# Patient Record
Sex: FEMALE | Race: WHITE | NOT HISPANIC OR LATINO | Employment: FULL TIME | URBAN - METROPOLITAN AREA
[De-identification: names, ages, dates, MRNs, and addresses within clinical notes are randomized per-mention and may not be internally consistent; named-entity substitution may affect disease eponyms.]

---

## 2023-09-16 VITALS — BODY MASS INDEX: 38.83 KG/M2 | HEIGHT: 62 IN | WEIGHT: 211 LBS

## 2023-09-26 DIAGNOSIS — M25.511 BILATERAL SHOULDER PAIN, UNSPECIFIED CHRONICITY: Primary | ICD-10-CM

## 2023-09-26 DIAGNOSIS — M25.572 LEFT ANKLE PAIN, UNSPECIFIED CHRONICITY: Primary | ICD-10-CM

## 2023-09-26 DIAGNOSIS — M25.512 BILATERAL SHOULDER PAIN, UNSPECIFIED CHRONICITY: Primary | ICD-10-CM

## 2023-09-28 ENCOUNTER — HOSPITAL ENCOUNTER (OUTPATIENT)
Dept: DATA CONVERSION | Facility: HOSPITAL | Age: 63
Discharge: HOME | End: 2023-09-28
Payer: COMMERCIAL

## 2023-09-29 ENCOUNTER — HOSPITAL ENCOUNTER (OUTPATIENT)
Dept: DATA CONVERSION | Facility: HOSPITAL | Age: 63
Discharge: HOME | End: 2023-09-29
Payer: COMMERCIAL

## 2023-09-29 DIAGNOSIS — Z51.89 ENCOUNTER FOR OTHER SPECIFIED AFTERCARE: ICD-10-CM

## 2023-09-30 DIAGNOSIS — G89.29 CHRONIC PAIN OF BOTH SHOULDERS: Primary | ICD-10-CM

## 2023-09-30 DIAGNOSIS — M25.512 CHRONIC PAIN OF BOTH SHOULDERS: Primary | ICD-10-CM

## 2023-09-30 DIAGNOSIS — M25.511 CHRONIC PAIN OF BOTH SHOULDERS: Primary | ICD-10-CM

## 2023-10-01 DIAGNOSIS — G89.29 CHRONIC PAIN OF LEFT ANKLE: Primary | Chronic | ICD-10-CM

## 2023-10-01 DIAGNOSIS — M25.572 CHRONIC PAIN OF LEFT ANKLE: Primary | Chronic | ICD-10-CM

## 2023-10-01 DIAGNOSIS — Z98.1 S/P ANKLE ARTHRODESIS: ICD-10-CM

## 2023-10-02 ENCOUNTER — TREATMENT (OUTPATIENT)
Dept: PHYSICAL THERAPY | Facility: CLINIC | Age: 63
End: 2023-10-02
Payer: COMMERCIAL

## 2023-10-02 DIAGNOSIS — M25.511 CHRONIC PAIN OF BOTH SHOULDERS: Primary | ICD-10-CM

## 2023-10-02 DIAGNOSIS — M25.512 BILATERAL SHOULDER PAIN, UNSPECIFIED CHRONICITY: ICD-10-CM

## 2023-10-02 DIAGNOSIS — G89.29 CHRONIC PAIN OF BOTH SHOULDERS: Primary | ICD-10-CM

## 2023-10-02 DIAGNOSIS — M25.511 BILATERAL SHOULDER PAIN, UNSPECIFIED CHRONICITY: ICD-10-CM

## 2023-10-02 DIAGNOSIS — G89.29 CHRONIC PAIN OF LEFT ANKLE: Chronic | ICD-10-CM

## 2023-10-02 DIAGNOSIS — M25.572 LEFT ANKLE PAIN, UNSPECIFIED CHRONICITY: Primary | ICD-10-CM

## 2023-10-02 DIAGNOSIS — M25.572 CHRONIC PAIN OF LEFT ANKLE: Chronic | ICD-10-CM

## 2023-10-02 DIAGNOSIS — M25.512 CHRONIC PAIN OF BOTH SHOULDERS: Primary | ICD-10-CM

## 2023-10-02 PROCEDURE — 97140 MANUAL THERAPY 1/> REGIONS: CPT | Mod: GP,CQ

## 2023-10-02 PROCEDURE — 97110 THERAPEUTIC EXERCISES: CPT | Mod: GP,CQ

## 2023-10-02 PROCEDURE — 97035 APP MDLTY 1+ULTRASOUND EA 15: CPT | Mod: GP,CQ

## 2023-10-02 ASSESSMENT — PAIN - FUNCTIONAL ASSESSMENT
PAIN_FUNCTIONAL_ASSESSMENT: 0-10
PAIN_FUNCTIONAL_ASSESSMENT: 0-10

## 2023-10-02 ASSESSMENT — PAIN SCALES - GENERAL
PAINLEVEL_OUTOF10: 0 - NO PAIN
PAINLEVEL_OUTOF10: 3

## 2023-10-02 NOTE — LETTER
October 2, 2023     Patient: Maureen Vela   YOB: 1960   Date of Visit: 10/2/2023       To Whom it May Concern:    Maureen Vela was seen in my clinic on 10/2/2023. She {Return to school/sport:83005}.    If you have any questions or concerns, please don't hesitate to call.         Sincerely,          Maykel Lr, PTA        CC: No Recipients

## 2023-10-02 NOTE — PROGRESS NOTES
Physical Therapy    Physical Therapy Treatment    Patient Name: Maureen Vela  MRN: [id number]  Today's Date: 10/2/2023  Time Calculation  Start Time: 1633  Stop Time: 1719  Time Calculation (min): 46 min      Assessment:  PT Assessment  Assessment Comment: Pt treatment focused on HEP review and modalities to reduce pain in L ankle.    Plan:  OP PT Plan  Treatment/Interventions: Dry needling, Education/ Instruction, Electrical stimulation, Manual therapy, Neuromuscular re-education, Ultrasound, Therapeutic exercises, Therapeutic activities, Hot pack, Cryotherapy  PT Plan: Skilled PT  PT Frequency: 2 times per week  Duration: 6 wks  Number of Treatments Authorized: MN  Rehab Potential: Good  Plan of Care Agreement: Patient    Current Problem  1. Left ankle pain, unspecified chronicity  PT eval and treat      2. Chronic pain of left ankle            Subjective   General  General Comment: Pt states her ankle is feeling swollen, as usual by end of day.    Pain  Pain Assessment: 0-10  Pain Score: 3    Objective     Treatments:  Therapeutic Exercise  Therapeutic Exercise Performed: Yes  Therapeutic Exercise Activity 1: Toe yoga- great/lesser toe flex/ext, scrunches, splaying  Therapeutic Exercise Activity 2: Ankle circles and ABC  Therapeutic Exercise Activity 3: Ankle isometrics PF, DF, Inv, Evr  Therapeutic Exercise Activity 4: seated heel raises    Modalities  Modalities Used: Yes  Modality 1: Timed Ultrasound (1Mz 1.5w/cm2 100% L medial ankle)

## 2023-10-02 NOTE — LETTER
October 2, 2023     Patient: Maureen Vela   YOB: 1960   Date of Visit: 10/2/2023       To Whom It May Concern:    It is my medical opinion that Maureen Vela {Work release (duty restriction):89469}.    If you have any questions or concerns, please don't hesitate to call.         Sincerely,        Maykel Lr, PTA    CC: No Recipients

## 2023-10-02 NOTE — Clinical Note
October 2, 2023     Patient: Maureen Vela   YOB: 1960   Date of Visit: 10/2/2023       To Whom it May Concern:    Maureen Vela was seen in my clinic on 10/2/2023. She {Return to school/sport:37507}.    If you have any questions or concerns, please don't hesitate to call.         Sincerely,          Maykel Lr, PTA        CC: No Recipients

## 2023-10-02 NOTE — Clinical Note
October 2, 2023     Patient: Maureen Vela   YOB: 1960   Date of Visit: 10/2/2023       To Whom It May Concern:    It is my medical opinion that Maureen Vela {Work release (duty restriction):98543}.    If you have any questions or concerns, please don't hesitate to call.         Sincerely,        Maykel Lr, PTA    CC: No Recipients

## 2023-10-02 NOTE — PROGRESS NOTES
Physical Therapy    Physical Therapy Treatment    Patient Name: Maureen Vela  MRN: [id number]  Today's Date: 10/2/2023  Time Calculation  Start Time: 1549  Stop Time: 1633  Time Calculation (min): 44 min      Assessment:  PT Assessment  Evaluation/Treatment Tolerance: Other (Comment) (R shoulder strengthening deferred due to pain)  Assessment Comment: Focused on L shoulder strengthening    Plan:  OP PT Plan  Treatment/Interventions: Dry needling, Education/ Instruction, Electrical stimulation, Manual therapy, Neuromuscular re-education, Ultrasound, Therapeutic exercises, Therapeutic activities, Hot pack, Cryotherapy  PT Plan: Skilled PT  PT Frequency: 2 times per week  Duration: 6 wks  Number of Treatments Authorized: MN  Rehab Potential: Good  Plan of Care Agreement: Patient    Current Problem  1. Chronic pain of both shoulders        2. Bilateral shoulder pain, unspecified chronicity  PT eval and treat          Subjective   General  General Comment: Pt states she is a little sore in R shoulder blade, started up last evening while folding laundry.    Pain  Pain Assessment: 0-10  Pain Score: 0 - No pain (sore/stiff)  Pain Type: Other (Comment) (ache/stiff)    Objective     Treatments:  Therapeutic Exercise  Therapeutic Exercise Performed: Yes  Therapeutic Exercise Activity 1: Pulley scaption 4 min  Therapeutic Exercise Activity 2: Tband row green 2x15 L only  Therapeutic Exercise Activity 3: Tband ext orange 2x15 L only  Therapeutic Exercise Activity 4: Tband ER orange , IR green 2x15 L only  Therapeutic Exercise Activity 5: Standing scaption/abduction raises x10 L only    Manual Therapy  Manual Therapy Activity 1: R shoulder oscillations and PROM with distraction    Modalities  Modality 1:  (1Mz 1.5w/cm2 R posterior shoulder seated)

## 2023-10-04 PROBLEM — N95.0 POSTMENOPAUSAL BLEEDING: Status: ACTIVE | Noted: 2023-10-04

## 2023-10-04 PROBLEM — Z96.659 ARTIFICIAL KNEE JOINT PRESENT: Status: ACTIVE | Noted: 2023-10-04

## 2023-10-04 PROBLEM — J30.9 ALLERGIC RHINITIS: Status: ACTIVE | Noted: 2023-10-04

## 2023-10-04 PROBLEM — R76.8 ANA POSITIVE: Status: ACTIVE | Noted: 2023-10-04

## 2023-10-04 PROBLEM — E78.5 HYPERLIPIDEMIA: Status: ACTIVE | Noted: 2023-10-04

## 2023-10-04 PROBLEM — G62.9 NEUROPATHY: Status: ACTIVE | Noted: 2023-10-04

## 2023-10-04 PROBLEM — M79.10 MYALGIA: Status: ACTIVE | Noted: 2023-10-04

## 2023-10-04 PROBLEM — G89.29 OTHER CHRONIC PAIN: Status: ACTIVE | Noted: 2023-10-04

## 2023-10-04 PROBLEM — J45.909 ASTHMA (HHS-HCC): Status: ACTIVE | Noted: 2023-10-04

## 2023-10-04 PROBLEM — G43.909 MIGRAINE: Status: ACTIVE | Noted: 2023-10-04

## 2023-10-04 PROBLEM — L20.9 ATOPIC DERMATITIS: Status: ACTIVE | Noted: 2023-10-04

## 2023-10-04 PROBLEM — E03.9 HYPOTHYROID: Status: ACTIVE | Noted: 2023-10-04

## 2023-10-04 PROBLEM — R51.9 HEADACHE: Status: ACTIVE | Noted: 2023-10-04

## 2023-10-04 PROBLEM — F41.9 ANXIETY: Status: ACTIVE | Noted: 2023-10-04

## 2023-10-04 PROBLEM — C44.319 BASAL CELL CARCINOMA OF SKIN OF OTHER PARTS OF FACE: Status: ACTIVE | Noted: 2019-08-22

## 2023-10-04 PROBLEM — E55.9 VITAMIN D DEFICIENCY: Status: ACTIVE | Noted: 2023-10-04

## 2023-10-04 PROBLEM — K52.9 CHRONIC DIARRHEA: Status: ACTIVE | Noted: 2023-10-04

## 2023-10-04 PROBLEM — I10 BENIGN ESSENTIAL HYPERTENSION: Status: ACTIVE | Noted: 2023-10-04

## 2023-10-04 PROBLEM — D84.9 ACQUIRED IMMUNOCOMPROMISED STATE (MULTI): Status: ACTIVE | Noted: 2023-10-04

## 2023-10-04 PROBLEM — K80.12 CALCULUS OF GALLBLADDER WITH ACUTE ON CHRONIC CHOLECYSTITIS WITHOUT OBSTRUCTION: Status: ACTIVE | Noted: 2023-10-04

## 2023-10-04 PROBLEM — M47.817 SPONDYLOSIS OF LUMBOSACRAL SPINE WITHOUT MYELOPATHY: Status: ACTIVE | Noted: 2023-10-04

## 2023-10-04 PROBLEM — K21.9 GASTROESOPHAGEAL REFLUX DISEASE: Status: ACTIVE | Noted: 2023-10-04

## 2023-10-04 PROBLEM — M05.79 SEROPOSITIVE RHEUMATOID ARTHRITIS OF MULTIPLE JOINTS (MULTI): Status: ACTIVE | Noted: 2023-10-04

## 2023-10-04 PROBLEM — M17.9 OSTEOARTHRITIS OF KNEE: Status: ACTIVE | Noted: 2023-10-04

## 2023-10-04 PROBLEM — R20.2 PARESTHESIA: Status: ACTIVE | Noted: 2023-10-04

## 2023-10-04 PROBLEM — M46.1 BILATERAL SACROILIITIS (CMS-HCC): Status: ACTIVE | Noted: 2023-10-04

## 2023-10-04 PROBLEM — E66.9 OBESITY WITH BODY MASS INDEX 30 OR GREATER: Status: ACTIVE | Noted: 2023-10-04

## 2023-10-04 PROBLEM — D84.9 IMMUNODEFICIENCY (MULTI): Status: ACTIVE | Noted: 2023-10-04

## 2023-10-04 RX ORDER — AMLODIPINE BESYLATE 5 MG/1
1 TABLET ORAL DAILY
COMMUNITY

## 2023-10-04 RX ORDER — PROPRANOLOL HYDROCHLORIDE 80 MG/1
1 CAPSULE, EXTENDED RELEASE ORAL DAILY
COMMUNITY
End: 2024-04-17

## 2023-10-04 RX ORDER — TIZANIDINE HYDROCHLORIDE 4 MG/1
4 TABLET ORAL EVERY 8 HOURS PRN
COMMUNITY
End: 2024-02-01

## 2023-10-04 RX ORDER — MONTELUKAST SODIUM 10 MG/1
10 TABLET, FILM COATED ORAL EVERY EVENING
COMMUNITY
End: 2023-11-01 | Stop reason: WASHOUT

## 2023-10-04 RX ORDER — FOLIC ACID 1 MG/1
1 TABLET ORAL DAILY
COMMUNITY

## 2023-10-04 RX ORDER — MINERAL OIL
180 ENEMA (ML) RECTAL DAILY PRN
COMMUNITY
End: 2023-11-01 | Stop reason: WASHOUT

## 2023-10-04 RX ORDER — ALBUTEROL SULFATE 90 UG/1
2 AEROSOL, METERED RESPIRATORY (INHALATION) EVERY 4 HOURS PRN
COMMUNITY

## 2023-10-04 RX ORDER — PREDNISONE 10 MG/1
10 TABLET ORAL DAILY PRN
COMMUNITY
Start: 2023-05-18 | End: 2024-01-25 | Stop reason: WASHOUT

## 2023-10-04 RX ORDER — ASPIRIN 325 MG
325 TABLET ORAL DAILY
COMMUNITY

## 2023-10-04 RX ORDER — LEVOCETIRIZINE DIHYDROCHLORIDE 5 MG/1
TABLET ORAL
COMMUNITY
End: 2023-11-01 | Stop reason: WASHOUT

## 2023-10-04 RX ORDER — AMOXICILLIN AND CLAVULANATE POTASSIUM 875; 125 MG/1; MG/1
TABLET, FILM COATED ORAL
COMMUNITY
Start: 2022-12-02 | End: 2023-11-01 | Stop reason: WASHOUT

## 2023-10-04 RX ORDER — TRIAMCINOLONE ACETONIDE 1 MG/G
1 CREAM TOPICAL 2 TIMES WEEKLY
COMMUNITY

## 2023-10-04 RX ORDER — LISINOPRIL 5 MG/1
5 TABLET ORAL DAILY
COMMUNITY
End: 2023-11-01 | Stop reason: WASHOUT

## 2023-10-04 RX ORDER — CHOLECALCIFEROL (VITAMIN D3) 50 MCG
2000 TABLET ORAL DAILY
COMMUNITY

## 2023-10-04 RX ORDER — LEVOTHYROXINE SODIUM 75 UG/1
1 TABLET ORAL DAILY
COMMUNITY
End: 2023-12-07 | Stop reason: SDUPTHER

## 2023-10-04 RX ORDER — CELECOXIB 200 MG/1
200 CAPSULE ORAL DAILY
COMMUNITY
End: 2023-11-01 | Stop reason: WASHOUT

## 2023-10-04 RX ORDER — OMEPRAZOLE 20 MG/1
1 CAPSULE, DELAYED RELEASE ORAL DAILY
COMMUNITY
End: 2023-11-15 | Stop reason: SDUPTHER

## 2023-10-04 RX ORDER — GABAPENTIN 300 MG/1
1 CAPSULE ORAL DAILY
COMMUNITY
End: 2023-11-01 | Stop reason: WASHOUT

## 2023-10-04 RX ORDER — AMOXICILLIN 500 MG/1
2000 CAPSULE ORAL
COMMUNITY
Start: 2023-09-13

## 2023-10-04 RX ORDER — AZELASTINE HYDROCHLORIDE, FLUTICASONE PROPIONATE 137; 50 UG/1; UG/1
1 SPRAY, METERED NASAL 2 TIMES DAILY
COMMUNITY

## 2023-10-04 RX ORDER — RIZATRIPTAN BENZOATE 10 MG/1
10 TABLET ORAL DAILY PRN
COMMUNITY
Start: 2014-11-11

## 2023-10-04 RX ORDER — LEFLUNOMIDE 20 MG/1
1 TABLET ORAL DAILY
COMMUNITY
End: 2024-01-25 | Stop reason: WASHOUT

## 2023-10-04 RX ORDER — LORATADINE PSEUDOEPHEDRINE SULFATE 10; 240 MG/1; MG/1
1 TABLET, EXTENDED RELEASE ORAL DAILY PRN
COMMUNITY
End: 2023-11-01 | Stop reason: WASHOUT

## 2023-10-04 RX ORDER — TRAMADOL HYDROCHLORIDE 50 MG/1
50 TABLET ORAL 2 TIMES DAILY PRN
COMMUNITY
End: 2023-11-01 | Stop reason: SDUPTHER

## 2023-10-04 RX ORDER — ALBUTEROL SULFATE 90 UG/1
2 AEROSOL, METERED RESPIRATORY (INHALATION) EVERY 4 HOURS PRN
COMMUNITY
Start: 2023-06-12 | End: 2023-11-01 | Stop reason: WASHOUT

## 2023-10-04 RX ORDER — CLONIDINE HYDROCHLORIDE 0.1 MG/1
0.1 TABLET ORAL DAILY PRN
COMMUNITY

## 2023-10-04 RX ORDER — VIT C/E/ZN/COPPR/LUTEIN/ZEAXAN 250MG-90MG
25 CAPSULE ORAL DAILY
COMMUNITY
End: 2024-01-24 | Stop reason: ALTCHOICE

## 2023-10-05 ENCOUNTER — TREATMENT (OUTPATIENT)
Dept: PHYSICAL THERAPY | Facility: CLINIC | Age: 63
End: 2023-10-05
Payer: COMMERCIAL

## 2023-10-05 DIAGNOSIS — M25.511 CHRONIC PAIN OF BOTH SHOULDERS: Primary | ICD-10-CM

## 2023-10-05 DIAGNOSIS — G89.29 CHRONIC PAIN OF LEFT ANKLE: Primary | Chronic | ICD-10-CM

## 2023-10-05 DIAGNOSIS — M25.572 CHRONIC PAIN OF LEFT ANKLE: Primary | Chronic | ICD-10-CM

## 2023-10-05 DIAGNOSIS — M25.511 BILATERAL SHOULDER PAIN, UNSPECIFIED CHRONICITY: ICD-10-CM

## 2023-10-05 DIAGNOSIS — M25.572 LEFT ANKLE PAIN, UNSPECIFIED CHRONICITY: ICD-10-CM

## 2023-10-05 DIAGNOSIS — G89.29 CHRONIC PAIN OF BOTH SHOULDERS: Primary | ICD-10-CM

## 2023-10-05 DIAGNOSIS — M25.512 CHRONIC PAIN OF BOTH SHOULDERS: Primary | ICD-10-CM

## 2023-10-05 DIAGNOSIS — M25.512 BILATERAL SHOULDER PAIN, UNSPECIFIED CHRONICITY: ICD-10-CM

## 2023-10-05 PROCEDURE — 97110 THERAPEUTIC EXERCISES: CPT | Mod: GP,CQ

## 2023-10-05 PROCEDURE — 97035 APP MDLTY 1+ULTRASOUND EA 15: CPT | Mod: GP,CQ

## 2023-10-05 PROCEDURE — 97110 THERAPEUTIC EXERCISES: CPT | Mod: GP | Performed by: PHYSICAL THERAPIST

## 2023-10-05 PROCEDURE — 97140 MANUAL THERAPY 1/> REGIONS: CPT | Mod: GP,CQ

## 2023-10-05 PROCEDURE — 97140 MANUAL THERAPY 1/> REGIONS: CPT | Mod: GP | Performed by: PHYSICAL THERAPIST

## 2023-10-05 PROCEDURE — 97035 APP MDLTY 1+ULTRASOUND EA 15: CPT | Mod: GP | Performed by: PHYSICAL THERAPIST

## 2023-10-05 ASSESSMENT — PAIN - FUNCTIONAL ASSESSMENT
PAIN_FUNCTIONAL_ASSESSMENT: 0-10
PAIN_FUNCTIONAL_ASSESSMENT: 0-10

## 2023-10-05 ASSESSMENT — PAIN SCALES - GENERAL: PAINLEVEL_OUTOF10: 4

## 2023-10-05 NOTE — PROGRESS NOTES
"Physical Therapy Treatment    Patient Name: Maureen Vela  MRN: [id number]  Today's Date: 10/5/2023  Time Calculation  Start Time: 0300 (visit 5)  Stop Time: 0345  Time Calculation (min): 45 min    Current Problem   1. Chronic pain of both shoulders        2. Bilateral shoulder pain, unspecified chronicity  PT eval and treat          Subjective   General    Pt RUE still hurting more than LUE, more ROM directions are painful with RUE. Some days she can write on white board with RUE, some days she cannot.   Pain   Pain Assessment: 0-10  Pain Score: 4  Pain Type: Acute pain  Pain Location: Shoulder    Objective   Findings: L CAM boot donned  Flexion, horiz add and abd and arm behind back hurt RUE    Treatments:   THER EX  BUE:  UBE 6min F/B L1  Pulleys 3min  Wall slides x10  Rows-red felt more in R    R SHOULDER:  isometrics flx, abd, add, ext, ER, IR 10x5\"   Tband IR step out green x10 , green x10   Tband ER step out green x10  (sore) weaker  Rows-red  L SHOULDER:  Tband ext green 2x10  Tband add gr, ER/IR ROM gr, rows green x15    MANUAL:  PROM, oscialltions for pain relief, STM L UT  joint mobs, post/inf; BUE's    MODALITIES:  Ultrasound: Continuous at 100%, 1mHz at 1.6w/cm2, applied to R shoulder, in Supine , for 8 minutes   Assessment:   PT Assessment  Assessment Comment: Pt treatment focused on HEP review and modalities to reduce pain in R shldr. No pain reported in RUE during there ex, just discomfort or fatigue, LUE c/o clicking. Pt tolerated increased resistance with L shoudler exercises without significantly increased pain.  Plan:  OP PT Plan  Treatment/Interventions: Dry needling, Education/ Instruction, Electrical stimulation, Manual therapy, Neuromuscular re-education, Ultrasound, Therapeutic exercises, Therapeutic activities, Hot pack, Cryotherapy  PT Plan: Skilled PT  PT Frequency: 2 times per week  Duration: 6+ wks    Goals:   STG's    1. Pt will tolerate lifting her arm to write on white board at " school without pain or impaired ROM. 2. 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 3. Pt will sleep >4 hours without disturbance from shoulder pain.   LTG's  1. Pt will sleep >6 hours without disturbance from shoulder pain. 2. Pt will perform all job duties and household chores without increased shoulder pain or compensation. 3. Pt will complete raking nad vacuuming/sweeping without increased shoulder pain. 4. SPADI <20%

## 2023-10-05 NOTE — PROGRESS NOTES
Physical Therapy Treatment    Patient Name: Maureen Vela  MRN: [id number]  Today's Date: 10/5/2023  Time Calculation  Start Time: 0345 (3/10-12)  Stop Time: 0430  Time Calculation (min): 45 min    Current Problem   1. Chronic pain of left ankle        2. Left ankle pain, unspecified chronicity  PT eval and treat          Subjective   General      Pain   Pain Assessment: 0-10  Pain Location: Ankle    Objective   Findings:     Treatments:  Therapeutic Exercise  Therapeutic Exercise Performed: Yes  Therapeutic Exercise Activity 1: Toe yoga- great/lesser toe flex/ext, scrunches, splaying  Therapeutic Exercise Activity 2: Ankle circles and ABC  Therapeutic Exercise Activity 3: Ankle isometrics PF, DF, Inv, Evr  Therapeutic Exercise Activity 4: seated heel raises  Therapeutic Exercise Activity 5: towel stretch 3x20 seconds  Therapeutic Exercise Activity 6: RB seated ap only 10x  Therapeutic Exercise Activity 7: towel crunches one minute    STM with patient elevated foot, efflurage to help decrease swelling 10 min    Modalities  Modalities Used: Yes  Modality 1: Timed Ultrasound (1.5 pulsed 8min)    Assessment:   PT Assessment  Assessment Comment: Patient following guidlines as best as possible. To see MD OCt 10.Instructed in efflurage with foot elevated folloe by anklepumps.    Plan:   OP PT Plan  PT Plan: Skilled PT    Goals:

## 2023-10-09 ENCOUNTER — TREATMENT (OUTPATIENT)
Dept: PHYSICAL THERAPY | Facility: CLINIC | Age: 63
End: 2023-10-09
Payer: COMMERCIAL

## 2023-10-09 DIAGNOSIS — M25.512 CHRONIC PAIN OF BOTH SHOULDERS: Primary | ICD-10-CM

## 2023-10-09 DIAGNOSIS — G89.29 CHRONIC PAIN OF BOTH SHOULDERS: Primary | ICD-10-CM

## 2023-10-09 DIAGNOSIS — M25.572 LEFT ANKLE PAIN, UNSPECIFIED CHRONICITY: ICD-10-CM

## 2023-10-09 DIAGNOSIS — M25.572 CHRONIC PAIN OF LEFT ANKLE: Primary | Chronic | ICD-10-CM

## 2023-10-09 DIAGNOSIS — G89.29 CHRONIC PAIN OF LEFT ANKLE: Primary | Chronic | ICD-10-CM

## 2023-10-09 DIAGNOSIS — M25.512 BILATERAL SHOULDER PAIN, UNSPECIFIED CHRONICITY: ICD-10-CM

## 2023-10-09 DIAGNOSIS — M25.511 BILATERAL SHOULDER PAIN, UNSPECIFIED CHRONICITY: ICD-10-CM

## 2023-10-09 DIAGNOSIS — M25.511 CHRONIC PAIN OF BOTH SHOULDERS: Primary | ICD-10-CM

## 2023-10-09 PROCEDURE — 97110 THERAPEUTIC EXERCISES: CPT | Mod: GP | Performed by: PHYSICAL THERAPIST

## 2023-10-09 PROCEDURE — 97035 APP MDLTY 1+ULTRASOUND EA 15: CPT | Mod: GP | Performed by: PHYSICAL THERAPIST

## 2023-10-09 PROCEDURE — 97140 MANUAL THERAPY 1/> REGIONS: CPT | Mod: GP | Performed by: PHYSICAL THERAPIST

## 2023-10-09 PROCEDURE — 97026 INFRARED THERAPY: CPT | Mod: GP | Performed by: PHYSICAL THERAPIST

## 2023-10-09 NOTE — PROGRESS NOTES
Physical Therapy Treatment    Patient Name: Maureen Vela  MRN: [id number]  Today's Date: 10/9/2023  Time Calculation  Start Time: 0415 (VISIT 4)  Stop Time: 0500  Time Calculation (min): 45 min    Current Problem   1. Chronic pain of left ankle        2. Left ankle pain, unspecified chronicity  PT eval and treat          Subjective   General   Reason for Referral: L ankle pain  General Comment: Pt felt good after last session from STM and US. But she has to change positions a lot in bed 2/2 ankle discomfort.Pt wearing a compression sleeve and pumped up the CAM boot with more air before work today, ankle feels better bc of that and notices less swelling at end of day .  Pain    4-5/10    Objective   Findings:     Treatments:   THER EX:  Supine ankle AROM: PF/DF/INV/EV/CW/CCW x30  SAQ/LAQ 4# x15  SLR's x15  Ball bridges x15    *Next session LAQ and HSC machines with boot on  MANUAL:  STM with patient elevated foot, efflurage to help decrease swelling, gentle joint mobilizaations and glides for ROM. STM and TPR to calf and peroneals, gastroc and soleus stretches  MODALITIES:  Light Therapy: Infrared/Red wave length; 10J/cm2 applied w/pads; 10 mins; applied to L ankle, in Supine , with wedge        Assessment:   PT Assessment  Assessment Comment: ankle AROM, STM and modalities for pain. Feels good when she is out of boot. Only performing open kinetic chain AROM and anti-embolics    Plan:    Pt to see Dr. Treviño tomorrow, will know if she can come out of the boot.    Goals:     STG  1. Patient will demonstrate independence and report compliance with HEP to facilitate independent rehab program upon discharge.  2. Pt will demo ability to fully weight bear through LLE.  LT. Patient will report decrease in pain by > 2 points to meet MCID  2. Pt will increase muscle strength by >/= 1/2 MMT to provide improved stability and ability to perform activities such as normal, resume full work duties without pain, able to  perform ADLs/IADLs without pain.  3. Patient will demo increase of LILLY by 3 points to demo improved function and meet MDC.  4. Pt will be able to ambulate without CAM boot with proper gait mechanics.

## 2023-10-09 NOTE — PROGRESS NOTES
"Physical Therapy Treatment    Patient Name: Maureen Vela  MRN: [id number]  Today's Date: 10/9/2023  Time Calculation  Start Time: 0330 (visit 6 shoulder)  Stop Time: 0415  Time Calculation (min): 45 min    Current Problem   1. Chronic pain of both shoulders  Follow Up In Physical Therapy      2. Bilateral shoulder pain, unspecified chronicity  PT eval and treat    Follow Up In Physical Therapy          Subjective   General   Reason for Referral: B shoulder pain/weakness R>L  General Comment: Pt felt fine after thursdays appt, was sore after vacuuming sunday.Pt RUE still hurting more than LUE, more ROM directions are painful with RUE. Some days she can write on white board with RUE, some days she cannot.   Pain    2-3/10 R shoulder    Objective   Findings: L CAM boot donned- dayanna's Dr. Treviño 10/10/23  Flexion, horiz add and abd and arm behind back hurt RUE    Treatments:   THER EX  BUE:  UBE 6min F/B L2  Pulleys 3min  Wall slides x15  Rows-red felt more in R  Biceps curls 4#  Bench press with dowel 3# x10  R SHOULDER:  isometrics flx, abd, add, ext, ER, IR 10x5\" - HEP  Tband IR step out green x10  Tband ER step out red x10  (sore) weaker  Rows-green x15  Shldr add- green x15    L SHOULDER:  Tband ext green 2x10  Tband add gr, ER/IR ROM blue 2x15, rows blue x15    MANUAL:  PROM, oscialltions for pain relief, STM BUT's  joint mobs, post/inf; RUE    MODALITIES:  Ultrasound: Continuous at 100%, 1mHz at 1.6w/cm2, applied to R shoulder, in Supine , for 8 minutes   Assessment:   PT Assessment  Assessment Comment: Pt treatment focused on HEP review and modalities to reduce pain in R shldr. No pain reported in RUE during there ex, just discomfort or fatigue, LUE c/o clicking. Pt tolerated increased resistance with L shoulder exercises without significantly increased pain.  Plan:  OP PT Plan  Treatment/Interventions: Dry needling, Education/ Instruction, Electrical stimulation, Manual therapy, Neuromuscular re-education, Self " care/ home management, Therapeutic exercises, Ultrasound  PT Plan: Skilled PT  PT Frequency: 2 times per week  Duration: 6-8+wks    Goals:   STG's    1. Pt will tolerate lifting her arm to write on white board at school without pain or impaired ROM. 2. 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 3. Pt will sleep >4 hours without disturbance from shoulder pain.   LTG's  1. Pt will sleep >6 hours without disturbance from shoulder pain. 2. Pt will perform all job duties and household chores without increased shoulder pain or compensation. 3. Pt will complete raking nad vacuuming/sweeping without increased shoulder pain. 4. SPADI <20%

## 2023-10-13 ENCOUNTER — TREATMENT (OUTPATIENT)
Dept: PHYSICAL THERAPY | Facility: CLINIC | Age: 63
End: 2023-10-13
Payer: COMMERCIAL

## 2023-10-13 DIAGNOSIS — G89.29 CHRONIC PAIN OF LEFT ANKLE: Primary | Chronic | ICD-10-CM

## 2023-10-13 DIAGNOSIS — M25.572 CHRONIC PAIN OF LEFT ANKLE: Primary | Chronic | ICD-10-CM

## 2023-10-13 DIAGNOSIS — M25.511 BILATERAL SHOULDER PAIN, UNSPECIFIED CHRONICITY: ICD-10-CM

## 2023-10-13 DIAGNOSIS — M25.512 BILATERAL SHOULDER PAIN, UNSPECIFIED CHRONICITY: ICD-10-CM

## 2023-10-13 DIAGNOSIS — G89.29 CHRONIC PAIN OF BOTH SHOULDERS: Primary | ICD-10-CM

## 2023-10-13 DIAGNOSIS — M25.511 CHRONIC PAIN OF BOTH SHOULDERS: Primary | ICD-10-CM

## 2023-10-13 DIAGNOSIS — M25.572 LEFT ANKLE PAIN, UNSPECIFIED CHRONICITY: ICD-10-CM

## 2023-10-13 DIAGNOSIS — M25.512 CHRONIC PAIN OF BOTH SHOULDERS: Primary | ICD-10-CM

## 2023-10-13 PROCEDURE — 97110 THERAPEUTIC EXERCISES: CPT | Mod: GP | Performed by: PHYSICAL THERAPIST

## 2023-10-13 ASSESSMENT — PAIN - FUNCTIONAL ASSESSMENT: PAIN_FUNCTIONAL_ASSESSMENT: 0-10

## 2023-10-13 ASSESSMENT — PAIN SCALES - GENERAL: PAINLEVEL_OUTOF10: 6

## 2023-10-13 NOTE — PROGRESS NOTES
"Physical Therapy Treatment    Patient Name: Maureen Vela  MRN: [id number]  Today's Date: 10/13/2023  Visit 7 of 12   Time Calculation  Start Time: 1250  Stop Time: 1340  Time Calculation (min): 50 min  PT Therapeutic Procedures Time Entry  Manual Therapy Time Entry: 5  Therapeutic Exercise Time Entry: 40     Current Problem   1. Chronic pain of both shoulders        2. Bilateral shoulder pain, unspecified chronicity  PT eval and treat          Subjective   General    Had a rough night. Did a lot of carrying her laptop yesterday.   (Pt RUE still hurting more than LUE, more ROM directions are painful with RUE. Some days she can write on white board with RUE, some days she cannot.)  Pain    3/10 R shoulder    Objective   Findings: L CAM boot donned- dayanna's Dr. Treviño 10/10/23  Flexion, horiz add and abd and arm behind back hurt RUE    Treatments:   THER EX  BUE:  UBE 6min F/B L2  Pulleys 3min  Wall slides 2x10  Rows - red felt more in R  Biceps/hammer curls 5# 2x10  Bench press with dowel 3# x10 dnp    R SHOULDER:  isometrics flx, abd, add, ext, ER, IR 10x5\" - HEP  Tband IR/ER orange step out green x10  Tband ER step out red x10  (sore) weaker  Rows-green x20  Shldr add- green x20  Tband ext green 2x10  S/L R shoulder RS at 90deg abd    L SHOULDER:  Tband ext green 2x10  Tband add gr, ER/IR ROM blue x20, rows blue x20    MANUAL:  R scap mobs in S/L, STM R rhomboids  joint mobs, post/inf; RUE, oscialltions for pain relief,     MODALITIES: DNP  Ultrasound: Continuous at 100%, 1mHz at 1.6w/cm2, applied to R shoulder, in Supine , for 8 minutes   Assessment:    Some R shoulder discomfort with band strengthening, but no cues needed for technique. R medial shoulder blade and thoracic stabbing pain noted with end-range R shoulder elevation ROM/pulleys, addressed with subsequent scap mobs and STM MT.  Pt tolerated increased resistance with B shoulder exercises fairly well.   Plan:   Continued BUE (R>L) strength functional " progressions    Goals:   Active       PT Problem       LTGs       Start:  09/30/23    Expected End:  12/08/23       1. Pt will sleep >6 hours without disturbance from shoulder pain. 2. Pt will perform all job duties and household chores without increased shoulder pain or compensation. 3. Pt will complete raking nad vacuuming/sweeping without increased shoulder pain. 4. SPADI to <20% impairment. 5. Improve bilateral shoulder AROM and MMT to WNL for less difficulty with mobility, work, and ADLs.            PT problem       STG       Start:  10/13/23    Expected End:  11/10/23       1. Pt will tolerate lifting her arm to write on white board at school without pain or impaired ROM. 2. 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 3. Pt will sleep >4 hours without disturbance from shoulder pain.

## 2023-10-13 NOTE — PROGRESS NOTES
"Physical Therapy Treatment    Patient Name: Maureen Vela  MRN: [id number]  Today's Date: 10/13/2023  Visit 5 of 12 ankle  Time Calculation  Start Time: 1340     Current Problem   1. Chronic pain of left ankle        2. Left ankle pain, unspecified chronicity  PT eval and treat          Subjective   General   General Comment: Pt is now WB in LLE, having a lot of pain with it. \"Feels very unstable.\"Pt cleared for WB in LLE with Lace up ankle brace DONNED. Pt has been doing about an hour, but it is very painful. She is still in the boot at work.   Pain   Pain Assessment: 0-10  Pain Score: 6  5-6/10    Objective   Findings:     Treatments:   THER EX:  STS x10 no UE  Standing WS lateral, diagonal A/P  Seated HR/TR's  Supine ankle AROM: PF/DF/INV/EV/CW/CCW x30  SAQ/LAQ 4# x15  SLR's x15  Ball bridges x15    *Next session LAQ and HSC machines with boot on  MANUAL:  STM with patient elevated foot, efflurage to help decrease swelling, gentle joint mobilizations and glides for ROM. STM and TPR to calf and peroneals, gastroc and soleus stretches  MODALITIES:  Light Therapy: Infrared/Red wave length; 10J/cm2 applied w/pads; 10 mins; applied to L ankle, in Supine , with wedge        Assessment:   PT Assessment  Assessment Comment: Pt hada lot of pain and trouble with WBing in LLE with brace on, her heel is what is most painful. No swelling.     Plan:    Pt to cont to progress Wbing as tolerated and increase amount of time she is out of boot and in a brace with tennis shoe.   Goals:   Active       PT Problem       LTGs       Start:  10/01/23    Expected End:  11/12/23       1. Patient will report decrease in pain by > 2 points to meet MCID  2. Pt will increase muscle strength by >/= 1/2 MMT to provide improved stability and ability to perform activities such as normal, resume full work duties without pain, able to perform ADLs/IADLs without pain.  3. Patient will demo increase of LILLY by 3 points to demo improved function and " meet MDC.  4. Pt will be able to ambulate without CAM boot with proper gait mechanics.           STG  1. Patient will demonstrate independence and report compliance with HEP to facilitate independent rehab program upon discharge.  2. Pt will demo ability to fully weight bear through LLE.  LT. Patient will report decrease in pain by > 2 points to meet MCID  2. Pt will increase muscle strength by >/= 1/2 MMT to provide improved stability and ability to perform activities such as normal, resume full work duties without pain, able to perform ADLs/IADLs without pain.  3. Patient will demo increase of LILLY by 3 points to demo improved function and meet MDC.  4. Pt will be able to ambulate without CAM boot with proper gait mechanics.

## 2023-10-16 ENCOUNTER — TREATMENT (OUTPATIENT)
Dept: PHYSICAL THERAPY | Facility: CLINIC | Age: 63
End: 2023-10-16
Payer: COMMERCIAL

## 2023-10-16 DIAGNOSIS — M25.512 CHRONIC PAIN OF BOTH SHOULDERS: Primary | ICD-10-CM

## 2023-10-16 DIAGNOSIS — G89.29 CHRONIC PAIN OF LEFT ANKLE: Primary | Chronic | ICD-10-CM

## 2023-10-16 DIAGNOSIS — M25.511 CHRONIC PAIN OF BOTH SHOULDERS: Primary | ICD-10-CM

## 2023-10-16 DIAGNOSIS — G89.29 CHRONIC PAIN OF BOTH SHOULDERS: Primary | ICD-10-CM

## 2023-10-16 DIAGNOSIS — M25.511 BILATERAL SHOULDER PAIN, UNSPECIFIED CHRONICITY: ICD-10-CM

## 2023-10-16 DIAGNOSIS — M25.572 CHRONIC PAIN OF LEFT ANKLE: Primary | Chronic | ICD-10-CM

## 2023-10-16 DIAGNOSIS — M25.572 LEFT ANKLE PAIN, UNSPECIFIED CHRONICITY: ICD-10-CM

## 2023-10-16 DIAGNOSIS — M25.512 BILATERAL SHOULDER PAIN, UNSPECIFIED CHRONICITY: ICD-10-CM

## 2023-10-16 PROCEDURE — 97110 THERAPEUTIC EXERCISES: CPT | Mod: GP | Performed by: PHYSICAL THERAPIST

## 2023-10-16 PROCEDURE — 97026 INFRARED THERAPY: CPT | Mod: GP | Performed by: PHYSICAL THERAPIST

## 2023-10-16 PROCEDURE — 97035 APP MDLTY 1+ULTRASOUND EA 15: CPT | Mod: GP | Performed by: PHYSICAL THERAPIST

## 2023-10-16 PROCEDURE — 97014 ELECTRIC STIMULATION THERAPY: CPT | Mod: GP | Performed by: PHYSICAL THERAPIST

## 2023-10-16 ASSESSMENT — PAIN - FUNCTIONAL ASSESSMENT
PAIN_FUNCTIONAL_ASSESSMENT: 0-10
PAIN_FUNCTIONAL_ASSESSMENT: 0-10

## 2023-10-16 ASSESSMENT — PAIN SCALES - GENERAL
PAINLEVEL_OUTOF10: 3
PAINLEVEL_OUTOF10: 6

## 2023-10-16 NOTE — PROGRESS NOTES
Physical Therapy Treatment    Patient Name: Maureen Vela  MRN: [id number]  Today's Date: 10/16/2023  Visit 5 of 12 ankle  Time Calculation  Start Time: 1445  Stop Time: 1530  Time Calculation (min): 45 min  PT Modalities Time Entry  Infrared Time Entry: (S) 10  Ultrasound Time Entry: 8  PT Therapeutic Procedures Time Entry  Therapeutic Exercise Time Entry: 15     Current Problem   1. Chronic pain of left ankle        2. Left ankle pain, unspecified chronicity  PT eval and treat          Subjective   General   Reason for Referral: L ankle pain, post-op  General Comment: Pt havinga lot of pain in her left heel, very painful to ambulate withpressure on heel. Shoes touching her heel are also painful. Not too much lateral ankle pain. Also has pain over talocrural joint with DFPt cleared for WB in LLE with Lace up ankle brace DONNED. Pt has been doing about an hour, but it is very painful. She is still in the boot at work.   Pain   Pain Assessment: 0-10  Pain Score: 6  Pain Type: Surgical pain  Pain Location: Foot  Pain Orientation: Left  5-6/10    Objective   Findings:     Treatments:   THER EX: 15  STS x10 no UE  Standing WS lateral, diagonal A/P  Seated HR/TR's  Supine ankle AROM: PF/DF/INV/EV/CW/CCW x30  SAQ/LAQ 4# x15  SLR's x15  Ball bridges x15  Stair DF stretch    *Next session LAQ and HSC machines with boot on  MANUAL: 12min  STM with patient elevated foot, efflurage to help decrease swelling, gentle joint mobilizations and glides for ROM. STM and TPR to calf and peroneals, gastroc and soleus stretches, TPR and IASTM to plantar fascia, heel  MODALITIES:  Light Therapy: Infrared/Red wave length; 10J/cm2 applied w/pads; 10 mins; applied to L ankle, in Supine , with wedge    Ultrasound: Continuous at 100%, 1mHz at 1.5w/cm2, applied to L heel, in Prone, for 8 minutes       Assessment:   PT Assessment  Assessment Comment: Pt was unableto ambulate well, no DF and more of a foot flat rather than heel strike     Plan:    OP PT Plan  PT Plan: Skilled PT  PT Frequency: 2 times per week  Duration: 6-8wksPt to cont to progress Wbing as tolerated and increase amount of time she is out of boot and in a brace with tennis shoe.   Goals:   Active       PT Problem       LTGs       Start:  10/01/23    Expected End:  23       1. Patient will report decrease in pain by > 2 points to meet MCID  2. Pt will increase muscle strength by >/= 1/2 MMT to provide improved stability and ability to perform activities such as normal, resume full work duties without pain, able to perform ADLs/IADLs without pain.  3. Patient will demo increase of LILLY by 3 points to demo improved function and meet MDC.  4. Pt will be able to ambulate without CAM boot with proper gait mechanics.           STG  1. Patient will demonstrate independence and report compliance with HEP to facilitate independent rehab program upon discharge.  2. Pt will demo ability to fully weight bear through LLE.  LT. Patient will report decrease in pain by > 2 points to meet MCID  2. Pt will increase muscle strength by >/= 1/2 MMT to provide improved stability and ability to perform activities such as normal, resume full work duties without pain, able to perform ADLs/IADLs without pain.  3. Patient will demo increase of LILLY by 3 points to demo improved function and meet MDC.  4. Pt will be able to ambulate without CAM boot with proper gait mechanics.

## 2023-10-16 NOTE — PROGRESS NOTES
Physical Therapy Treatment    Patient Name: Maureen Vela  MRN: [id number]  Today's Date: 10/16/2023  Visit 8 of 12   Time Calculation  Start Time: 1400  Stop Time: 1445  Time Calculation (min): 45 min  PT Modalities Time Entry  E-Stim (Unattended) Time Entry: 10  PT Therapeutic Procedures Time Entry  Therapeutic Exercise Time Entry: 35     Current Problem   1. Chronic pain of both shoulders        2. Bilateral shoulder pain, unspecified chronicity  PT eval and treat          Subjective   General   Reason for Referral: bilateral shoulder shoulder pain R>L  General Comment: RUE moving farther nowReports she is able to raise the RUE a little higher before it starts to hurt.   (Pt RUE still hurting more than LUE, more ROM directions are painful with RUE. Some days she can write on white board with RUE, some days she cannot.)  Pain   Pain Assessment: 0-10  Pain Score: 3  Pain Location: Shoulder  Pain Orientation: Right2-3/10 R shoulder    Objective   Findings: L CAM boot donned  Flexion, horiz add and abd and arm behind back hurt RUE    Treatments:     THER EX 35min  BUE:  UBE 6min F/B L2  Pulleys 3min  Wall slides x10 warm-up  Bicep curls/hammer curls 5# 2x10  Bench press with dowel 4# x10  Tband IR green/ER red  Rows-blue 2x15  Shldr add- green x20  Tband ext green 2x15  Band horiz abd x10 R weaker  Loop band pull aparts- yel  S/L flex 0#/abd 0#/ER 1-2# x10-15 ea    MANUAL:  R scap mobs in S/L, STM R rhomboids  joint mobs, post/inf; RUE, oscialltions for pain relief,     MODALITIES: DNP 10/16/2023  Ultrasound: Continuous at 100%, 1mHz at 1.6w/cm2, applied to R shoulder, in Supine , for 8 minutes     Dry needling following informed consent: with e-stim; 3Hz, mA to tolerance, applied to R shoulder, for 10 minutes.   Assessment:   PT Assessment  Assessment Comment: Pt able to progress RUE to active ER/IR with bands instead of the step-outs. Also increased reps/sets. Some R shoulder discomfort with band strengthening, but  no cues needed for technique.  Pt tolerated increased resistance with B shoulder exercises fairly well. No scapular discomfort anymore,still some burning along medial border of scap.   Plan:  OP PT Plan  Treatment/Interventions: Dry needling, Education/ Instruction, Electrical stimulation, Manual therapy, Neuromuscular re-education, Self care/ home management, Therapeutic exercises, Ultrasound  PT Plan: Skilled PT  PT Frequency: 2 times per week  Duration: 6-8wksContinued BUE (R>L) strength functional progressions    Goals:   Active       PT Problem       LTGs       Start:  09/30/23    Expected End:  12/08/23       1. Pt will sleep >6 hours without disturbance from shoulder pain. 2. Pt will perform all job duties and household chores without increased shoulder pain or compensation. 3. Pt will complete raking nad vacuuming/sweeping without increased shoulder pain. 4. SPADI to <20% impairment. 5. Improve bilateral shoulder AROM and MMT to WNL for less difficulty with mobility, work, and ADLs.            PT problem       STG       Start:  10/13/23    Expected End:  11/10/23       1. Pt will tolerate lifting her arm to write on white board at school without pain or impaired ROM. 2. 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 3. Pt will sleep >4 hours without disturbance from shoulder pain.

## 2023-10-24 ENCOUNTER — TREATMENT (OUTPATIENT)
Dept: PHYSICAL THERAPY | Facility: CLINIC | Age: 63
End: 2023-10-24
Payer: COMMERCIAL

## 2023-10-24 DIAGNOSIS — G89.29 CHRONIC PAIN OF LEFT ANKLE: Chronic | ICD-10-CM

## 2023-10-24 DIAGNOSIS — G89.29 CHRONIC PAIN OF BOTH SHOULDERS: Primary | ICD-10-CM

## 2023-10-24 DIAGNOSIS — M25.511 CHRONIC PAIN OF BOTH SHOULDERS: Primary | ICD-10-CM

## 2023-10-24 DIAGNOSIS — G89.29 CHRONIC PAIN OF BOTH SHOULDERS: ICD-10-CM

## 2023-10-24 DIAGNOSIS — M25.572 CHRONIC PAIN OF LEFT ANKLE: Chronic | ICD-10-CM

## 2023-10-24 DIAGNOSIS — M25.511 BILATERAL SHOULDER PAIN, UNSPECIFIED CHRONICITY: ICD-10-CM

## 2023-10-24 DIAGNOSIS — M25.512 BILATERAL SHOULDER PAIN, UNSPECIFIED CHRONICITY: ICD-10-CM

## 2023-10-24 DIAGNOSIS — M25.572 LEFT ANKLE PAIN, UNSPECIFIED CHRONICITY: ICD-10-CM

## 2023-10-24 DIAGNOSIS — M25.512 CHRONIC PAIN OF BOTH SHOULDERS: Primary | ICD-10-CM

## 2023-10-24 DIAGNOSIS — M25.512 CHRONIC PAIN OF BOTH SHOULDERS: ICD-10-CM

## 2023-10-24 DIAGNOSIS — M25.511 CHRONIC PAIN OF BOTH SHOULDERS: ICD-10-CM

## 2023-10-24 PROCEDURE — 97110 THERAPEUTIC EXERCISES: CPT | Mod: GP,CQ

## 2023-10-24 PROCEDURE — 97035 APP MDLTY 1+ULTRASOUND EA 15: CPT | Mod: GP,CQ

## 2023-10-24 NOTE — PROGRESS NOTES
Physical Therapy Treatment    Patient Name: Maureen Vela  MRN: [id number]  Today's Date: 10/24/2023  Visit 9 of 12         Current Problem   No diagnosis found.      Subjective   General    Pt states she has noticed a lot of improvement   Pain    0/10 R and L shoulder , stiff R shoulder    Objective   Findings: L CAM boot donned  Flexion, horiz add and abd and arm behind back hurt RUE    Treatments:     THER EX 35min  BUE:  UBE 6min F/B L2  Pulleys 3min  Wall slides x10 warm-up  Bicep curls/hammer curls 5# 2x10  Bench press with dowel 4# x10  Tband IR green/ER red  Rows-blue 2x15  Shldr add- green x20  Tband ext green 2x15  Band horiz abd x10 R weaker  Loop band pull aparts- yel  S/L flex 0#/abd 0#/ER 1-2# x10-15 ea    MANUAL:  R scap mobs in S/L, STM R rhomboids  joint mobs, post/inf; RUE, oscialltions for pain relief,     MODALITIES: DNP 10/16/2023  Ultrasound: Continuous at 100%, 1mHz at 1.6w/cm2, applied to R shoulder, in Supine , for 8 minutes     Dry needling following informed consent: with e-stim; 3Hz, mA to tolerance, applied to R shoulder, for 10 minutes.   Assessment:     Also increased reps/sets. Some R shoulder discomfort with band strengthening, but no cues needed for technique.  Pt tolerated increased resistance with B shoulder exercises fairly well. No scapular discomfort anymore,still some burning along medial border of scap.   Plan:   Continued BUE (R>L) strength functional progressions    Goals:

## 2023-10-24 NOTE — PROGRESS NOTES
Physical Therapy Treatment    Patient Name: Maureen Vela  MRN: [id number]  Today's Date: 10/24/2023  Visit 8 of 12   Time Calculation  Start Time: 1501  Stop Time: 1545  Time Calculation (min): 44 min  PT Modalities Time Entry  Ultrasound Time Entry: 8  PT Therapeutic Procedures Time Entry  Therapeutic Exercise Time Entry: 20     Current Problem   1. Chronic pain of both shoulders  Follow Up In Physical Therapy      2. Bilateral shoulder pain, unspecified chronicity  Follow Up In Physical Therapy          Subjective   General    Pt states she has noticed a lot of improvement with both shoudlers since starting therapy. Pt reports the L ankle is not doing well, had appt with Treviño today and received cortisone injection, CT scan in near future.     Pain    0/10 R/L shoulder , Stiff R shoulder    Objective   Findings: L CAM boot donned    Treatments:   THER EX  BUE:  UBE 6min F/B L3  Bicep curls/hammer curls 5# 2x10 ea  Bench press with dowel 4# 2x10  S/L flex 0# x12 /abd 0# x7 / ER 1-2#     DNP:  Pulleys 3min  Wall slides x10 warm-up  Tband IR green/ER red  Rows-blue 2x15  Shldr add- green x20  Tband ext green 2x15  Band horiz abd x10 R weaker  Loop band pull aparts- yel    MANUAL: DNP  R scap mobs in S/L, STM R rhomboids  joint mobs, post/inf; RUE, oscialltions for pain relief,     MODALITIES:   Ultrasound: Continuous at 100%, 1mHz at 1.6w/cm2, applied to R shoulder, in Supine , for 8 minutes     Dry needling following informed consent: with e-stim; 3Hz, mA to tolerance, applied to R shoulder, for 10 minutes. DNP today    Assessment:     Pt treatment consisted of light shoulder strengthening and reduction of tone in R shoulder through US treatment.     Plan:   Continued BUE (R>L) strength functional progressions    Goals:   Active       PT Problem       LTGs       Start:  09/30/23    Expected End:  12/08/23       1. Pt will sleep >6 hours without disturbance from shoulder pain. 2. Pt will perform all job duties and  household chores without increased shoulder pain or compensation. 3. Pt will complete raking nad vacuuming/sweeping without increased shoulder pain. 4. SPADI to <20% impairment. 5. Improve bilateral shoulder AROM and MMT to WNL for less difficulty with mobility, work, and ADLs.            PT problem       STG       Start:  10/13/23    Expected End:  11/10/23       1. Pt will tolerate lifting her arm to write on white board at school without pain or impaired ROM. 2. 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 3. Pt will sleep >4 hours without disturbance from shoulder pain.

## 2023-10-27 ENCOUNTER — TREATMENT (OUTPATIENT)
Dept: PHYSICAL THERAPY | Facility: CLINIC | Age: 63
End: 2023-10-27
Payer: COMMERCIAL

## 2023-10-27 DIAGNOSIS — M25.572 LEFT ANKLE PAIN, UNSPECIFIED CHRONICITY: ICD-10-CM

## 2023-10-27 DIAGNOSIS — M25.511 CHRONIC PAIN OF BOTH SHOULDERS: Primary | ICD-10-CM

## 2023-10-27 DIAGNOSIS — G89.29 CHRONIC PAIN OF LEFT ANKLE: Chronic | ICD-10-CM

## 2023-10-27 DIAGNOSIS — M25.572 CHRONIC PAIN OF LEFT ANKLE: Chronic | ICD-10-CM

## 2023-10-27 DIAGNOSIS — M25.512 CHRONIC PAIN OF BOTH SHOULDERS: Primary | ICD-10-CM

## 2023-10-27 DIAGNOSIS — G89.29 CHRONIC PAIN OF BOTH SHOULDERS: Primary | ICD-10-CM

## 2023-10-27 PROCEDURE — 97014 ELECTRIC STIMULATION THERAPY: CPT | Mod: GP,CQ

## 2023-10-27 PROCEDURE — 97110 THERAPEUTIC EXERCISES: CPT | Mod: GP,CQ

## 2023-10-27 PROCEDURE — 97140 MANUAL THERAPY 1/> REGIONS: CPT | Mod: GP,CQ

## 2023-10-27 NOTE — PROGRESS NOTES
Physical Therapy Treatment    Patient Name: Maureen Vela  MRN: [id number]  Today's Date: 10/27/2023  Visit 9 of 12   Time Calculation  Start Time: 1455  Stop Time: 1555  Time Calculation (min): 60 min  PT Modalities Time Entry  E-Stim (Unattended) Time Entry: 10  PT Therapeutic Procedures Time Entry  Manual Therapy Time Entry: 10  Therapeutic Exercise Time Entry: 28     Current Problem   1. Chronic pain of both shoulders            Subjective   General    Pt states she had slept on R shoulder last night, felt very painful and stiff this morning. Pt reports the pain has greatly reduced since then.     Pain    2/10 R shoulder , Stiff L shoulder    Objective   Findings: L CAM boot donned    Treatments:   Therapeutic Exercise:  BUE:  UBE 6min F/B L2  TB horizontal abduction green 2x10 R weaker  TB adduction blue 2x12  Seated shoulder depression 10x5 sec  FT lat pull down 10# 2x12    DNP:  Bicep curls/hammer curls 5# 2x10 ea  Bench press with dowel 4# 2x10  S/L flex 0# x12 /abd 0# x7 / ER 1-2#   Pulleys 3min  Wall slides x10 warm-up  Tband IR green/ER red  Rows-blue 2x15  Tband ext green 2x15    MANUAL:  R scap mobs in S/L, STM R rhomboids, R UT    DNP:  joint mobs, post/inf;  RUE, oscialltions for pain relief     MODALITIES:   Dry needling following informed consent: with e-stim; 3Hz, mA to tolerance, applied to R shoulder/medial border of scap, for 10 minutes. Done by AK    Ultrasound: Continuous at 100%, 1mHz at 1.6w/cm2, applied to R shoulder, in Supine , for 8 minutes DNP today    Post Pain:  Looser, better    Assessment:    Pt did well with all exercises today, horizontal abduction causes some discomfort in R shoulder as it fatigues. Pt had favorable outcome with manual therapies and DN treatment, reducing pain and tone in R shoulder and medial border of scapula.     Plan:   Continued BUE (R>L) strength functional progressions    Goals:   Active       PT Problem       LTGs       Start:  09/30/23    Expected End:   12/08/23       1. Pt will sleep >6 hours without disturbance from shoulder pain. 2. Pt will perform all job duties and household chores without increased shoulder pain or compensation. 3. Pt will complete raking nad vacuuming/sweeping without increased shoulder pain. 4. SPADI to <20% impairment. 5. Improve bilateral shoulder AROM and MMT to WNL for less difficulty with mobility, work, and ADLs.            PT problem       STG       Start:  10/13/23    Expected End:  11/10/23       1. Pt will tolerate lifting her arm to write on white board at school without pain or impaired ROM. 2. 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 3. Pt will sleep >4 hours without disturbance from shoulder pain.

## 2023-10-31 ENCOUNTER — APPOINTMENT (OUTPATIENT)
Dept: PHYSICAL THERAPY | Facility: CLINIC | Age: 63
End: 2023-10-31
Payer: COMMERCIAL

## 2023-10-31 ENCOUNTER — TREATMENT (OUTPATIENT)
Dept: PHYSICAL THERAPY | Facility: CLINIC | Age: 63
End: 2023-10-31
Payer: COMMERCIAL

## 2023-10-31 DIAGNOSIS — G89.29 CHRONIC PAIN OF LEFT ANKLE: Chronic | ICD-10-CM

## 2023-10-31 DIAGNOSIS — M25.572 LEFT ANKLE PAIN, UNSPECIFIED CHRONICITY: ICD-10-CM

## 2023-10-31 DIAGNOSIS — M25.512 CHRONIC PAIN OF BOTH SHOULDERS: Primary | ICD-10-CM

## 2023-10-31 DIAGNOSIS — M25.511 CHRONIC PAIN OF BOTH SHOULDERS: Primary | ICD-10-CM

## 2023-10-31 DIAGNOSIS — G89.29 CHRONIC PAIN OF BOTH SHOULDERS: Primary | ICD-10-CM

## 2023-10-31 DIAGNOSIS — M25.572 CHRONIC PAIN OF LEFT ANKLE: Chronic | ICD-10-CM

## 2023-10-31 PROCEDURE — 97014 ELECTRIC STIMULATION THERAPY: CPT | Mod: GP,CQ

## 2023-10-31 PROCEDURE — 97110 THERAPEUTIC EXERCISES: CPT | Mod: GP,CQ

## 2023-10-31 NOTE — PROGRESS NOTES
Physical Therapy Treatment    Patient Name: Maureen Vela  MRN: [id number]  Today's Date: 10/31/2023  Visit 10 of 12   Time Calculation  Start Time: 1459  Stop Time: 1550  Time Calculation (min): 51 min  PT Modalities Time Entry  E-Stim (Unattended) Time Entry: 10  PT Therapeutic Procedures Time Entry  Therapeutic Exercise Time Entry: 29     Current Problem   1. Chronic pain of both shoulders        2. Left ankle pain, unspecified chronicity  Follow Up In Physical Therapy      3. Chronic pain of left ankle  Follow Up In Physical Therapy          Subjective   General    Pt states she was fatiguing quickly with holding R UE at chest height. Pt states she has CT scan Nov 7th.     Pain    3/10 R shoulder , Stiff L shoulder    Objective   Findings: L CAM boot donned    Treatments:   Therapeutic Exercise:  BUE:  UBE 6min F/B L2  Standing scaption raises 0# 2x10 , with scap pinch x10   Standing abduction raises 0# 2x10  FT lat pull down 10# x15 , 15# x11  Ball on wall cw/ccw , up/down    DNP:  TB horizontal abduction green 2x10 R weaker  TB adduction blue 2x12  Seated shoulder depression 10x5 sec  Bicep curls/hammer curls 5# 2x10 ea  Bench press with dowel 4# 2x10  S/L flex 0# x12 /abd 0# x7 / ER 1-2#   Pulleys 3min  Wall slides x10 warm-up  Tband IR green/ER red  Rows-blue 2x15  Tband ext green 2x15    MANUAL: DNP  R scap mobs in S/L, STM R rhomboids, R UT  joint mobs, post/inf;  RUE, oscialltions for pain relief     MODALITIES:   Dry needling following informed consent: with e-stim; 3Hz, mA to tolerance, applied to R shoulder/medial border of scap, for 10 minutes. Done by JUDY    Ultrasound: Continuous at 100%, 1mHz at 1.6w/cm2, applied to R shoulder, in Supine , for 8 minutes DNP today      Assessment:    Pt did well with all exercises, focused on shoulder flexion and abduction strengthening and endurance. Pt will benefit from continued overhead strengthening R/L.    Post Pain:  Looser, better    Plan:   Continued BUE  (R>L) strength functional progressions    Goals:   Active       PT Problem       LTGs       Start:  09/30/23    Expected End:  12/08/23       1. Pt will sleep >6 hours without disturbance from shoulder pain. 2. Pt will perform all job duties and household chores without increased shoulder pain or compensation. 3. Pt will complete raking nad vacuuming/sweeping without increased shoulder pain. 4. SPADI to <20% impairment. 5. Improve bilateral shoulder AROM and MMT to WNL for less difficulty with mobility, work, and ADLs.            PT problem       STG       Start:  10/13/23    Expected End:  11/10/23       1. Pt will tolerate lifting her arm to write on white board at school without pain or impaired ROM. 2. 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 3. Pt will sleep >4 hours without disturbance from shoulder pain.

## 2023-11-01 ENCOUNTER — OFFICE VISIT (OUTPATIENT)
Dept: PAIN MEDICINE | Facility: CLINIC | Age: 63
End: 2023-11-01
Payer: COMMERCIAL

## 2023-11-01 VITALS
HEIGHT: 62 IN | SYSTOLIC BLOOD PRESSURE: 122 MMHG | DIASTOLIC BLOOD PRESSURE: 70 MMHG | HEART RATE: 70 BPM | WEIGHT: 211 LBS | BODY MASS INDEX: 38.83 KG/M2

## 2023-11-01 DIAGNOSIS — M79.601 RIGHT ARM PAIN: ICD-10-CM

## 2023-11-01 DIAGNOSIS — M46.1 BILATERAL SACROILIITIS (CMS-HCC): ICD-10-CM

## 2023-11-01 DIAGNOSIS — M47.817 LUMBOSACRAL SPONDYLOSIS WITHOUT MYELOPATHY: Primary | ICD-10-CM

## 2023-11-01 PROCEDURE — 3074F SYST BP LT 130 MM HG: CPT | Performed by: NURSE PRACTITIONER

## 2023-11-01 PROCEDURE — 99214 OFFICE O/P EST MOD 30 MIN: CPT | Performed by: NURSE PRACTITIONER

## 2023-11-01 PROCEDURE — 1036F TOBACCO NON-USER: CPT | Performed by: NURSE PRACTITIONER

## 2023-11-01 PROCEDURE — 3078F DIAST BP <80 MM HG: CPT | Performed by: NURSE PRACTITIONER

## 2023-11-01 RX ORDER — TRAMADOL HYDROCHLORIDE 50 MG/1
50 TABLET ORAL 2 TIMES DAILY PRN
Qty: 60 TABLET | Refills: 1 | Status: SHIPPED | OUTPATIENT
Start: 2023-11-01 | End: 2023-12-01

## 2023-11-01 ASSESSMENT — ENCOUNTER SYMPTOMS
OCCASIONAL FEELINGS OF UNSTEADINESS: 1
LOSS OF SENSATION IN FEET: 0
DEPRESSION: 0

## 2023-11-01 ASSESSMENT — PAIN - FUNCTIONAL ASSESSMENT: PAIN_FUNCTIONAL_ASSESSMENT: 0-10

## 2023-11-01 ASSESSMENT — PAIN SCALES - GENERAL: PAINLEVEL_OUTOF10: 2

## 2023-11-01 ASSESSMENT — PATIENT HEALTH QUESTIONNAIRE - PHQ9
2. FEELING DOWN, DEPRESSED OR HOPELESS: NOT AT ALL
SUM OF ALL RESPONSES TO PHQ9 QUESTIONS 1 AND 2: 0
1. LITTLE INTEREST OR PLEASURE IN DOING THINGS: NOT AT ALL

## 2023-11-01 ASSESSMENT — PAIN DESCRIPTION - DESCRIPTORS: DESCRIPTORS: ACHING

## 2023-11-01 NOTE — PROGRESS NOTES
MEDICATION NAME: Tramadol   STRENGTH: 50mg  LAST FILL DATE:   DATE LAST TAKEN: 23  QUANTITY FILLED: 60  QUANTITY REMAIN  COUNT COMPLETED BY: CAROLE SCHRADER MA and LINDA SHUKLA MA      UDS LAST COMPLETED:   CONTROLLED SUBSTANCES AGREEMENT LAST SIGNED:   ORT LAST COMPLETED:  Modified [employer] disability form filled out annually.

## 2023-11-01 NOTE — PROGRESS NOTES
Subjective   Patient ID: Maureen Vela is a 63 y.o. female who presents for Back Pain.    HPI 62YO Female with a PMHx significant for Anxiety, HLD, Migraines, GERD, Myalgias, Vitamin D Deficiency, Chronic Pain, Lumbosacral Spondylosis, HTN and RA. She presents for a pain management follow-up and medication refill. On 6/8/2023 Ms. Vela reports a longstanding history of low back pain that was sucessfully treated with RFA. She continues to be pleased with her response to the RFA of the facet medial nerve branches in the mid and lower portion of hte lumbar spine. She did, however, continue to have pain below this treated area which was worsened by standing, walking and sitting. She therefore underwent bilateral SIJ injections with Dr. NEVAREZ on 6/8/23. She reports the left side is still feeling pretty good. She notes the right side is more noticeable than it had been but she is hoping to hold off on repeating injections until closer to the holidays, however she has had some setbacks with her LLE and due to compensating and gait changes she is starting to have increased pain at the SIJ.          On 06/28/2023 she did undergo a left sub taller joint fusion of the ankle. She is currently booted and only weight bearing with boot. She was supposed to get out of the boot last time we met but she continues to have significant pain with weight bearing. Unfortunately, since school started back, she did sustain a fall on her scooter in the parking lot. She feels she may have worsened her RUE pain in the fall. She reports she did just have a steroid injection to the shoulder and has been going to be doing PT. she is scheduled for a CT of her left ankle on 11/7 to determine if her fusion has fully healed.  She would like to repeat her SI joint injections but she is waiting to see what the results of her ankle CT are.    Review of Systems Unless noted in the HPI all other systems have been reviewed and are negative for  complaint    Objective   Physical Exam  General: No acute distress, well appearing and well nourished.  Eyes Conjunctiva and lids: No erythema, swelling or discharge  Neck: Supple, no cervical lymphadenopathy.  Pulmonary: Respiratory effort: Normal respiration.  Cardiovascular: Normal rate.  Examination of extremities: LLE partial weight bearing.   Abdomen: Non-distended, no obvious abdominal masses.  Musculoskeletal: Range of motion: reduced ROM to the spine, LLE and RUE.  Skin: Skin and subcutaneous tissue: Normal without rashes or lesions.  Neurologic: Reflexes: Normal. Coordination: LLE only weight-bearing when boot is on, use of rollator/scooter for distance ambulation, use of cane for short distances when boot is on.  Psychiatric: Orientation to person, place, and time: Normal. Mood and affect: Normal.    Assessment/Plan   Problem List Items Addressed This Visit          Musculoskeletal and Injuries    Bilateral sacroiliitis (CMS/HCC)     Other Visit Diagnoses       Lumbosacral spondylosis without myelopathy    -  Primary    Relevant Medications    traMADol (Ultram) 50 mg tablet    Right arm pain              I had a nice discussion with the patient today and our plan will be as follows:  Radiology: No new imaging to review at this time.  Physically: Encouraged patient to continue with increased physical activity as able.  Psychologically: No acute psychological needs at this time.  Medication: I will refill the patient's Tramadol today for [ 2 ] months. The patient continues to see benefit and improvement in their quality of life and ability to maintain ADLs.  Patient educated about the risks of taking opioids and operating a motor vehicle.  Patient reports no adverse side effects to current medication regimen. Current regimen does allow patient to maintain ADLs. Patient reports no new neurologic symptoms, new pain areas, or exacerbation in pain today. Patient reports they are happy with current treatment  care path. Patient has been educated on the risks, benefits, and alternatives of controlled substances as well as the proper way to store these medications. The patient and I discussed the nature of this medication and its side effects. We discussed tolerance, physical dependence, psychological dependence, addiction and opioid-induced hyperalgesia. We discussed the potential need to wean from this medication. We discussed the availability of programs that can help with this process if necessary. We discussed safety issues related to opioids including safe storage. We discussed the fact that the patient should not drive an automobile or operate heavy machinery while taking this medication. A prescription for naloxone was offered to the patient. The patient will be re-evaluated for the need to continue opioid therapy in 60-90 days.  A PDMP report was reviewed today and was consistent with reported prescribing. Tox screen is up-to-date and consistent with prescribing history; will be due next visit.   Duration: Chronic/Ongoing  Intervention: Patient is scheduled for a CT of her left ankle on 11/7/2023.  Based on these findings she will call the office and let us know if she would like to schedule a repeat SI joint injection.

## 2023-11-02 ENCOUNTER — CLINICAL SUPPORT (OUTPATIENT)
Dept: RHEUMATOLOGY | Facility: CLINIC | Age: 63
End: 2023-11-02
Payer: COMMERCIAL

## 2023-11-02 VITALS
TEMPERATURE: 97.7 F | HEART RATE: 89 BPM | SYSTOLIC BLOOD PRESSURE: 151 MMHG | DIASTOLIC BLOOD PRESSURE: 100 MMHG | OXYGEN SATURATION: 100 %

## 2023-11-02 DIAGNOSIS — M05.79 SEROPOSITIVE RHEUMATOID ARTHRITIS OF MULTIPLE JOINTS (MULTI): ICD-10-CM

## 2023-11-02 PROCEDURE — 2500000004 HC RX 250 GENERAL PHARMACY W/ HCPCS (ALT 636 FOR OP/ED): Performed by: INTERNAL MEDICINE

## 2023-11-02 PROCEDURE — 96413 CHEMO IV INFUSION 1 HR: CPT | Performed by: INTERNAL MEDICINE

## 2023-11-02 RX ORDER — FAMOTIDINE 10 MG/ML
20 INJECTION INTRAVENOUS ONCE AS NEEDED
Status: CANCELLED | OUTPATIENT
Start: 2023-11-02

## 2023-11-02 RX ORDER — DIPHENHYDRAMINE HYDROCHLORIDE 50 MG/ML
50 INJECTION INTRAMUSCULAR; INTRAVENOUS AS NEEDED
Status: CANCELLED | OUTPATIENT
Start: 2023-11-02

## 2023-11-02 RX ORDER — EPINEPHRINE 0.3 MG/.3ML
0.3 INJECTION SUBCUTANEOUS EVERY 5 MIN PRN
OUTPATIENT
Start: 2023-11-30

## 2023-11-02 RX ORDER — DIPHENHYDRAMINE HYDROCHLORIDE 50 MG/ML
50 INJECTION INTRAMUSCULAR; INTRAVENOUS AS NEEDED
OUTPATIENT
Start: 2023-11-30

## 2023-11-02 RX ORDER — ALBUTEROL SULFATE 0.83 MG/ML
3 SOLUTION RESPIRATORY (INHALATION) AS NEEDED
Status: CANCELLED | OUTPATIENT
Start: 2023-11-02

## 2023-11-02 RX ORDER — FAMOTIDINE 10 MG/ML
20 INJECTION INTRAVENOUS ONCE AS NEEDED
OUTPATIENT
Start: 2023-11-30

## 2023-11-02 RX ORDER — ALBUTEROL SULFATE 0.83 MG/ML
3 SOLUTION RESPIRATORY (INHALATION) AS NEEDED
OUTPATIENT
Start: 2023-11-30

## 2023-11-02 RX ORDER — EPINEPHRINE 0.3 MG/.3ML
0.3 INJECTION SUBCUTANEOUS EVERY 5 MIN PRN
Status: CANCELLED | OUTPATIENT
Start: 2023-11-02

## 2023-11-02 RX ADMIN — SODIUM CHLORIDE 750 MG: 9 INJECTION, SOLUTION INTRAVENOUS at 15:12

## 2023-11-03 ENCOUNTER — APPOINTMENT (OUTPATIENT)
Dept: PHYSICAL THERAPY | Facility: CLINIC | Age: 63
End: 2023-11-03
Payer: COMMERCIAL

## 2023-11-03 ENCOUNTER — TREATMENT (OUTPATIENT)
Dept: PHYSICAL THERAPY | Facility: CLINIC | Age: 63
End: 2023-11-03
Payer: COMMERCIAL

## 2023-11-03 DIAGNOSIS — M25.572 LEFT ANKLE PAIN, UNSPECIFIED CHRONICITY: ICD-10-CM

## 2023-11-03 DIAGNOSIS — M25.512 CHRONIC PAIN OF BOTH SHOULDERS: Primary | ICD-10-CM

## 2023-11-03 DIAGNOSIS — M25.511 CHRONIC PAIN OF BOTH SHOULDERS: Primary | ICD-10-CM

## 2023-11-03 DIAGNOSIS — M25.572 CHRONIC PAIN OF LEFT ANKLE: Chronic | ICD-10-CM

## 2023-11-03 DIAGNOSIS — G89.29 CHRONIC PAIN OF LEFT ANKLE: Chronic | ICD-10-CM

## 2023-11-03 DIAGNOSIS — G89.29 CHRONIC PAIN OF BOTH SHOULDERS: Primary | ICD-10-CM

## 2023-11-03 PROCEDURE — 97110 THERAPEUTIC EXERCISES: CPT | Mod: GP,CQ

## 2023-11-03 PROCEDURE — 97014 ELECTRIC STIMULATION THERAPY: CPT | Mod: GP,CQ

## 2023-11-03 NOTE — PROGRESS NOTES
"Physical Therapy Treatment Progress Note    Patient Name: Maureen Vela  MRN: [id number]  Today's Date: 11/3/2023  Visit 11 of 12  Time Calculation  Start Time: 1240  Stop Time: 1330  Time Calculation (min): 50 min  PT Modalities Time Entry  E-Stim (Unattended) Time Entry: 10  PT Therapeutic Procedures Time Entry  Therapeutic Exercise Time Entry: 30      Current Problem   1. Chronic pain of both shoulders        2. Left ankle pain, unspecified chronicity  Follow Up In Physical Therapy      3. Chronic pain of left ankle  Follow Up In Physical Therapy        Subjective   General    Pt states she didn't have work today so the shoulders aren't feeling to bad at the moment. Pt reports her R anterior shoulder was very sore after last session, possibly from DN or exercises. Pt states she still is woken up at night from pain in R shoulder, otherwise she has noticed a lot of improvements in bilateral shoulders.     Pain    3/10 R shoulder , Stiff L shoulder    Objective   Findings: L CAM boot donned    ROM   Shoulder: Right: Flexion: AROM 122, Abduction: AROM 105  Left: Flexion: AROM 150, Abduction: AROM 140      Outcome Measures   R shoulder SPADI: 42% impaired    L shoulder SPADI: 26% impaired      Treatments:   Therapeutic Exercise:  BUE:  UBE 6min F/B L2  Room 8- nuts and bolts just above head level x2  Room 8- pvc slots waist level to overhead x2 (red marker to red marker)  FT lat pull down 12.5# 2x12  Abduction isometric R only 10x5\" at wall  Supine abduction eccentrics x6 (therapist resistance)  Supine adduction blue x6 , x7 R only    DNP:  Standing scaption raises 0# 2x10 , with scap pinch x10   Standing abduction raises 0# 2x10  Ball on wall cw/ccw , up/down  TB horizontal abduction green 2x10 R weaker  TB adduction blue 2x12  Seated shoulder depression 10x5 sec  Bicep curls/hammer curls 5# 2x10 ea  Bench press with dowel 4# 2x10  S/L flex 0# x12 /abd 0# x7 / ER 1-2#   Pulleys 3min  Wall slides x10 warm-up  Tband IR " green/ER red  Rows-blue 2x15  Tband ext green 2x15    MANUAL: DNP  R scap mobs in S/L, STM R rhomboids, R UT  joint mobs, post/inf;  RUE, oscialltions for pain relief     MODALITIES:   Dry needling following informed consent: with e-stim; 3Hz, mA to tolerance, applied to R shoulder/medial border of scap, for 10 minutes. Performed by MK    Assessment:    Pt has improved function and pain levels in bilateral shoulders since beginning therapy, L >R. Pt's R shoulder struggles with endurance to activity and overhead movements. Pt will benefit from continued skilled PT visits to improve R and L shoulder function and pain.      Post Pain:  Looser in both shoulders, R minimal pain at rest    Plan:   Continued BUE (R>L) strength functional progressions    Goals:   Active       PT Problem       LTGs       Start:  09/30/23    Expected End:  12/08/23       1. Pt will sleep >6 hours without disturbance from shoulder pain. 2. Pt will perform all job duties and household chores without increased shoulder pain or compensation. 3. Pt will complete raking nad vacuuming/sweeping without increased shoulder pain. 4. SPADI to <20% impairment. 5. Improve bilateral shoulder AROM and MMT to WNL for less difficulty with mobility, work, and ADLs.            PT problem       STG       Start:  10/13/23    Expected End:  11/10/23       1. Pt will tolerate lifting her arm to write on white board at school without pain or impaired ROM. 2. 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 3. Pt will sleep >4 hours without disturbance from shoulder pain.

## 2023-11-07 ENCOUNTER — TREATMENT (OUTPATIENT)
Dept: PHYSICAL THERAPY | Facility: CLINIC | Age: 63
End: 2023-11-07
Payer: COMMERCIAL

## 2023-11-07 ENCOUNTER — HOSPITAL ENCOUNTER (OUTPATIENT)
Dept: RADIOLOGY | Facility: HOSPITAL | Age: 63
Discharge: HOME | End: 2023-11-07
Payer: COMMERCIAL

## 2023-11-07 ENCOUNTER — APPOINTMENT (OUTPATIENT)
Dept: PHYSICAL THERAPY | Facility: CLINIC | Age: 63
End: 2023-11-07
Payer: COMMERCIAL

## 2023-11-07 DIAGNOSIS — Z98.1 ARTHRODESIS STATUS: ICD-10-CM

## 2023-11-07 DIAGNOSIS — M25.572 CHRONIC PAIN OF LEFT ANKLE: Chronic | ICD-10-CM

## 2023-11-07 DIAGNOSIS — G89.29 OTHER CHRONIC PAIN: ICD-10-CM

## 2023-11-07 DIAGNOSIS — M05.9 RHEUMATOID ARTHRITIS WITH RHEUMATOID FACTOR, UNSPECIFIED (MULTI): ICD-10-CM

## 2023-11-07 DIAGNOSIS — M25.512 CHRONIC PAIN OF BOTH SHOULDERS: Primary | ICD-10-CM

## 2023-11-07 DIAGNOSIS — M25.572 LEFT ANKLE PAIN, UNSPECIFIED CHRONICITY: ICD-10-CM

## 2023-11-07 DIAGNOSIS — M25.572 PAIN IN LEFT ANKLE AND JOINTS OF LEFT FOOT: ICD-10-CM

## 2023-11-07 DIAGNOSIS — G89.29 CHRONIC PAIN OF LEFT ANKLE: Chronic | ICD-10-CM

## 2023-11-07 DIAGNOSIS — M25.511 CHRONIC PAIN OF BOTH SHOULDERS: Primary | ICD-10-CM

## 2023-11-07 DIAGNOSIS — G89.29 CHRONIC PAIN OF BOTH SHOULDERS: Primary | ICD-10-CM

## 2023-11-07 DIAGNOSIS — M19.072 PRIMARY OSTEOARTHRITIS, LEFT ANKLE AND FOOT: ICD-10-CM

## 2023-11-07 PROCEDURE — 97035 APP MDLTY 1+ULTRASOUND EA 15: CPT | Mod: GP,CQ

## 2023-11-07 PROCEDURE — 97110 THERAPEUTIC EXERCISES: CPT | Mod: GP,CQ

## 2023-11-07 PROCEDURE — 73700 CT LOWER EXTREMITY W/O DYE: CPT | Mod: LT

## 2023-11-07 NOTE — PROGRESS NOTES
"Physical Therapy Treatment Progress Note    Patient Name: Maureen Vela  MRN: [id number]  Today's Date: 11/7/2023  Visit 12 of 12  Time Calculation  Start Time: 1504  Stop Time: 1547  Time Calculation (min): 43 min  PT Modalities Time Entry  Ultrasound Time Entry: 10  PT Therapeutic Procedures Time Entry  Therapeutic Exercise Time Entry: 28      Current Problem   1. Chronic pain of both shoulders        2. Left ankle pain, unspecified chronicity  Follow Up In Physical Therapy      3. Chronic pain of left ankle  Follow Up In Physical Therapy        Subjective   General    Pt states she still has CT scan after appointment for L ankle. Pt reports she still has pain at night rolling onto R shoulder. Pt states she has been having a stabbing pain on top of R shoulder the past few days, no sure what triggers it.   Pain    3/10 R shoulder , Stiff L shoulder    Objective   Findings: L CAM boot donned    Treatments:   Therapeutic Exercise:  BUE:  UBE 6min F/B L3  FT lat pull down 12.5# 2x12  Pulleys scaption 2 min   Room 8- nuts and bolts just above head level x2 (off/on)    DNP:  Room 8- pvc slots waist level to overhead x2 (red marker to red marker)  Abduction isometric R only 10x5\" at wall  Supine abduction eccentrics x6 (therapist resistance)  Supine adduction blue x6 , x7 R only  Standing scaption raises 0# 2x10 , with scap pinch x10   Standing abduction raises 0# 2x10  Ball on wall cw/ccw , up/down  TB horizontal abduction green 2x10 R weaker  TB adduction blue 2x12  Seated shoulder depression 10x5 sec  Bicep curls/hammer curls 5# 2x10 ea  Bench press with dowel 4# 2x10  S/L flex 0# x12 /abd 0# x7 / ER 1-2#   Pulleys 3min  Wall slides x10 warm-up  Tband IR green/ER red  Rows-blue 2x15  Tband ext green 2x15    MANUAL: DNP  R scap mobs in S/L, STM R rhomboids, R UT  joint mobs, post/inf;  RUE, oscialltions for pain relief     MODALITIES:   Ultrasound: Continuous at 100%, 1mHz at 1.5w/cm2, applied to R shoulder/UT, in " Seated, for 10 minutes    DNP  Dry needling following informed consent: with e-stim; 3Hz, mA to tolerance, applied to R shoulder/medial border of scap, for 10 minutes. Performed by MK     Assessment:    Pt did not have any stabbing pain in R shoulder during session today. Pt was very stressed during today's visit, worrying about CT scan on L ankle after appointment today. Pt had reduced tone and pain in R shoulder after US treatment.   Post Pain:  Looser in R shoulder , not as painful    Plan:   Continued BUE (R>L) strength functional progressions    Goals:   Active       PT Problem       LTGs       Start:  09/30/23    Expected End:  12/08/23       1. Pt will sleep >6 hours without disturbance from shoulder pain. 2. Pt will perform all job duties and household chores without increased shoulder pain or compensation. 3. Pt will complete raking nad vacuuming/sweeping without increased shoulder pain. 4. SPADI to <20% impairment. 5. Improve bilateral shoulder AROM and MMT to WNL for less difficulty with mobility, work, and ADLs.            PT problem       STG       Start:  10/13/23    Expected End:  11/10/23       1. Pt will tolerate lifting her arm to write on white board at school without pain or impaired ROM. 2. 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 3. Pt will sleep >4 hours without disturbance from shoulder pain.

## 2023-11-09 ENCOUNTER — APPOINTMENT (OUTPATIENT)
Dept: PHYSICAL THERAPY | Facility: CLINIC | Age: 63
End: 2023-11-09
Payer: COMMERCIAL

## 2023-11-09 ENCOUNTER — TREATMENT (OUTPATIENT)
Dept: PHYSICAL THERAPY | Facility: CLINIC | Age: 63
End: 2023-11-09
Payer: COMMERCIAL

## 2023-11-09 ENCOUNTER — TELEPHONE (OUTPATIENT)
Dept: RHEUMATOLOGY | Facility: CLINIC | Age: 63
End: 2023-11-09

## 2023-11-09 DIAGNOSIS — M25.572 CHRONIC PAIN OF LEFT ANKLE: Chronic | ICD-10-CM

## 2023-11-09 DIAGNOSIS — M25.512 CHRONIC PAIN OF BOTH SHOULDERS: Primary | ICD-10-CM

## 2023-11-09 DIAGNOSIS — M25.572 LEFT ANKLE PAIN, UNSPECIFIED CHRONICITY: ICD-10-CM

## 2023-11-09 DIAGNOSIS — G89.29 CHRONIC PAIN OF LEFT ANKLE: Chronic | ICD-10-CM

## 2023-11-09 DIAGNOSIS — M25.511 CHRONIC PAIN OF BOTH SHOULDERS: Primary | ICD-10-CM

## 2023-11-09 DIAGNOSIS — G89.29 CHRONIC PAIN OF BOTH SHOULDERS: Primary | ICD-10-CM

## 2023-11-09 PROCEDURE — 97110 THERAPEUTIC EXERCISES: CPT | Mod: GP | Performed by: PHYSICAL THERAPIST

## 2023-11-09 ASSESSMENT — PAIN SCALES - GENERAL: PAINLEVEL_OUTOF10: 3

## 2023-11-09 ASSESSMENT — PAIN - FUNCTIONAL ASSESSMENT: PAIN_FUNCTIONAL_ASSESSMENT: 0-10

## 2023-11-09 NOTE — TELEPHONE ENCOUNTER
"PT CALLED, SAW  DR WALKER & HER SURGERY ON BONE. DID NOT \"TAKE\" & WANTS HER OFF ALL RA MEDS TO DO BONE STIMULATOR. PT ASKING FOR DIRECTIONS ON HOW TO STOP ALL MEDS. HAD ORENCIA INFUSION LAST WEEK.  "

## 2023-11-09 NOTE — PROGRESS NOTES
"Physical Therapy Treatment Progress Note    Patient Name: Maureen Vela  MRN: [id number]  Today's Date: 11/9/2023  Visit 12 of 24 extended POC  Time Calculation  Start Time: 1500  Stop Time: 1550  Time Calculation (min): 50 min  PT Therapeutic Procedures Time Entry  Therapeutic Exercise Time Entry: 40  Current Problem   1. Chronic pain of both shoulders        2. Left ankle pain, unspecified chronicity  Follow Up In Physical Therapy      3. Chronic pain of left ankle  Follow Up In Physical Therapy        Subjective   General    CT scan of L ankle reveals no fusion, will be starting on a bone stimulator. Pt reports she still has pain at night rolling onto R shoulder. Doing better writing on white board, just does not try to write all the way at the top. Still lacking the endurance to hold arm up a longtime.   Pain   Pain Assessment: 0-10  Pain Score: 33/10 R shoulder , Stiff L shoulder    Objective   Rounded shoulders  Findings: L CAM boot donned    Treatments:   Therapeutic Exercise:  BUE:  UBE 6min F/B L3  FT lat pull down 12.5# 2x12  Tband ER/IR/ADD/ROW/EXT [id number]  Room 8- nuts and bolts just above head level x2 (off/on)  Bicep curls/hammer curls 5# 2x10 ea  Bench press with dowel 4# 2x10  S/L flex 0# x12 /abd 0# x7 / ER 1-2#   Standing scaption raises 1# 2x10 , with scap pinch x10   Standing abduction raises 1# 2x10, FRONT RAISES 2# 2X10  Ball on wall cw/ccw , up/down  TB horizontal abduction green 2x10 R weaker  DNP:  Room 8- pvc slots waist level to overhead x2 (red marker to red marker)  Abduction isometric R only 10x5\" at wall  Supine abduction eccentrics x6 (therapist resistance)  Supine adduction blue x6 , x7 R only  TB adduction blue 2x12  Seated shoulder depression 10x5 sec  Pulleys 3min  Wall slides x10 warm-up  Tband IR green/ER red  Rows-blue 2x15  Tband ext green 2x15    MANUAL: DNP  R scap mobs in S/L, STM R rhomboids, R UT  joint mobs, post/inf;  RUE, oscialltions for pain relief     MODALITIES: "   Light Therapy: Infrared/Red wave length; 10J/cm2 applied w/pads; 10 mins; applied to L ankle, in Seated     DNP:  Ultrasound: Continuous at 100%, 1mHz at 1.5w/cm2, applied to R shoulder/UT, in Seated, for 10 minutes  DNP  Dry needling following informed consent: with e-stim; 3Hz, mA to tolerance, applied to R shoulder/medial border of scap, for 10 minutes. Performed by MK     Assessment:    SPADI R shoulder 42%, L shoulder 26%  Post Pain:  Looser in R shoulder , not as painful    Plan:   Continued BUE (R>L) strength functional progressions, extend beyond original 12 visits, add 12 more to POC for at least 24 visits total, still 2x a week.     Goals:   LTG- progressing towards well   Active       PT Problem       LTGs (Progressing)       Start:  09/30/23    Expected End:  12/08/23       1. Pt will sleep >6 hours without disturbance from shoulder pain. 2. Pt will perform all job duties and household chores without increased shoulder pain or compensation. 3. Pt will complete raking nad vacuuming/sweeping without increased shoulder pain. 4. SPADI to <20% impairment. 5. Improve bilateral shoulder AROM and MMT to WNL for less difficulty with mobility, work, and ADLs.            PT problem       STG (Progressing)       Start:  10/13/23    Expected End:  11/10/23       1. Pt will tolerate lifting her arm to write on white board at school without pain or impaired ROM. 2. 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 3. Pt will sleep >4 hours without disturbance from shoulder pain.

## 2023-11-13 ENCOUNTER — TREATMENT (OUTPATIENT)
Dept: PHYSICAL THERAPY | Facility: CLINIC | Age: 63
End: 2023-11-13
Payer: COMMERCIAL

## 2023-11-13 DIAGNOSIS — M25.512 BILATERAL SHOULDER PAIN, UNSPECIFIED CHRONICITY: ICD-10-CM

## 2023-11-13 DIAGNOSIS — M25.512 CHRONIC PAIN OF BOTH SHOULDERS: ICD-10-CM

## 2023-11-13 DIAGNOSIS — M25.511 CHRONIC PAIN OF BOTH SHOULDERS: ICD-10-CM

## 2023-11-13 DIAGNOSIS — G89.29 CHRONIC PAIN OF BOTH SHOULDERS: ICD-10-CM

## 2023-11-13 DIAGNOSIS — M25.511 BILATERAL SHOULDER PAIN, UNSPECIFIED CHRONICITY: ICD-10-CM

## 2023-11-13 PROCEDURE — 97110 THERAPEUTIC EXERCISES: CPT | Mod: GP,CQ

## 2023-11-13 NOTE — PROGRESS NOTES
"Physical Therapy Treatment     Patient Name: Maureen Vela  MRN: [id number]  Today's Date: 11/13/2023  Visit 13 of 24   Time Calculation  Start Time: 1456  Stop Time: 1540  Time Calculation (min): 44 min  PT Therapeutic Procedures Time Entry  Therapeutic Exercise Time Entry: 40  Current Problem   1. Bilateral shoulder pain, unspecified chronicity  Follow Up In Physical Therapy    Follow Up In Physical Therapy      2. Chronic pain of both shoulders  Follow Up In Physical Therapy          Subjective   General    Pt states she still has difficulty sleeping on     Pain    3/10 R shoulder , Stiff L shoulder    Objective   Rounded shoulders  Findings: L CAM boot donned    Treatments:   Therapeutic Exercise:  UBE 6min F/B L3  FT lat pull down 12.5# 2x12  TB rows purple x20  TB extension blue x15  TB ER orange x15 R , x20 L  TB IR green x20 R/L  TB adduction blue R x20  Standing scaption raises 2# 2x10   Standing abduction raises 1# 2x10  Ball on wall cw/ccw x12 each on R/L  TB horizontal abduction green x8 R weaker    Deferred:  Room 8- nuts and bolts just above head level x2 (off/on)  Bicep curls/hammer curls 5# 2x10 ea  Bench press with dowel 4# 2x10  S/L flex 0# x12 /abd 0# x7 / ER 1-2#   Room 8- pvc slots waist level to overhead x2 (red marker to red marker)  Abduction isometric R only 10x5\" at wall  Supine adduction blue x6 , x7 R only  Seated shoulder depression 10x5 sec  Pulleys 3min  Wall slides x10 warm-up      MANUAL: DNP  R scap mobs in S/L, STM R rhomboids, R UT  joint mobs, post/inf;  RUE, oscialltions for pain relief     MODALITIES: DNP  Light Therapy: Infrared/Red wave length; 10J/cm2 applied w/pads; 10 mins; applied to L ankle, in Seated    DNP:  Ultrasound: Continuous at 100%, 1mHz at 1.5w/cm2, applied to R shoulder/UT, in Seated, for 10 minutes  Dry needling following informed consent: with e-stim; 3Hz, mA to tolerance, applied to R shoulder/medial border of scap, for 10 minutes.     Assessment:    Pt " treatment focused on high repetitions with all exercises, pt had minimal complaint of pain with R shoulder during ER.   Post Pain:  Looser in R shoulder , not as painful    Plan:   Continued BUE (R>L) strength functional progressions, extend beyond original 12 visits, add 12 more to POC for at least 24 visits total, still 2x a week.     Goals:   LTG- progressing towards well   Active       PT Problem       LTGs (Progressing)       Start:  09/30/23    Expected End:  12/08/23       1. Pt will sleep >6 hours without disturbance from shoulder pain. 2. Pt will perform all job duties and household chores without increased shoulder pain or compensation. 3. Pt will complete raking nad vacuuming/sweeping without increased shoulder pain. 4. SPADI to <20% impairment. 5. Improve bilateral shoulder AROM and MMT to WNL for less difficulty with mobility, work, and ADLs.            PT problem       STG (Progressing)       Start:  10/13/23    Expected End:  11/10/23       1. Pt will tolerate lifting her arm to write on white board at school without pain or impaired ROM. 2. 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 3. Pt will sleep >4 hours without disturbance from shoulder pain.

## 2023-11-15 ENCOUNTER — TREATMENT (OUTPATIENT)
Dept: PHYSICAL THERAPY | Facility: CLINIC | Age: 63
End: 2023-11-15
Payer: COMMERCIAL

## 2023-11-15 DIAGNOSIS — K21.9 GASTROESOPHAGEAL REFLUX DISEASE WITHOUT ESOPHAGITIS: Primary | ICD-10-CM

## 2023-11-15 DIAGNOSIS — M25.511 CHRONIC PAIN OF BOTH SHOULDERS: ICD-10-CM

## 2023-11-15 DIAGNOSIS — M25.511 BILATERAL SHOULDER PAIN, UNSPECIFIED CHRONICITY: ICD-10-CM

## 2023-11-15 DIAGNOSIS — M25.512 BILATERAL SHOULDER PAIN, UNSPECIFIED CHRONICITY: ICD-10-CM

## 2023-11-15 DIAGNOSIS — M25.512 CHRONIC PAIN OF BOTH SHOULDERS: ICD-10-CM

## 2023-11-15 DIAGNOSIS — G89.29 CHRONIC PAIN OF BOTH SHOULDERS: ICD-10-CM

## 2023-11-15 PROCEDURE — 97110 THERAPEUTIC EXERCISES: CPT | Mod: GP | Performed by: PHYSICAL THERAPIST

## 2023-11-15 PROCEDURE — 97032 APPL MODALITY 1+ESTIM EA 15: CPT | Mod: GP | Performed by: PHYSICAL THERAPIST

## 2023-11-15 RX ORDER — OMEPRAZOLE 20 MG/1
20 CAPSULE, DELAYED RELEASE ORAL DAILY
Qty: 90 CAPSULE | Refills: 1 | Status: SHIPPED | OUTPATIENT
Start: 2023-11-15 | End: 2024-05-07

## 2023-11-15 ASSESSMENT — PAIN SCALES - GENERAL: PAINLEVEL_OUTOF10: 3

## 2023-11-15 ASSESSMENT — PAIN - FUNCTIONAL ASSESSMENT: PAIN_FUNCTIONAL_ASSESSMENT: 0-10

## 2023-11-15 NOTE — PROGRESS NOTES
"Physical Therapy Treatment     Patient Name: Maureen Vela  MRN: [id number]  Today's Date: 11/15/2023  Visit 14 of 24   Time Calculation  Start Time: 1500  Stop Time: 1545  Time Calculation (min): 45 min  PT Modalities Time Entry  E-Stim (Attended) Time Entry: 15  PT Therapeutic Procedures Time Entry  Therapeutic Exercise Time Entry: 30  Current Problem   1. Bilateral shoulder pain, unspecified chronicity  Follow Up In Physical Therapy      2. Chronic pain of both shoulders  Follow Up In Physical Therapy          Subjective   General   General Comment: Pt been trying to use her arm more at school, but it still fatigues quicklywith holdingher arm up to write on whiteboard and she cannot reach to the top of the board. Pt states she still has difficulty sleeping on her sides.     Pain   Pain Assessment: 0-10  Pain Score: 3 Stiff L shoulder    Objective   Rounded shoulders  Findings: L CAM boot donned    Treatments:   Therapeutic Exercise:  UBE 6min F/B L3  FT lat pull down 12.5# 2x12  TB rows purple x20  TB extension blue x15  TB ER orange x15 R , x20 L  TB IR green x20 R/L  TB adduction blue R x20  Standing scaption raises 2# 2x10   Standing abduction raises 1# 2x10  Ball on wall cw/ccw x15 each on R/L  TB horizontal abduction green x8 R weaker  Cable rows 7.5#, lat pull-downs 10-15#  Deferred:  Room 8- nuts and bolts just above head level x2 (off/on)  Bicep curls/hammer curls 5# 2x10 ea  Bench press with dowel 4# 2x10  S/L flex 0# x12 /abd 0# x7 / ER 1-2#   Room 8- pvc slots waist level to overhead x2 (red marker to red marker)  Abduction isometric R only 10x5\" at wall  Supine adduction blue x6 , x7 R only  Seated shoulder depression 10x5 sec  Pulleys 3min  Wall slides x10 warm-up      MANUAL: DNP  R scap mobs in S/L, STM R rhomboids, R UT  joint mobs, post/inf;  RUE, oscialltions for pain relief     MODALITIES:  Light Therapy: Infrared/Red wave length; 10J/cm2 applied w/pads; 10 mins; applied to L ankle, in " Seated  Unattended e-stim: IFC: applied to Shoulders, Intensity to tolerance, for 15 minutes, in Seated   DNP:  Ultrasound: Continuous at 100%, 1mHz at 1.5w/cm2, applied to R shoulder/UT, in Seated, for 10 minutes  Dry needling following informed consent: with e-stim; 3Hz, mA to tolerance, applied to R shoulder/medial border of scap, for 10 minutes.     Assessment:   PT Assessment  Assessment Comment: Pt tolerated there ex well, but with discomfort with flexion and ER.Pt treatment focused on high repetitions with all exercises, pt had minimal complaint of pain with R shoulder during ER.   Post Pain:  Looser in R shoulder , not as painful    Plan:  OP PT Plan  PT Plan: Skilled PT  PT Frequency: 2 times per week  Duration: 8wks  Rehab Potential: GoodContinued BUE (R>L) strength functional progressions, extend beyond original 12 visits, add 12 more to POC for at least 24 visits total, still 2x a week.     Goals:   LTG- progressing towards well   Active       PT Problem       LTGs (Progressing)       Start:  09/30/23    Expected End:  12/08/23       1. Pt will sleep >6 hours without disturbance from shoulder pain. 2. Pt will perform all job duties and household chores without increased shoulder pain or compensation. 3. Pt will complete raking nad vacuuming/sweeping without increased shoulder pain. 4. SPADI to <20% impairment. 5. Improve bilateral shoulder AROM and MMT to WNL for less difficulty with mobility, work, and ADLs.            PT problem       STG (Progressing)       Start:  10/13/23    Expected End:  11/10/23       1. Pt will tolerate lifting her arm to write on white board at school without pain or impaired ROM. 2. 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 3. Pt will sleep >4 hours without disturbance from shoulder pain.

## 2023-11-20 ENCOUNTER — TREATMENT (OUTPATIENT)
Dept: PHYSICAL THERAPY | Facility: CLINIC | Age: 63
End: 2023-11-20
Payer: COMMERCIAL

## 2023-11-20 DIAGNOSIS — M25.511 BILATERAL SHOULDER PAIN, UNSPECIFIED CHRONICITY: ICD-10-CM

## 2023-11-20 DIAGNOSIS — M25.512 BILATERAL SHOULDER PAIN, UNSPECIFIED CHRONICITY: ICD-10-CM

## 2023-11-20 DIAGNOSIS — M25.512 CHRONIC PAIN OF BOTH SHOULDERS: Primary | ICD-10-CM

## 2023-11-20 DIAGNOSIS — M25.511 CHRONIC PAIN OF BOTH SHOULDERS: Primary | ICD-10-CM

## 2023-11-20 DIAGNOSIS — G89.29 CHRONIC PAIN OF BOTH SHOULDERS: Primary | ICD-10-CM

## 2023-11-20 PROCEDURE — 97014 ELECTRIC STIMULATION THERAPY: CPT | Mod: GP,CQ

## 2023-11-20 PROCEDURE — 97110 THERAPEUTIC EXERCISES: CPT | Mod: GP,CQ

## 2023-11-20 NOTE — PROGRESS NOTES
"Physical Therapy Treatment     Patient Name: Maureen Vela  MRN: [id number]  Today's Date: 11/20/2023  Visit 14 of 24      Current Problem   1. Chronic pain of both shoulders        2. Bilateral shoulder pain, unspecified chronicity  Follow Up In Physical Therapy        Subjective   General     Pt states she did a lot of house work over weekend, R shoulder is feeling it. Pt reports she doesn't over reach with R UE, uses L to do most of heavy lifting and reaching.     Pain     Sore R shoulder    Objective   Rounded shoulders  Findings: L CAM boot donned , bone stimulator donned    Treatments:   Therapeutic Exercise:  UBE 6min F/B L3  Room 8- nuts and bolts just above head level x2 (off/on)  Tband yellow B ER- shoulder at 0 2x10 , shoulder at 45 2x10  Tband yellow horizontal abduction pulses at 0-45-90  x12 each  Dowel wall slides with low trap lift off x10  FT lat pull down 12.5# 2x12    Deferred:  FT rows 7.5#  TB extension blue x15  TB ER orange x15 R , x20 L  TB IR green x20 R/L  TB adduction blue R x20  Standing scaption raises 2# 2x10   Standing abduction raises 1# 2x10  Ball on wall cw/ccw x15 each on R/L  TB horizontal abduction green x8 R weaker  Bicep curls/hammer curls 5# 2x10 ea  Bench press with dowel 4# 2x10  S/L flex 0# x12 /abd 0# x7 / ER 1-2#   Room 8- pvc slots waist level to overhead x2 (red marker to red marker)  Abduction isometric R only 10x5\" at wall  Supine adduction blue x6 , x7 R only  Seated shoulder depression 10x5 sec  Pulleys 3min  Wall slides x10 warm-up    MANUAL: DNP  R scap mobs in S/L, STM R rhomboids, R UT  joint mobs, post/inf;  RUE, oscialltions for pain relief     MODALITIES:  Light Therapy: Infrared/Red wave length; 10J/cm2 applied w/pads; 10 mins; applied to L ankle, in Seated  Unattended e-stim: IFC: applied to Shoulders, Intensity to tolerance, for 15 minutes, in Seated    DNP:  Ultrasound: Continuous at 100%, 1mHz at 1.5w/cm2, applied to R shoulder/UT, in Seated, for 10 " minutes  Dry needling following informed consent: with e-stim; 3Hz, mA to tolerance, applied to R shoulder/medial border of scap, for 10 minutes.     Assessment:    Pt's R zainab continues to fatigue quickly with overhead exercises. Pt challenged with intensity current exercises, R>L shoulder. Pt cued to limit activation of R UT during exercises.   Post Pain:  Less sore, fatigued  Plan:   Continued BUE (R>L) strength functional progressions, extend beyond original 12 visits, add 12 more to POC for at least 24 visits total, still 2x a week.     Goals:   LTG- progressing towards well   Active       PT Problem       LTGs (Progressing)       Start:  09/30/23    Expected End:  12/08/23       1. Pt will sleep >6 hours without disturbance from shoulder pain. 2. Pt will perform all job duties and household chores without increased shoulder pain or compensation. 3. Pt will complete raking nad vacuuming/sweeping without increased shoulder pain. 4. SPADI to <20% impairment. 5. Improve bilateral shoulder AROM and MMT to WNL for less difficulty with mobility, work, and ADLs.            PT problem       STG (Progressing)       Start:  10/13/23    Expected End:  11/10/23       1. Pt will tolerate lifting her arm to write on white board at school without pain or impaired ROM. 2. 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 3. Pt will sleep >4 hours without disturbance from shoulder pain.

## 2023-11-29 ENCOUNTER — TREATMENT (OUTPATIENT)
Dept: PHYSICAL THERAPY | Facility: CLINIC | Age: 63
End: 2023-11-29
Payer: COMMERCIAL

## 2023-11-29 DIAGNOSIS — M25.511 BILATERAL SHOULDER PAIN, UNSPECIFIED CHRONICITY: ICD-10-CM

## 2023-11-29 DIAGNOSIS — M25.512 BILATERAL SHOULDER PAIN, UNSPECIFIED CHRONICITY: ICD-10-CM

## 2023-11-29 DIAGNOSIS — M25.572 CHRONIC PAIN OF LEFT ANKLE: Primary | Chronic | ICD-10-CM

## 2023-11-29 DIAGNOSIS — G89.29 CHRONIC PAIN OF LEFT ANKLE: Primary | Chronic | ICD-10-CM

## 2023-11-29 PROCEDURE — 97014 ELECTRIC STIMULATION THERAPY: CPT | Mod: GP | Performed by: PHYSICAL THERAPIST

## 2023-11-29 PROCEDURE — 97110 THERAPEUTIC EXERCISES: CPT | Mod: GP | Performed by: PHYSICAL THERAPIST

## 2023-11-29 ASSESSMENT — PAIN SCALES - GENERAL: PAINLEVEL_OUTOF10: 4

## 2023-11-29 ASSESSMENT — PAIN - FUNCTIONAL ASSESSMENT: PAIN_FUNCTIONAL_ASSESSMENT: 0-10

## 2023-11-29 NOTE — PROGRESS NOTES
"Physical Therapy Treatment     Patient Name: Maureen Vela  MRN: [id number]  Today's Date: 11/29/2023  Visit 17 of 24   Time Calculation  Start Time: 1630  Stop Time: 1730  Time Calculation (min): 60 min  PT Modalities Time Entry  E-Stim (Unattended) Time Entry: 15  PT Therapeutic Procedures Time Entry  Therapeutic Exercise Time Entry: 40  Current Problem   1. Chronic pain of left ankle        2. Bilateral shoulder pain, unspecified chronicity  Follow Up In Physical Therapy        Subjective   General   General Comment: Pt decorated and cookeda lot, her arm is very sore. And she did not keep up with HEP well. Pt states she did a lot of house work over weekend, R shoulder is feeling it. Pt reports she doesn't over reach with R UE, uses L to do most of heavy lifting and reaching.     Pain   Pain Assessment: 0-10  Pain Score: 4 (R shoulder)  Response to Interventions: 2/10 just fatigued Sore R shoulder    Objective   Rounded shoulders  Findings: L CAM boot donned , bone stimulator donned    Treatments:   Therapeutic Exercise:  UBE 6min F/B L3  Room 8- nuts and bolts just above head level x2 (off/on)  Tband yellow B ER- shoulder at 0 2x10 , shoulder at 45 2x10  Tband yellow horizontal abduction pulses at 0-45-90  x12 each  Dowel wall slides with low trap lift off x10  FT lat pull down 12.5# 2x12  TB horizontal abduction green x8 R weaker  Bicep curls/hammer curls 5# 2x10 ea  Bench press with dowel 9# 2x10  S/L flex 0# x12 /abd 0# x7 / ER 1-2#     Deferred:  FT rows 7.5#  TB extension blue x15  TB ER orange x15 R , x20 L  TB IR green x20 R/L  TB adduction blue R x20  Standing scaption raises 2# 2x10   Standing abduction raises 1# 2x10  Ball on wall cw/ccw x15 each on R/L  Room 8- pvc slots waist level to overhead x2 (red marker to red marker)  Abduction isometric R only 10x5\" at wall  Supine adduction blue x6 , x7 R only  Seated shoulder depression 10x5 sec  Pulleys 3min  Wall slides x10 warm-up    MANUAL: DNP  R scap " mobs in S/L, STM R rhomboids, R UT  joint mobs, post/inf;  RUE, oscialltions for pain relief     MODALITIES:  Dry needling following informed consent: with e-stim; 3Hz, mA to tolerance, applied to R shoulder/medial border of scap, for 10 minutes.   DNP:  Ultrasound: Continuous at 100%, 1mHz at 1.5w/cm2, applied to R shoulder/UT, in Seated, for 10 minutes  Light Therapy: Infrared/Red wave length; 10J/cm2 applied w/pads; 10 mins; applied to L ankle, in Seated  Unattended e-stim: IFC: applied to Shoulders, Intensity to tolerance, for 15 minutes, in Seated  Assessment:   PT Assessment  Assessment Comment: Pt tolerated there ex well, but with discomfort with flexion and ER.Pt's R shoudler continues to fatigue quickly with overhead exercises. Pt challenged with intensity current exercises, R>L shoulder. Pt cued to limit activation of R UT during exercises.   Post Pain:  Less sore, fatigued  Plan:   Continued BUE (R>L) strength functional progressions, extend beyond original 12 visits, add 12 more to POC for at least 24 visits total, still 2x a week.     Goals:   LTG- progressing towards well   Active       PT Problem       LTGs       Start:  10/01/23    Expected End:  11/12/23       1. Patient will report decrease in pain by > 2 points to meet MCID  2. Pt will increase muscle strength by >/= 1/2 MMT to provide improved stability and ability to perform activities such as normal, resume full work duties without pain, able to perform ADLs/IADLs without pain.  3. Patient will demo increase of LILLY by 3 points to demo improved function and meet MDC.  4. Pt will be able to ambulate without CAM boot with proper gait mechanics.

## 2023-11-30 ENCOUNTER — APPOINTMENT (OUTPATIENT)
Dept: RHEUMATOLOGY | Facility: CLINIC | Age: 63
End: 2023-11-30
Payer: COMMERCIAL

## 2023-11-30 NOTE — TELEPHONE ENCOUNTER
From what I can see she is on leflunomide and possibly prednisone. First one to be discontinued is prednisone- take half dose daily then half dose every other day then stop (if she is taking it). If she is taking it prn, then stop.    Leflunomide she can take every other day for a week then stop.  Continue with folic acid- it does not need to be discontinued.    If she is on NSAIDs, stop.     Please let me know how she is doing when off the leflunomide.    Keep taking calcium 1200 mg (preferably from diet) and vit D at least 1000 units daily or rx.

## 2023-11-30 NOTE — TELEPHONE ENCOUNTER
CALLED PT TO INFORM HER OF DR SANTIAGO' INSTRUCTIONS. PT STATES SHE STOPPED ALL HER MEDS & BONE STIMULATOR IS ON.

## 2023-12-06 ENCOUNTER — TREATMENT (OUTPATIENT)
Dept: PHYSICAL THERAPY | Facility: CLINIC | Age: 63
End: 2023-12-06
Payer: COMMERCIAL

## 2023-12-06 DIAGNOSIS — M25.512 CHRONIC PAIN OF BOTH SHOULDERS: Primary | ICD-10-CM

## 2023-12-06 DIAGNOSIS — G89.29 CHRONIC PAIN OF BOTH SHOULDERS: Primary | ICD-10-CM

## 2023-12-06 DIAGNOSIS — M25.512 BILATERAL SHOULDER PAIN, UNSPECIFIED CHRONICITY: ICD-10-CM

## 2023-12-06 DIAGNOSIS — M25.511 BILATERAL SHOULDER PAIN, UNSPECIFIED CHRONICITY: ICD-10-CM

## 2023-12-06 DIAGNOSIS — M25.511 CHRONIC PAIN OF BOTH SHOULDERS: Primary | ICD-10-CM

## 2023-12-06 PROCEDURE — RXMED WILLOW AMBULATORY MEDICATION CHARGE

## 2023-12-06 PROCEDURE — 97110 THERAPEUTIC EXERCISES: CPT | Mod: GP | Performed by: PHYSICAL THERAPIST

## 2023-12-06 ASSESSMENT — PAIN - FUNCTIONAL ASSESSMENT: PAIN_FUNCTIONAL_ASSESSMENT: 0-10

## 2023-12-06 ASSESSMENT — PAIN SCALES - GENERAL: PAINLEVEL_OUTOF10: 2

## 2023-12-06 NOTE — PROGRESS NOTES
"Physical Therapy Treatment     Patient Name: Maureen Vela  MRN: [id number]  Today's Date: 12/6/2023  Visit 17/18? of 24   Time Calculation  Start Time: 1615  Stop Time: 1700  Time Calculation (min): 45 min  PT Therapeutic Procedures Time Entry  Therapeutic Exercise Time Entry: 42  Current Problem   1. Chronic pain of both shoulders        2. Bilateral shoulder pain, unspecified chronicity  Follow Up In Physical Therapy        Subjective   General   General Comment: Pt had a rough day at work, had totake tramadol for LBP and ankle pain. Says shoulder popped a lot today at work.     Pain   Pain Assessment: 0-10  Pain Score: 2 (achy)  Response to Interventions: no worse    Objective   Rounded shoulders  Findings: L CAM boot donned , bone stimulator donned    Treatments:   Therapeutic Exercise:  UBE 6min F/B L4 hills  Crossover symmetry warm-up  Room 8- nuts and bolts just above head level x2 (off/on)  Tband yellow B ER- shoulder at 0 2x10 , shoulder at 45 2x10  Tband yellow horizontal abduction pulses at 0-45-90  x12 each  Dowel wall slides with low trap lift off x10  FT lat pull down 12.5-15# 2x12  TB horizontal abduction green x8 R weaker  Bicep curls/hammer curls 5-7# 2x10 ea  Bench press with dowel 10# 2x10  S/L flex 1# x12 /abd 1# x7 / ER 2-3#   TB adduction blue R x20  Standing scaption raises 2-3# 2x10   Standing abduction raises 2# 2x10  Face pulls    Deferred:  FT rows 7.5#  TB extension blue x15  TB ER orange x15 R , x20 L  TB IR green x20 R/L  Ball on wall cw/ccw x15 each on R/L  Room 8- pvc slots waist level to overhead x2 (red marker to red marker)  Abduction isometric R only 10x5\" at wall  Supine adduction blue x6 , x7 R only  Seated shoulder depression 10x5 sec  Pulleys 3min  Wall slides x10 warm-up    MANUAL: DNP  R scap mobs in S/L, STM R rhomboids, R UT  joint mobs, post/inf;  RUE, oscialltions for pain relief     MODALITIES: deferred 12/6/23 to see how she tolerates ther ex alone  Dry needling " following informed consent: with e-stim; 3Hz, mA to tolerance, applied to R shoulder/medial border of scap, for 10 minutes.   DNP:  Ultrasound: Continuous at 100%, 1mHz at 1.5w/cm2, applied to R shoulder/UT, in Seated, for 10 minutes  Light Therapy: Infrared/Red wave length; 10J/cm2 applied w/pads; 10 mins; applied to L ankle, in Seated  Unattended e-stim: IFC: applied to Shoulders, Intensity to tolerance, for 15 minutes, in Seated  Assessment:   PT Assessment  Assessment Comment: Tolerated ther ex well today 2/2 pain meds for other body pains.Pt's R shoudler continues to fatigue quickly with overhead exercises. Pt challenged with intensity current exercises, R>L shoulder. Pt cued to limit activation of R UT during exercises.   Post Pain:  Less sore, fatigued  Plan:  OP PT Plan  PT Frequency: 2 times per weekContinued BUE (R>L) strength functional progressions, extend beyond original 12 visits, add 12 more to POC for at least 24 visits total, still 2x a week.     Goals:   LTG- progressing towards well   Active       PT Problem       LTGs (Progressing)       Start:  09/30/23    Expected End:  12/08/23       1. Pt will sleep >6 hours without disturbance from shoulder pain. 2. Pt will perform all job duties and household chores without increased shoulder pain or compensation. 3. Pt will complete raking nad vacuuming/sweeping without increased shoulder pain. 4. SPADI to <20% impairment. 5. Improve bilateral shoulder AROM and MMT to WNL for less difficulty with mobility, work, and ADLs.            PT problem       STG (Progressing)       Start:  10/13/23    Expected End:  11/10/23       1. Pt will tolerate lifting her arm to write on white board at school without pain or impaired ROM. 2. 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 3. Pt will sleep >4 hours without disturbance from shoulder pain.

## 2023-12-07 ENCOUNTER — PHARMACY VISIT (OUTPATIENT)
Dept: PHARMACY | Facility: CLINIC | Age: 63
End: 2023-12-07
Payer: COMMERCIAL

## 2023-12-07 DIAGNOSIS — E03.9 ACQUIRED HYPOTHYROIDISM: Primary | ICD-10-CM

## 2023-12-07 RX ORDER — LEVOTHYROXINE SODIUM 75 UG/1
75 TABLET ORAL DAILY
Qty: 90 TABLET | Refills: 0 | Status: SHIPPED | OUTPATIENT
Start: 2023-12-07 | End: 2024-02-19

## 2023-12-11 ENCOUNTER — TREATMENT (OUTPATIENT)
Dept: PHYSICAL THERAPY | Facility: CLINIC | Age: 63
End: 2023-12-11
Payer: COMMERCIAL

## 2023-12-11 DIAGNOSIS — M25.512 BILATERAL SHOULDER PAIN, UNSPECIFIED CHRONICITY: ICD-10-CM

## 2023-12-11 DIAGNOSIS — G89.29 CHRONIC PAIN OF BOTH SHOULDERS: Primary | ICD-10-CM

## 2023-12-11 DIAGNOSIS — M25.512 CHRONIC PAIN OF BOTH SHOULDERS: Primary | ICD-10-CM

## 2023-12-11 DIAGNOSIS — M25.511 BILATERAL SHOULDER PAIN, UNSPECIFIED CHRONICITY: ICD-10-CM

## 2023-12-11 DIAGNOSIS — M25.511 CHRONIC PAIN OF BOTH SHOULDERS: Primary | ICD-10-CM

## 2023-12-11 PROCEDURE — 97110 THERAPEUTIC EXERCISES: CPT | Mod: GP,CQ

## 2023-12-11 ASSESSMENT — PAIN - FUNCTIONAL ASSESSMENT: PAIN_FUNCTIONAL_ASSESSMENT: 0-10

## 2023-12-11 ASSESSMENT — PAIN SCALES - GENERAL: PAINLEVEL_OUTOF10: 0 - NO PAIN

## 2023-12-11 NOTE — PROGRESS NOTES
"Physical Therapy Treatment     Patient Name: Maureen Vela  MRN: [id number]  Today's Date: 12/11/2023  Visit 19 of 24   Time Calculation  Start Time: 1500  Stop Time: 1541  Time Calculation (min): 41 min  PT Therapeutic Procedures Time Entry  Therapeutic Exercise Time Entry: 40  Current Problem   1. Chronic pain of both shoulders        2. Bilateral shoulder pain, unspecified chronicity  Follow Up In Physical Therapy        Subjective   General     Pt states she knows reaching with R UE tends to cause pain to flare up, avoids those movements and pain stays away.     Pain   Pain Assessment: 0-10  Pain Score: 0 - No pain  Response to Interventions: fatigue, no pain    Objective   Rounded shoulders  Findings: L CAM boot donned , bone stimulator donned    Treatments:   Therapeutic Exercise:  UBE 6min F/B L4 [place]  Room 8- nuts and bolts just above head level x1 (off/on) R UE only   Bicep curls/hammer curls 3# 1x15 ea , 6# 1x15 ea  Tband yellow horizontal abduction pulses at 0-45-90  2x15 each  TB row purple 1x15  Standing scaption raises 2# 2x10   Standing abduction raises 2# 2x10  Dowel wall slides with low trap lift off x10  TB face pulls pink 2x10  TB chest press purple 1x15    Deferred:  Crossover symmetry warm-up  Tband yellow B ER- shoulder at 0 2x10 , shoulder at 45 2x10  FT lat pull down 12.5-15# 2x12  TB horizontal abduction green x8 R weaker  Bench press with dowel 10# 2x10  S/L flex 1# x12 /abd 1# x7 / ER 2-3#   TB adduction blue R x20  FT rows 7.5#  TB extension blue x15  TB ER orange x15 R , x20 L  TB IR green x20 R/L  Ball on wall cw/ccw x15 each on R/L  Room 8- pvc slots waist level to overhead x2 (red marker to red marker)  Abduction isometric R only 10x5\" at wall  Supine adduction blue x6 , x7 R only  Seated shoulder depression 10x5 sec  Pulleys 3min  Wall slides x10 warm-up    MANUAL: DNP  R scap mobs in S/L, STM R rhomboids, R UT  joint mobs, post/inf;  RUE, oscialltions for pain relief "     MODALITIES: DNP  Dry needling following informed consent: with e-stim; 3Hz, mA to tolerance, applied to R shoulder/medial border of scap, for 10 minutes.   Ultrasound: Continuous at 100%, 1mHz at 1.5w/cm2, applied to R shoulder/UT, in Seated, for 10 minutes  Light Therapy: Infrared/Red wave length; 10J/cm2 applied w/pads; 10 mins; applied to L ankle, in Seated  Unattended e-stim: IFC: applied to Shoulders, Intensity to tolerance, for 15 minutes, in Seated    Assessment:    Pt treatment focused on continued strengthening of bilateral shoulders, no modalities need for pain relief.     Post Pain:  Fatigued, no pain  Plan:   Continued BUE (R>L) strength functional progressions, extend beyond original 12 visits, add 12 more to POC for at least 24 visits total, still 2x a week.     Goals:   LTG- progressing towards well   Active       PT Problem       LTGs (Progressing)       Start:  09/30/23    Expected End:  12/08/23       1. Pt will sleep >6 hours without disturbance from shoulder pain. 2. Pt will perform all job duties and household chores without increased shoulder pain or compensation. 3. Pt will complete raking nad vacuuming/sweeping without increased shoulder pain. 4. SPADI to <20% impairment. 5. Improve bilateral shoulder AROM and MMT to WNL for less difficulty with mobility, work, and ADLs.            PT problem       STG (Progressing)       Start:  10/13/23    Expected End:  11/10/23       1. Pt will tolerate lifting her arm to write on white board at school without pain or impaired ROM. 2. 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 3. Pt will sleep >4 hours without disturbance from shoulder pain.

## 2023-12-13 ENCOUNTER — TREATMENT (OUTPATIENT)
Dept: PHYSICAL THERAPY | Facility: CLINIC | Age: 63
End: 2023-12-13
Payer: COMMERCIAL

## 2023-12-13 DIAGNOSIS — M25.511 CHRONIC PAIN OF BOTH SHOULDERS: Primary | ICD-10-CM

## 2023-12-13 DIAGNOSIS — M25.512 BILATERAL SHOULDER PAIN, UNSPECIFIED CHRONICITY: ICD-10-CM

## 2023-12-13 DIAGNOSIS — G89.29 CHRONIC PAIN OF BOTH SHOULDERS: Primary | ICD-10-CM

## 2023-12-13 DIAGNOSIS — M25.512 CHRONIC PAIN OF BOTH SHOULDERS: Primary | ICD-10-CM

## 2023-12-13 DIAGNOSIS — M25.511 BILATERAL SHOULDER PAIN, UNSPECIFIED CHRONICITY: ICD-10-CM

## 2023-12-13 PROCEDURE — 97014 ELECTRIC STIMULATION THERAPY: CPT | Mod: GP | Performed by: PHYSICAL THERAPIST

## 2023-12-13 PROCEDURE — 97110 THERAPEUTIC EXERCISES: CPT | Mod: GP | Performed by: PHYSICAL THERAPIST

## 2023-12-13 ASSESSMENT — PAIN SCALES - GENERAL: PAINLEVEL_OUTOF10: 2

## 2023-12-13 ASSESSMENT — PAIN - FUNCTIONAL ASSESSMENT: PAIN_FUNCTIONAL_ASSESSMENT: 0-10

## 2023-12-13 NOTE — PROGRESS NOTES
Physical Therapy Treatment     Patient Name: Maureen Vela  MRN: [id number]  Today's Date: 12/13/2023  Visit 20 of 24   Time Calculation  Start Time: 1523  Stop Time: 1615  Time Calculation (min): 52 min  PT Modalities Time Entry  E-Stim (Unattended) Time Entry: 15  PT Therapeutic Procedures Time Entry  Therapeutic Exercise Time Entry: 35  Current Problem   1. Chronic pain of both shoulders        2. Bilateral shoulder pain, unspecified chronicity  Follow Up In Physical Therapy        Subjective   General   General Comment: Pt reports feelingfine afterlast session, but lastnight she was putting dishes away and when shewas almost finshed with all of them, she had a really hard time lifting her arm overheadand to the back of the cabinet. Pt states she knows reaching with R UE tends to cause pain to flare up, avoids those movements.    Pain   Pain Assessment: 0-10  Pain Score: 2  Response to Interventions: no pain, just tired    Objective   Rounded shoulders  Findings: L CAM boot donned , bone stimulator donned    Treatments:   Therapeutic Exercise:  UBE 6min F/B L4 hills   Bicep curls/hammer curls 3# 1x15 ea , 6# 1x15 ea  Tband yellow horizontal abduction pulses at 0-45-90  2x15 each  TB row purple 1x15  Standing scaption raises 2# 2x10   Standing abduction raises 2# 2x10  Dowel wall slides with low trap lift off x10  TB face pulls pink 2x10  TB chest press purple 1x15  FT rows 7.5-10#, FT lat pull down 12.5-15# 2x12, shldr ext 7.5# 2x10  TB adduction blue R x20    Deferred:  Room 8- nuts and bolts just above head level x1 (off/on) R UE only  Crossover symmetry warm-up  Tband yellow B ER- shoulder at 0 2x10 , shoulder at 45 2x10  TB horizontal abduction green x8 R weaker  Bench press with dowel 10# 2x10  S/L flex 1# x12 /abd 1# x7 / ER 2-3#   TB extension blue x15  TB ER orange x15 R , x20 L  TB IR green x20 R/L  Ball on wall cw/ccw x15 each on R/L  Room 8- pvc slots waist level to overhead x2 (red marker to red  "marker)  Abduction isometric R only 10x5\" at wall  Supine adduction blue x6 , x7 R only  Seated shoulder depression 10x5 sec  Pulleys 3min  Wall slides x10 warm-up    MANUAL: DNP  R scap mobs in S/L, STM R rhomboids, R UT  joint mobs, post/inf;  RUE, oscialltions for pain relief     MODALITIES:   Dry needling following informed consent: with e-stim; 3Hz, mA to tolerance, applied to R shoulder/medial border of scap, for 10 minutes.   DNP:  Ultrasound: Continuous at 100%, 1mHz at 1.5w/cm2, applied to R shoulder/UT, in Seated, for 10 minutes  Light Therapy: Infrared/Red wave length; 10J/cm2 applied w/pads; 10 mins; applied to L ankle, in Seated  Unattended e-stim: IFC: applied to Shoulders, Intensity to tolerance, for 15 minutes, in Seated    Assessment:    Pt treatment focused on continued strengthening of bilateral shoulders, no modalities need for pain relief. Had pinpoint anterior shoulder pain, TTP.     Post Pain:  Fatigued, no pain  Plan:  OP PT Plan  PT Frequency: 2 times per weekContinued BUE (R>L) strength functional progressions, extend beyond original 12 visits, add 12 more to POC for at least 24 visits total, still 2x a week.     Goals:   LTG- progressing towards well   Active       PT Problem       LTGs (Progressing)       Start:  09/30/23    Expected End:  12/08/23       1. Pt will sleep >6 hours without disturbance from shoulder pain. 2. Pt will perform all job duties and household chores without increased shoulder pain or compensation. 3. Pt will complete raking nad vacuuming/sweeping without increased shoulder pain. 4. SPADI to <20% impairment. 5. Improve bilateral shoulder AROM and MMT to WNL for less difficulty with mobility, work, and ADLs.            PT problem       STG (Progressing)       Start:  10/13/23    Expected End:  11/10/23       1. Pt will tolerate lifting her arm to write on white board at school without pain or impaired ROM. 2. 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 3. Pt will sleep >4 hours without disturbance from shoulder pain.

## 2023-12-18 ENCOUNTER — TREATMENT (OUTPATIENT)
Dept: PHYSICAL THERAPY | Facility: CLINIC | Age: 63
End: 2023-12-18
Payer: COMMERCIAL

## 2023-12-18 DIAGNOSIS — M25.512 BILATERAL SHOULDER PAIN, UNSPECIFIED CHRONICITY: ICD-10-CM

## 2023-12-18 DIAGNOSIS — M25.511 CHRONIC PAIN OF BOTH SHOULDERS: Primary | ICD-10-CM

## 2023-12-18 DIAGNOSIS — G89.29 CHRONIC PAIN OF BOTH SHOULDERS: Primary | ICD-10-CM

## 2023-12-18 DIAGNOSIS — M25.512 CHRONIC PAIN OF BOTH SHOULDERS: Primary | ICD-10-CM

## 2023-12-18 DIAGNOSIS — M25.511 BILATERAL SHOULDER PAIN, UNSPECIFIED CHRONICITY: ICD-10-CM

## 2023-12-18 PROCEDURE — 97110 THERAPEUTIC EXERCISES: CPT | Mod: GP | Performed by: PHYSICAL THERAPIST

## 2023-12-18 ASSESSMENT — PAIN - FUNCTIONAL ASSESSMENT: PAIN_FUNCTIONAL_ASSESSMENT: 0-10

## 2023-12-18 ASSESSMENT — PAIN SCALES - GENERAL: PAINLEVEL_OUTOF10: 2

## 2023-12-18 NOTE — PROGRESS NOTES
"Physical Therapy Treatment     Patient Name: Maureen Vela  MRN: [id number]  Today's Date: 12/18/2023  Visit 21 of 24   Time Calculation  Start Time: 1530  Stop Time: 1615  Time Calculation (min): 45 min  PT Therapeutic Procedures Time Entry  Therapeutic Exercise Time Entry: 45  Current Problem   1. Chronic pain of both shoulders        2. Bilateral shoulder pain, unspecified chronicity  Follow Up In Physical Therapy        Subjective   General   General Comment: Pt states eher shoulder feelspretty goodcurrentlybc she took tramadol at 11am bc of her ankle. Pt states she knows reaching with R UE tends to cause pain to flare up, avoids those movements.  Left arm has been good.   Pain   Pain Assessment: 0-10  Pain Score: 2 (not pain, just tired and achy)  Response to Interventions: no pain, just tired    Objective   Rounded shoulders  Findings: L CAM boot donned , bone stimulator donned    Treatments:   Therapeutic Exercise:  UBE 6min F/B L4 [place]  Bicep curls/hammer curls 3# 1x15 ea , 6# 1x15 ea  Tband yellow horizontal abduction pulses at 0-45-90  2x15 each  TB row purple 1x30  Standing scaption raises and front raises 2# 2x10 - tried 3# only 5x  Standing abduction raises 2# 2x10  Dowel wall slides with low trap lift off x10  TB face pulls pink 2x10  TB chest press purple 1x20  FT rows 7.5-10#, FT lat pull down 12.5-15# 2x12, shldr ext 7.5# 2x10  TB adduction blue R x20  Dowel ext x15 4#, dowel IR/ext 4# x15  S/L flex 1# x12 /abd 1# x7 / ER 2-3#     Deferred:  Room 8- nuts and bolts just above head level x1 (off/on) R UE only  Tband yellow B ER- shoulder at 0 2x10 , shoulder at 45 2x10  TB horizontal abduction green x8 R weaker  Bench press with dowel 10# 2x10  TB extension blue x15  TB ER orange x15 R , x20 L  TB IR green x20 R/L  Ball on wall cw/ccw x15 each on R/L  Room 8- pvc slots waist level to overhead x2 (red marker to red marker)  Abduction isometric R only 10x5\" at wall  Supine adduction blue x6 , x7 R " only  Pulleys 3min  Wall slides x10 warm-up    MANUAL: DNP  R scap mobs in S/L, STM R rhomboids, R UT  joint mobs, post/inf;  RUE, oscialltions for pain relief     MODALITIES:   Dry needling following informed consent: with e-stim; 3Hz, mA to tolerance, applied to R shoulder/medial border of scap, for 10 minutes.   DNP:  Ultrasound: Continuous at 100%, 1mHz at 1.5w/cm2, applied to R shoulder/UT, in Seated, for 10 minutes  Light Therapy: Infrared/Red wave length; 10J/cm2 applied w/pads; 10 mins; applied to L ankle, in Seated  Unattended e-stim: IFC: applied to Shoulders, Intensity to tolerance, for 15 minutes, in Seated    Assessment:    Pt treatment focused on continued strengthening of bilateral shoulders, no modalities need for pain relief. Had pinpoint anterior shoulder pain, TTP.     Post Pain:  Fatigued, no pain  Plan:   Continued BUE (R>L) strength functional progressions, extend beyond original 12 visits, add 12 more to POC for at least 24 visits total, still 2x a week. Pt shoulder progressing very well.     Goals:   LTG- progressing towards well   Active       PT Problem       LTGs (Progressing)       Start:  09/30/23    Expected End:  12/08/23       1. Pt will sleep >6 hours without disturbance from shoulder pain. 2. Pt will perform all job duties and household chores without increased shoulder pain or compensation. 3. Pt will complete raking nad vacuuming/sweeping without increased shoulder pain. 4. SPADI to <20% impairment. 5. Improve bilateral shoulder AROM and MMT to WNL for less difficulty with mobility, work, and ADLs.            PT problem       STG (Progressing)       Start:  10/13/23    Expected End:  11/10/23       1. Pt will tolerate lifting her arm to write on white board at school without pain or impaired ROM. 2. 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 3. Pt will sleep >4 hours without disturbance from shoulder pain.

## 2023-12-20 ENCOUNTER — TREATMENT (OUTPATIENT)
Dept: PHYSICAL THERAPY | Facility: CLINIC | Age: 63
End: 2023-12-20
Payer: COMMERCIAL

## 2023-12-20 DIAGNOSIS — M25.512 CHRONIC PAIN OF BOTH SHOULDERS: Primary | ICD-10-CM

## 2023-12-20 DIAGNOSIS — G89.29 CHRONIC PAIN OF BOTH SHOULDERS: Primary | ICD-10-CM

## 2023-12-20 DIAGNOSIS — M25.511 BILATERAL SHOULDER PAIN, UNSPECIFIED CHRONICITY: ICD-10-CM

## 2023-12-20 DIAGNOSIS — M25.511 CHRONIC PAIN OF BOTH SHOULDERS: Primary | ICD-10-CM

## 2023-12-20 DIAGNOSIS — M25.512 BILATERAL SHOULDER PAIN, UNSPECIFIED CHRONICITY: ICD-10-CM

## 2023-12-20 PROCEDURE — 97110 THERAPEUTIC EXERCISES: CPT | Mod: GP,CQ

## 2023-12-20 PROCEDURE — 97014 ELECTRIC STIMULATION THERAPY: CPT | Mod: GP,CQ

## 2023-12-20 ASSESSMENT — PAIN SCALES - GENERAL: PAINLEVEL_OUTOF10: 2

## 2023-12-20 ASSESSMENT — PAIN - FUNCTIONAL ASSESSMENT: PAIN_FUNCTIONAL_ASSESSMENT: 0-10

## 2023-12-20 NOTE — PROGRESS NOTES
"Physical Therapy Treatment     Patient Name: Maureen Vela  MRN: [id number]  Today's Date: 12/20/2023  Visit 22 of 24   Time Calculation  Start Time: 1547  Stop Time: 1640  Time Calculation (min): 53 min  PT Modalities Time Entry  E-Stim (Unattended) Time Entry: 12  PT Therapeutic Procedures Time Entry  Therapeutic Exercise Time Entry: 31  Current Problem   1. Chronic pain of both shoulders        2. Bilateral shoulder pain, unspecified chronicity  Follow Up In Physical Therapy        Subjective   General     Pt states she had a lot of soreness after last appointment, having covid shot and workout without DN treatment after probably did it.  Pain   Pain Assessment: 0-10  Pain Score: 2 (sore)    Objective   Rounded shoulders  Findings: L CAM boot donned , bone stimulator donned    Treatments:   Therapeutic Exercise:  UBE 6min F/B L4 [place]  Dowel 4# wall slides with low trap lift off x10  Dowel ext x15 4#, dowel IR/ext 4# x15  Standing scaption raises and front raises 2# 1x10 , scaption 3# only 2x5  TB row purple 1x30  TB adduction blue R x20  Bicep curls/hammer curls 4# 2x10 each  Standing abduction raises 2# 2x10    Deferred:  S/L flex 1# x12 /abd 1# x7 / ER 2-3#   Tband yellow horizontal abduction pulses at 0-45-90  2x15 each  TB face pulls pink 2x10  TB chest press purple 1x20  FT rows 7.5-10#, FT lat pull down 12.5-15# 2x12, shldr ext 7.5# 2x10  Room 8- nuts and bolts just above head level x1 (off/on) R UE only  Tband yellow B ER- shoulder at 0 2x10 , shoulder at 45 2x10  TB horizontal abduction green x8 R weaker  Bench press with dowel 10# 2x10  TB extension blue x15  TB ER orange x15 R , x20 L  TB IR green x20 R/L  Ball on wall cw/ccw x15 each on R/L  Room 8- pvc slots waist level to overhead x2 (red marker to red marker)  Abduction isometric R only 10x5\" at wall  Supine adduction blue x6 , x7 R only  Pulleys 3min  Wall slides x10 warm-up    MANUAL: DNP  R scap mobs in S/L, STM R rhomboids, R UT  joint mobs, " post/inf;  RUE, oscialltions for pain relief     MODALITIES:   Dry needling following informed consent: with e-stim; 3Hz, mA to tolerance, applied to R shoulder/medial border of scap, for 10 minutes.     Assessment:    Pt treatment focused on exercises from previous session, with some increased repetitions. Pt had DN at end of session to reduce soreness in R shoulder. Pt left feeling better than when she came in.     Post Pain:  Minimal soreness  Plan:   Continued BUE (R>L) strength functional progressions, extend beyond original 12 visits, add 12 more to POC for at least 24 visits total, still 2x a week. Pt shoulder progressing very well.     Goals:   LTG- progressing towards well   Active       PT Problem       LTGs (Progressing)       Start:  09/30/23    Expected End:  12/08/23       1. Pt will sleep >6 hours without disturbance from shoulder pain. 2. Pt will perform all job duties and household chores without increased shoulder pain or compensation. 3. Pt will complete raking nad vacuuming/sweeping without increased shoulder pain. 4. SPADI to <20% impairment. 5. Improve bilateral shoulder AROM and MMT to WNL for less difficulty with mobility, work, and ADLs.            PT problem       STG (Progressing)       Start:  10/13/23    Expected End:  11/10/23       1. Pt will tolerate lifting her arm to write on white board at school without pain or impaired ROM. 2. 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 3. Pt will sleep >4 hours without disturbance from shoulder pain.

## 2023-12-27 ENCOUNTER — TREATMENT (OUTPATIENT)
Dept: PHYSICAL THERAPY | Facility: CLINIC | Age: 63
End: 2023-12-27
Payer: COMMERCIAL

## 2023-12-27 DIAGNOSIS — M25.512 BILATERAL SHOULDER PAIN, UNSPECIFIED CHRONICITY: ICD-10-CM

## 2023-12-27 DIAGNOSIS — M25.511 BILATERAL SHOULDER PAIN, UNSPECIFIED CHRONICITY: ICD-10-CM

## 2023-12-27 DIAGNOSIS — G89.29 CHRONIC PAIN OF BOTH SHOULDERS: Primary | ICD-10-CM

## 2023-12-27 DIAGNOSIS — M25.511 CHRONIC PAIN OF BOTH SHOULDERS: Primary | ICD-10-CM

## 2023-12-27 DIAGNOSIS — M25.512 CHRONIC PAIN OF BOTH SHOULDERS: Primary | ICD-10-CM

## 2023-12-27 PROCEDURE — 97110 THERAPEUTIC EXERCISES: CPT | Mod: GP | Performed by: PHYSICAL THERAPIST

## 2023-12-27 ASSESSMENT — PAIN - FUNCTIONAL ASSESSMENT: PAIN_FUNCTIONAL_ASSESSMENT: 0-10

## 2023-12-27 ASSESSMENT — PAIN SCALES - GENERAL: PAINLEVEL_OUTOF10: 0 - NO PAIN

## 2023-12-27 NOTE — PROGRESS NOTES
Physical Therapy Treatment/Discharge summary    Patient Name: Maureen Vela  MRN: [id number]  Today's Date: 12/27/2023  Visit 23 of 24   Time Calculation  Start Time: 1145  Stop Time: 1230  Time Calculation (min): 45 min  PT Therapeutic Procedures Time Entry  Therapeutic Exercise Time Entry: 42  Current Problem   1. Chronic pain of both shoulders        2. Bilateral shoulder pain, unspecified chronicity  Follow Up In Physical Therapy        Subjective   General   General Comment: Pt avoids reaching too faroverhead or all the way across her [facility] bc she knows that it will aggravate her shoulder. Otherwise, shoulder is feeling pretty good, still cannot lay/sleep on that side. Pt  Pain   Pain Assessment: 0-10  Pain Score: 0 - No pain (generally aching all over her body today, says it is from the weather.)  Response to Interventions: 0/10, just fatigue    Objective   Rounded shoulders  Findings: L CAM boot donned , bone stimulator donned    Treatments:   Therapeutic Exercise:  UBE 6min F/B L4 [place]  Dowel 6# wall slides with low trap lift off x10  Dowel ext x15 6#, dowel IR/ext 6# x15  Standing scaption raises and front raises 3# 1x10  TB row purple 1x30  TB adduction blue R 3sx20  Bicep curls/hammer curls 5# 2x10 each  Standing abduction raises 3# 2x10  TB extension blue x15  TB ER blue 2x15  TB IR blue x30  S/L flex 1# x12 /abd 1-2# x7 / ER 2-3#  Weighted cone stack overhead  Wall-slide shoulder press 2x10 5#  Standard shldr press 2# x10    Deferred:  Tband yellow horizontal abduction pulses at 0-45-90  2x15 each  TB face pulls pink 2x10  TB chest press purple 1x20  FT rows 7.5-10#, FT lat pull down 12.5-15# 2x12, shldr ext 7.5# 2x10  Room 8- nuts and bolts just above head level x1 (off/on) R UE only  Tband yellow B ER- shoulder at 0 2x10 , shoulder at 45 2x10  TB horizontal abduction green x8 R weaker  Bench press with dowel 12# 2x10  Ball on wall cw/ccw x15 each on R/L  Room 8- pvc slots waist  level to overhead x2 (red marker to red marker)  Supine adduction blue x6 , x7 R only    MANUAL: DNP  R scap mobs in S/L, STM R rhomboids, R UT  joint mobs, post/inf;  RUE, oscialltions for pain relief     MODALITIES:   Dry needling following informed consent: with e-stim; 3Hz, mA to tolerance, applied to R shoulder/medial border of scap, for 10 minutes.     Assessment:   PT Assessment  Assessment Comment: Pt tolerated ther ex fine, just fatigued easily. Cont to porgress strength in order to increase activity tolerance and reduce pain.Pt treatment focused on exercises from previous session, with some increased repetitions. Pt had DN at end of session to reduce soreness in R shoulder. Pt left feeling better than when she came in.     Post Pain:  Minimal soreness  Plan:  OP PT Plan  PT Plan: Skilled PT  PT Frequency: 2 times per week  Duration: 6-8 wksContinued BUE (R>L) strength functional progressions, extend beyond original 12 visits, add 12 more to POC for at least 24 visits total, still 2x a week. Pt shoulder progressing very well.     Goals:   LTG- progressing towards well   Active       PT Problem       LTGs (Progressing)       Start:  09/30/23    Expected End:  12/08/23       1. Pt will sleep >6 hours without disturbance from shoulder pain. 2. Pt will perform all job duties and household chores without increased shoulder pain or compensation. 3. Pt will complete raking nad vacuuming/sweeping without increased shoulder pain. 4. SPADI to <20% impairment. 5. Improve bilateral shoulder AROM and MMT to WNL for less difficulty with mobility, work, and ADLs.            PT problem       STG (Progressing)       Start:  10/13/23    Expected End:  11/10/23       1. Pt will tolerate lifting her arm to write on white board at school without pain or impaired ROM. 2. 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 3. Pt will sleep >4 hours without disturbance from shoulder pain.

## 2023-12-28 NOTE — PROGRESS NOTES
Subjective   Patient ID: Maureen Vela is a 63 y.o. female who presents for No chief complaint on file..    HPI 62YO Female with a PMHx significant for Anxiety, HLD, Migraines, GERD, Myalgias, Vitamin D Deficiency, Chronic Pain, Lumbosacral Spondylosis, HTN and RA. She presents for a pain management follow-up and medication refill. On 6/8/2023 Ms. Vela reports a longstanding history of low back pain that was sucessfully treated with RFA. She continues to be pleased with her response to the RFA of the facet medial nerve branches in the mid and lower portion of hte lumbar spine. She did, however, continue to have pain below this treated area which was worsened by standing, walking and sitting. She therefore underwent bilateral SIJ injections with Dr. NEVAREZ on 6/8/23. She reports the left side is still feeling pretty good. She notes the right side is more noticeable than it had been but she is hoping to hold off on repeating injections until closer to the holidays, however she has had some setbacks with her LLE and due to compensating and gait changes she is starting to have increased pain at the SIJ.          On 06/28/2023 she did undergo a left sub taller joint fusion of the ankle. She is currently booted and only weight bearing with boot. She was supposed to get out of the boot last time we met but she had a new imaging to evaluate the fusion and unfortunately no bone growth was noted. She therefore was fitted for a boot that promotes bone growth and is scheduled to repeat imagining later today to determine if it has been effective in fusing those bones. Additionally she was given a shoe boot for her right foot as she was compensating in her gait due to her left leg being booted. She reports she does notice an improvement with ambulation while wearing this boot.    She is also reporting SIJ pain, similar to the pain she was treated with by injections previously. She would like to repeat these injections, however,  she has recently stopped all steroids and RA meds in an effort to help with her ankle fusion. She wants to wait until her follow-up on her ankle before scheduling her SIJ injections.    She does need a refill on her Tramadol.     Review of Systems Unless noted in the HPI all other systems have been reviewed and are negative for complaint    Objective   Physical Exam  General: No acute distress, well appearing and well nourished.  Eyes Conjunctiva and lids: No erythema, swelling or discharge  Neck: Supple, no cervical lymphadenopathy.  Pulmonary: Respiratory effort: Normal respiration.  Cardiovascular: Normal rate.  Examination of extremities: LLE partial weight bearing.   Abdomen: Non-distended, no obvious abdominal masses.  Musculoskeletal: Range of motion: reduced ROM to the spine and LLE. Right foot with shoe boot.   Skin: Skin and subcutaneous tissue: Normal without rashes or lesions.  Neurologic: Reflexes: Normal. Coordination: LLE only weight-bearing when boot is on, use of rollator/scooter for distance ambulation, use of cane for short distances when boot is on.  Psychiatric: Orientation to person, place, and time: Normal. Mood and affect: Normal.    Assessment/Plan   Problem List Items Addressed This Visit       Chronic pain of both shoulders    Chronic pain of left ankle (Chronic)    Neuropathy    Spondylosis of lumbosacral spine without myelopathy - Primary    Relevant Medications    traMADol (Ultram) 50 mg tablet    Other chronic pain    Relevant Medications    traMADol (Ultram) 50 mg tablet     TREATMENT PLAN:  I had a nice discussion with the patient today and our plan will be as follows:  Radiology: No new imaging to review at this time.  Physically: Encouraged patient to continue with increased physical activity as able.  Psychologically: No acute psychological needs at this time.  Medication: I will refill the patient's Tramadol today for [ 3 ] months. The patient continues to see benefit and  improvement in their quality of life and ability to maintain ADLs.  Patient educated about the risks of taking opioids and operating a motor vehicle.  Patient reports no adverse side effects to current medication regimen. Current regimen does allow patient to maintain ADLs. Patient reports no new neurologic symptoms, new pain areas, or exacerbation in pain today. Patient reports they are happy with current treatment care path. Patient has been educated on the risks, benefits, and alternatives of controlled substances as well as the proper way to store these medications. The patient and I discussed the nature of this medication and its side effects. We discussed tolerance, physical dependence, psychological dependence, addiction and opioid-induced hyperalgesia. We discussed the potential need to wean from this medication. We discussed the availability of programs that can help with this process if necessary. We discussed safety issues related to opioids including safe storage. We discussed the fact that the patient should not drive an automobile or operate heavy machinery while taking this medication. A prescription for naloxone was offered to the patient. The patient will be re-evaluated for the need to continue opioid therapy in 60-90 days.  A PDMP report was reviewed today and was consistent with reported prescribing. Tox screen is up-to-date and consistent with prescribing history; will be due next visit.   Duration: Chronic/Ongoing  Intervention: Patient is due to repeat imaging on her LLE to evaluate for bone growth/complete fusion. Until then, she would like to hold off on SIJ injection scheduling. She will call once she follows-up with ortho.

## 2024-01-02 ENCOUNTER — OFFICE VISIT (OUTPATIENT)
Dept: PAIN MEDICINE | Facility: CLINIC | Age: 64
End: 2024-01-02
Payer: COMMERCIAL

## 2024-01-02 VITALS
WEIGHT: 211 LBS | SYSTOLIC BLOOD PRESSURE: 138 MMHG | DIASTOLIC BLOOD PRESSURE: 93 MMHG | HEART RATE: 83 BPM | BODY MASS INDEX: 38.83 KG/M2 | HEIGHT: 62 IN

## 2024-01-02 DIAGNOSIS — G89.29 CHRONIC PAIN OF LEFT ANKLE: Chronic | ICD-10-CM

## 2024-01-02 DIAGNOSIS — M47.817 SPONDYLOSIS OF LUMBOSACRAL SPINE WITHOUT MYELOPATHY: Primary | ICD-10-CM

## 2024-01-02 DIAGNOSIS — M25.512 CHRONIC PAIN OF BOTH SHOULDERS: ICD-10-CM

## 2024-01-02 DIAGNOSIS — G62.9 NEUROPATHY: ICD-10-CM

## 2024-01-02 DIAGNOSIS — M25.572 CHRONIC PAIN OF LEFT ANKLE: Chronic | ICD-10-CM

## 2024-01-02 DIAGNOSIS — M25.511 CHRONIC PAIN OF BOTH SHOULDERS: ICD-10-CM

## 2024-01-02 DIAGNOSIS — G89.29 CHRONIC PAIN OF BOTH SHOULDERS: ICD-10-CM

## 2024-01-02 DIAGNOSIS — G89.29 OTHER CHRONIC PAIN: ICD-10-CM

## 2024-01-02 PROCEDURE — 3075F SYST BP GE 130 - 139MM HG: CPT | Performed by: NURSE PRACTITIONER

## 2024-01-02 PROCEDURE — 1036F TOBACCO NON-USER: CPT | Performed by: NURSE PRACTITIONER

## 2024-01-02 PROCEDURE — 99214 OFFICE O/P EST MOD 30 MIN: CPT | Performed by: NURSE PRACTITIONER

## 2024-01-02 PROCEDURE — 3080F DIAST BP >= 90 MM HG: CPT | Performed by: NURSE PRACTITIONER

## 2024-01-02 RX ORDER — TRAMADOL HYDROCHLORIDE 50 MG/1
50 TABLET ORAL EVERY 12 HOURS PRN
Qty: 60 TABLET | Refills: 2 | Status: SHIPPED | OUTPATIENT
Start: 2024-01-02 | End: 2024-02-07 | Stop reason: HOSPADM

## 2024-01-02 ASSESSMENT — PAIN - FUNCTIONAL ASSESSMENT: PAIN_FUNCTIONAL_ASSESSMENT: 0-10

## 2024-01-02 ASSESSMENT — PATIENT HEALTH QUESTIONNAIRE - PHQ9
SUM OF ALL RESPONSES TO PHQ9 QUESTIONS 1 AND 2: 0
2. FEELING DOWN, DEPRESSED OR HOPELESS: NOT AT ALL
1. LITTLE INTEREST OR PLEASURE IN DOING THINGS: NOT AT ALL

## 2024-01-02 ASSESSMENT — PAIN DESCRIPTION - DESCRIPTORS: DESCRIPTORS: ACHING

## 2024-01-02 ASSESSMENT — PAIN SCALES - GENERAL: PAINLEVEL_OUTOF10: 7

## 2024-01-02 NOTE — PROGRESS NOTES
MEDICATION NAME: Tramadol  STRENGTH: 50mg  LAST FILL DATE: 23  DATE LAST TAKEN: 23  QUANTITY FILLED: 60  QUANTITY REMAININ  COUNT COMPLETED BY: CAROLE SCHRADER MA and NOEMÍ MONTALVO      UDS LAST COMPLETED:   CONTROLLED SUBSTANCES AGREEMENT LAST SIGNED:   ORT LAST COMPLETED:  Modified Osw[employer] disability form filled out annually.

## 2024-01-06 ENCOUNTER — HOSPITAL ENCOUNTER (OUTPATIENT)
Dept: RADIOLOGY | Facility: HOSPITAL | Age: 64
Discharge: HOME | End: 2024-01-06
Payer: COMMERCIAL

## 2024-01-06 DIAGNOSIS — G89.29 OTHER CHRONIC PAIN: ICD-10-CM

## 2024-01-06 DIAGNOSIS — M25.572 PAIN IN LEFT ANKLE AND JOINTS OF LEFT FOOT: ICD-10-CM

## 2024-01-06 PROCEDURE — 73700 CT LOWER EXTREMITY W/O DYE: CPT | Mod: LT

## 2024-01-08 ENCOUNTER — PHARMACY VISIT (OUTPATIENT)
Dept: PHARMACY | Facility: CLINIC | Age: 64
End: 2024-01-08
Payer: COMMERCIAL

## 2024-01-08 PROCEDURE — RXMED WILLOW AMBULATORY MEDICATION CHARGE

## 2024-01-08 RX ORDER — HYDROCODONE BITARTRATE AND ACETAMINOPHEN 5; 325 MG/1; MG/1
1 TABLET ORAL EVERY 6 HOURS PRN
Qty: 28 TABLET | Refills: 0 | OUTPATIENT
Start: 2024-01-08 | End: 2024-02-07 | Stop reason: HOSPADM

## 2024-01-16 ENCOUNTER — APPOINTMENT (OUTPATIENT)
Dept: RADIOLOGY | Facility: HOSPITAL | Age: 64
End: 2024-01-16
Payer: COMMERCIAL

## 2024-01-24 ENCOUNTER — PRE-ADMISSION TESTING (OUTPATIENT)
Dept: PREADMISSION TESTING | Facility: HOSPITAL | Age: 64
End: 2024-01-24
Payer: COMMERCIAL

## 2024-01-24 VITALS
RESPIRATION RATE: 16 BRPM | HEART RATE: 77 BPM | OXYGEN SATURATION: 98 % | TEMPERATURE: 97.2 F | HEIGHT: 62 IN | SYSTOLIC BLOOD PRESSURE: 143 MMHG | DIASTOLIC BLOOD PRESSURE: 95 MMHG | WEIGHT: 211.64 LBS | BODY MASS INDEX: 38.95 KG/M2

## 2024-01-24 DIAGNOSIS — Z01.818 PREOPERATIVE TESTING: Primary | ICD-10-CM

## 2024-01-24 DIAGNOSIS — E78.2 MIXED HYPERLIPIDEMIA: ICD-10-CM

## 2024-01-24 DIAGNOSIS — E03.9 ACQUIRED HYPOTHYROIDISM: ICD-10-CM

## 2024-01-24 LAB
ANION GAP SERPL CALC-SCNC: 8 MMOL/L
BUN SERPL-MCNC: 17 MG/DL (ref 8–25)
CALCIUM SERPL-MCNC: 9.3 MG/DL (ref 8.5–10.4)
CHLORIDE SERPL-SCNC: 104 MMOL/L (ref 97–107)
CO2 SERPL-SCNC: 29 MMOL/L (ref 24–31)
CREAT SERPL-MCNC: 0.8 MG/DL (ref 0.4–1.6)
EGFRCR SERPLBLD CKD-EPI 2021: 84 ML/MIN/1.73M*2
ERYTHROCYTE [DISTWIDTH] IN BLOOD BY AUTOMATED COUNT: 12.6 % (ref 11.5–14.5)
GLUCOSE SERPL-MCNC: 95 MG/DL (ref 65–99)
HCT VFR BLD AUTO: 42.9 % (ref 36–46)
HGB BLD-MCNC: 14.2 G/DL (ref 12–16)
MCH RBC QN AUTO: 31.1 PG (ref 26–34)
MCHC RBC AUTO-ENTMCNC: 33.1 G/DL (ref 32–36)
MCV RBC AUTO: 94 FL (ref 80–100)
NRBC BLD-RTO: 0 /100 WBCS (ref 0–0)
PLATELET # BLD AUTO: 210 X10*3/UL (ref 150–450)
POTASSIUM SERPL-SCNC: 4.5 MMOL/L (ref 3.4–5.1)
RBC # BLD AUTO: 4.57 X10*6/UL (ref 4–5.2)
SODIUM SERPL-SCNC: 141 MMOL/L (ref 133–145)
WBC # BLD AUTO: 5.4 X10*3/UL (ref 4.4–11.3)

## 2024-01-24 PROCEDURE — 80076 HEPATIC FUNCTION PANEL: CPT

## 2024-01-24 PROCEDURE — 87081 CULTURE SCREEN ONLY: CPT | Mod: TRILAB,WESLAB

## 2024-01-24 PROCEDURE — 80048 BASIC METABOLIC PNL TOTAL CA: CPT

## 2024-01-24 PROCEDURE — 80061 LIPID PANEL: CPT

## 2024-01-24 PROCEDURE — 84443 ASSAY THYROID STIM HORMONE: CPT

## 2024-01-24 PROCEDURE — 85027 COMPLETE CBC AUTOMATED: CPT

## 2024-01-24 PROCEDURE — 84439 ASSAY OF FREE THYROXINE: CPT

## 2024-01-24 PROCEDURE — 36415 COLL VENOUS BLD VENIPUNCTURE: CPT

## 2024-01-24 PROCEDURE — 99204 OFFICE O/P NEW MOD 45 MIN: CPT | Performed by: NURSE PRACTITIONER

## 2024-01-24 RX ORDER — CHLORHEXIDINE GLUCONATE ORAL RINSE 1.2 MG/ML
SOLUTION DENTAL
Qty: 473 ML | Refills: 0 | Status: SHIPPED | OUTPATIENT
Start: 2024-01-24 | End: 2024-02-07 | Stop reason: HOSPADM

## 2024-01-24 ASSESSMENT — DUKE ACTIVITY SCORE INDEX (DASI)
CAN YOU HAVE SEXUAL RELATIONS: YES
CAN YOU WALK INDOORS, SUCH AS AROUND YOUR HOUSE: YES
DASI METS SCORE: 5.7
CAN YOU DO HEAVY WORK AROUND THE HOUSE LIKE SCRUBBING FLOORS OR LIFTING AND MOVING HEAVY FURNITURE: NO
CAN YOU TAKE CARE OF YOURSELF (EAT, DRESS, BATHE, OR USE TOILET): YES
CAN YOU DO MODERATE WORK AROUND THE HOUSE LIKE VACUUMING, SWEEPING FLOORS OR CARRYING GROCERIES: YES
CAN YOU WALK A BLOCK OR TWO ON LEVEL GROUND: YES
CAN YOU PARTICIPATE IN MODERATE RECREATIONAL ACTIVITIES LIKE GOLF, BOWLING, DANCING, DOUBLES TENNIS OR THROWING A BASEBALL OR FOOTBALL: NO
CAN YOU RUN A SHORT DISTANCE: NO
CAN YOU DO YARD WORK LIKE RAKING LEAVES, WEEDING OR PUSHING A MOWER: NO
TOTAL_SCORE: 24.2
CAN YOU CLIMB A FLIGHT OF STAIRS OR WALK UP A HILL: YES
CAN YOU DO LIGHT WORK AROUND THE HOUSE LIKE DUSTING OR WASHING DISHES: YES
CAN YOU PARTICIPATE IN STRENOUS SPORTS LIKE SWIMMING, SINGLES TENNIS, FOOTBALL, BASKETBALL, OR SKIING: NO

## 2024-01-24 ASSESSMENT — PAIN DESCRIPTION - DESCRIPTORS: DESCRIPTORS: SHARP

## 2024-01-24 ASSESSMENT — ENCOUNTER SYMPTOMS
GASTROINTESTINAL NEGATIVE: 1
NEUROLOGICAL NEGATIVE: 1
PSYCHIATRIC NEGATIVE: 1
EYES NEGATIVE: 1
CARDIOVASCULAR NEGATIVE: 1
ARTHRALGIAS: 1
ACTIVITY CHANGE: 1
RESPIRATORY NEGATIVE: 1

## 2024-01-24 ASSESSMENT — CHADS2 SCORE
CHF: NO
CHADS2 SCORE: 1
AGE GREATER THAN OR EQUAL TO 75: NO
HYPERTENSION: YES
PRIOR STROKE OR TIA OR THROMBOEMBOLISM: NO
DIABETES: NO

## 2024-01-24 ASSESSMENT — PAIN SCALES - GENERAL: PAINLEVEL_OUTOF10: 2

## 2024-01-24 ASSESSMENT — PAIN - FUNCTIONAL ASSESSMENT: PAIN_FUNCTIONAL_ASSESSMENT: 0-10

## 2024-01-24 NOTE — H&P (VIEW-ONLY)
CPM/PAT Evaluation       Name: Maureen Vela (Maureen Vela)  /Age: 1960/63 y.o.     In-Person       Chief Complaint: Pseudarthrosis after fusion or arthrodesis,  Seropositive rheumatoid arthritis, Posterior tibial tendinitis of left leg, Arthritis of left ankle     HPI  A 63-year-old female with pseudoarthrosis roses after fusion or arthrodesis, seropositive rheumatoid arthritis, posterior tibial tendinitis of left leg, arthritis of left ankle.  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  She has had progressive continued left lower extremity pain that increases with prolonged standing and ambulation.  Conservative treatments not helping.  Patient currently using a boot and cane to aid ambulation. Denies fever, chills, chest pain, shortness of breath, syncope, or left lower extremity numbness. She is she is scheduled for revision left subtalar joint fusion, left tibial autograft, left lower extremity hardware removal.    Past Medical History:   Diagnosis Date    Asthma     Chronic pain     GERD (gastroesophageal reflux disease)     GERD (gastroesophageal reflux disease)     HLD (hyperlipidemia)     HTN (hypertension)     Hypothyroid     Hypothyroidism     Migraine     Neuropathy     PONV (postoperative nausea and vomiting)     RA (rheumatoid arthritis) (CMS/[facility])        Past Surgical History:   Procedure Laterality Date     SECTION, LOW TRANSVERSE      CYST REMOVAL      PAT SUBTALAR ARTHRODESIS      LAMINECTOMY           Allergies   Allergen Reactions    Oxycodone-Acetaminophen Nausea/vomiting    Sulfa (Sulfonamide Antibiotics) Headache     Hypertension    Sulfamethoxazole-Trimethoprim Other, Rash and Unknown    Balsam [place] Hives    Clarithromycin Itching, Rash and Unknown    Hydroxychloroquine Rash    Methotrexate Hives and Rash       Current Outpatient Medications   Medication Sig Dispense Refill     abatacept (Orencia, with maltose,) 250 mg injection Infuse 750 mg into a venous catheter every 30 (thirty) days.      albuterol (ProAir HFA) 90 mcg/actuation inhaler Inhale 2 puffs every 4 hours if needed.      amLODIPine (Norvasc) 5 mg tablet Take 1 tablet (5 mg) by mouth once daily.      amoxicillin (Amoxil) 500 mg capsule Take 4 capsules (2,000 mg) by mouth. 1 HOUR BEFORE DENTIST APPT      aspirin 325 mg tablet Take 1 tablet (325 mg) by mouth once daily.      azelastine-fluticasone 137-50 mcg/spray spray,non-aerosol Administer 1 spray into each nostril 2 times a day.      chlorhexidine (Peridex) 0.12 % solution Use cap to measure 15 mL.  Swish/gargle mouthwash for at least 30 seconds.  Do not swallow.  Use night before surgery after brushing teeth and morning of surgery after brushing teeth. 473 mL 0    cholecalciferol (Vitamin D-3) 50 MCG (2000 UT) tablet Take 1 tablet (2,000 Units) by mouth once daily.      cloNIDine (Catapres) 0.1 mg tablet Take 1 tablet (0.1 mg) by mouth once daily as needed (FOR BLOOD PRESSURE GREATER 140/90).      folic acid (Folvite) 1 mg tablet Take 1 tablet (1 mg) by mouth once daily.      HYDROcodone-acetaminophen (Norco) 5-325 mg tablet Take 1 tablet by mouth every 6 hours as needed for pain for up to 7 days. 28 tablet 0    Inderal LA 80 mg 24 hr capsule Take 1 capsule (80 mg) by mouth once daily.      leflunomide (Arava) 20 mg tablet Take 1 tablet (20 mg) by mouth once daily.      levothyroxine (Synthroid, Levoxyl) 75 mcg tablet Take 1 tablet (75 mcg) by mouth once daily. 90 tablet 0    MULTIVITAMIN ORAL Take 1 tablet by mouth once daily.      omeprazole (PriLOSEC) 20 mg DR capsule Take 1 capsule (20 mg) by mouth once daily. 90 capsule 1    predniSONE (Deltasone) 10 mg tablet Take 1 tablet (10 mg) by mouth once daily as needed (RA FLARE).      rizatriptan (Maxalt) 10 mg tablet Take 1 tablet (10 mg) by mouth once daily as needed.      traMADol (Ultram) 50 mg tablet Take 1 tablet (50  "mg) by mouth every 12 hours if needed for severe pain (7 - 10). 60 tablet 2    triamcinolone (Kenalog) 0.1 % cream Apply 1 Application topically 2 times a week.      Zanaflex 4 mg tablet Take 1 tablet (4 mg) by mouth every 8 hours if needed for muscle spasms.       No current facility-administered medications for this visit.          Review of Systems   Constitutional:  Positive for activity change.   HENT: Negative.     Eyes: Negative.         Glasses   Respiratory: Negative.     Cardiovascular: Negative.    Gastrointestinal: Negative.    Genitourinary: Negative.    Musculoskeletal:  Positive for arthralgias.        LLE pain   Skin: Negative.    Neurological: Negative.    Psychiatric/Behavioral: Negative.          Physical Exam  Vitals (Elevated BP-patient advised to take medications as prescribed and follow-up with PCP) reviewed.   HENT:      Head: Normocephalic and atraumatic.      Mouth/Throat:      Mouth: Mucous membranes are moist.   Eyes:      Pupils: Pupils are equal, round, and reactive to light.      Comments: glasses   Cardiovascular:      Rate and Rhythm: Normal rate and regular rhythm.   Pulmonary:      Effort: Pulmonary effort is normal.      Breath sounds: Normal breath sounds.   Abdominal:      Palpations: Abdomen is soft.   Musculoskeletal:         General: Normal range of motion.      Cervical back: Normal range of motion.      Comments:  left lower extremity pain, left lower extremity boot present, using a cane   Skin:     General: Skin is warm and dry.   Neurological:      Mental Status: She is alert and oriented to person, place, and time.   Psychiatric:         Mood and Affect: Mood normal.          PAT AIRWAY:   Airway:     Mallampati::  III    Neck ROM::  Full  normal        BP (!) 143/95   Pulse 77   Temp 36.2 °C (97.2 °F) (Temporal)   Resp 16   Ht 1.575 m (5' 2\")   Wt 96 kg (211 lb 10.3 oz)   SpO2 98%   BMI 38.71 kg/m²         Stop Bang Score 3     CHADS 2 score: 2.8%  DASI score: " 24.2  METS score: 5.7  Revised cardiac risk index: 0.4%  ASA II  ARISCAT 1.6%    Assessment and Plan:      Pseudarthrosis after fusion or arthrodesis,  Seropositive rheumatoid arthritis, Posterior tibial tendinitis of left leg, Arthritis of left ankle Plan: revision left subtalar joint fusion, left tibial autograft, left lower extremity hardware removal  Hypertension  Hypothyroidism  Osteoarthritis/rheumatoid arthritis  Chronic pain  Migraine  Hyperlipidemia  Asthma  Neuropathy  BMI 38.71

## 2024-01-24 NOTE — PREPROCEDURE INSTRUCTIONS
PAT DISCHARGE INSTRUCTIONS    Please call the Same Day Surgery (SDS) Department of the hospital where your procedure will be performed after 2:00 PM the day before your surgery. If you are scheduled on a Monday, or a Tuesday following a Monday holiday, you will need to call on the last business day prior to your surgery.    [facility]  [address], [address]  [phone]    [facility]  [address] [place] [address] [phone]    [facility]  [address]  [place] [id number]  [phone]    Please let your surgeon know if:      You develop any open sores, shingles, burning or painful urination as these may increase your risk of an infection.   You no longer wish to have the surgery.   Any other personal circumstances change that may lead to the need to cancel or defer this surgery-such as being sick or getting admitted to any hospital within one week of your planned procedure.    Your contact details change, such as a change of address or phone number.    Starting now:     Please DO NOT drink alcohol or smoke for 24 hours before surgery. It is well known that quitting smoking can make a huge difference to your health and recovery from surgery. The longer you abstain from smoking, the better your chances of a healthy recovery. If you need help with quitting, call 4-800-QUIT-NOW to be connected to a trained counselor who will discuss the best methods to help you quit.     Before your surgery:    Please stop all supplements 7 days prior to surgery. Or as directed by your surgeon.   Please stop taking NSAID pain medicine such as Advil and Motrin 7 days before surgery.    If you develop any fever, cough, cold, rashes, cuts, scratches, scrapes, urinary symptoms or infection anywhere on your body (including teeth and gums) prior to surgery, please call your surgeon’s office as soon  as possible. This may require treatment to reduce the chance of cancellation on the day of surgery.    The day before your surgery:   DIET- Do not eat any food after MIDNIGHT. May have 10 ounces of CLEAR LIQUIDS until TWO HOURS before your arrival time. This includes water, black tea or coffee (no milk ir cream), apple juice and electrolyte drinks (Gatorade). May chew gum until TWO hours before your surgery time.   Get a good night’s rest.  Use the special soap for bathing if you have been instructed to use one.    Scheduled surgery times may change and you will be notified if this occurs - please check your personal voicemail for any updates.     On the morning of surgery:   Wear comfortable, loose fitting clothes which open in the front. Please do not wear moisturizers, creams, lotions, makeup or perfume.    Please bring with you to surgery:   Photo ID and insurance card   Current list of medicines and allergies   Pacemaker/ Defibrillator/Heart stent cards   CPAP machine and mask    Slings/ splints/ crutches   A copy of your complete advanced directive/DHPOA.    Please do NOT bring with you to surgery:   All jewelry and valuables should be left at home.   Prosthetic devices such as contact lenses, hearing aids, dentures, eyelash extensions, hairpins and body piercings must be removed prior to going in to the surgical suite.    After outpatient surgery:   A responsible adult MUST accompany you at the time of discharge and stay with you for 24 hours after your surgery. You may NOT drive yourself home after surgery.    Do not drive, operate machinery, make critical decisions or do activities that require co-ordination or balance until after a night’s sleep.   Do not drink alcoholic beverages for 24 hours.   Instructions for resuming your medications will be provided by your surgeon.    CALL YOUR DOCTOR AFTER SURGERY IF YOU HAVE:     Chills and/or a fever of 101° F or higher.    Redness, swelling, pus or drainage from  your surgical wound or a bad smell from the wound.    Lightheadedness, fainting or confusion.    Persistent vomiting (throwing up) and are not able to eat or drink for 12 hours.    Three or more loose, watery bowel movements in 24 hours (diarrhea).   Difficulty or pain while urinating( after non-urological surgery)    Pain and swelling in your legs, especially if it is only on one side.    Difficulty breathing or are breathing faster than normal.    Any new concerning symptoms.     Home Preoperative Antibacterial Shower    What is a home preoperative antibacterial shower?  This shower is a way of cleaning the skin with a germ killing solution before surgery. The solution contains chlorhexidine, commonly known as CHG. CHG is a skin cleanser with germ killing ability. Let your doctor know if you are allergic to chlorhexidine.      Why do I need to take a preoperative antibacterial shower?  Skin is not sterile. It is best to try to make your skin as free of germs as possible before surgery. Proper cleansing with a germ killing soap before surgery can lower the number of germs on your skin. This helps to reduce the risk of infection at the surgical site. Following the instructions listed below will help you prepare your skin for surgery.    How do I use the solution?      Steps: Begin using your CHG soap FIVE DAYS BEFORE your scheduled surgery on __________________________________________________.  First, wash and rinse your hair using the CHG soap. Keep CHG away soap away from ear canals and eyes.  Rinse completely, do not condition. Hair extensions should be removed.  Wash your face with your normal soap and rinse.  Apply the CHG solution to a clean wet washcloth. Turn the water off or move away from the water spray to avoid premature rinsing of the CHG soap as you are applying.  Firmly lather your entire body from neck down. Do not use on face.  Pay special attention to the areas(s) where your incision(s) will be  located unless they are on your face.  Avoid scrubbing your skin too hard.  The important point is to have the CHG soap sit on your skin for 3 minutes.  DO NOT wash with regular soap after you have used the CHG soap solution.  Pat yourself dry with a clean, freshly laundered towel.  DO NOT apply powders, deodorants or lotions.  Dress in clean, freshly laundered night clothes.  Be sure to sleep with clean, freshly laundered sheets.  Be aware that CHG will cause stains on fabrics; if you wash them with bleach after use. Rinse your washcloth and other linens that have contact with CHG completely. Use only non-chlorine detergents to launder the items used.  The morning of surgery is the fifth day. Repeat the above steps and dress in clean comfortable clothing.      Who should I call if I have any questions regarding the use of CHG soap?  Call the [facility] at [phone] if you have any questions.     Patient Information: Oral/Dental Rinse    What is oral/dental rinse?  It is a mouthwash. It is a way of cleaning the mouth with a germ killing solution before your surgery. The solution contains chlorhexidine, commonly know as CHG.  It is used inside the mouth to kill bacteria known as Staphylococcus aureus.  Let your doctor know if you are allergic to chlorhexidine.    Why do I need to use CHG oral/dental rinse?  The CHG oral/dental rinse helps to kill bacteria in your mouth known as Staphylococcus aureus. This reduces the risk of infection at the surgical site.    Using your CHG oral/dental rinse.  STEPS: use your CHG oral/dental rinse after you brush your teeth the night before (at bedtime) and the morning of your surgery. Follow the directions on your prescription label.  Use the cap on the container to measure 15ml (fill cap to fill line)  Swish (gargle if you can) the mouthwash in your mouth for at least 30 seconds, (do not swallow) spit  out.  After you use your CHG rinse, do not rinse your mouth with water, drink or eat. Please refer to prescription label for the appropriate time to resume oral intake.    What side effects might I have using the CHG oral/dental rinse?  CHG rinse will stick to the plaque on the teeth. Brush and floss just before use. Teeth brushing will help avoid staining of plaque during use.    Who should I contact if I have questions about the CHG oral/dental rinse?  Please call [facility], [facility] at [phone] if you have any questions.     Medication List            Accurate as of January 24, 2024  7:12 AM. Always use your most recent med list.                amLODIPine 5 mg tablet  Commonly known as: Norvasc  Notes to patient: Take evening dose before surgery.     amoxicillin 500 mg capsule  Commonly known as: Amoxil  Notes to patient: Take as needed.     aspirin 325 mg tablet  Notes to patient: Stop 5 days before surgery.     azelastine-fluticasone 137-50 mcg/spray nasal spray  Commonly known as: Dymista  Medication Adjustments for Surgery: Take morning of surgery with sip of water, no other fluids     cholecalciferol 50 MCG (2000 UT) tablet  Commonly known as: Vitamin D-3  Medication Adjustments for Surgery: Stop 7 days before surgery     cloNIDine 0.1 mg tablet  Commonly known as: Catapres  Notes to patient: Take as needed.     folic acid 1 mg tablet  Commonly known as: Folvite  Medication Adjustments for Surgery: Stop 7 days before surgery     HYDROcodone-acetaminophen 5-325 mg tablet  Commonly known as: Norco  Take 1 tablet by mouth every 6 hours as needed for pain for up to 7 days.  Notes to patient: Take as needed.     Inderal LA 80 mg 24 hr capsule  Generic drug: propranolol LA  Medication Adjustments for Surgery: Take morning of surgery with sip of water, no other fluids     leflunomide 20 mg tablet  Commonly known as: Arava  Notes to patient: STOPPED      levothyroxine 75 mcg tablet  Commonly known as: Synthroid, Levoxyl  Take 1 tablet (75 mcg) by mouth once daily.  Medication Adjustments for Surgery: Take morning of surgery with sip of water, no other fluids     Maxalt 10 mg tablet  Generic drug: rizatriptan  Notes to patient: Take as needed.     MULTIVITAMIN ORAL  Medication Adjustments for Surgery: Stop 7 days before surgery     omeprazole 20 mg DR capsule  Commonly known as: PriLOSEC  Take 1 capsule (20 mg) by mouth once daily.  Medication Adjustments for Surgery: Take morning of surgery with sip of water, no other fluids     Orencia (with maltose) 250 mg injection  Generic drug: abatacept  Notes to patient: STOPPED     predniSONE 10 mg tablet  Commonly known as: Deltasone  Notes to patient: Take as needed.     ProAir HFA 90 mcg/actuation inhaler  Generic drug: albuterol  Notes to patient: Take as needed.     traMADol 50 mg tablet  Commonly known as: Ultram  Take 1 tablet (50 mg) by mouth every 12 hours if needed for severe pain (7 - 10).  Notes to patient: Take as needed.     triamcinolone 0.1 % cream  Commonly known as: Kenalog  Notes to patient: Take as needed.     Zanaflex 4 mg tablet  Generic drug: tiZANidine  Notes to patient: Take as needed.

## 2024-01-24 NOTE — CPM/PAT H&P
CPM/PAT Evaluation       Name: Maureen Vela (Maureen Vela)  /Age: 1960/63 y.o.     In-Person       Chief Complaint: Pseudarthrosis after fusion or arthrodesis,  Seropositive rheumatoid arthritis, Posterior tibial tendinitis of left leg, Arthritis of left ankle     HPI  A 63-year-old female with pseudoarthrosis roses after fusion or arthrodesis, seropositive rheumatoid arthritis, posterior tibial tendinitis of left leg, arthritis of left ankle.  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  She has had progressive continued left lower extremity pain that increases with prolonged standing and ambulation.  Conservative treatments not helping.  Patient currently using a boot and cane to aid ambulation. Denies fever, chills, chest pain, shortness of breath, syncope, or left lower extremity numbness. She is she is scheduled for revision left subtalar joint fusion, left tibial autograft, left lower extremity hardware removal.    Past Medical History:   Diagnosis Date    Asthma     Chronic pain     GERD (gastroesophageal reflux disease)     GERD (gastroesophageal reflux disease)     HLD (hyperlipidemia)     HTN (hypertension)     Hypothyroid     Hypothyroidism     Migraine     Neuropathy     PONV (postoperative nausea and vomiting)     RA (rheumatoid arthritis) (CMS/[facility])        Past Surgical History:   Procedure Laterality Date     SECTION, LOW TRANSVERSE      CYST REMOVAL      PAT SUBTALAR ARTHRODESIS      LAMINECTOMY           Allergies   Allergen Reactions    Oxycodone-Acetaminophen Nausea/vomiting    Sulfa (Sulfonamide Antibiotics) Headache     Hypertension    Sulfamethoxazole-Trimethoprim Other, Rash and Unknown    Balsam [place] Hives    Clarithromycin Itching, Rash and Unknown    Hydroxychloroquine Rash    Methotrexate Hives and Rash       Current Outpatient Medications   Medication Sig Dispense Refill     abatacept (Orencia, with maltose,) 250 mg injection Infuse 750 mg into a venous catheter every 30 (thirty) days.      albuterol (ProAir HFA) 90 mcg/actuation inhaler Inhale 2 puffs every 4 hours if needed.      amLODIPine (Norvasc) 5 mg tablet Take 1 tablet (5 mg) by mouth once daily.      amoxicillin (Amoxil) 500 mg capsule Take 4 capsules (2,000 mg) by mouth. 1 HOUR BEFORE DENTIST APPT      aspirin 325 mg tablet Take 1 tablet (325 mg) by mouth once daily.      azelastine-fluticasone 137-50 mcg/spray spray,non-aerosol Administer 1 spray into each nostril 2 times a day.      chlorhexidine (Peridex) 0.12 % solution Use cap to measure 15 mL.  Swish/gargle mouthwash for at least 30 seconds.  Do not swallow.  Use night before surgery after brushing teeth and morning of surgery after brushing teeth. 473 mL 0    cholecalciferol (Vitamin D-3) 50 MCG (2000 UT) tablet Take 1 tablet (2,000 Units) by mouth once daily.      cloNIDine (Catapres) 0.1 mg tablet Take 1 tablet (0.1 mg) by mouth once daily as needed (FOR BLOOD PRESSURE GREATER 140/90).      folic acid (Folvite) 1 mg tablet Take 1 tablet (1 mg) by mouth once daily.      HYDROcodone-acetaminophen (Norco) 5-325 mg tablet Take 1 tablet by mouth every 6 hours as needed for pain for up to 7 days. 28 tablet 0    Inderal LA 80 mg 24 hr capsule Take 1 capsule (80 mg) by mouth once daily.      leflunomide (Arava) 20 mg tablet Take 1 tablet (20 mg) by mouth once daily.      levothyroxine (Synthroid, Levoxyl) 75 mcg tablet Take 1 tablet (75 mcg) by mouth once daily. 90 tablet 0    MULTIVITAMIN ORAL Take 1 tablet by mouth once daily.      omeprazole (PriLOSEC) 20 mg DR capsule Take 1 capsule (20 mg) by mouth once daily. 90 capsule 1    predniSONE (Deltasone) 10 mg tablet Take 1 tablet (10 mg) by mouth once daily as needed (RA FLARE).      rizatriptan (Maxalt) 10 mg tablet Take 1 tablet (10 mg) by mouth once daily as needed.      traMADol (Ultram) 50 mg tablet Take 1 tablet (50  "mg) by mouth every 12 hours if needed for severe pain (7 - 10). 60 tablet 2    triamcinolone (Kenalog) 0.1 % cream Apply 1 Application topically 2 times a week.      Zanaflex 4 mg tablet Take 1 tablet (4 mg) by mouth every 8 hours if needed for muscle spasms.       No current facility-administered medications for this visit.          Review of Systems   Constitutional:  Positive for activity change.   HENT: Negative.     Eyes: Negative.         Glasses   Respiratory: Negative.     Cardiovascular: Negative.    Gastrointestinal: Negative.    Genitourinary: Negative.    Musculoskeletal:  Positive for arthralgias.        LLE pain   Skin: Negative.    Neurological: Negative.    Psychiatric/Behavioral: Negative.          Physical Exam  Vitals (Elevated BP-patient advised to take medications as prescribed and follow-up with PCP) reviewed.   HENT:      Head: Normocephalic and atraumatic.      Mouth/Throat:      Mouth: Mucous membranes are moist.   Eyes:      Pupils: Pupils are equal, round, and reactive to light.      Comments: glasses   Cardiovascular:      Rate and Rhythm: Normal rate and regular rhythm.   Pulmonary:      Effort: Pulmonary effort is normal.      Breath sounds: Normal breath sounds.   Abdominal:      Palpations: Abdomen is soft.   Musculoskeletal:         General: Normal range of motion.      Cervical back: Normal range of motion.      Comments:  left lower extremity pain, left lower extremity boot present, using a cane   Skin:     General: Skin is warm and dry.   Neurological:      Mental Status: She is alert and oriented to person, place, and time.   Psychiatric:         Mood and Affect: Mood normal.          PAT AIRWAY:   Airway:     Mallampati::  III    Neck ROM::  Full  normal        BP (!) 143/95   Pulse 77   Temp 36.2 °C (97.2 °F) (Temporal)   Resp 16   Ht 1.575 m (5' 2\")   Wt 96 kg (211 lb 10.3 oz)   SpO2 98%   BMI 38.71 kg/m²         Stop Bang Score 3     CHADS 2 score: 2.8%  DASI score: " 24.2  METS score: 5.7  Revised cardiac risk index: 0.4%  ASA II  ARISCAT 1.6%    Assessment and Plan:      Pseudarthrosis after fusion or arthrodesis,  Seropositive rheumatoid arthritis, Posterior tibial tendinitis of left leg, Arthritis of left ankle Plan: revision left subtalar joint fusion, left tibial autograft, left lower extremity hardware removal  Hypertension  Hypothyroidism  Osteoarthritis/rheumatoid arthritis  Chronic pain  Migraine  Hyperlipidemia  Asthma  Neuropathy  BMI 38.71

## 2024-01-25 ENCOUNTER — OFFICE VISIT (OUTPATIENT)
Dept: PRIMARY CARE | Facility: CLINIC | Age: 64
End: 2024-01-25
Payer: COMMERCIAL

## 2024-01-25 VITALS
OXYGEN SATURATION: 99 % | BODY MASS INDEX: 39.14 KG/M2 | WEIGHT: 214 LBS | HEART RATE: 70 BPM | SYSTOLIC BLOOD PRESSURE: 130 MMHG | DIASTOLIC BLOOD PRESSURE: 84 MMHG

## 2024-01-25 DIAGNOSIS — I10 PRIMARY HYPERTENSION: ICD-10-CM

## 2024-01-25 DIAGNOSIS — E03.9 ACQUIRED HYPOTHYROIDISM: ICD-10-CM

## 2024-01-25 DIAGNOSIS — E78.2 MIXED HYPERLIPIDEMIA: ICD-10-CM

## 2024-01-25 DIAGNOSIS — Z41.9 ELECTIVE SURGERY: Primary | ICD-10-CM

## 2024-01-25 DIAGNOSIS — E55.9 VITAMIN D DEFICIENCY: ICD-10-CM

## 2024-01-25 DIAGNOSIS — H10.30 ACUTE BACTERIAL CONJUNCTIVITIS, UNSPECIFIED LATERALITY: ICD-10-CM

## 2024-01-25 DIAGNOSIS — M79.672 LEFT FOOT PAIN: ICD-10-CM

## 2024-01-25 LAB
ALBUMIN SERPL-MCNC: 4.3 G/DL (ref 3.5–5)
ALP BLD-CCNC: 63 U/L (ref 35–125)
ALT SERPL-CCNC: 12 U/L (ref 5–40)
AST SERPL-CCNC: 15 U/L (ref 5–40)
BILIRUB DIRECT SERPL-MCNC: <0.2 MG/DL (ref 0–0.2)
BILIRUB SERPL-MCNC: 0.3 MG/DL (ref 0.1–1.2)
CHOLEST SERPL-MCNC: 194 MG/DL (ref 133–200)
CHOLEST/HDLC SERPL: 3.7 {RATIO}
HDLC SERPL-MCNC: 52 MG/DL
LDLC SERPL CALC-MCNC: 122 MG/DL (ref 65–130)
PROT SERPL-MCNC: 6.8 G/DL (ref 5.9–7.9)
T4 FREE SERPL-MCNC: 1.74 NG/DL (ref 0.9–1.7)
TRIGL SERPL-MCNC: 99 MG/DL (ref 40–150)
TSH SERPL DL<=0.05 MIU/L-ACNC: 2.49 MIU/L (ref 0.27–4.2)

## 2024-01-25 PROCEDURE — 3079F DIAST BP 80-89 MM HG: CPT | Performed by: FAMILY MEDICINE

## 2024-01-25 PROCEDURE — 99214 OFFICE O/P EST MOD 30 MIN: CPT | Performed by: FAMILY MEDICINE

## 2024-01-25 PROCEDURE — 1036F TOBACCO NON-USER: CPT | Performed by: FAMILY MEDICINE

## 2024-01-25 PROCEDURE — 3075F SYST BP GE 130 - 139MM HG: CPT | Performed by: FAMILY MEDICINE

## 2024-01-25 RX ORDER — TOBRAMYCIN 3 MG/ML
1 SOLUTION/ DROPS OPHTHALMIC EVERY 4 HOURS
Qty: 5 ML | Refills: 0 | Status: ON HOLD | OUTPATIENT
Start: 2024-01-25 | End: 2024-02-07 | Stop reason: ALTCHOICE

## 2024-01-25 ASSESSMENT — PAIN SCALES - GENERAL: PAINLEVEL: 3

## 2024-01-25 ASSESSMENT — ENCOUNTER SYMPTOMS
WEAKNESS: 0
ARTHRALGIAS: 0
DIZZINESS: 0
FATIGUE: 1
NAUSEA: 0
SHORTNESS OF BREATH: 0
CONSTIPATION: 0
WHEEZING: 0
FREQUENCY: 0
FEVER: 0
HEMATURIA: 0
PALPITATIONS: 0
LOSS OF SENSATION IN FEET: 0
MYALGIAS: 0
VOMITING: 0
COUGH: 0
CHILLS: 0
TREMORS: 0
OCCASIONAL FEELINGS OF UNSTEADINESS: 0
DEPRESSION: 0

## 2024-01-25 ASSESSMENT — PATIENT HEALTH QUESTIONNAIRE - PHQ9
1. LITTLE INTEREST OR PLEASURE IN DOING THINGS: NOT AT ALL
2. FEELING DOWN, DEPRESSED OR HOPELESS: NOT AT ALL
SUM OF ALL RESPONSES TO PHQ9 QUESTIONS 1 AND 2: 0

## 2024-01-25 NOTE — H&P (VIEW-ONLY)
Subjective   Patient ID: Maureen Vela is a 63 y.o. female who presents for No chief complaint on file..      Ankle/Foot:          Ankle left ankle fusion om 6/28/23 with Dr Treviño - she has pain and instability to this ankle - needs a fusion at this time. Going for surgery with her own bone graft with Dr Treviño again           SHe denies any chest pain, no shortness of breath, no CAD, no dvt/ pe. no anesthesia comlications - but does request reglan or she gets severe nausea and vomiting, no complications to surgery. Non smoker.      Anxiety:          Anxiety F/U at patient's baseline.          Medications none- she feels she has anxiety over her physical symptoms- she does not want to take meds for anxiety.          Stressors worsening- starting back at school - concerns with covid.          Supports significant, support from spouse, support from family, support from friends.          Mood anxious, worried about everything.          Energy change decreased energy.          Concentration decreased.          Suicidal ideations none.      Hypertension:          Patient here for F/U. she gets white coat hypertesnion - she needs to take meds as needed as well as daily meds.          Med compliance yes , yes.          Med side effects none , none.          Chest pain none.          Dizziness lightheaded.          Palpitations asymptomatic.          Syncope none.      Hypothyroidism:          Follow Up taking hormone supplement routinely, due for a tsh.          Hypothyroidism tolerating meds with no side effects.   Woke up with her right eye red and crusted shut - no fever or sinus symptoms         Review of Systems   Constitutional:  Positive for fatigue. Negative for chills and fever.   HENT:  Negative for ear pain, postnasal drip and tinnitus.    Respiratory:  Negative for cough, shortness of breath and wheezing.    Cardiovascular:  Negative for chest pain, palpitations and leg swelling.   Gastrointestinal:  Negative  for constipation, nausea and vomiting.   Endocrine: Negative for cold intolerance.   Genitourinary:  Negative for frequency and hematuria.   Musculoskeletal:  Negative for arthralgias and myalgias.   Neurological:  Negative for dizziness, tremors and weakness.       Objective   /84   Pulse 70   Wt 97.1 kg (214 lb)   SpO2 99%   BMI 39.14 kg/m²     Physical Exam  Vitals reviewed.   Constitutional:       Appearance: Normal appearance.   HENT:      Right Ear: Tympanic membrane normal.      Left Ear: Tympanic membrane normal.      Mouth/Throat:      Pharynx: No oropharyngeal exudate.   Eyes:      General:         Right eye: Discharge present.         Left eye: No discharge.   Cardiovascular:      Rate and Rhythm: Normal rate and regular rhythm.      Heart sounds: Normal heart sounds. No murmur heard.  Pulmonary:      Breath sounds: Normal breath sounds.   Abdominal:      General: Abdomen is flat. Bowel sounds are normal.      Palpations: Abdomen is soft.   Musculoskeletal:         General: No swelling.   Skin:     Findings: No rash.   Neurological:      General: No focal deficit present.      Mental Status: She is alert and oriented to person, place, and time.   Psychiatric:         Mood and Affect: Mood normal.         Behavior: Behavior normal.         Assessment/Plan   Problem List Items Addressed This Visit             ICD-10-CM    Hyperlipidemia E78.5    Relevant Orders    Lipid Panel (Completed)    Hepatic Function Panel (Completed)    Hypothyroid E03.9    Relevant Orders    TSH with reflex to Free T4 if abnormal (Completed)    Thyroxine, Free (Completed)    Vitamin D deficiency E55.9     Other Visit Diagnoses         Codes    Elective surgery    -  Primary Z41.9    surgically cleared with low surgial risk - off biologics to help with healing     Left foot pain     M79.672    Acute bacterial conjunctivitis, unspecified laterality     H10.30    Relevant Medications    tobramycin (Tobrex) 0.3 % ophthalmic  solution    Primary hypertension     I10

## 2024-01-26 LAB — STAPHYLOCOCCUS SPEC CULT: NORMAL

## 2024-01-31 DIAGNOSIS — G89.29 CHRONIC PAIN OF BOTH SHOULDERS: Primary | ICD-10-CM

## 2024-01-31 DIAGNOSIS — M25.511 CHRONIC PAIN OF BOTH SHOULDERS: Primary | ICD-10-CM

## 2024-01-31 DIAGNOSIS — M25.512 CHRONIC PAIN OF BOTH SHOULDERS: Primary | ICD-10-CM

## 2024-02-01 RX ORDER — TIZANIDINE 4 MG/1
TABLET ORAL
Qty: 90 TABLET | Refills: 3 | Status: SHIPPED | OUTPATIENT
Start: 2024-02-01

## 2024-02-07 ENCOUNTER — ANESTHESIA (OUTPATIENT)
Dept: OPERATING ROOM | Facility: HOSPITAL | Age: 64
End: 2024-02-07
Payer: COMMERCIAL

## 2024-02-07 ENCOUNTER — HOSPITAL ENCOUNTER (OUTPATIENT)
Facility: HOSPITAL | Age: 64
Setting detail: OUTPATIENT SURGERY
Discharge: HOME | End: 2024-02-07
Attending: PODIATRIST | Admitting: PODIATRIST
Payer: COMMERCIAL

## 2024-02-07 ENCOUNTER — PHARMACY VISIT (OUTPATIENT)
Dept: PHARMACY | Facility: CLINIC | Age: 64
End: 2024-02-07
Payer: COMMERCIAL

## 2024-02-07 ENCOUNTER — ANESTHESIA EVENT (OUTPATIENT)
Dept: OPERATING ROOM | Facility: HOSPITAL | Age: 64
End: 2024-02-07
Payer: COMMERCIAL

## 2024-02-07 ENCOUNTER — APPOINTMENT (OUTPATIENT)
Dept: RADIOLOGY | Facility: HOSPITAL | Age: 64
End: 2024-02-07
Payer: COMMERCIAL

## 2024-02-07 VITALS
DIASTOLIC BLOOD PRESSURE: 86 MMHG | TEMPERATURE: 97.7 F | HEART RATE: 77 BPM | BODY MASS INDEX: 39.15 KG/M2 | SYSTOLIC BLOOD PRESSURE: 124 MMHG | OXYGEN SATURATION: 99 % | WEIGHT: 214.07 LBS | RESPIRATION RATE: 16 BRPM

## 2024-02-07 DIAGNOSIS — G89.18 POST-OP PAIN: Primary | ICD-10-CM

## 2024-02-07 PROBLEM — R11.2 PONV (POSTOPERATIVE NAUSEA AND VOMITING): Status: ACTIVE | Noted: 2024-02-07

## 2024-02-07 PROBLEM — Z98.890 PONV (POSTOPERATIVE NAUSEA AND VOMITING): Status: ACTIVE | Noted: 2024-02-07

## 2024-02-07 PROCEDURE — A28725 PR FUSION FOOT BONES,SUBTALAR: Performed by: STUDENT IN AN ORGANIZED HEALTH CARE EDUCATION/TRAINING PROGRAM

## 2024-02-07 PROCEDURE — 2780000003 HC OR 278 NO HCPCS: Performed by: PODIATRIST

## 2024-02-07 PROCEDURE — 64445 NJX AA&/STRD SCIATIC NRV IMG: CPT | Performed by: STUDENT IN AN ORGANIZED HEALTH CARE EDUCATION/TRAINING PROGRAM

## 2024-02-07 PROCEDURE — 2500000004 HC RX 250 GENERAL PHARMACY W/ HCPCS (ALT 636 FOR OP/ED): Performed by: STUDENT IN AN ORGANIZED HEALTH CARE EDUCATION/TRAINING PROGRAM

## 2024-02-07 PROCEDURE — A28725 PR FUSION FOOT BONES,SUBTALAR: Performed by: NURSE ANESTHETIST, CERTIFIED REGISTERED

## 2024-02-07 PROCEDURE — 7100000010 HC PHASE TWO TIME - EACH INCREMENTAL 1 MINUTE: Performed by: PODIATRIST

## 2024-02-07 PROCEDURE — 64447 NJX AA&/STRD FEMORAL NRV IMG: CPT | Performed by: STUDENT IN AN ORGANIZED HEALTH CARE EDUCATION/TRAINING PROGRAM

## 2024-02-07 PROCEDURE — C1713 ANCHOR/SCREW BN/BN,TIS/BN: HCPCS | Performed by: PODIATRIST

## 2024-02-07 PROCEDURE — C1762 CONN TISS, HUMAN(INC FASCIA): HCPCS | Performed by: PODIATRIST

## 2024-02-07 PROCEDURE — 3700000002 HC GENERAL ANESTHESIA TIME - EACH INCREMENTAL 1 MINUTE: Performed by: PODIATRIST

## 2024-02-07 PROCEDURE — 3600000009 HC OR TIME - EACH INCREMENTAL 1 MINUTE - PROCEDURE LEVEL FOUR: Performed by: PODIATRIST

## 2024-02-07 PROCEDURE — 7100000002 HC RECOVERY ROOM TIME - EACH INCREMENTAL 1 MINUTE: Performed by: PODIATRIST

## 2024-02-07 PROCEDURE — 2500000005 HC RX 250 GENERAL PHARMACY W/O HCPCS: Performed by: NURSE ANESTHETIST, CERTIFIED REGISTERED

## 2024-02-07 PROCEDURE — 2500000004 HC RX 250 GENERAL PHARMACY W/ HCPCS (ALT 636 FOR OP/ED): Performed by: NURSE ANESTHETIST, CERTIFIED REGISTERED

## 2024-02-07 PROCEDURE — RXMED WILLOW AMBULATORY MEDICATION CHARGE

## 2024-02-07 PROCEDURE — 2720000007 HC OR 272 NO HCPCS: Performed by: PODIATRIST

## 2024-02-07 PROCEDURE — 7100000001 HC RECOVERY ROOM TIME - INITIAL BASE CHARGE: Performed by: PODIATRIST

## 2024-02-07 PROCEDURE — 7100000009 HC PHASE TWO TIME - INITIAL BASE CHARGE: Performed by: PODIATRIST

## 2024-02-07 PROCEDURE — C1734 ORTH/DEVIC/DRUG BN/BN,TIS/BN: HCPCS | Performed by: PODIATRIST

## 2024-02-07 PROCEDURE — 3700000001 HC GENERAL ANESTHESIA TIME - INITIAL BASE CHARGE: Performed by: PODIATRIST

## 2024-02-07 PROCEDURE — 3600000004 HC OR TIME - INITIAL BASE CHARGE - PROCEDURE LEVEL FOUR: Performed by: PODIATRIST

## 2024-02-07 DEVICE — BONE GRAFT KIT
Type: IMPLANTABLE DEVICE | Site: ANKLE | Status: FUNCTIONAL
Brand: AUGMENT® BONE GRAFT

## 2024-02-07 DEVICE — IMPLANTABLE DEVICE: Type: IMPLANTABLE DEVICE | Site: ANKLE | Status: FUNCTIONAL

## 2024-02-07 DEVICE — CANNULATED SCREW
Type: IMPLANTABLE DEVICE | Site: ANKLE | Status: FUNCTIONAL
Brand: ASNIS

## 2024-02-07 RX ORDER — FENTANYL CITRATE 50 UG/ML
50 INJECTION, SOLUTION INTRAMUSCULAR; INTRAVENOUS ONCE
Status: COMPLETED | OUTPATIENT
Start: 2024-02-07 | End: 2024-02-07

## 2024-02-07 RX ORDER — MEPERIDINE HYDROCHLORIDE 25 MG/ML
12.5 INJECTION INTRAMUSCULAR; INTRAVENOUS; SUBCUTANEOUS EVERY 10 MIN PRN
Status: DISCONTINUED | OUTPATIENT
Start: 2024-02-07 | End: 2024-02-07 | Stop reason: HOSPADM

## 2024-02-07 RX ORDER — ONDANSETRON HYDROCHLORIDE 2 MG/ML
4 INJECTION, SOLUTION INTRAVENOUS ONCE AS NEEDED
Status: DISCONTINUED | OUTPATIENT
Start: 2024-02-07 | End: 2024-02-07 | Stop reason: HOSPADM

## 2024-02-07 RX ORDER — SCOLOPAMINE TRANSDERMAL SYSTEM 1 MG/1
1 PATCH, EXTENDED RELEASE TRANSDERMAL ONCE
Status: DISCONTINUED | OUTPATIENT
Start: 2024-02-07 | End: 2024-02-07 | Stop reason: HOSPADM

## 2024-02-07 RX ORDER — ONDANSETRON HYDROCHLORIDE 2 MG/ML
INJECTION, SOLUTION INTRAVENOUS AS NEEDED
Status: DISCONTINUED | OUTPATIENT
Start: 2024-02-07 | End: 2024-02-07

## 2024-02-07 RX ORDER — FENTANYL CITRATE 50 UG/ML
50 INJECTION, SOLUTION INTRAMUSCULAR; INTRAVENOUS EVERY 5 MIN PRN
Status: DISCONTINUED | OUTPATIENT
Start: 2024-02-07 | End: 2024-02-07 | Stop reason: HOSPADM

## 2024-02-07 RX ORDER — SODIUM CHLORIDE, SODIUM LACTATE, POTASSIUM CHLORIDE, CALCIUM CHLORIDE 600; 310; 30; 20 MG/100ML; MG/100ML; MG/100ML; MG/100ML
100 INJECTION, SOLUTION INTRAVENOUS CONTINUOUS
Status: DISCONTINUED | OUTPATIENT
Start: 2024-02-07 | End: 2024-02-07 | Stop reason: HOSPADM

## 2024-02-07 RX ORDER — HYDROCODONE BITARTRATE AND ACETAMINOPHEN 10; 325 MG/1; MG/1
1 TABLET ORAL EVERY 6 HOURS PRN
Qty: 28 TABLET | Refills: 0 | Status: SHIPPED | OUTPATIENT
Start: 2024-02-07 | End: 2024-02-14

## 2024-02-07 RX ORDER — MIDAZOLAM HYDROCHLORIDE 1 MG/ML
2 INJECTION, SOLUTION INTRAMUSCULAR; INTRAVENOUS ONCE
Status: COMPLETED | OUTPATIENT
Start: 2024-02-07 | End: 2024-02-07

## 2024-02-07 RX ORDER — DIPHENHYDRAMINE HYDROCHLORIDE 50 MG/ML
12.5 INJECTION INTRAMUSCULAR; INTRAVENOUS ONCE AS NEEDED
Status: DISCONTINUED | OUTPATIENT
Start: 2024-02-07 | End: 2024-02-07 | Stop reason: HOSPADM

## 2024-02-07 RX ORDER — CEFAZOLIN 1 G/1
INJECTION, POWDER, FOR SOLUTION INTRAVENOUS AS NEEDED
Status: DISCONTINUED | OUTPATIENT
Start: 2024-02-07 | End: 2024-02-07

## 2024-02-07 RX ORDER — PROCHLORPERAZINE EDISYLATE 5 MG/ML
5 INJECTION INTRAMUSCULAR; INTRAVENOUS ONCE AS NEEDED
Status: DISCONTINUED | OUTPATIENT
Start: 2024-02-07 | End: 2024-02-07 | Stop reason: HOSPADM

## 2024-02-07 RX ORDER — MONTELUKAST SODIUM 4 MG/1
4 TABLET, CHEWABLE ORAL NIGHTLY
COMMUNITY
End: 2024-05-01 | Stop reason: WASHOUT

## 2024-02-07 RX ORDER — LIDOCAINE HYDROCHLORIDE 10 MG/ML
INJECTION INFILTRATION; PERINEURAL AS NEEDED
Status: DISCONTINUED | OUTPATIENT
Start: 2024-02-07 | End: 2024-02-07

## 2024-02-07 RX ORDER — HYDRALAZINE HYDROCHLORIDE 20 MG/ML
5 INJECTION INTRAMUSCULAR; INTRAVENOUS EVERY 30 MIN PRN
Status: DISCONTINUED | OUTPATIENT
Start: 2024-02-07 | End: 2024-02-07 | Stop reason: HOSPADM

## 2024-02-07 RX ORDER — IPRATROPIUM BROMIDE 0.5 MG/2.5ML
500 SOLUTION RESPIRATORY (INHALATION) AS NEEDED
Status: DISCONTINUED | OUTPATIENT
Start: 2024-02-07 | End: 2024-02-07 | Stop reason: HOSPADM

## 2024-02-07 RX ORDER — CEFAZOLIN SODIUM 2 G/100ML
2 INJECTION, SOLUTION INTRAVENOUS ONCE
Status: DISCONTINUED | OUTPATIENT
Start: 2024-02-07 | End: 2024-02-07 | Stop reason: HOSPADM

## 2024-02-07 RX ORDER — DEXAMETHASONE SODIUM PHOSPHATE 4 MG/ML
INJECTION, SOLUTION INTRA-ARTICULAR; INTRALESIONAL; INTRAMUSCULAR; INTRAVENOUS; SOFT TISSUE AS NEEDED
Status: DISCONTINUED | OUTPATIENT
Start: 2024-02-07 | End: 2024-02-07

## 2024-02-07 RX ORDER — FENTANYL CITRATE 50 UG/ML
INJECTION, SOLUTION INTRAMUSCULAR; INTRAVENOUS AS NEEDED
Status: DISCONTINUED | OUTPATIENT
Start: 2024-02-07 | End: 2024-02-07

## 2024-02-07 RX ORDER — ALBUTEROL SULFATE 0.83 MG/ML
2.5 SOLUTION RESPIRATORY (INHALATION) ONCE AS NEEDED
Status: DISCONTINUED | OUTPATIENT
Start: 2024-02-07 | End: 2024-02-07 | Stop reason: HOSPADM

## 2024-02-07 RX ORDER — GLYCOPYRROLATE 0.2 MG/ML
INJECTION INTRAMUSCULAR; INTRAVENOUS AS NEEDED
Status: DISCONTINUED | OUTPATIENT
Start: 2024-02-07 | End: 2024-02-07

## 2024-02-07 RX ORDER — FENTANYL CITRATE 50 UG/ML
25 INJECTION, SOLUTION INTRAMUSCULAR; INTRAVENOUS EVERY 5 MIN PRN
Status: DISCONTINUED | OUTPATIENT
Start: 2024-02-07 | End: 2024-02-07 | Stop reason: HOSPADM

## 2024-02-07 RX ORDER — PROPOFOL 10 MG/ML
INJECTION, EMULSION INTRAVENOUS CONTINUOUS PRN
Status: DISCONTINUED | OUTPATIENT
Start: 2024-02-07 | End: 2024-02-07

## 2024-02-07 RX ORDER — PROPOFOL 10 MG/ML
INJECTION, EMULSION INTRAVENOUS AS NEEDED
Status: DISCONTINUED | OUTPATIENT
Start: 2024-02-07 | End: 2024-02-07

## 2024-02-07 RX ORDER — LABETALOL HYDROCHLORIDE 5 MG/ML
5 INJECTION, SOLUTION INTRAVENOUS ONCE AS NEEDED
Status: DISCONTINUED | OUTPATIENT
Start: 2024-02-07 | End: 2024-02-07 | Stop reason: HOSPADM

## 2024-02-07 RX ORDER — LEVOCETIRIZINE DIHYDROCHLORIDE 5 MG/1
5 TABLET, FILM COATED ORAL EVERY EVENING
COMMUNITY

## 2024-02-07 RX ADMIN — FENTANYL CITRATE 25 MCG: 0.05 INJECTION, SOLUTION INTRAMUSCULAR; INTRAVENOUS at 12:01

## 2024-02-07 RX ADMIN — FENTANYL CITRATE 50 MCG: 50 INJECTION INTRAMUSCULAR; INTRAVENOUS at 13:29

## 2024-02-07 RX ADMIN — FENTANYL CITRATE 25 MCG: 0.05 INJECTION, SOLUTION INTRAMUSCULAR; INTRAVENOUS at 12:17

## 2024-02-07 RX ADMIN — FENTANYL CITRATE 25 MCG: 0.05 INJECTION, SOLUTION INTRAMUSCULAR; INTRAVENOUS at 10:35

## 2024-02-07 RX ADMIN — MIDAZOLAM 2 MG: 1 INJECTION INTRAMUSCULAR; INTRAVENOUS at 09:41

## 2024-02-07 RX ADMIN — PROPOFOL 150 MG: 10 INJECTION, EMULSION INTRAVENOUS at 10:23

## 2024-02-07 RX ADMIN — PROPOFOL 50 MG: 10 INJECTION, EMULSION INTRAVENOUS at 12:28

## 2024-02-07 RX ADMIN — DEXAMETHASONE SODIUM PHOSPHATE 6 MG: 4 INJECTION, SOLUTION INTRA-ARTICULAR; INTRALESIONAL; INTRAMUSCULAR; INTRAVENOUS; SOFT TISSUE at 10:34

## 2024-02-07 RX ADMIN — SODIUM CHLORIDE, POTASSIUM CHLORIDE, SODIUM LACTATE AND CALCIUM CHLORIDE: 600; 310; 30; 20 INJECTION, SOLUTION INTRAVENOUS at 10:20

## 2024-02-07 RX ADMIN — PROPOFOL 50 MCG/KG/MIN: 10 INJECTION, EMULSION INTRAVENOUS at 10:43

## 2024-02-07 RX ADMIN — FENTANYL CITRATE 50 MCG: 50 INJECTION INTRAMUSCULAR; INTRAVENOUS at 12:59

## 2024-02-07 RX ADMIN — FENTANYL CITRATE 50 MCG: 50 INJECTION INTRAMUSCULAR; INTRAVENOUS at 13:20

## 2024-02-07 RX ADMIN — SCOPALAMINE 1 PATCH: 1 PATCH, EXTENDED RELEASE TRANSDERMAL at 09:11

## 2024-02-07 RX ADMIN — GLYCOPYRROLATE 0.3 MG: 0.2 INJECTION INTRAMUSCULAR; INTRAVENOUS at 10:59

## 2024-02-07 RX ADMIN — CEFAZOLIN 2 G: 330 INJECTION, POWDER, FOR SOLUTION INTRAMUSCULAR; INTRAVENOUS at 10:31

## 2024-02-07 RX ADMIN — LIDOCAINE HYDROCHLORIDE 5 ML: 10 INJECTION, SOLUTION INFILTRATION; PERINEURAL at 10:23

## 2024-02-07 RX ADMIN — FENTANYL CITRATE 50 MCG: 0.05 INJECTION, SOLUTION INTRAMUSCULAR; INTRAVENOUS at 09:41

## 2024-02-07 RX ADMIN — PROPOFOL 50 MG: 10 INJECTION, EMULSION INTRAVENOUS at 10:28

## 2024-02-07 RX ADMIN — FENTANYL CITRATE 25 MCG: 0.05 INJECTION, SOLUTION INTRAMUSCULAR; INTRAVENOUS at 10:23

## 2024-02-07 RX ADMIN — SODIUM CHLORIDE, POTASSIUM CHLORIDE, SODIUM LACTATE AND CALCIUM CHLORIDE: 600; 310; 30; 20 INJECTION, SOLUTION INTRAVENOUS at 11:18

## 2024-02-07 RX ADMIN — ONDANSETRON HYDROCHLORIDE 4 MG: 2 INJECTION INTRAMUSCULAR; INTRAVENOUS at 10:34

## 2024-02-07 SDOH — HEALTH STABILITY: MENTAL HEALTH: CURRENT SMOKER: 0

## 2024-02-07 ASSESSMENT — PAIN - FUNCTIONAL ASSESSMENT
PAIN_FUNCTIONAL_ASSESSMENT: 0-10

## 2024-02-07 ASSESSMENT — PAIN SCALES - GENERAL
PAINLEVEL_OUTOF10: 7
PAINLEVEL_OUTOF10: 1
PAINLEVEL_OUTOF10: 8
PAINLEVEL_OUTOF10: 4
PAINLEVEL_OUTOF10: 7
PAINLEVEL_OUTOF10: 1

## 2024-02-07 ASSESSMENT — PAIN DESCRIPTION - LOCATION
LOCATION: ANKLE

## 2024-02-07 ASSESSMENT — COLUMBIA-SUICIDE SEVERITY RATING SCALE - C-SSRS
1. IN THE PAST MONTH, HAVE YOU WISHED YOU WERE DEAD OR WISHED YOU COULD GO TO SLEEP AND NOT WAKE UP?: NO
6. HAVE YOU EVER DONE ANYTHING, STARTED TO DO ANYTHING, OR PREPARED TO DO ANYTHING TO END YOUR LIFE?: NO
2. HAVE YOU ACTUALLY HAD ANY THOUGHTS OF KILLING YOURSELF?: NO

## 2024-02-07 NOTE — OP NOTE
Revision subtalar joint fusion (L), Tibial autograft (L), Lower Extremity Hardware Removal (L) Operative Note     Date: 2024  OR Location: [facility]    Name: Maureen Vela, : 1960, Age: 63 y.o., MRN: [id number], Sex: female    Diagnosis  Pre-op Diagnosis     * Pseudarthrosis after fusion or arthrodesis [M96.0]     * Seropositive rheumatoid arthritis (CMS/HCC) [M05.9]     * Posterior tibial tendinitis of left leg [M76.822]     * Arthritis of left ankle [M19.072] Post-op Diagnosis     * Pseudarthrosis after fusion or arthrodesis [M96.0]     * Seropositive rheumatoid arthritis (CMS/HCC) [M05.9]     * Posterior tibial tendinitis of left leg [M76.822]     * Arthritis of left ankle [M19.072]     Procedures  Revision subtalar joint fusion  76724 - ME ARTHRODESIS SUBTALAR    Tibial autograft  86395 - ME BONE GRAFT ANY DONOR AREA MAJOR/LARGE    Lower Extremity Hardware Removal  60406 - ME REMOVAL IMPLANT DEEP      Surgeons      * Tadeo Treviño - Primary    Resident/Fellow/Other Assistant:  Surgeon(s) and Role:     * Yaya Horton DPM - Fellow - Assisting, 2nd surgeon     * Kitty Mccarthy DPM - Resident, Assisting    Procedure Summary  Anesthesia: General  ASA: II  Anesthesia Staff: Anesthesiologist: Gustavo Deluna MD  CRNA: DEVONTE Pantoja-CRNA  Estimated Blood Loss: 30 mL  Intra-op Medications:   Administrations occurring from 0945 to 1215 on 24:   Medication Name Total Dose   lactated Ringer's infusion 1,084.45 mL              Anesthesia Record               Intraprocedure I/O Totals          Intake    Propofol Drip 0.00 mL    The total shown is the total volume documented since Anesthesia Start was filed.    lactated Ringer's infusion 1000.00 mL    Total Intake 1000 mL          Specimen: No specimens collected     Staff:   Circulator: Pavel Andersen RN  Relief Scrub: Tamiko Cho RN  Scrub Person: Freedom Quick RN         Drains and/or Catheters: none    Tourniquet Times:   Mid  thigh tourniquet 300 mmhg less than two hours    Implants:  Implants       Type Name Action Serial No.      Graft BONE GRAFT, AUGMENT, 1.5CC - [id number] Implanted      Graft CHIP, CANCELLOUS, FREEZE DRIED, 1-8MM, 15CC - [id number] Implanted [id number]     Screw SCREW, CANNULATED, ASNIS III, 8.0 X 75MM, SELF D/ T - [id number] Implanted       8.0MM X 75MM, ASNIS III CANNULATED SCREW, FULLY THREADED, TITANIUM, STERILE Implanted               Findings: pseudoarthrosis    Indications: Maureen Vela is an 63 y.o. female who is having surgery for Pseudarthrosis after fusion or arthrodesis [M96.0]  Seropositive rheumatoid arthritis (CMS/[facility]) [M05.9]  Posterior tibial tendinitis of left leg [M76.822]  Arthritis of left ankle [M19.072].     The patient was seen in the preoperative area. The risks, benefits, complications, treatment options, non-operative alternatives, expected recovery and outcomes were discussed with the patient. The possibilities of reaction to medication, pulmonary aspiration, injury to surrounding structures, bleeding, recurrent infection, the need for additional procedures, failure to diagnose a condition, and creating a complication requiring transfusion or operation were discussed with the patient. The patient concurred with the proposed plan, giving informed consent.  The site of surgery was properly noted/marked if necessary per policy. The patient has been actively warmed in preoperative area. Preoperative antibiotics have been ordered and given within 1 hours of incision. Venous thrombosis prophylaxis have been ordered including unilateral sequential compression device    Procedure Details: This patient presents today for revision subtalar joint arthrodesis on the left foot.  Historically the patient had a subtalar joint arthrodesis secondary to rheumatoid arthritis and posterior tibial tendon dysfunction.  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  She has  made an attempt to heal with continued immobilization, nonweightbearing, vitamin D supplementation, external bone stimulator.  Unfortunately there has been no progressive fusion noted radiographically clinically or on CT scan.  We had a full preoperative consultation.  We discussed the risk, benefits, nature, alternatives to surgical intervention.  All of her questions were answered.  She understands her prognosis and the potential risks associated with the surgical procedure including a possible second nonunion.  We did consent her for tibial autograft as well.  All of her questions were answered and she appeared to understand.  In the preoperative holding area the patient received a prophylactic antibiotic as well as a popliteal block and a adductor canal block performed by the anesthesia group.  She was transported the operating room and placed on the operative table in supine position.  A huddle was performed.  A lap belt was placed for safety.  An SCD was placed on the contralateral limb for DVT prevention.  LMA general anesthesia was gained.  A well-padded mid thigh tourniquet was placed on the left lower extremity.  The total tourniquet time was less than 2 hours.  Dr. Yaya Horton served as a second surgeon on the case.  The left lower extremity was sterilely prepped and draped in usual aseptic fashion with alcohol scrub followed by ChloraPrep prep stick.  A timeout was held per proper protocol.  The limb was exsanguinated with an Esmarch bandage and the tourniquet was inflated to 300 mmHg.    Procedure #1-first I began the dissection for the revision of the subtalar joint arthrodesis.  I utilized the same surgical incision sign issues previously.  This extended from just posterior to the fibular tip toward the sinus tarsi and base of the fourth metatarsal.  The incision was made #15 scalpel blade and deepened through the dermal tissue layer.  Blunt dissection was carried through the subcutaneous tissue.   Dissection was carried down to the peroneal tendons which were identified.  The peroneal tendon sheaths were gently incised and the peroneal tendons were retracted inferiorly.  Peroneal tendons were temporarily held retracted with a K wire.    Procedure #2-at this time I performed the hardware removal.  A longitudinal incision was made to the posterior heel tuber.  This was approximately 1 inch in length.  Blunt dissection was carried down to the palpable screw heads.  A K wire was delivered through the cannula of each screw.  The screw was then removed in total and passed from the surgical field.      Dissection was carried down to the subtalar joint from the lateral incision.  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  There was evidence of excessive straw colored synovitis within the subtalar non-union and the lack of any bone fusion.  After appropriate soft tissue dissection and joint exposure the joint was distracted with a pin distractor.  It was very aggressively debrided.  We utilized a curette to remove any fibrocartilaginous ingrowth.  We utilized a pituitary rongeur to appropriately debride the periphery of the joint.  There was no evidence of any residual hyaline cartilage however there was fibrocartilaginous ingrowth.  The joint surfaces were very aggressively debrided and the subchondral bone plate was obliterated.  We had exposed bleeding subchondral bone on both the inferior portion of the talus and the superior portion of the calcaneus.  Both the posterior facet and the middle facet were prepared in the same fashion.  The area was irrigated with copious amounts of normal sterile saline.  The joint was further broken down with a small osteotome and mallet with a fish scaling technique to increase surface area and exposure of the subchondral bone.  We did note healthy bleeding bone and no residual cartilage, fibrocartilage or hard sclerotic eburnation.    Procedure #3 -next I performed the  autograft harvest from the distal tibia.  A longitudinal 1 inch incision was made over the metaphyseal region of the tibia distally.  Incision was made #15 scalpel blade.  Blunt dissection was carried to the subcutaneous tissue.  Dissection was carried down to the bone.  A longitudinal periosteal incision was made.  I made a small cortical window through the anteromedial metaphyseal region of the distal tibia.  Next I utilized a large bone curette to harvest autograft from the distal tibia.  I was able to harvest approximately 2 cc of healthy autograft.  The area was irrigated with copious amounts normal sterile saline.  Fluoroscopic images were taken confirming appropriate harvest site and no intra-articular violation.  At this point the bone void created by harvesting the autograft was packed with cancellous chip allograft aggressively.  The area was irrigated.    Next I combined the harvested autograft with 1 cc of augment graft as well as approximately 5 cc of cancellous bone chips for volume.  The graft was mixed together and then aggressively packed into the subtalar fusion site.  There is noted to be excellent graft fill and no gaps or voids.  At this time I placed a guidewire from the inferior posterior aspect of the calcaneal tuber through the posterior facet of the subtalar joint into the talar body.  This was done under fluoroscopic imaging.  At this point I did upsize the screw secondary to revision.  We utilized [place] 8.0 mm cannulated titanium screws.  First I placed a petite screw from posterior to anterior with the threads crossing the joint affording for excellent compression.   This was countersunk and drilled in standard fashion.  I did utilize a washer to prevent the screw from inadvertently sinking to the bone from prior screw tracks in preparation.  There is noted to be excellent compression both clinically and with insertion of the screw.  Next I made a small 1 cm incision over the dorsal  medial talar neck region.  Blunt dissection was carried down to the bone.  I delivered a second K wire from the talar neck across the subtalar joint into the calcaneal body.  Again this was countersunk measured and the screw was inserted.  This was a fully threaded screw for stability.  This was also an 8.0 mm cannulated titanium [place] screw.  There was excellent compression and stability as well as alignment.  I did range ankle to make sure that there was no evidence of anterior impingement from the screw head which there was not and this was confirmed fluoroscopically as well.  The surgical areas were irrigated with copious amounts of normal sterile saline.  Final fluoroscopic images were taken including a lateral AP ankle and calcaneal axial.  These showed appropriate compression and alignment of the hindfoot as well as no inadvertent joint penetration to the ankle gutters.  After aggressive irrigation the tissues were closed.  Periosteal tissues were reapproximated 3.0 Vicryl interrupted.  Subcutaneous tissue was reapproximated with 4.0 Vicryl interrupted suture.  The skin was reapproximated 3 point 0 Ethilon interrupted sutures.  The areas were cleansed.  Betadine soaked Adaptic was placed over all incisions.  She was then placed into a multilayer compressive foot and leg dressing.  Finally she was placed into a well-padded posterior short leg splint.  The area was padded with ABD pads as well as ample specialist cast padding.  She was placed in a short leg splint below the knee with the ankle joint neutral.  This was secured with a double Ace bandage.  She was noted to have tolerated the procedure and anesthesia well.  She was transported the PACU with vital signs stable neurovascular status intact.    Complications:  None; patient tolerated the procedure well.    Disposition: PACU - hemodynamically stable.  Condition: stable         Additional Details: see op note    Attending Attestation: I was present and  scrubbed for the key portions of the procedure.    Tadeo Treviño  Phone Number: [phone]

## 2024-02-07 NOTE — ADDENDUM NOTE
Addendum  created 02/07/24 1506 by Gustavo Deluna MD    Child order released for a procedure order, Clinical Note Signed, Intraprocedure Blocks edited, SmartForm saved

## 2024-02-07 NOTE — DISCHARGE INSTRUCTIONS
PODIATRIC SURGERY POST-OP INSTRUCTIONS    POST-OP INSTRUCTIONS  You must have a responsible adult drive you home and stay with you for the first 24 hours.    Do not drive a car or drink any alcohol for 24 hours after surgery.  You may have mild nausea, a sore throat or raspy voice for a few days.    Keep dressing clean, dry and intact until your first post-operative appointment.  Do not shower unless using a cast protector to protect dressing.  If dressing gets wet, please call office immediately as this can lead to increased risk of infection or wound dehiscence.   Elevate surgical extremity as much as possible to help with pain and swelling.  Place ice pack behind knee and top of foot of the surgical extremity.  Please remain NO weight bearing to the surgical foot.  Please use walker or knee scooter.  You were prescribed oxycodone for post-operative antibiotics; please take as directed on the label.  Please call to schedule post-operative appointment with Dr. Treviño / Sol for 6 days.  Should any problems, questions, or concerns arise, please call the clinic office.    WHEN TO CALL YOUR DOCTOR:  Fever (>100.4°F or 38.0°C) or chills.  Incision problems such as redness, warmth, swelling, or foul smelling drainage.  Severe nausea or persistent vomiting.  Pain and swelling in your legs, especially if it is only on one side and not the other.  Pain with urination, cloudy urine, or foul smelling urine.  Or if you have any other problems or questions.  CALL 911 or go to the ED if you have any shortness of breath, difficulty breathing, or chest pain.    Tadeo Treviño DPM  [facility]  [address]  [place] [address] [phone]

## 2024-02-07 NOTE — POST-PROCEDURE NOTE
1408-REPORT RECEIVED.  I BROUGHT PATIENT OVER FROM PACU.  SHE IS ALERT AND AWAKE.  LLE DRESSING IS C/D/I, NO DRAINAGE AND ELEVATED.  EXTREMITY IS NUMB, SHE CAN'T WIGGLE HER TOES.  PAIN IS 1/1O.  SNACK PROVIDED.  SPOUSE BROUGHT BACK TO ROOM.    1420-TOLERATING SNACK WITHOUT N&V.      1430-DISCHARGE INSTRUCTIONS REVIEWED WITH PATIENT AND SPOUSE, VERBALIZED UNDERSTANDING.  ICE PACK PROVIDED.  PATIENT HAS HER OWN CRUTCHES AND KNEE ROLLER AT HOME, DENIES NEEDING WALKER.    1435-RX TO GO DELIVERED MEDS.

## 2024-02-07 NOTE — ANESTHESIA PROCEDURE NOTES
Peripheral Block    Patient location during procedure: holding area  Start time: 2/7/2024 1:45 PM  End time: 2/7/2024 1:49 PM  Reason for block: at surgeon's request and post-op pain management  Staffing  Performed: attending   Authorized by: Gustavo Deluna MD    Performed by: Gustavo Deluna MD  Preanesthetic Checklist  Completed: patient identified, IV checked, site marked, risks and benefits discussed, surgical consent, monitors and equipment checked, pre-op evaluation and timeout performed   Timeout performed at: 2/7/2024 1:43 PM  Peripheral Block  Patient position: laying flat  Prep: ChloraPrep  Patient monitoring: heart rate, cardiac monitor and continuous pulse ox  Block type: popliteal  Laterality: left  Injection technique: single-shot  Guidance: ultrasound guided  Local infiltration: ropivacaine  Infiltration strength: 0.5 %  Dose: 30 mL  Needle  Needle gauge: 20 G  Needle length: 10 cm  Needle localization: ultrasound guidance  Assessment  Injection assessment: negative aspiration for heme, no paresthesia on injection, incremental injection and local visualized surrounding nerve on ultrasound  Heart rate change: no  Slow fractionated injection: no  Additional Notes  Rescue block performed in PACU due to foot pain on the lateral aspect.

## 2024-02-07 NOTE — ANESTHESIA PREPROCEDURE EVALUATION
Patient: Maureen Vela    Procedure Information       Date/Time: 02/07/24 0945    Procedures:       Revision subtalar joint fusion (Left)      Tibial autograft (Left)      Lower Extremity Hardware Removal (Left) - C-arm, Styker: 7.0 stainless screws(short & full thread), augment, ankle fracture set, popliteal block, adductor canal block    Location: [facility] / [facility]    Surgeons: Tadeo Treviño DPM            Relevant Problems   Anesthesia   (+) PONV (postoperative nausea and vomiting)      Cardiovascular   (+) Benign essential hypertension   (+) Hyperlipidemia      Endocrine   (+) Hypothyroid      GI   (+) Chronic diarrhea   (+) Gastroesophageal reflux disease      Neuro/Psych   (+) Anxiety      Pulmonary   (+) Asthma      GI/Hepatic   (+) Calculus of gallbladder with acute on chronic cholecystitis without obstruction      Musculoskeletal   (+) Osteoarthritis of knee   (+) Seropositive rheumatoid arthritis of multiple joints (CMS/HCC)   (+) Spondylosis of lumbosacral spine without myelopathy      Infectious Disease   (+) Acquired immunocompromised state (CMS/HCC)      Other   (+) Seropositive rheumatoid arthritis of multiple joints (CMS/HCC)       Clinical information reviewed:   Tobacco  Allergies  Meds  Problems  Med Hx  Surg Hx  OB Status    Fam Hx  Soc Hx        NPO Detail:  NPO/Void Status  Date of Last Liquid: 02/06/24  Time of Last Liquid: 1900  Date of Last Solid: 02/06/24  Time of Last Solid: 1900  Time of Last Void: 0700         Physical Exam    Airway  Mallampati: II  TM distance: >3 FB  Neck ROM: full     Cardiovascular    Dental    Pulmonary    Abdominal            Anesthesia Plan    History of general anesthesia?: yes  History of complications of general anesthesia?: yes    ASA 2     general and regional     The patient is not a current smoker.    intravenous induction   Postoperative administration of opioids is intended.  Anesthetic plan and risks discussed with patient.  Use of  blood products discussed with patient who consented to blood products.

## 2024-02-07 NOTE — ANESTHESIA POSTPROCEDURE EVALUATION
Patient: Maureen Vela    Procedure Summary       Date: 02/07/24 Room / Location: [facility]    Anesthesia Start: 1018 Anesthesia Stop: 1252    Procedures:       Revision subtalar joint fusion (Left: Ankle)      Tibial autograft (Left: Ankle)      Lower Extremity Hardware Removal (Left: Ankle) Diagnosis:       Pseudarthrosis after fusion or arthrodesis      Seropositive rheumatoid arthritis (CMS/HCC)      Posterior tibial tendinitis of left leg      Arthritis of left ankle      (Pseudarthrosis after fusion or arthrodesis [M96.0])      (Seropositive rheumatoid arthritis (CMS/HCC) [M05.9])      (Posterior tibial tendinitis of left leg [M76.822])      (Arthritis of left ankle [M19.072])    Surgeons: Tadeo Treviño DPM Responsible Provider: Gustavo Deluna MD    Anesthesia Type: general, regional ASA Status: 2            Anesthesia Type: general, regional    Vitals Value Taken Time   /73 02/07/24 1256   Temp 36.4 °C (97.5 °F) 02/07/24 1251   Pulse 82 02/07/24 1259   Resp 11 02/07/24 1259   SpO2 97 % 02/07/24 1259   Vitals shown include unvalidated device data.    Anesthesia Post Evaluation    Patient location during evaluation: PACU  Patient participation: complete - patient participated  Level of consciousness: awake  Pain management: adequate  Multimodal analgesia pain management approach  Airway patency: patent  Cardiovascular status: acceptable and hemodynamically stable  Respiratory status: acceptable and spontaneous ventilation  Hydration status: euvolemic  Postoperative Nausea and Vomiting: none  Comments: No nausea or vomiting        There were no known notable events for this encounter.

## 2024-02-07 NOTE — ANESTHESIA PROCEDURE NOTES
Peripheral Block    Patient location during procedure: holding area  Start time: 2/7/2024 9:49 AM  End time: 2/7/2024 9:51 AM  Reason for block: at surgeon's request and post-op pain management  Staffing  Performed: attending   Authorized by: Gustavo Deluna MD    Performed by: Gustavo Deluna MD  Preanesthetic Checklist  Completed: patient identified, IV checked, site marked, risks and benefits discussed, surgical consent, monitors and equipment checked, pre-op evaluation and timeout performed   Timeout performed at: 2/7/2024 9:41 AM  Peripheral Block  Patient position: laying flat  Prep: ChloraPrep  Patient monitoring: heart rate, cardiac monitor and continuous pulse ox  Block type: popliteal  Laterality: left  Injection technique: single-shot  Guidance: ultrasound guided  Local infiltration: ropivacaine  Infiltration strength: 0.5 %  Dose: 27 mL  Needle  Needle gauge: 20 G  Needle length: 10 cm  Needle localization: ultrasound guidance  Assessment  Injection assessment: negative aspiration for heme, no paresthesia on injection, incremental injection and local visualized surrounding nerve on ultrasound  Heart rate change: no  Slow fractionated injection: no

## 2024-02-07 NOTE — ANESTHESIA PROCEDURE NOTES
Airway  Date/Time: 2/7/2024 10:24 AM  Urgency: elective    Airway not difficult    Staffing  Performed: CRNA   Authorized by: Gustavo Deluna MD    Performed by: DEVONTE Pantoja-MARISABEL  Patient location during procedure: OR    Indications and Patient Condition  Indications for airway management: anesthesia  Spontaneous Ventilation: absent  Sedation level: deep  Preoxygenated: yes  Patient position: sniffing  MILS maintained throughout  Mask difficulty assessment: 1 - vent by mask  Planned trial extubation    Final Airway Details  Final airway type: supraglottic airway      Successful airway: classic  Size 3     Number of attempts at approach: 1  Ventilation between attempts: none  Number of other approaches attempted: 0    Additional Comments  No change to oral cavity/ dentition

## 2024-02-07 NOTE — ANESTHESIA PROCEDURE NOTES
Peripheral Block    Patient location during procedure: holding area  Start time: 2/7/2024 9:53 AM  End time: 2/7/2024 9:56 AM  Reason for block: at surgeon's request  Staffing  Performed: attending   Authorized by: Gustavo Deluna MD    Performed by: Gustavo Deluna MD  Preanesthetic Checklist  Completed: patient identified, IV checked, site marked, risks and benefits discussed, surgical consent, monitors and equipment checked, pre-op evaluation and timeout performed   Timeout performed at: 2/7/2024 9:52 AM  Peripheral Block  Patient position: laying flat  Prep: ChloraPrep  Patient monitoring: heart rate, cardiac monitor and continuous pulse ox  Block type: adductor canal  Laterality: left  Injection technique: single-shot  Guidance: ultrasound guided  Local infiltration: ropivacaine  Infiltration strength: 0.5 %  Dose: 13 mL  Needle  Needle type: long-bevel   Needle gauge: 20 G  Needle length: 10 cm  Needle localization: ultrasound guidance  Assessment  Injection assessment: negative aspiration for heme, no paresthesia on injection, incremental injection and local visualized surrounding nerve on ultrasound  Heart rate change: no  Slow fractionated injection: no

## 2024-02-19 DIAGNOSIS — E03.9 ACQUIRED HYPOTHYROIDISM: ICD-10-CM

## 2024-02-19 RX ORDER — LEVOTHYROXINE SODIUM 75 UG/1
75 TABLET ORAL DAILY
Qty: 90 TABLET | Refills: 1 | Status: SHIPPED | OUTPATIENT
Start: 2024-02-19

## 2024-02-28 ENCOUNTER — OFFICE VISIT (OUTPATIENT)
Dept: PRIMARY CARE | Facility: CLINIC | Age: 64
End: 2024-02-28
Payer: COMMERCIAL

## 2024-02-28 VITALS — HEART RATE: 66 BPM | SYSTOLIC BLOOD PRESSURE: 126 MMHG | DIASTOLIC BLOOD PRESSURE: 84 MMHG | OXYGEN SATURATION: 98 %

## 2024-02-28 DIAGNOSIS — Z12.11 ENCOUNTER FOR SCREENING FOR MALIGNANT NEOPLASM OF COLON: ICD-10-CM

## 2024-02-28 DIAGNOSIS — G43.809 OTHER MIGRAINE WITHOUT STATUS MIGRAINOSUS, NOT INTRACTABLE: ICD-10-CM

## 2024-02-28 DIAGNOSIS — Z12.31 SCREENING MAMMOGRAM FOR BREAST CANCER: ICD-10-CM

## 2024-02-28 DIAGNOSIS — Z90.710 H/O: HYSTERECTOMY: ICD-10-CM

## 2024-02-28 DIAGNOSIS — Z00.00 ROUTINE GENERAL MEDICAL EXAMINATION AT A HEALTH CARE FACILITY: Primary | ICD-10-CM

## 2024-02-28 DIAGNOSIS — Z78.0 MENOPAUSE: ICD-10-CM

## 2024-02-28 DIAGNOSIS — J30.1 ALLERGIC RHINITIS DUE TO POLLEN, UNSPECIFIED SEASONALITY: ICD-10-CM

## 2024-02-28 DIAGNOSIS — E03.9 ACQUIRED HYPOTHYROIDISM: ICD-10-CM

## 2024-02-28 DIAGNOSIS — Z13.820 SCREENING FOR OSTEOPOROSIS: ICD-10-CM

## 2024-02-28 DIAGNOSIS — I10 PRIMARY HYPERTENSION: ICD-10-CM

## 2024-02-28 PROCEDURE — 1036F TOBACCO NON-USER: CPT | Performed by: FAMILY MEDICINE

## 2024-02-28 PROCEDURE — 99396 PREV VISIT EST AGE 40-64: CPT | Performed by: FAMILY MEDICINE

## 2024-02-28 PROCEDURE — 3074F SYST BP LT 130 MM HG: CPT | Performed by: FAMILY MEDICINE

## 2024-02-28 PROCEDURE — 3079F DIAST BP 80-89 MM HG: CPT | Performed by: FAMILY MEDICINE

## 2024-02-28 ASSESSMENT — ENCOUNTER SYMPTOMS
CONSTIPATION: 0
FATIGUE: 1
HEMATURIA: 0
WHEEZING: 0
COUGH: 0
WEAKNESS: 0
FEVER: 0
NAUSEA: 0
SHORTNESS OF BREATH: 0
MYALGIAS: 0
LOSS OF SENSATION IN FEET: 0
FREQUENCY: 0
DIZZINESS: 0
ARTHRALGIAS: 0
PALPITATIONS: 0
CHILLS: 0
TREMORS: 0
OCCASIONAL FEELINGS OF UNSTEADINESS: 0
DEPRESSION: 0
VOMITING: 0

## 2024-02-28 ASSESSMENT — PAIN SCALES - GENERAL: PAINLEVEL: 0-NO PAIN

## 2024-02-28 NOTE — PROGRESS NOTES
Subjective   Patient ID: Maureen Vela is a 63 y.o. female who presents for Follow-up.      Ankle/Foot:          Foot left foot surgery with Dr markel Treviño 3 weeks ago - at [facility], she is recovering for the surgery, he says it is still healing, she is non weight bearing , using a scooter and a bone stimulator     Anxiety:          Anxiety F/U at patient's baseline.          Medications none- she feels she has anxiety over her physical symptoms- she does not want to take meds for anxiety.          Stressors worsening- starting back at school - concerns with covid.          Supports significant, support from spouse, support from family, support from friends.          Mood anxious, worried about everything.          Energy change decreased energy.          Concentration decreased.          Suicidal ideations none.      Hypertension:          Patient here for F/U. at goal.          Med compliance yes , yes.          Med side effects none , none.          Chest pain none.          Dizziness lightheaded.          Palpitations asymptomatic.          Syncope none.      Hypothyroidism:          Follow Up taking hormone supplement routinely, due for a tsh.          Hypothyroidism tolerating meds with no side effects.      Medications reviewed:          Current medications Use reviewed and recorded.          Vitamin/Mineral supplements Use reviewed and recorded.      Review family history:          Reviewed See family history.      Review surgical history:          Reviewed See surgical history.      Review past history:          Reviewed See past history.      Headaches/Migraines:          Headaches/Migraines very rare migraines - uses maxalt maybe 1-2 a month.          Review of Systems   Constitutional:  Positive for fatigue. Negative for chills and fever.   HENT:  Negative for ear pain, postnasal drip and tinnitus.    Respiratory:  Negative for cough, shortness of breath and wheezing.    Cardiovascular:  Negative for  chest pain, palpitations and leg swelling.   Gastrointestinal:  Negative for constipation, nausea and vomiting.   Endocrine: Negative for cold intolerance.   Genitourinary:  Negative for frequency and hematuria.   Musculoskeletal:  Negative for arthralgias and myalgias.   Neurological:  Negative for dizziness, tremors and weakness.   Nocturia, vit D def    Objective   /84   Pulse 66   SpO2 98%     Physical Exam  Vitals reviewed.   Constitutional:       General: She is not in acute distress.     Appearance: Normal appearance. She is not ill-appearing.   HENT:      Right Ear: Tympanic membrane normal.      Left Ear: Tympanic membrane normal.      Nose: No congestion or rhinorrhea.   Eyes:      General:         Right eye: No discharge.         Left eye: No discharge.   Cardiovascular:      Rate and Rhythm: Normal rate and regular rhythm.      Heart sounds: Normal heart sounds. No murmur heard.  Pulmonary:      Breath sounds: Normal breath sounds.   Abdominal:      General: Abdomen is flat. Bowel sounds are normal.      Palpations: Abdomen is soft.   Musculoskeletal:         General: No swelling.   Skin:     Findings: No rash.   Neurological:      General: No focal deficit present.      Mental Status: She is alert and oriented to person, place, and time.   Psychiatric:         Mood and Affect: Mood normal.         Behavior: Behavior normal.         Assessment/Plan   Problem List Items Addressed This Visit             ICD-10-CM    Allergic rhinitis J30.9    Hypothyroid E03.9    Migraine G43.909     Other Visit Diagnoses         Codes    Routine general medical examination at a health care facility    -  Primary Z00.00    Menopause     Z78.0    Relevant Orders    XR DEXA bone density    Screening for osteoporosis     Z13.820    Relevant Orders    XR DEXA bone density    Primary hypertension     I10    Encounter for screening for malignant neoplasm of colon     Z12.11    Screening mammogram for breast cancer      Z12.31    Relevant Orders    BI mammo bilateral screening tomosynthesis    H/O: hysterectomy     Z90.710

## 2024-03-29 ENCOUNTER — HOSPITAL ENCOUNTER (OUTPATIENT)
Dept: RADIOLOGY | Facility: HOSPITAL | Age: 64
Discharge: HOME | End: 2024-03-29
Payer: COMMERCIAL

## 2024-03-29 DIAGNOSIS — Z78.0 MENOPAUSE: ICD-10-CM

## 2024-03-29 DIAGNOSIS — Z13.820 SCREENING FOR OSTEOPOROSIS: ICD-10-CM

## 2024-03-29 PROCEDURE — 77080 DXA BONE DENSITY AXIAL: CPT | Performed by: RADIOLOGY

## 2024-03-29 PROCEDURE — 77080 DXA BONE DENSITY AXIAL: CPT

## 2024-04-01 NOTE — PROGRESS NOTES
Subjective   Patient ID: Maureen Vela is a 63 y.o. female who presents for Pain Management.    HPI 62YO Female with a PMHx significant for Anxiety, HLD, Migraines, GERD, Myalgias, Vitamin D Deficiency, Chronic Pain, Lumbosacral Spondylosis, HTN and RA. She presents for a pain management follow-up and medication refill. On 6/8/2023 Ms. Vela reports a longstanding history of low back pain that was sucessfully treated with RFA. She continues to be pleased with her response to the RFA of the facet medial nerve branches in the mid and lower portion of hte lumbar spine. She did, however, continue to have pain below this treated area which was worsened by standing, walking and sitting. She therefore underwent bilateral SIJ injections with Dr. NEVAREZ on 6/8/23. She reports the left side is still feeling pretty good. She notes the right side is more noticeable than it had been but she is hoping to hold off on repeating injections until closer to the holidays, however she has had some setbacks with her LLE and due to compensating and gait changes she is starting to have increased pain at the SIJ.          On 06/28/2023 she did undergo a left sub taller joint fusion of the ankle. She is currently booted and only weight bearing with boot. She was supposed to get out of the boot last time we met but she had a new imaging to evaluate the fusion and unfortunately no bone growth was noted. She therefore underwent a full revision of the subtalar joint fusion with Dr. Treviño on 2/7/2024. She is currently in a boot with the bone stimulator device. She will be following up with Dr. Treviño this week to find out if she can return to WB status. She continues to be off all RA medications and steroids. She is utilizing her Tramadol RX. She took a break from it due to post-op meds but is back on Tramadol as needed. She does continue to have SIJ pain; L>R but at this time she is going to continue to hold off on any interventions due to needing  to be off steroids for a period of time to allow her fusion to heal.     Review of Systems Unless noted in the HPI all other systems have been reviewed and are negative for complaint.    Objective   Physical Exam  General: No acute distress, well appearing and well nourished.  Eyes Conjunctiva and lids: No erythema, swelling or discharge  Neck: Supple, no cervical lymphadenopathy.  Pulmonary: Respiratory effort: Normal respiration.  Cardiovascular: Normal rate.  Examination of extremities: LLE partial weight bearing.   Abdomen: Non-distended, no obvious abdominal masses.  Musculoskeletal: Range of motion: reduced ROM to the spine and LLE.   Skin: Skin and subcutaneous tissue: Normal without rashes or lesions.  Neurologic: Reflexes: Normal. Coordination: LLE only weight-bearing when boot is on, use of rollator/scooter for distance ambulation, use of cane for short distances when boot is on.  Psychiatric: Orientation to person, place, and time: Normal. Mood and affect: Normal.    Assessment/Plan   Problem List Items Addressed This Visit       Chronic pain of both shoulders    Chronic pain of left ankle (Chronic)    Neuropathy    Spondylosis of lumbosacral spine without myelopathy - Primary     TREATMENT PLAN:  I had a nice discussion with the patient today and our plan will be as follows:  Radiology: No new imaging to review at this time.  Physically: Starting PT Friday for shoulder. Will defer to ortho regarding LLE.   Psychologically: No acute psychological needs at this time.  Medication: Nothing at this time; she has almost 2 mo worth of medications due to holding them while in the immediate post-op period. She will call when she needs a refill.   Duration: Chronic/Ongoing  Intervention: Nothing at this time. Will defer to ortho until she is fully recovered.

## 2024-04-02 ENCOUNTER — OFFICE VISIT (OUTPATIENT)
Dept: PAIN MEDICINE | Facility: CLINIC | Age: 64
End: 2024-04-02
Payer: COMMERCIAL

## 2024-04-02 VITALS
HEIGHT: 62 IN | DIASTOLIC BLOOD PRESSURE: 86 MMHG | SYSTOLIC BLOOD PRESSURE: 146 MMHG | WEIGHT: 214 LBS | HEART RATE: 65 BPM | BODY MASS INDEX: 39.38 KG/M2 | OXYGEN SATURATION: 97 %

## 2024-04-02 DIAGNOSIS — M05.79 SEROPOSITIVE RHEUMATOID ARTHRITIS OF MULTIPLE JOINTS (MULTI): Primary | ICD-10-CM

## 2024-04-02 DIAGNOSIS — M25.512 CHRONIC PAIN OF BOTH SHOULDERS: ICD-10-CM

## 2024-04-02 DIAGNOSIS — G62.9 NEUROPATHY: ICD-10-CM

## 2024-04-02 DIAGNOSIS — M47.817 SPONDYLOSIS OF LUMBOSACRAL SPINE WITHOUT MYELOPATHY: Primary | ICD-10-CM

## 2024-04-02 DIAGNOSIS — M25.572 CHRONIC PAIN OF LEFT ANKLE: Chronic | ICD-10-CM

## 2024-04-02 DIAGNOSIS — G89.29 CHRONIC PAIN OF BOTH SHOULDERS: ICD-10-CM

## 2024-04-02 DIAGNOSIS — M25.511 CHRONIC PAIN OF BOTH SHOULDERS: ICD-10-CM

## 2024-04-02 DIAGNOSIS — G89.29 CHRONIC PAIN OF LEFT ANKLE: Chronic | ICD-10-CM

## 2024-04-02 PROCEDURE — 99213 OFFICE O/P EST LOW 20 MIN: CPT | Performed by: NURSE PRACTITIONER

## 2024-04-02 PROCEDURE — 3077F SYST BP >= 140 MM HG: CPT | Performed by: NURSE PRACTITIONER

## 2024-04-02 PROCEDURE — 3079F DIAST BP 80-89 MM HG: CPT | Performed by: NURSE PRACTITIONER

## 2024-04-02 PROCEDURE — 1036F TOBACCO NON-USER: CPT | Performed by: NURSE PRACTITIONER

## 2024-04-02 ASSESSMENT — PAIN SCALES - GENERAL
PAINLEVEL: 2
PAINLEVEL_OUTOF10: 2

## 2024-04-02 ASSESSMENT — PAIN - FUNCTIONAL ASSESSMENT: PAIN_FUNCTIONAL_ASSESSMENT: 0-10

## 2024-04-02 ASSESSMENT — PAIN DESCRIPTION - DESCRIPTORS: DESCRIPTORS: ACHING

## 2024-04-02 NOTE — PROGRESS NOTES
MEDICATION NAME: Tramadol  STRENGTH: 50mg  LAST FILL DATE: 24, 3/8/24  DATE LAST TAKEN: 24  QUANTITY FILLED: 60, 60  QUANTITY REMAININ, 60  COUNT COMPLETED BY: CAROLE SCHRADER MA and NOEMÍ MONTALVO      UDS LAST COMPLETED:   CONTROLLED SUBSTANCES AGREEMENT LAST SIGNED:   ORT LAST COMPLETED:  Modified Osw[employer] disability form filled out annually.

## 2024-04-05 ENCOUNTER — DOCUMENTATION (OUTPATIENT)
Dept: INFUSION THERAPY | Facility: CLINIC | Age: 64
End: 2024-04-05
Payer: COMMERCIAL

## 2024-04-05 NOTE — PROGRESS NOTES
"Patient to be scheduled for  continuation of [facility].   For Diagnosis: Rheumatoid Arthritis     Dosing is weight based at:     60 to 100 k mg    With a Dosing weight of: 97.1 kg  For a total dose of: 750 mg every 4 weeks thereafter (maintenance)    Labs…  T-Spot Drawn/Results:   Lab Results   Component Value Date    TBGRES Negative  Reference range: NEGATIVE   2020      Hep B SAg Drawn/Results:   Lab Results   Component Value Date    HEPBSAG NEGATIVE 2020      No results found for: \"NONUHFIRE\", \"NONUHSWGH\", \"NONUHFISH\", \"EXTHEPBSAG\"    Premeds ordered? No    Does the patient have a diagnosis of COPD? No  (May cause COPD exacerbations)   Patient Active Problem List   Diagnosis    Chronic pain of both shoulders    Chronic pain of left ankle    Acquired immunocompromised state (CMS/HCC)    Allergic rhinitis    ALEXANDER positive    Anxiety    Artificial knee joint present    Asthma    Atopic dermatitis    Basal cell carcinoma of skin of other parts of face    Bilateral sacroiliitis (CMS/HCC)    Calculus of gallbladder with acute on chronic cholecystitis without obstruction    Chronic diarrhea    Gastroesophageal reflux disease    Benign essential hypertension    Hyperlipidemia    Hypothyroid    Immunodeficiency (CMS/HCC)    Migraine    Myalgia    Neuropathy    Obesity with body mass index 30 or greater    Osteoarthritis of knee    Paresthesia    Postmenopausal bleeding    Seropositive rheumatoid arthritis of multiple joints (CMS/HCC)    Spondylosis of lumbosacral spine without myelopathy    Vitamin D deficiency    Other chronic pain    PONV (postoperative nausea and vomiting)        Urine Hcg test ordered prior to first infusion? Not applicable (If female pt <60 years of age and with reproductive capability)  (If urine Hcg test ordered please instruct pt upon scheduling to drink 32 ounces of water 1 hour before arrival so bladder is full for needed urine sample)    Last infusion received: 2023 " (if continuation) Due: ASAP    This result meets treatment criteria.

## 2024-04-17 DIAGNOSIS — I10 PRIMARY HYPERTENSION: Primary | ICD-10-CM

## 2024-04-17 RX ORDER — PROPRANOLOL HYDROCHLORIDE 80 MG/1
80 CAPSULE, EXTENDED RELEASE ORAL DAILY
Qty: 90 CAPSULE | Refills: 3 | Status: SHIPPED | OUTPATIENT
Start: 2024-04-17

## 2024-04-22 DIAGNOSIS — E55.9 VITAMIN D DEFICIENCY: ICD-10-CM

## 2024-04-22 DIAGNOSIS — M05.79 SEROPOSITIVE RHEUMATOID ARTHRITIS OF MULTIPLE JOINTS (MULTI): Primary | ICD-10-CM

## 2024-04-26 ENCOUNTER — LAB (OUTPATIENT)
Dept: LAB | Facility: LAB | Age: 64
End: 2024-04-26
Payer: COMMERCIAL

## 2024-04-26 DIAGNOSIS — M05.79 SEROPOSITIVE RHEUMATOID ARTHRITIS OF MULTIPLE JOINTS (MULTI): ICD-10-CM

## 2024-04-26 DIAGNOSIS — E55.9 VITAMIN D DEFICIENCY: ICD-10-CM

## 2024-04-26 LAB
25(OH)D3 SERPL-MCNC: 62 NG/ML (ref 31–100)
ALBUMIN SERPL-MCNC: 4.3 G/DL (ref 3.5–5)
ALP BLD-CCNC: 69 U/L (ref 35–125)
ALT SERPL-CCNC: 11 U/L (ref 5–40)
ANION GAP SERPL CALC-SCNC: 12 MMOL/L
APPEARANCE UR: CLEAR
AST SERPL-CCNC: 13 U/L (ref 5–40)
BASOPHILS # BLD AUTO: 0.05 X10*3/UL (ref 0–0.1)
BASOPHILS NFR BLD AUTO: 0.9 %
BILIRUB SERPL-MCNC: <0.2 MG/DL (ref 0.1–1.2)
BILIRUB UR STRIP.AUTO-MCNC: NEGATIVE MG/DL
BUN SERPL-MCNC: 18 MG/DL (ref 8–25)
CALCIUM SERPL-MCNC: 9.3 MG/DL (ref 8.5–10.4)
CHLORIDE SERPL-SCNC: 105 MMOL/L (ref 97–107)
CK SERPL-CCNC: 53 U/L (ref 24–195)
CO2 SERPL-SCNC: 25 MMOL/L (ref 24–31)
COLOR UR: NORMAL
CREAT SERPL-MCNC: 0.9 MG/DL (ref 0.4–1.6)
CRP SERPL-MCNC: <0.3 MG/DL (ref 0–2)
EGFRCR SERPLBLD CKD-EPI 2021: 73 ML/MIN/1.73M*2
EOSINOPHIL # BLD AUTO: 0.16 X10*3/UL (ref 0–0.7)
EOSINOPHIL NFR BLD AUTO: 2.8 %
ERYTHROCYTE [DISTWIDTH] IN BLOOD BY AUTOMATED COUNT: 12.9 % (ref 11.5–14.5)
ERYTHROCYTE [SEDIMENTATION RATE] IN BLOOD BY WESTERGREN METHOD: 2 MM/H (ref 0–30)
GLUCOSE SERPL-MCNC: 98 MG/DL (ref 65–99)
GLUCOSE UR STRIP.AUTO-MCNC: NORMAL MG/DL
HCT VFR BLD AUTO: 42.7 % (ref 36–46)
HGB BLD-MCNC: 14 G/DL (ref 12–16)
IMM GRANULOCYTES # BLD AUTO: 0.01 X10*3/UL (ref 0–0.7)
IMM GRANULOCYTES NFR BLD AUTO: 0.2 % (ref 0–0.9)
KETONES UR STRIP.AUTO-MCNC: NEGATIVE MG/DL
LEUKOCYTE ESTERASE UR QL STRIP.AUTO: NEGATIVE
LYMPHOCYTES # BLD AUTO: 1.68 X10*3/UL (ref 1.2–4.8)
LYMPHOCYTES NFR BLD AUTO: 29.6 %
MCH RBC QN AUTO: 30.8 PG (ref 26–34)
MCHC RBC AUTO-ENTMCNC: 32.8 G/DL (ref 32–36)
MCV RBC AUTO: 94 FL (ref 80–100)
MONOCYTES # BLD AUTO: 0.41 X10*3/UL (ref 0.1–1)
MONOCYTES NFR BLD AUTO: 7.2 %
NEUTROPHILS # BLD AUTO: 3.37 X10*3/UL (ref 1.2–7.7)
NEUTROPHILS NFR BLD AUTO: 59.3 %
NITRITE UR QL STRIP.AUTO: NEGATIVE
NRBC BLD-RTO: 0 /100 WBCS (ref 0–0)
PH UR STRIP.AUTO: 5 [PH]
PLATELET # BLD AUTO: 233 X10*3/UL (ref 150–450)
POTASSIUM SERPL-SCNC: 4.2 MMOL/L (ref 3.4–5.1)
PROT SERPL-MCNC: 6.6 G/DL (ref 5.9–7.9)
PROT UR STRIP.AUTO-MCNC: NEGATIVE MG/DL
RBC # BLD AUTO: 4.55 X10*6/UL (ref 4–5.2)
RBC # UR STRIP.AUTO: NEGATIVE /UL
SODIUM SERPL-SCNC: 142 MMOL/L (ref 133–145)
SP GR UR STRIP.AUTO: 1.02
UROBILINOGEN UR STRIP.AUTO-MCNC: NORMAL MG/DL
WBC # BLD AUTO: 5.7 X10*3/UL (ref 4.4–11.3)

## 2024-04-26 PROCEDURE — 83529 ASAY OF INTERLEUKIN-6 (IL-6): CPT

## 2024-04-26 PROCEDURE — 81003 URINALYSIS AUTO W/O SCOPE: CPT

## 2024-04-26 PROCEDURE — 82306 VITAMIN D 25 HYDROXY: CPT

## 2024-04-26 PROCEDURE — 85652 RBC SED RATE AUTOMATED: CPT

## 2024-04-26 PROCEDURE — 82652 VIT D 1 25-DIHYDROXY: CPT

## 2024-04-26 PROCEDURE — 80053 COMPREHEN METABOLIC PANEL: CPT

## 2024-04-26 PROCEDURE — 86140 C-REACTIVE PROTEIN: CPT

## 2024-04-26 PROCEDURE — 36415 COLL VENOUS BLD VENIPUNCTURE: CPT

## 2024-04-26 PROCEDURE — 82550 ASSAY OF CK (CPK): CPT

## 2024-04-26 PROCEDURE — 85025 COMPLETE CBC W/AUTO DIFF WBC: CPT

## 2024-04-27 LAB — HOLD SPECIMEN: NORMAL

## 2024-04-29 ENCOUNTER — LAB (OUTPATIENT)
Dept: LAB | Facility: LAB | Age: 64
End: 2024-04-29
Payer: COMMERCIAL

## 2024-04-29 DIAGNOSIS — M05.79 SEROPOSITIVE RHEUMATOID ARTHRITIS OF MULTIPLE JOINTS (MULTI): ICD-10-CM

## 2024-04-29 LAB — IL6 SERPL-MCNC: <2 PG/ML

## 2024-04-29 PROCEDURE — 86481 TB AG RESPONSE T-CELL SUSP: CPT

## 2024-04-29 PROCEDURE — 36415 COLL VENOUS BLD VENIPUNCTURE: CPT

## 2024-04-30 LAB — 1,25(OH)2D3 SERPL-MCNC: 57.4 PG/ML (ref 19.9–79.3)

## 2024-05-01 LAB
NIL(NEG) CONTROL SPOT COUNT: NORMAL
PANEL A SPOT COUNT: 0
PANEL B SPOT COUNT: 0
POS CONTROL SPOT COUNT: NORMAL
T-SPOT. TB INTERPRETATION: NEGATIVE

## 2024-05-01 RX ORDER — GABAPENTIN 300 MG/1
300 CAPSULE ORAL 3 TIMES DAILY
COMMUNITY
Start: 2024-02-29 | End: 2024-05-07 | Stop reason: ALTCHOICE

## 2024-05-01 RX ORDER — MONTELUKAST SODIUM 10 MG/1
TABLET ORAL
COMMUNITY
Start: 2024-04-11

## 2024-05-01 RX ORDER — TRAMADOL HYDROCHLORIDE 50 MG/1
TABLET ORAL
COMMUNITY
Start: 2024-03-08

## 2024-05-01 RX ORDER — ERGOCALCIFEROL 1.25 MG/1
1 CAPSULE ORAL
COMMUNITY
Start: 2024-03-01

## 2024-05-04 LAB — SCAN RESULT: NORMAL

## 2024-05-06 LAB — SCAN RESULT: NORMAL

## 2024-05-07 ENCOUNTER — OFFICE VISIT (OUTPATIENT)
Dept: RHEUMATOLOGY | Facility: CLINIC | Age: 64
End: 2024-05-07
Payer: COMMERCIAL

## 2024-05-07 VITALS
OXYGEN SATURATION: 98 % | DIASTOLIC BLOOD PRESSURE: 82 MMHG | HEART RATE: 83 BPM | BODY MASS INDEX: 39.15 KG/M2 | WEIGHT: 214.07 LBS | SYSTOLIC BLOOD PRESSURE: 110 MMHG

## 2024-05-07 DIAGNOSIS — K21.9 GASTROESOPHAGEAL REFLUX DISEASE WITHOUT ESOPHAGITIS: ICD-10-CM

## 2024-05-07 DIAGNOSIS — M05.79 SEROPOSITIVE RHEUMATOID ARTHRITIS OF MULTIPLE JOINTS (MULTI): Primary | ICD-10-CM

## 2024-05-07 DIAGNOSIS — E55.9 VITAMIN D DEFICIENCY: ICD-10-CM

## 2024-05-07 DIAGNOSIS — Z79.899 ENCOUNTER FOR LONG-TERM (CURRENT) USE OF MEDICATIONS: ICD-10-CM

## 2024-05-07 DIAGNOSIS — R76.8 POSITIVE ANA (ANTINUCLEAR ANTIBODY): ICD-10-CM

## 2024-05-07 DIAGNOSIS — L80 VITILIGO: ICD-10-CM

## 2024-05-07 PROBLEM — R20.9 SKIN SENSATION DISTURBANCE: Status: ACTIVE | Noted: 2024-05-07

## 2024-05-07 PROBLEM — M79.672 LEFT FOOT PAIN: Status: ACTIVE | Noted: 2021-07-06

## 2024-05-07 PROBLEM — M19.071 DJD (DEGENERATIVE JOINT DISEASE), ANKLE AND FOOT, RIGHT: Status: ACTIVE | Noted: 2021-07-06

## 2024-05-07 PROBLEM — F41.9 ANXIETY: Status: RESOLVED | Noted: 2023-10-04 | Resolved: 2024-05-07

## 2024-05-07 PROBLEM — M05.9 RHEUMATOID ARTHRITIS WITH POSITIVE RHEUMATOID FACTOR (MULTI): Status: ACTIVE | Noted: 2021-07-06

## 2024-05-07 PROBLEM — Z98.1 HISTORY OF ARTHRODESIS: Status: ACTIVE | Noted: 2024-05-07

## 2024-05-07 PROBLEM — M79.671 PAIN IN RIGHT FOOT: Status: ACTIVE | Noted: 2021-07-06

## 2024-05-07 PROBLEM — M79.601 PAIN OF RIGHT UPPER EXTREMITY: Status: ACTIVE | Noted: 2024-05-07

## 2024-05-07 PROBLEM — R51.9 HEADACHE: Status: ACTIVE | Noted: 2024-05-07

## 2024-05-07 PROBLEM — J30.1 ALLERGIC RHINITIS DUE TO POLLEN: Status: ACTIVE | Noted: 2024-05-07

## 2024-05-07 PROBLEM — M25.579 PAIN IN JOINT INVOLVING ANKLE AND FOOT: Status: ACTIVE | Noted: 2024-05-07

## 2024-05-07 PROBLEM — M76.822 POSTERIOR TIBIAL TENDON DYSFUNCTION (PTTD) OF LEFT LOWER EXTREMITY: Status: ACTIVE | Noted: 2021-07-06

## 2024-05-07 PROCEDURE — 99214 OFFICE O/P EST MOD 30 MIN: CPT | Performed by: INTERNAL MEDICINE

## 2024-05-07 PROCEDURE — 3079F DIAST BP 80-89 MM HG: CPT | Performed by: INTERNAL MEDICINE

## 2024-05-07 PROCEDURE — 3074F SYST BP LT 130 MM HG: CPT | Performed by: INTERNAL MEDICINE

## 2024-05-07 RX ORDER — OMEPRAZOLE 20 MG/1
20 CAPSULE, DELAYED RELEASE ORAL DAILY
Qty: 90 CAPSULE | Refills: 3 | Status: SHIPPED | OUTPATIENT
Start: 2024-05-07

## 2024-05-07 ASSESSMENT — PAIN SCALES - GENERAL: PAINLEVEL: 4

## 2024-05-07 ASSESSMENT — ROUTINE ASSESSMENT OF PATIENT INDEX DATA (RAPID3)
FN_SCORE: 3.3
IN_OUT_TRANSPORT: WITH SOME DIFFICULTY
FEELINGS_ANXIETY_NERVOUS: WITHOUT ANY DIFFICULTY
WALK_FLAT_GROUND: WITH SOME DIFFICULTY
WEIGHTED_TOTAL_SCORE: 4.77
LIFT_CUP_TO_MOUTH: WITHOUT ANY DIFFICULTY
PICK_CLOTHES_OFF_FLOOR: WITHOUT ANY DIFFICULTY
ON A SCALE OF ONE TO TEN, HOW MUCH PAIN HAVE YOU HAD BECAUSE OF YOUR CONDITION OVER THE PAST WEEK?: 6
PARTIPATE_RECREATIONAL_ACTIVITIES: UNABLE TO DO
SEVERITY_SCORE: HIGH SEVERITY (HS)
GOOD_NIGHTS_SLEEP: WITH SOME DIFFICULTY
DRESS_YOURSELF: WITH SOME DIFFICULTY
SEVERITY_SCORE: 0
SUM OF QUESTIONS A TO J: 10
ON A SCALE OF ONE TO TEN, CONSIDERING ALL THE WAYS IN WHICH ILLNESS AND HEALTH CONDITIONS MAY AFFECT YOU AT THIS TIME, PLEASE INDICATE BELOW HOW YOU ARE DOING:: 5
TURN_FAUCETS_OFF: WITHOUT ANY DIFFICULTY
FEELINGS_DEPRESSION: WITHOUT ANY DIFFICULTY
WASH_DRY_BODY: WITHOUT ANY DIFFICULTY
TOTAL RAPID3 SCORE: 14.3
ON A SCALE OF ONE TO TEN, HOW MUCH PAIN HAVE YOU HAD BECAUSE OF YOUR CONDITION OVER THE PAST WEEK?: 6
IN_OUT_BED: WITH SOME DIFFICULTY
WALK_KILOMETERS: UNABLE TO DO
ON A SCALE OF ONE TO TEN, CONSIDERING ALL THE WAYS IN WHICH ILLNESS AND HEALTH CONDITIONS MAY AFFECT YOU AT THIS TIME, PLEASE INDICATE BELOW HOW YOU ARE DOING:: 5

## 2024-05-07 NOTE — PROGRESS NOTES
[facility]/  Rheumatology  [facility] [facility], [address]  [place] [address]  Phone: [phone]  Fax: [phone]    Rheumatology Progress Note 5/7/24    Maureen Vela is a 63 y.o. female here for   Chief Complaint   Patient presents with    FOLLOW UP - RESTART INFUSIONS       Last Visit: 11/15/2021    Rheum Hx    Component      Latest Ref Rng 5/26/2020   C-ANCA (PR3) BY EIA (Note)…    C-ANCA FLOURESCENCE Negative…    P-ANCA (MPO) BY EIA (Note)…    P-ANCA FLOURESCENCE Negative…    ANCP INTERPRETATION (Note)…    Staff Review (Note)…    IgG Cardiolipin Ab 9…    IgM Cardiolipin Ab 10…    IgA Cardiolipin Ab <9…    ALEXANDER Positive…    ALEXANDER Titer 1:160…    ALEXANDER Pattern Atypical speckled…    Beta-2 Glyco 1 IgG <9…    Beta 2 Glyco 1 IgM <9…    SSA ANTIBODIES 0.2…    SSB ANTIBODIES <0.2…    Thyroid Peroxidase (TPO) Antibody <1.0…    Beta-2 Glyco 1 IgA 3…    Citrulline Antibody, IgG 27… (H)    Centromere Ab Negative…    DAVID-1 ANTIBODY <0.2…    Rheumatoid Factor      0 - 20 IU/ML 10    RNP Antibody <0.2…    SCL-70 ABS EIA <0.2…    SM Antibody <0.2…    Anti-Thyroglobulin AB 1.5…    SUZY, Broad Spectrum Fátima Serum NEGATIVE Performed at [address] [address]      Component      Latest Ref Rng 1/2/2023   Total Protein, Spe 6.2… (L)    Albumin, SPE 3.9…    Alpha 1 Globulin 0.3…    Alpha 2 Globulin 0.7…    Beta Glob SPE 0.7…    Gamma Glob,Spe 0.7…    Protein Electrophoresis Comment NORMAL    UMPA Interpretation See Note…    UMPA Interpretation N/A    UMPA Review See Note…     Previous Tx    Sulfa allergy  Methotrexate- did not tolerate  HCQ- did not tolerate  Leflunomide- discontinued due to lack of efficacy  Enbrel- discontinued due to lack of efficacy    Health Maintenance  DXA T-1.3 (hip; 3/2024); FRAX 7.1%/0.5%  Malignancy Hx- none  Immunization History   Administered Date(s) Administered    Flu vaccine (IIV4), preservative free *Check age/dose* 12/12/2015, 09/22/2016,  11/01/2022, 10/11/2023    Flu vaccine, quadrivalent, high-dose, preservative free, age 65y+ (FLUZONE) 10/19/2020, 11/04/2021    Influenza, injectable, quadrivalent 10/31/2017, 10/01/2018, 01/23/2020    Influenza, seasonal, injectable 10/07/2009, 10/29/2010, 10/04/2012, 08/23/2013, 09/25/2014, 11/28/2015    Moderna COVID-19 vaccine, Fall 2023, 12 yeasrs and older (50mcg/0.5mL) 12/17/2023    Moderna COVID-19 vaccine, bivalent, blue cap/gray label *Check age/dose* 11/01/2022    Moderna SARS-CoV-2 Vaccination 03/01/2021, 03/27/2021, 11/04/2021, 06/15/2022    Novel influenza-H1N1-09, preservative-free 11/05/2009    Pneumococcal polysaccharide vaccine, 23-valent, age 2 years and older (PNEUMOVAX 23) 10/31/2017    Td vaccine, age 7 years and older (TDVAX) 12/31/2008, 01/26/2018    Tdap vaccine, age 7 year and older (BOOSTRIX, ADACEL) 11/04/2021    Zoster vaccine, recombinant, adult (SHINGRIX) 01/21/2019, 05/16/2019          Past Medical History:   Diagnosis Date    AB (asthmatic bronchitis) ([facility])     Actinic keratoses     Allergic rhinitis     Asthma ([facility])     Chronic pain     Chronic sinusitis     DDD (degenerative disc disease), cervical     C5,6 BULGE    DDD (degenerative disc disease), lumbar     L4,5 BULGE    Depression with anxiety     DUB (dysfunctional uterine bleeding)     ABLATION 03/12    ETD (eustachian tube dysfunction)     Gallbladder problem     GERD (gastroesophageal reflux disease)     HDL deficiency     HLD (hyperlipidemia)     HTN (hypertension)     Hx of thyroid nodule     Hyperlipidemia     Hypothyroid     Hypothyroidism     Joint pain     Knee pain, left     Lateral epicondylitis of elbow     Low back pain     Lumbosacral disc disease     Migraine     Neuropathy     Osteoarthritis     PONV (postoperative nausea and vomiting)     RA (rheumatoid arthritis) (Multi)     Seronegative rheumatoid arthritis (Multi)     Sinobronchitis     Vitamin D deficiency       Past Surgical History:   Procedure  Laterality Date    BACK SURGERY       SECTION, LOW TRANSVERSE      CHOLECYSTECTOMY  2017    LAPAROSCOPIC    COLONOSCOPY      COLONOSCOPY  10/16/2018    CYST REMOVAL Left     LEFT LEG    CYST REMOVAL Left 2015    LEFT EYE (CONJUVIAL RETENTION CYSTS)    ENDOMETRIAL ABLATION  2012    EPIDURAL BLOCK INJECTION      EYE SURGERY Left 2016    LEFT EYELID    FINGER SURGERY  2015    PIN IN THUMB    PAT SUBTALAR ARTHRODESIS      HAND ARTHROPLASTY Left 2015    LEFT HAND [facility] LRTI    INJECTION  2014    CORTISONE- LEFT THUMB    INJECTION Bilateral 10/25/2017    ONE IN EACH KNEE    JOINT REPLACEMENT      KNEE CARTILAGE SURGERY Right 2018    TORN MENISCUS    KNEE SURGERY Right 2018    KNEE ARTHROSCOPY    KNEE SURGERY  2019    SCOPE OF LEFT KNEE    LAMINECTOMY  1996    LUMBAR    LARYNX SURGERY  2014    MEDIALIZATION    LIPOMA RESECTION      BACK    OTHER SURGICAL HISTORY  2013    LUMP REMOVAL RIGHT SIDE    OTHER SURGICAL HISTORY      LEFT FOOT TENDON TRANSFER , SUBTALLAR JOINT FUSION 2023, REVISION OF FAILED JOINT FUSION 2024    SPINE SURGERY      THYROIDECTOMY  2014    RIGHT LOBE    TOTAL ABDOMINAL HYSTERECTOMY W/ BILATERAL SALPINGOOPHORECTOMY  08/10/2017    TOTAL KNEE ARTHROPLASTY Right 2019    TUBAL LIGATION        Current Outpatient Medications   Medication Sig Dispense Refill    abatacept 750 mg in sodium chloride 0.9% 70 mL IV Infuse 750 mg into a venous catheter every 28 (twenty-eight) days.      albuterol (ProAir HFA) 90 mcg/actuation inhaler Inhale 2 puffs every 4 hours if needed.      amLODIPine (Norvasc) 5 mg tablet Take 1 tablet (5 mg) by mouth once daily.      amoxicillin (Amoxil) 500 mg capsule Take 4 capsules (2,000 mg) by mouth. 1 HOUR BEFORE DENTIST APPT      aspirin 325 mg tablet Take 1 tablet (325 mg) by mouth once daily.      azelastine-fluticasone 137-50 mcg/spray spray,non-aerosol Administer 1 spray into each nostril 2  times a day.      cholecalciferol (Vitamin D-3) 50 MCG (2000 UT) tablet Take 1 tablet (2,000 Units) by mouth once daily.      cloNIDine (Catapres) 0.1 mg tablet Take 1 tablet (0.1 mg) by mouth once daily as needed (FOR BLOOD PRESSURE GREATER 140/90).      ergocalciferol (Vitamin D-2) 1.25 MG (84801 UT) capsule Take 1 capsule (1,250 mcg) by mouth 1 (one) time per week.      folic acid (Folvite) 1 mg tablet Take 1 tablet (1 mg) by mouth once daily.      levocetirizine (Xyzal) 5 mg tablet Take 1 tablet (5 mg) by mouth once daily in the evening.      levothyroxine (Synthroid, Levoxyl) 75 mcg tablet TAKE 1 TABLET DAILY 90 tablet 1    montelukast (Singulair) 10 mg tablet       MULTIVITAMIN ORAL Take 1 tablet by mouth once daily.      omeprazole (PriLOSEC) 20 mg DR capsule Take 1 capsule (20 mg) by mouth once daily. 90 capsule 1    propranolol LA (Inderal LA) 80 mg 24 hr capsule TAKE 1 CAPSULE DAILY 90 capsule 3    rizatriptan (Maxalt) 10 mg tablet Take 1 tablet (10 mg) by mouth once daily as needed.      tiZANidine (Zanaflex) 4 mg tablet TAKE 1 TABLET DAILY AT NIGHT AS NEEDED 90 tablet 3    traMADol (Ultram) 50 mg tablet TAKE 1 TABLET BY MOUTH TWICE A DAY AS NEEDED FOR 30 DAYS      triamcinolone (Kenalog) 0.1 % cream Apply 1 Application topically 2 times a week.      gabapentin (Neurontin) 300 mg capsule Take 1 capsule (300 mg) by mouth 3 times a day.       No current facility-administered medications for this visit.      Allergies   Allergen Reactions    Oxycodone-Acetaminophen Nausea/vomiting    Sulfa (Sulfonamide Antibiotics) Headache     Hypertension    Sulfamethoxazole-Trimethoprim Other, Rash and Unknown    Balsam [place] Hives    Clarithromycin Itching, Rash and Unknown    Hydroxychloroquine Rash    Methotrexate Hives and Rash        Visit Vitals  /82 (BP Location: Right arm, Patient Position: Sitting)   Pulse 83   Wt 97.1 kg (214 lb 1.1 oz)   SpO2 98%   BMI 39.15 kg/m²   OB Status Hysterectomy   Smoking Status  Never   BSA 2.06 m²            Rapid 3  Function Score (FN): 3.3  Pain Score (PN) (0-10): 6  Patient Global (PTGL) (0-10): 5  Rapid3 Score: 14.3  RAPID3 Weighted Score: 4.77       Workup        Component      Latest Ref Rn 4/26/2024   LEUKOCYTES (10*3/UL) IN BLOOD BY AUTOMATED COUNT, Thai      4.4 - 11.3 x10*3/uL 5.7    nRBC      0.0 - 0.0 /100 WBCs 0.0    ERYTHROCYTES (10*6/UL) IN BLOOD BY AUTOMATED COUNT, Thai      4.00 - 5.20 x10*6/uL 4.55    HEMOGLOBIN      12.0 - 16.0 g/dL 14.0    HEMATOCRIT      36.0 - 46.0 % 42.7    MCV      80 - 100 fL 94    MCH      26.0 - 34.0 pg 30.8    MCHC      32.0 - 36.0 g/dL 32.8    RED CELL DISTRIBUTION WIDTH      11.5 - 14.5 % 12.9    PLATELETS (10*3/UL) IN BLOOD AUTOMATED COUNT, Thai      150 - 450 x10*3/uL 233    NEUTROPHILS/100 LEUKOCYTES IN BLOOD BY AUTOMATED COUNT, Thai      40.0 - 80.0 % 59.3    Immature Granulocytes %, Automated      0.0 - 0.9 % 0.2    Lymphocytes %      13.0 - 44.0 % 29.6    Monocytes %      2.0 - 10.0 % 7.2    Eosinophils %      0.0 - 6.0 % 2.8    Basophils %      0.0 - 2.0 % 0.9    NEUTROPHILS (10*3/UL) IN BLOOD BY AUTOMATED COUNT, Thai      1.20 - 7.70 x10*3/uL 3.37    Immature Granulocytes Absolute, Automated      0.00 - 0.70 x10*3/uL 0.01    Lymphocytes Absolute      1.20 - 4.80 x10*3/uL 1.68    Monocytes Absolute      0.10 - 1.00 x10*3/uL 0.41    Eosinophils Absolute      0.00 - 0.70 x10*3/uL 0.16    Basophils Absolute      0.00 - 0.10 x10*3/uL 0.05    GLUCOSE      65 - 99 mg/dL 98    SODIUM      133 - 145 mmol/L 142    POTASSIUM      3.4 - 5.1 mmol/L 4.2    CHLORIDE      97 - 107 mmol/L 105    Bicarbonate      24 - 31 mmol/L 25    Blood Urea Nitrogen      8 - 25 mg/dL 18    Creatinine      0.40 - 1.60 mg/dL 0.90    EGFR      >60 mL/min/1.73m*2 73    Calcium      8.5 - 10.4 mg/dL 9.3    Albumin      3.5 - 5.0 g/dL 4.3    Alkaline Phosphatase      35 - 125 U/L 69    Total Protein      5.9 - 7.9 g/dL 6.6    AST      5 - 40 U/L 13     Bilirubin Total      0.1 - 1.2 mg/dL <0.2    ALT      5 - 40 U/L 11    Anion Gap      <=19 mmol/L 12    Color, Urine      Light-Yellow, Yellow, Dark-Yellow  Light-Yellow    Appearance, Urine      Clear  Clear    Specific Gravity, Urine      1.005 - 1.035  1.022    pH, Urine      5.0, 5.5, 6.0, 6.5, 7.0, 7.5, 8.0  5.0    Protein, Urine      NEGATIVE, 10 (TRACE), 20 (TRACE) mg/dL NEGATIVE    Glucose, Urine      Normal mg/dL Normal    Blood, Urine      NEGATIVE  NEGATIVE    Ketones, Urine      NEGATIVE mg/dL NEGATIVE    Bilirubin, Urine      NEGATIVE  NEGATIVE    Urobilinogen, Urine      Normal mg/dL Normal    Nitrite, Urine      NEGATIVE  NEGATIVE    Leukocyte Esterase, Urine      NEGATIVE  NEGATIVE    Scan Result Vectra 31 (moderate)   Scan Result 14.3.3 neg   C-Reactive Protein      0.00 - 2.00 mg/dL <0.30    Creatine Kinase      24 - 195 U/L 53    Sed Rate      0 - 30 mm/h 2    Vitamin D, 25-Hydroxy, Total      31 - 100 ng/mL 62    Vit D, 1,25-Dihydroxy      19.9 - 79.3 pg/mL 57.4    Interleukin 6      <=2.0 pg/mL <2.0    Extra Tube Hold for add-ons.      Assessment/Plan  1. Seropositive rheumatoid arthritis of multiple joints (Multi)    2. Positive ALEXANDER (antinuclear antibody)    3. Vitiligo    4. Vitamin D deficiency    5. Encounter for long-term (current) use of medications       Orders Placed This Encounter   Procedures    Anti-DNA Antibody, Double-Stranded    C1Q Complement    C3 Complement    C4 Complement    CBC and Auto Differential    Comprehensive Metabolic Panel    C-Reactive Protein    Creatine Kinase    Sedimentation Rate    Serum Protein Electrophoresis + Immunofixation    Urine Protein Electrophoresis + Immunofixation    Uric Acid    Urinalysis with Reflex Culture and Microscopic    Vitamin D 25-Hydroxy,Total (for eval of Vitamin D levels)    V[facility]; [facility]; [id number] - Miscellaneous Test          Since last appt, adherent and tolerating Orencia 750 mg.  Last infusion   Tendon inflammation,  Subatlar  joint fusion end of June 2023. Also failed  Bone stim- still has it.   Broke R 5th toe/   Feb 7 th surgery  including cadaver bone. Hardware taken out and new screws placed front to back.  Ingrown toe nail tx and cleared with doxycycline.   For follow up testiong next week.  L ankle still healing. Hands are very sore and stiff.   PT for both shoulders after fall. Checking when to start back on meds.   N ablation for back worked. Tramadol helpful.   Gabapemtin for the sural nerve shooting pain which has eased off.   Denies any recent or current infection.  Not on any NSAIDs or glucocorticoids.  ROS+ for fatigue, GERD, rashes, sun sens, joint pain/swelling/stiffness all day, worse in the AM, raynaud's without pitting/ulceration, numbness/tingling.   Rapid 3 consistent with high severity.  Labs reviewed  D/w pt tx options and decided on   Orencia on hold for now.   Advised of possible side effects and importance of monitoring.   All questions answered.  Patient to follow up with primary care provider regarding all other medical issues not addressed today and for medical chart updating.    Cathy Vargas MD      Patient Care Team:  Florentino Thompson MD as PCP - General (Family Medicine)  Cathy Vargas MD as Consulting Physician (Rheumatology)

## 2024-05-10 RX ORDER — EPINEPHRINE 0.3 MG/.3ML
0.3 INJECTION SUBCUTANEOUS EVERY 5 MIN PRN
OUTPATIENT
Start: 2024-05-10

## 2024-05-10 RX ORDER — DIPHENHYDRAMINE HYDROCHLORIDE 50 MG/ML
50 INJECTION INTRAMUSCULAR; INTRAVENOUS AS NEEDED
OUTPATIENT
Start: 2024-05-10

## 2024-05-10 RX ORDER — FAMOTIDINE 10 MG/ML
20 INJECTION INTRAVENOUS ONCE AS NEEDED
OUTPATIENT
Start: 2024-05-10

## 2024-05-10 RX ORDER — ALBUTEROL SULFATE 0.83 MG/ML
3 SOLUTION RESPIRATORY (INHALATION) AS NEEDED
OUTPATIENT
Start: 2024-05-10

## 2024-05-10 RX ORDER — HEPARIN 100 UNIT/ML
500 SYRINGE INTRAVENOUS AS NEEDED
OUTPATIENT
Start: 2024-05-10

## 2024-05-10 RX ORDER — HEPARIN SODIUM,PORCINE/PF 10 UNIT/ML
50 SYRINGE (ML) INTRAVENOUS AS NEEDED
OUTPATIENT
Start: 2024-05-10

## 2024-05-14 ENCOUNTER — HOSPITAL ENCOUNTER (OUTPATIENT)
Dept: RADIOLOGY | Facility: HOSPITAL | Age: 64
Discharge: HOME | End: 2024-05-14
Payer: COMMERCIAL

## 2024-05-14 DIAGNOSIS — M25.572 PAIN IN LEFT ANKLE AND JOINTS OF LEFT FOOT: ICD-10-CM

## 2024-05-14 DIAGNOSIS — G89.29 OTHER CHRONIC PAIN: ICD-10-CM

## 2024-05-14 PROCEDURE — 73700 CT LOWER EXTREMITY W/O DYE: CPT | Mod: LEFT SIDE | Performed by: RADIOLOGY

## 2024-05-14 PROCEDURE — 73700 CT LOWER EXTREMITY W/O DYE: CPT | Mod: LT

## 2024-05-22 ENCOUNTER — TELEPHONE (OUTPATIENT)
Dept: RHEUMATOLOGY | Facility: CLINIC | Age: 64
End: 2024-05-22
Payer: COMMERCIAL

## 2024-05-22 NOTE — TELEPHONE ENCOUNTER
PT CALLED, STATES SHE WAS TO F/U  AFTER CT. PER PT CT SHOWS NO BONE GROWTH IN 1 BONE & MINIMAL GROWTH IN THE OTHER. DR WALKER WANTS PT TO HOLD INFUSIONS FOR ANOTHER MONTH. PT WILL STAY IN BOOT ANOTHER 6 WEEKS (HAS BEEN 11 MONTHS). STATES YOU WANTED TO ADD OSTEOPOROSIS MEDS TO INFUSIONS.ALSO FYI DR WALKER IS RETIRING & SHE WILL BE CHANGING DOCOTRS

## 2024-05-23 PROBLEM — M85.80 OSTEOPENIA WITH HIGH RISK OF FRACTURE: Status: ACTIVE | Noted: 2024-05-23

## 2024-05-28 ENCOUNTER — TELEPHONE (OUTPATIENT)
Dept: RHEUMATOLOGY | Facility: CLINIC | Age: 64
End: 2024-05-28
Payer: COMMERCIAL

## 2024-05-28 DIAGNOSIS — M85.80 OSTEOPENIA WITH HIGH RISK OF FRACTURE: Primary | ICD-10-CM

## 2024-05-28 RX ORDER — DIPHENHYDRAMINE HYDROCHLORIDE 50 MG/ML
50 INJECTION INTRAMUSCULAR; INTRAVENOUS AS NEEDED
OUTPATIENT
Start: 2024-05-28

## 2024-05-28 RX ORDER — EPINEPHRINE 0.3 MG/.3ML
0.3 INJECTION SUBCUTANEOUS EVERY 5 MIN PRN
OUTPATIENT
Start: 2024-05-28

## 2024-05-28 RX ORDER — ALBUTEROL SULFATE 0.83 MG/ML
3 SOLUTION RESPIRATORY (INHALATION) AS NEEDED
OUTPATIENT
Start: 2024-05-28

## 2024-05-28 RX ORDER — ZOLEDRONIC ACID 5 MG/100ML
5 INJECTION, SOLUTION INTRAVENOUS ONCE
OUTPATIENT
Start: 2024-05-28

## 2024-05-28 RX ORDER — FAMOTIDINE 10 MG/ML
20 INJECTION INTRAVENOUS ONCE AS NEEDED
OUTPATIENT
Start: 2024-05-28

## 2024-05-28 NOTE — TELEPHONE ENCOUNTER
PT CALLED, STATES SHE TRIED TO SCHEDULE RECLAST INFUSION & WAS TOLD ORDER HA NOT BEEN SIGNED. ASKING YOU TO SIGN, SO SHE CAN SCHEDULE.

## 2024-06-11 ENCOUNTER — DOCUMENTATION (OUTPATIENT)
Dept: INFUSION THERAPY | Facility: CLINIC | Age: 64
End: 2024-06-11
Payer: COMMERCIAL

## 2024-06-11 NOTE — PROGRESS NOTES
Patient to be scheduled for  New Start of Reclast infusions - Labs from 12/31/24 - Patient needs updated labs. Secure message sent to patient.   For Diagnosis: Osteoporosis     Labs… (must be within 28 days of scheduled infusion)  Lab Results   Component Value Date    CREATININE 0.90 04/26/2024    There were no vitals filed for this visit.  CrCl: 142.20 ml/min     Serum Calcium: 8.9  Lab Results   Component Value Date    CALCIUM 9.3 04/26/2024      Lab Results   Component Value Date    ALBUMIN 4.3 04/26/2024          Calcium and Vitamin D supplement noted on medication list? Yes  (if no nurse to encourage discussion of supplementation at visit)  Current Outpatient Medications on File Prior to Visit   Medication Sig Dispense Refill    abatacept 750 mg in sodium chloride 0.9% 70 mL IV Infuse 750 mg into a venous catheter every 28 (twenty-eight) days.      albuterol (ProAir HFA) 90 mcg/actuation inhaler Inhale 2 puffs every 4 hours if needed.      amLODIPine (Norvasc) 5 mg tablet Take 1 tablet (5 mg) by mouth once daily.      amoxicillin (Amoxil) 500 mg capsule Take 4 capsules (2,000 mg) by mouth. 1 HOUR BEFORE DENTIST APPT      aspirin 325 mg tablet Take 1 tablet (325 mg) by mouth once daily.      azelastine-fluticasone 137-50 mcg/spray spray,non-aerosol Administer 1 spray into each nostril 2 times a day.      cholecalciferol (Vitamin D-3) 50 MCG (2000 UT) tablet Take 1 tablet (2,000 Units) by mouth once daily.      cloNIDine (Catapres) 0.1 mg tablet Take 1 tablet (0.1 mg) by mouth once daily as needed (FOR BLOOD PRESSURE GREATER 140/90).      ergocalciferol (Vitamin D-2) 1.25 MG (95570 UT) capsule Take 1 capsule (1,250 mcg) by mouth 1 (one) time per week.      folic acid (Folvite) 1 mg tablet Take 1 tablet (1 mg) by mouth once daily.      levocetirizine (Xyzal) 5 mg tablet Take 1 tablet (5 mg) by mouth once daily in the evening.      levothyroxine (Synthroid, Levoxyl) 75 mcg tablet TAKE 1 TABLET DAILY 90 tablet 1     montelukast (Singulair) 10 mg tablet       MULTIVITAMIN ORAL Take 1 tablet by mouth once daily.      omeprazole (PriLOSEC) 20 mg DR capsule TAKE 1 CAPSULE DAILY 90 capsule 3    propranolol LA (Inderal LA) 80 mg 24 hr capsule TAKE 1 CAPSULE DAILY 90 capsule 3    rizatriptan (Maxalt) 10 mg tablet Take 1 tablet (10 mg) by mouth once daily as needed.      tiZANidine (Zanaflex) 4 mg tablet TAKE 1 TABLET DAILY AT NIGHT AS NEEDED 90 tablet 3    traMADol (Ultram) 50 mg tablet TAKE 1 TABLET BY MOUTH TWICE A DAY AS NEEDED FOR 30 DAYS      triamcinolone (Kenalog) 0.1 % cream Apply 1 Application topically 2 times a week.       No current facility-administered medications on file prior to visit.        Is the patient receiving or have an allergy to Zometa? No  Allergies   Allergen Reactions    Oxycodone-Acetaminophen Nausea/vomiting    Sulfa (Sulfonamide Antibiotics) Headache     Hypertension    Sulfamethoxazole-Trimethoprim Other, Rash and Unknown    Balsam [place] Hives    Clarithromycin Itching, Rash and Unknown    Hydroxychloroquine Rash    Methotrexate Hives and Rash        No obvious recent dental work per chart review. Nurse to confirm no dental within past/next 4 weeks at encounter.    Urine Hcg test ordered prior to first infusion? Not applicable (If female pt <60 years of age and with reproductive capability)  (If urine Hcg test ordered please instruct pt upon scheduling to drink 32 ounces of water 1 hour before arrival so bladder is full for needed urine sample)    Last infusion received: New start    Due: 02/25/2025    This result meets treatment criteria. Ok to schedule for reclast within 28 days of the calcium lab draw date listed above. OR… Ok to schedule for reclast; please instruct pt to have labs drawn no more than 28 days prior to their scheduled appointment

## 2024-06-18 ENCOUNTER — OFFICE VISIT (OUTPATIENT)
Dept: ORTHOPEDIC SURGERY | Facility: HOSPITAL | Age: 64
End: 2024-06-18
Payer: COMMERCIAL

## 2024-06-18 ENCOUNTER — HOSPITAL ENCOUNTER (OUTPATIENT)
Dept: RADIOLOGY | Facility: HOSPITAL | Age: 64
Discharge: HOME | End: 2024-06-18
Payer: COMMERCIAL

## 2024-06-18 DIAGNOSIS — G89.29 CHRONIC PAIN OF LEFT ANKLE: ICD-10-CM

## 2024-06-18 DIAGNOSIS — M96.0 NONUNION OF SUBTALAR ARTHRODESIS: Primary | ICD-10-CM

## 2024-06-18 DIAGNOSIS — T84.84XA PAINFUL ORTHOPAEDIC HARDWARE (CMS-HCC): ICD-10-CM

## 2024-06-18 DIAGNOSIS — M25.572 CHRONIC PAIN OF LEFT ANKLE: ICD-10-CM

## 2024-06-18 PROCEDURE — 73610 X-RAY EXAM OF ANKLE: CPT | Mod: LEFT SIDE | Performed by: STUDENT IN AN ORGANIZED HEALTH CARE EDUCATION/TRAINING PROGRAM

## 2024-06-18 PROCEDURE — 73650 X-RAY EXAM OF HEEL: CPT | Mod: LT

## 2024-06-18 PROCEDURE — 73650 X-RAY EXAM OF HEEL: CPT | Mod: LEFT SIDE | Performed by: STUDENT IN AN ORGANIZED HEALTH CARE EDUCATION/TRAINING PROGRAM

## 2024-06-18 PROCEDURE — 99204 OFFICE O/P NEW MOD 45 MIN: CPT | Performed by: STUDENT IN AN ORGANIZED HEALTH CARE EDUCATION/TRAINING PROGRAM

## 2024-06-18 PROCEDURE — 1036F TOBACCO NON-USER: CPT | Performed by: STUDENT IN AN ORGANIZED HEALTH CARE EDUCATION/TRAINING PROGRAM

## 2024-06-18 PROCEDURE — 73610 X-RAY EXAM OF ANKLE: CPT | Mod: LT

## 2024-06-18 PROCEDURE — 99214 OFFICE O/P EST MOD 30 MIN: CPT | Performed by: STUDENT IN AN ORGANIZED HEALTH CARE EDUCATION/TRAINING PROGRAM

## 2024-06-18 ASSESSMENT — PAIN DESCRIPTION - DESCRIPTORS: DESCRIPTORS: ACHING;SHARP;STABBING

## 2024-06-18 ASSESSMENT — PAIN - FUNCTIONAL ASSESSMENT: PAIN_FUNCTIONAL_ASSESSMENT: 0-10

## 2024-06-18 ASSESSMENT — PAIN SCALES - GENERAL: PAINLEVEL_OUTOF10: 9

## 2024-06-18 NOTE — PROGRESS NOTES
ORTHOPAEDIC SURGERY HISTORY & PHYSICAL     Chief Complaint:  Left foot pain    History Of Present Illness  Maureen Vela is a 63 y.o. female who presents for complex evaluation of left foot pain, self-referred.  Patient has previously undergone 3 surgeries with an [facility] podiatrist.  The first was a tendon transfer of which the details are unclear at this time, the second was a left subtalar joint arthrodesis from 06/28/2023 followed by a revision subtalar joint arthrodesis from 02/07/2024.  The patient continues with severe pain that is activity dependent.  Typically rated as 5-9 out of 10.  She has been ambulating with use of a boot and crutch.  By report, the patient's previous provider has stopped doing surgery due to a medical condition.  She was quite happy with her previous practice, however sought an orthopedic surgery perspective.  Prior to her index surgery she reported pain on the outside of her foot that radiated to the top.  This is yet unresolved.  Patient works as a  and is on her feet extensively throughout the day.  She denies any fevers, chills or night sweats but does report paresthesias on the outside of her foot.    Patient has a past medical history of rheumatoid arthritis, she has been off of her rheumatologic medications from the winter 2023.  She has been using a bone stimulator.    Past Medical History  Past Medical History:   Diagnosis Date    AB (asthmatic bronchitis) ([facility])     Actinic keratoses     Allergic rhinitis     Asthma ([facility])     Chronic pain     Chronic sinusitis     DDD (degenerative disc disease), cervical     C5,6 BULGE    DDD (degenerative disc disease), lumbar     L4,5 BULGE    Depression with anxiety     DUB (dysfunctional uterine bleeding)     ABLATION 03/12    ETD (eustachian tube dysfunction)     Gallbladder problem     GERD (gastroesophageal reflux disease)     HDL deficiency     HLD (hyperlipidemia)     HTN (hypertension)     Hx of  thyroid nodule     Hyperlipidemia     Hypothyroid     Hypothyroidism     Joint pain     Knee pain, left     Lateral epicondylitis of elbow     Low back pain     Lumbosacral disc disease     Migraine     Neuropathy     Osteoarthritis     PONV (postoperative nausea and vomiting)     RA (rheumatoid arthritis) (Multi)     Seronegative rheumatoid arthritis (Multi)     Sinobronchitis     Vitamin D deficiency        Surgical History  Recent Surgeries in Orthopaedic Surgery            No cases to display             Social History  Social History     Socioeconomic History    Marital status:      Spouse name: Not on file    Number of children: Not on file    Years of education: Not on file    Highest education level: Not on file   Occupational History    Not on file   Tobacco Use    Smoking status: Never    Smokeless tobacco: Never   Substance and Sexual Activity    Alcohol use: Not Currently     Comment: RARELY    Drug use: Never    Sexual activity: Yes     Partners: Male     Birth control/protection: Post-menopausal, Female Sterilization   Other Topics Concern    Not on file   Social History Narrative    Not on file     Social Determinants of Health     Financial Resource Strain: Not on file   Food Insecurity: Not on file   Transportation Needs: Not on file   Physical Activity: Not on file   Stress: Not on file   Social Connections: Not on file   Intimate Partner Violence: Not on file   Housing Stability: Not on file       Family History  Family History   Problem Relation Name Age of Onset    Arthritis Mother Mom     Alzheimer's disease Father Dad     Cancer Sister Sima     Stroke Maternal Grandfather Grandpa     Alzheimer's disease Sister Joselyn         Allergies  Allergies   Allergen Reactions    Oxycodone-Acetaminophen Nausea/vomiting    Sulfa (Sulfonamide Antibiotics) Headache     Hypertension    Sulfamethoxazole-Trimethoprim Other, Rash and Unknown    Balsam [place] Hives    Clarithromycin Itching, Rash and  Unknown    Hydroxychloroquine Rash    Methotrexate Hives and Rash       Review of Systems  REVIEW OF SYSTEMS  Constitutional: no unplanned weight loss  Psychiatric: no suicidal ideation  ENT: no vision changes, no sinus problems  Pulmonary: no shortness of breath  Lymphatic: no enlarged lymph nodes  Cardiovascular: no chest pain or shortness of breath  Gastrointestinal: no stomach problems  Genitourinary: no dysuria   Skin: no rashes  Endocrine: no thyroid problems  Neurological: no headache, no numbness  Hematological: no easy bruising  Musculoskeletal: Left foot pain    Physical Exam  PHYSICAL EXAMINATION  Constitutional Exam: well developed and well nourished  Psychiatric Exam: alert and oriented, appropriate mood and behavior  Eye Exam: EOMI  Pulmonary Exam: breathing non-labored, no apparent distress  Lymphatic exam: no appreciable lymphadenopathy in the lower extremities  Cardiovascular exam: RRR to peripheral palpation, DP pulses 2+, PT 2+, toes are pink with good capillary refill, no pitting edema  Skin exam: no open lesions, rashes, abrasions or ulcerations  Neurological exam: sensation to light touch intact in both lower extremities in peripheral and dermatomal distributions (except for any abnormalities noted in musculoskeletal exam)    Musculoskeletal exam: Left lower extremity examination.  Patient pain localized diffusely about the foot.  She has well-healed incisions without keisha-incisional erythema, induration, fluctuance or drainage.  She has tenderness to palpation at the sinus Tarsi as well as the anterior ankle.  She has painful and restricted subtalar joint range of motion and painful ankle range of motion, particularly with dorsiflexion.  Patient has pain-free midtarsal joint range of motion.  Patient has sensation intact light touch grossly in a saphenous, sural, superficial peroneal, deep peroneal and tibial nerve distribution.  She has intact PF/DF/EHL.  She has 2+ DP/PT pulse  palpated.    Last Recorded Vitals  There were no vitals taken for this visit.    Laboratory Results    No results found for this or any previous visit (from the past 24 hour(s)).    Radiology Results   X-ray imaging 3 view weightbearing left ankle and 2 view calcaneus reviewed from 06/18/2024 and left ankle CT reviewed from 05/14/2024 and independently evaluated by me on 06/18/2024 demonstrates no acute fracture or dislocation.  There is sequela of prior revision subtalar arthrodesis with calcaneal to talar screw fixation with subtle backing out evident as well as an antegrade talar to calcaneal screw with dorsal prominence and suspected impingement of the anterior distal tibia.  There does appear to be some incorporation at the posterior facet of the subtalar joint most posteriorly on x-ray, however CT imaging without arthrodesis healing.    Assessment/Plan:  63-year-old female who presents for complex evaluation of painful and multiply revised left subtalar arthrodesis by [facility] podiatrist.  I have reviewed the diagnosis and treatment options extensively with the patient.  I recommend the patient continue weightbearing to her tolerance in her left lower extremity.  She should continue with her current bone stimulator use.  I will order nonunion labs.  I discussed extensively with the patient is a complex problem for which there are few great answers.  I reviewed that she may be developing some element of ankle impingement and am concerned in the setting of multiply operated talus that she may develop avascular necrosis of the talus or go on to subtalar arthrodesis failure.  I reviewed that from a surgical treatment standpoint we could consider a revision subtalar joint arthrodesis with iliac crest bone graft or allograft versus a TTC approach, of course this would be a salvage option as this would sacrifice ankle joint motion.  I will plan to see the patient back in approximately 1 month to follow her clinical and  radiographic course as well as review her lab results.  I anticipate ordering an MRI of her left ankle without contrast to more completely evaluate her talus and rule out the possibility of AVN of her talus.  Upon return, patient would require three-view weightbearing left ankle.    Marlon Stafford MD, GLENN  Department of Orthopaedic Surgery  [facility]    The diagnosis and treatment plan were reviewed with the patient. All questions were answered. The patient verbalized understanding of the treatment plan. There were no barriers to understanding identified.    Note dictated with [employer] software.  Completed without full type editing and sent to avoid delay.

## 2024-06-19 ENCOUNTER — LAB (OUTPATIENT)
Dept: LAB | Facility: LAB | Age: 64
End: 2024-06-19
Payer: COMMERCIAL

## 2024-06-19 DIAGNOSIS — G89.29 CHRONIC PAIN OF LEFT ANKLE: ICD-10-CM

## 2024-06-19 DIAGNOSIS — T84.84XA PAINFUL ORTHOPAEDIC HARDWARE (CMS-HCC): ICD-10-CM

## 2024-06-19 DIAGNOSIS — M25.572 CHRONIC PAIN OF LEFT ANKLE: ICD-10-CM

## 2024-06-19 LAB
25(OH)D3 SERPL-MCNC: 56 NG/ML (ref 31–100)
ALBUMIN SERPL-MCNC: 4.3 G/DL (ref 3.5–5)
CALCIUM SERPL-MCNC: 9.5 MG/DL (ref 8.5–10.4)
CRP SERPL-MCNC: <0.3 MG/DL (ref 0–2)
ERYTHROCYTE [DISTWIDTH] IN BLOOD BY AUTOMATED COUNT: 12.9 % (ref 11.5–14.5)
ERYTHROCYTE [SEDIMENTATION RATE] IN BLOOD BY WESTERGREN METHOD: 9 MM/H (ref 0–30)
HCT VFR BLD AUTO: 44.3 % (ref 36–46)
HGB BLD-MCNC: 14.4 G/DL (ref 12–16)
MCH RBC QN AUTO: 30.5 PG (ref 26–34)
MCHC RBC AUTO-ENTMCNC: 32.5 G/DL (ref 32–36)
MCV RBC AUTO: 94 FL (ref 80–100)
NRBC BLD-RTO: 0 /100 WBCS (ref 0–0)
PLATELET # BLD AUTO: 238 X10*3/UL (ref 150–450)
PREALB SERPL-MCNC: 27.5 MG/DL (ref 18–40)
PTH-INTACT SERPL-MCNC: 53.3 PG/ML (ref 18.5–88)
RBC # BLD AUTO: 4.72 X10*6/UL (ref 4–5.2)
TSH SERPL DL<=0.05 MIU/L-ACNC: 1.89 MIU/L (ref 0.27–4.2)
WBC # BLD AUTO: 5.5 X10*3/UL (ref 4.4–11.3)

## 2024-06-19 PROCEDURE — 82310 ASSAY OF CALCIUM: CPT

## 2024-06-19 PROCEDURE — 84443 ASSAY THYROID STIM HORMONE: CPT

## 2024-06-19 PROCEDURE — 85652 RBC SED RATE AUTOMATED: CPT

## 2024-06-19 PROCEDURE — 86140 C-REACTIVE PROTEIN: CPT

## 2024-06-19 PROCEDURE — 85027 COMPLETE CBC AUTOMATED: CPT

## 2024-06-19 PROCEDURE — 84134 ASSAY OF PREALBUMIN: CPT

## 2024-06-19 PROCEDURE — 82306 VITAMIN D 25 HYDROXY: CPT

## 2024-06-19 PROCEDURE — 83970 ASSAY OF PARATHORMONE: CPT

## 2024-06-19 PROCEDURE — 82040 ASSAY OF SERUM ALBUMIN: CPT

## 2024-06-19 PROCEDURE — 36415 COLL VENOUS BLD VENIPUNCTURE: CPT

## 2024-07-08 DIAGNOSIS — I10 PRIMARY HYPERTENSION: Primary | ICD-10-CM

## 2024-07-08 RX ORDER — AMLODIPINE BESYLATE 5 MG/1
5 TABLET ORAL DAILY
Qty: 90 TABLET | Refills: 3 | Status: SHIPPED | OUTPATIENT
Start: 2024-07-08

## 2024-07-08 NOTE — PROGRESS NOTES
Subjective   Patient ID: Maureen Vela is a 64 y.o. female who presents for Pain Management.     HPI 62YO Female with a PMHx significant for Anxiety, HLD, Migraines, GERD, Myalgias, Vitamin D Deficiency, Chronic Pain, Lumbosacral Spondylosis, HTN and RA. She presents for a pain management follow-up and medication refill. On 6/8/2023 Ms. Vela reports a longstanding history of low back pain that was sucessfully treated with RFA. She continues to be pleased with her response to the RFA of the facet medial nerve branches in the mid and lower portion of hte lumbar spine. She did, however, continue to have pain below this treated area which was worsened by standing, walking and sitting. She therefore underwent bilateral SIJ injections with Dr. NEVAREZ on 6/8/23.          On 06/28/2023 she did undergo a left sub taller joint fusion of the ankle. She is currently booted and only weight bearing with boot. She was supposed to get out of the boot but she had new imaging to evaluate the fusion and unfortunately no bone growth was noted. She therefore underwent a full revision of the subtalar joint fusion with Dr. Treviño on 2/7/2024. She is currently in a boot with the bone stimulator device. She continues to be off all RA medications and steroids. She is utilizing her Tramadol RX. She took a break from it due to post-op meds but is back on Tramadol as needed. She does continue to have SIJ pain; L>R but at this time she is going to continue to hold off on any interventions due to needing to be off steroids for a period of time to allow her fusion to heal. She does report, however, that she is going to start going back on some of her other medications as stopping has not added any help to her healing.           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 She is going for more imaging on Friday to find out what the next steps are. She is feeling defeated to say the least.      Review of  Systems Unless noted in the HPI all other systems have been reviewed and are negative for complaint.     Objective   Physical Exam  General: No acute distress, well appearing and well nourished.  Eyes Conjunctiva and lids: No erythema, swelling or discharge  Neck: Supple, no cervical lymphadenopathy.  Pulmonary: Respiratory effort: Normal respiration.  Cardiovascular: Normal rate.  Examination of extremities: LLE partial weight bearing.   Abdomen: Non-distended, no obvious abdominal masses.  Musculoskeletal: Range of motion: reduced ROM to the spine and LLE.   Skin: Skin and subcutaneous tissue: Normal without rashes or lesions.  Neurologic: Reflexes: Normal. Coordination: LLE only weight-bearing when boot is on, use of rollator/scooter for distance ambulation, use of cane for short distances when boot is on.  Psychiatric: Orientation to person, place, and time: Normal. Mood and affect: Normal.     Assessment/Plan   Problem List Items Addressed This Visit       Chronic pain of both shoulders    Bilateral sacroiliitis (CMS-HCC)    Neuropathy    Spondylosis of lumbosacral spine without myelopathy - Primary    Relevant Medications    traMADol (Ultram) 50 mg tablet    Rheumatoid arthritis with positive rheumatoid factor (Multi)     TREATMENT PLAN:  I had a nice discussion with the patient today and our plan will be as follows:  Radiology: Reviewed available imaging.   Physically: Will defer to ortho regarding LLE.   Psychologically: No acute psychological needs at this time.  Medication: I will refill the patient's opioids today for [ 3 ] month.  The patient continues to see benefit and improvement in their quality of life and ability to maintain ADLs.  Patient educated about the risks of taking opioids and operating a motor vehicle.  Patient reports no adverse side effects to current medication regimen.  Current regimen does allow patient to maintain ADLs.  Patient reports no new neurologic symptoms, new pain areas, or  exacerbation in pain today.  Patient reports they are happy with current treatment care path. Patient has been educated on the risks, benefits, and alternatives of controlled substances as well as the proper way to store these medications. The patient and I discussed the nature of this medication and its side effects.  We discussed tolerance, physical dependence, psychological dependence, addiction and opioid-induced hyperalgesia.  We discussed the potential need to wean from this medication.  We discussed the availability of programs that can help with this process if necessary.  We discussed safety issues related to opioids including safe storage.  We discussed the fact that the patient should not drive an automobile or operate heavy machinery while taking this medication.  A prescription for naloxone was offered to the patient.  The patient will be re-evaluated for the need to continue opioid therapy in 60-90 days.  Duration: Chronic/Ongoing  Intervention: Nothing at this time. Will defer to ortho until she is fully recovered.

## 2024-07-09 ENCOUNTER — OFFICE VISIT (OUTPATIENT)
Dept: PAIN MEDICINE | Facility: CLINIC | Age: 64
End: 2024-07-09
Payer: COMMERCIAL

## 2024-07-09 VITALS
WEIGHT: 214 LBS | BODY MASS INDEX: 39.38 KG/M2 | OXYGEN SATURATION: 98 % | HEART RATE: 70 BPM | SYSTOLIC BLOOD PRESSURE: 143 MMHG | DIASTOLIC BLOOD PRESSURE: 76 MMHG | HEIGHT: 62 IN

## 2024-07-09 DIAGNOSIS — M47.817 SPONDYLOSIS OF LUMBOSACRAL SPINE WITHOUT MYELOPATHY: Primary | ICD-10-CM

## 2024-07-09 DIAGNOSIS — M25.511 CHRONIC PAIN OF BOTH SHOULDERS: ICD-10-CM

## 2024-07-09 DIAGNOSIS — M05.79 RHEUMATOID ARTHRITIS INVOLVING MULTIPLE SITES WITH POSITIVE RHEUMATOID FACTOR (MULTI): ICD-10-CM

## 2024-07-09 DIAGNOSIS — M46.1 BILATERAL SACROILIITIS (CMS-HCC): ICD-10-CM

## 2024-07-09 DIAGNOSIS — G89.29 CHRONIC PAIN OF BOTH SHOULDERS: ICD-10-CM

## 2024-07-09 DIAGNOSIS — M25.512 CHRONIC PAIN OF BOTH SHOULDERS: ICD-10-CM

## 2024-07-09 DIAGNOSIS — G62.9 NEUROPATHY: ICD-10-CM

## 2024-07-09 PROCEDURE — 3077F SYST BP >= 140 MM HG: CPT | Performed by: NURSE PRACTITIONER

## 2024-07-09 PROCEDURE — 1036F TOBACCO NON-USER: CPT | Performed by: NURSE PRACTITIONER

## 2024-07-09 PROCEDURE — 99214 OFFICE O/P EST MOD 30 MIN: CPT | Performed by: NURSE PRACTITIONER

## 2024-07-09 PROCEDURE — 3078F DIAST BP <80 MM HG: CPT | Performed by: NURSE PRACTITIONER

## 2024-07-09 RX ORDER — METHYLPREDNISOLONE 4 MG/1
TABLET ORAL
Qty: 21 TABLET | Refills: 0 | Status: SHIPPED | OUTPATIENT
Start: 2024-07-09 | End: 2024-07-16

## 2024-07-09 RX ORDER — TRAMADOL HYDROCHLORIDE 50 MG/1
50 TABLET ORAL EVERY 12 HOURS PRN
Qty: 60 TABLET | Refills: 2 | Status: SHIPPED | OUTPATIENT
Start: 2024-07-09 | End: 2024-08-08

## 2024-07-09 ASSESSMENT — PAIN SCALES - GENERAL
PAINLEVEL_OUTOF10: 3
PAINLEVEL: 3

## 2024-07-09 ASSESSMENT — PAIN DESCRIPTION - DESCRIPTORS: DESCRIPTORS: ACHING

## 2024-07-09 ASSESSMENT — PATIENT HEALTH QUESTIONNAIRE - PHQ9
SUM OF ALL RESPONSES TO PHQ9 QUESTIONS 1 AND 2: 0
1. LITTLE INTEREST OR PLEASURE IN DOING THINGS: NOT AT ALL
2. FEELING DOWN, DEPRESSED OR HOPELESS: NOT AT ALL

## 2024-07-09 ASSESSMENT — PAIN - FUNCTIONAL ASSESSMENT: PAIN_FUNCTIONAL_ASSESSMENT: 0-10

## 2024-07-09 NOTE — PROGRESS NOTES
MEDICATION NAME: Tramadol  STRENGTH: 50mg  LAST FILL DATE: 3/8/24  DATE LAST TAKEN: 24  QUANTITY FILLED: 60  QUANTITY REMAININ  COUNT COMPLETED BY: CAROLE SCHRADER MA and NOEMÍ MONTALVO      UDS LAST COMPLETED:   CONTROLLED SUBSTANCES AGREEMENT LAST SIGNED:   ORT LAST COMPLETED:  Modified Osw[employer] disability form filled out annually.

## 2024-07-10 DIAGNOSIS — M05.79 SEROPOSITIVE RHEUMATOID ARTHRITIS OF MULTIPLE JOINTS (MULTI): Primary | ICD-10-CM

## 2024-07-10 RX ORDER — FOLIC ACID 1 MG/1
1 TABLET ORAL DAILY
Qty: 90 TABLET | Refills: 3 | Status: SHIPPED | OUTPATIENT
Start: 2024-07-10

## 2024-07-12 ENCOUNTER — HOSPITAL ENCOUNTER (OUTPATIENT)
Dept: RADIOLOGY | Facility: CLINIC | Age: 64
Discharge: HOME | End: 2024-07-12
Payer: COMMERCIAL

## 2024-07-12 ENCOUNTER — OFFICE VISIT (OUTPATIENT)
Dept: ORTHOPEDIC SURGERY | Facility: CLINIC | Age: 64
End: 2024-07-12
Payer: COMMERCIAL

## 2024-07-12 DIAGNOSIS — M25.879 IMPINGEMENT OF ANTERIOR PART OF ANKLE: ICD-10-CM

## 2024-07-12 DIAGNOSIS — G57.82 SURAL NEURITIS, LEFT: ICD-10-CM

## 2024-07-12 DIAGNOSIS — G89.29 CHRONIC PAIN OF LEFT ANKLE: ICD-10-CM

## 2024-07-12 DIAGNOSIS — T84.84XA PAINFUL ORTHOPAEDIC HARDWARE (CMS-HCC): ICD-10-CM

## 2024-07-12 DIAGNOSIS — M96.0 NONUNION OF SUBTALAR ARTHRODESIS: ICD-10-CM

## 2024-07-12 DIAGNOSIS — M96.0 NONUNION OF SUBTALAR ARTHRODESIS: Primary | ICD-10-CM

## 2024-07-12 DIAGNOSIS — M25.572 CHRONIC PAIN OF LEFT ANKLE: ICD-10-CM

## 2024-07-12 PROCEDURE — 99214 OFFICE O/P EST MOD 30 MIN: CPT | Performed by: STUDENT IN AN ORGANIZED HEALTH CARE EDUCATION/TRAINING PROGRAM

## 2024-07-12 PROCEDURE — 73610 X-RAY EXAM OF ANKLE: CPT | Mod: LT

## 2024-07-12 RX ORDER — CEFAZOLIN SODIUM 2 G/100ML
2 INJECTION, SOLUTION INTRAVENOUS ONCE
OUTPATIENT
Start: 2024-07-12 | End: 2024-07-12

## 2024-07-12 RX ORDER — SODIUM CHLORIDE, SODIUM LACTATE, POTASSIUM CHLORIDE, CALCIUM CHLORIDE 600; 310; 30; 20 MG/100ML; MG/100ML; MG/100ML; MG/100ML
100 INJECTION, SOLUTION INTRAVENOUS CONTINUOUS
OUTPATIENT
Start: 2024-07-12

## 2024-07-12 ASSESSMENT — PAIN - FUNCTIONAL ASSESSMENT: PAIN_FUNCTIONAL_ASSESSMENT: 0-10

## 2024-07-12 ASSESSMENT — PAIN SCALES - GENERAL: PAINLEVEL_OUTOF10: 8

## 2024-07-12 ASSESSMENT — PAIN DESCRIPTION - DESCRIPTORS: DESCRIPTORS: ACHING;SORE

## 2024-07-12 NOTE — PROGRESS NOTES
ORTHOPAEDIC SURGERY PROGRESS NOTE    Chief Complaint:  Left foot pain    History Of Present Illness  Maureen Vela is a 64 y.o. female who presents for complex evaluation of left foot pain, self-referred.  Patient has previously undergone 3 surgeries with an [facility] podiatrist.  The first was a tendon transfer of which the details are unclear at this time, the second was a left subtalar joint arthrodesis from 06/28/2023 followed by a revision subtalar joint arthrodesis from 02/07/2024.  The patient continues with severe pain that is activity dependent.  Typically rated as 5-9 out of 10.  She has been ambulating with use of a boot and crutch.  By report, the patient's previous provider has stopped doing surgery due to a medical condition.  She was quite happy with her previous practice, however sought an orthopedic surgery perspective.  Prior to her index surgery she reported pain on the outside of her foot that radiated to the top.  This is yet unresolved.  Patient works as a  and is on her feet extensively throughout the day.  She denies any fevers, chills or night sweats but does report paresthesias on the outside of her foot.    Patient has a past medical history of rheumatoid arthritis, she has been off of her rheumatologic medications from the winter 2023.  She has been using a bone stimulator.    07/12/2024: Patient returns for follow-up of her left foot pain as above.  She continues with severe pain in her left foot and ankle.  Rated as 8 out of 10.  Pain is improved with boot wear.  She has been ambulating with use of crutches.  She is reporting a burning pain on the lateral side of her foot, this is unchanged from her previous operations.  Patient also notes discomfort when walking on the back of her heel from a prominent screw head.    Past Medical History  Past Medical History:   Diagnosis Date    AB (asthmatic bronchitis) ([facility])     Actinic keratoses     Allergic rhinitis      Asthma ([facility])     Chronic pain     Chronic sinusitis     DDD (degenerative disc disease), cervical     C5,6 BULGE    DDD (degenerative disc disease), lumbar     L4,5 BULGE    Depression with anxiety     DUB (dysfunctional uterine bleeding)     ABLATION 03/12    ETD (eustachian tube dysfunction)     Gallbladder problem     GERD (gastroesophageal reflux disease)     HDL deficiency     HLD (hyperlipidemia)     HTN (hypertension)     Hx of thyroid nodule     Hyperlipidemia     Hypothyroid     Hypothyroidism     Joint pain     Knee pain, left     Lateral epicondylitis of elbow     Low back pain     Lumbosacral disc disease     Migraine     Neuropathy     Osteoarthritis     PONV (postoperative nausea and vomiting)     RA (rheumatoid arthritis) (Multi)     Seronegative rheumatoid arthritis (Multi)     Sinobronchitis     Vitamin D deficiency        Surgical History  Recent Surgeries in Orthopaedic Surgery            No cases to display             Social History  Social History     Socioeconomic History    Marital status:    Tobacco Use    Smoking status: Never    Smokeless tobacco: Never   Substance and Sexual Activity    Alcohol use: Not Currently     Comment: RARELY    Drug use: Never    Sexual activity: Yes     Partners: Male     Birth control/protection: Post-menopausal, Female Sterilization       Family History  Family History   Problem Relation Name Age of Onset    Arthritis Mother Mom     Alzheimer's disease Father Dad     Cancer Sister Sima     Stroke Maternal Grandfather Grandpa     Alzheimer's disease Sister Joselyn         Allergies  Allergies   Allergen Reactions    Oxycodone-Acetaminophen Nausea/vomiting    Sulfa (Sulfonamide Antibiotics) Headache     Hypertension    Sulfamethoxazole-Trimethoprim Other, Rash and Unknown    Balsam [place] Hives    Clarithromycin Itching, Rash and Unknown    Hydroxychloroquine Rash    Methotrexate Hives and Rash       Review of Systems  REVIEW OF SYSTEMS  Constitutional:  no unplanned weight loss  Psychiatric: no suicidal ideation  ENT: no vision changes, no sinus problems  Pulmonary: no shortness of breath  Lymphatic: no enlarged lymph nodes  Cardiovascular: no chest pain or shortness of breath  Gastrointestinal: no stomach problems  Genitourinary: no dysuria   Skin: no rashes  Endocrine: no thyroid problems  Neurological: no headache, no numbness  Hematological: no easy bruising  Musculoskeletal: Left foot pain    Physical Exam  PHYSICAL EXAMINATION  Constitutional Exam: well developed and well nourished  Psychiatric Exam: alert and oriented, appropriate mood and behavior  Eye Exam: EOMI  Pulmonary Exam: breathing non-labored, no apparent distress  Lymphatic exam: no appreciable lymphadenopathy in the lower extremities  Cardiovascular exam: RRR to peripheral palpation, DP pulses 2+, PT 2+, toes are pink with good capillary refill, no pitting edema  Skin exam: no open lesions, rashes, abrasions or ulcerations  Neurological exam: sensation to light touch intact in both lower extremities in peripheral and dermatomal distributions (except for any abnormalities noted in musculoskeletal exam)    Musculoskeletal exam: Left lower extremity examination.  Patient pain continues to localize diffusely about the foot.  She is exquisitely tender to palpation overlying the sinus Tarsi.  She also has focal tenderness to palpation overlying the anteromedial tibiotalar joint.  There is no obvious ankle effusion.  Her previous surgical incisions appear well-healed.  Patient has painful and restricted ankle as well as subtalar joint range of motion, particularly with ankle dorsiflexion. Patient has pain-free midtarsal joint range of motion.  Patient has sensation intact light touch grossly in a saphenous, sural, superficial peroneal, deep peroneal and tibial nerve distribution.  She has intact PF/DF/EHL.  She has 2+ DP/PT pulse palpated.    Last Recorded Vitals  There were no vitals taken for this  visit.    Laboratory Results    No results found for this or any previous visit (from the past 24 hour(s)).    Radiology Results   X-ray imaging 3 view weightbearing left ankle reviewed from 07/12/2024 and independently evaluated by me demonstrates no acute fracture or dislocation.  There is no obvious consolidation of the subtalar arthrodesis site.  There is expected anterior ankle impingement from prominent talar screw head as well as prominent posterior calcaneal screw.    Assessment/Plan:  64-year-old female who presents for complex evaluation of painful and multiply revised left subtalar arthrodesis by [facility] podiatrist with sural nerve neuritis and anterior ankle impingement.  I have reviewed the diagnosis and treatment options extensively with the patient.  I discussed with the patient there are 2 primary treatment options at this point, she may either continue with her bone stimulator, it will be a year in October and await further consolidation or proceed with left revision subtalar arthrodesis with removal of hardware, BMAC/ICBG (dowel), possible allograft ICBG and BMP.  I reviewed the risk, benefits and alternatives to this surgery.  Risk include but not limited to infection, wound healing complications, need for further surgery, persistent or worsening pain, postoperative stiffness, bleeding, blood loss requiring a blood transfusion, neurovascular injury, failure of arthrodesis healing, mal- or non-union, recurrent deformity, avascular necrosis of the talus with progression to posttraumatic osteoarthritis of the ankle, hardware failure, hardware pain, failure of the procedure, complications of anesthesia, stroke, DVT/PE, myocardial infarction and death. Benefits included decreased pain, improve function and arthrodesis healing. Alternatives included continued conservative management as described above. The patient voiced understanding of the risks, benefits and alternatives and the informed consent was  signed, with both myself and the patient present.  I will tentatively schedule the patient for surgery in August and plan to have her follow-up approximately 2 weeks following surgery for wound check. She will keep her rheumatologist aware of the anticipated surgery event to manage her infusions.  Upon return, patient would require three-view nonweightbearing left ankle out of plaster.    Marlon Stafford MD, GLENN  Department of Orthopaedic Surgery  [facility]    The diagnosis and treatment plan were reviewed with the patient. All questions were answered. The patient verbalized understanding of the treatment plan. There were no barriers to understanding identified.    Note dictated with [employer] software.  Completed without full type editing and sent to avoid delay.

## 2024-07-16 ENCOUNTER — APPOINTMENT (OUTPATIENT)
Dept: INFUSION THERAPY | Facility: CLINIC | Age: 64
End: 2024-07-16
Payer: COMMERCIAL

## 2024-07-16 ENCOUNTER — TELEPHONE (OUTPATIENT)
Dept: ORTHOPEDIC SURGERY | Facility: HOSPITAL | Age: 64
End: 2024-07-16

## 2024-07-16 VITALS
TEMPERATURE: 97.2 F | BODY MASS INDEX: 40.24 KG/M2 | HEART RATE: 59 BPM | OXYGEN SATURATION: 98 % | RESPIRATION RATE: 16 BRPM | SYSTOLIC BLOOD PRESSURE: 119 MMHG | DIASTOLIC BLOOD PRESSURE: 79 MMHG | WEIGHT: 220 LBS

## 2024-07-16 DIAGNOSIS — M96.0 NONUNION OF SUBTALAR ARTHRODESIS: Primary | ICD-10-CM

## 2024-07-16 DIAGNOSIS — M05.79 SEROPOSITIVE RHEUMATOID ARTHRITIS OF MULTIPLE JOINTS (MULTI): ICD-10-CM

## 2024-07-16 PROCEDURE — 96365 THER/PROPH/DIAG IV INF INIT: CPT | Performed by: NURSE PRACTITIONER

## 2024-07-16 RX ORDER — ALBUTEROL SULFATE 0.83 MG/ML
3 SOLUTION RESPIRATORY (INHALATION) AS NEEDED
OUTPATIENT
Start: 2024-07-30

## 2024-07-16 RX ORDER — EPINEPHRINE 0.3 MG/.3ML
0.3 INJECTION SUBCUTANEOUS EVERY 5 MIN PRN
OUTPATIENT
Start: 2024-07-30

## 2024-07-16 RX ORDER — FAMOTIDINE 10 MG/ML
20 INJECTION INTRAVENOUS ONCE AS NEEDED
OUTPATIENT
Start: 2024-07-30

## 2024-07-16 RX ORDER — DIPHENHYDRAMINE HYDROCHLORIDE 50 MG/ML
50 INJECTION INTRAMUSCULAR; INTRAVENOUS AS NEEDED
OUTPATIENT
Start: 2024-07-30

## 2024-07-16 ASSESSMENT — PAIN SCALES - GENERAL: PAINLEVEL: 0-NO PAIN

## 2024-07-16 NOTE — PROGRESS NOTES
[facility]   Infusion Clinic Note   Date: 2024   Name: Maureen Vela  : 1960   MRN: [id number]         Reason for Visit: OP Infusion (Orencia)         Today: We administered abatacept (Orencia) 750 mg in sodium chloride 0.9% 100 mL IV.       Visit Type: INFUSION       Ordered By:       Accompanied by:Self      Diagnosis: Seropositive rheumatoid arthritis of multiple joints (Multi)       Allergies:   Allergies as of 2024 - Reviewed 2024   Allergen Reaction Noted    Oxycodone-acetaminophen Nausea/vomiting 10/04/2023    Sulfa (sulfonamide antibiotics) Headache 10/04/2023    Sulfamethoxazole-trimethoprim Other, Rash, and Unknown 2019    Balsam peru Hives 10/04/2023    Clarithromycin Itching, Rash, and Unknown 2019    Hydroxychloroquine Rash 10/04/2023    Methotrexate Hives and Rash 2019         Current Medications has a current medication list which includes the following prescription(s): abatacept 750 mg in sodium chloride 0.9% 70 mL IV, albuterol, amlodipine, amoxicillin, aspirin, azelastine-fluticasone, cholecalciferol, clonidine, ergocalciferol, folic acid, levocetirizine, levothyroxine, methylprednisolone, montelukast, multivitamin, omeprazole, propranolol la, rizatriptan, tizanidine, tramadol, and triamcinolone.       Vitals:   Vitals:    24 0801 24 0909   BP: 134/88 119/79   Pulse: 78 59   Resp: 16 16   Temp: 36.2 °C (97.2 °F) 36.2 °C (97.2 °F)   SpO2: 97% 98%   Weight: 99.8 kg (220 lb)    PainSc: 0-No pain              Infusion Pre-procedure Checklist:   - Allergies reviewed: yes   - Medications reviewed: yes       - Previous reaction to current treatment: no      Assess patient for the concerns below. Document provider notification as appropriate.  - Active or recent infection with/without current antibiotic use: no  - Recent or planned invasive dental work: no  - Recent or planned surgeries: no  - Recently received or  plans to receive vaccinations: no  - Has treatment related toxicities: no  - Is pregnant:  no      Pain: 0   - Is the pain different from normal: n/a   - Is the pain tolerable: n/a   - Is your Doctor aware:  n/a      Labs: N/A         Fall Risk Screening: Junaid Fall Risk  History of Falling, Immediate or Within 3 Months: Yes  Secondary Diagnosis: No  Ambulatory Aid: Crutches/cane/walker  Intravenous Therapy/Heparin Lock: Yes  Gait/Transferring: Normal/bedrest/immobile  Mental Status: Oriented to own ability  Quiroga Fall Risk Score: 60       Review Of Systems:  Review of Systems - Oncology      Infusion Readiness:   - Assessment Concerns Related to Infusion: No  - Provider notified: n/a      Document Below Only If Indicated:   New Patient Education:    N/A (returning patient for continuation of therapy. Ongoing education provided as needed.)        Treatment Conditions & Drug Specific Questions:    NOT APPLICABLE        Weight Based Drug Calculations:    WEIGHT BASED DRUGS: Abatacept  (ORENCIA)   Patient's dosing weight (kg):     10% weight variance for prescribed treatment:  kg to kg     Patient's weight today:   Vitals:    07/16/24 0801   Weight: 99.8 kg (220 lb)         weight range for prescribed dose:     Patient weight today falls outside of 10% variance or  weight range: Not applicable     Home Care pharmacist informed of weight variance: No    Doses that are weight based have an acceptable variance rule within 10% of the prescribed   order and/or within  weight range. If patient weight on day of infusion falls   outside of the 10% variance, or weight range, infusion is administered and   pharmacy contacted regarding future dosing adjustments, per policy.         Initiated By: Kristel Tellez RN   Pt. States she was on Orencia before but had to stop last November due to her healing of the left foot. Pt, has a boot with a bone stimulator to the left foot.  Pt. Tolerated  infusion well without adverse effects.  Lab work in the computer pt. States she is to have in September prior to her next visit with Dr. Mustafa.

## 2024-07-16 NOTE — PATIENT INSTRUCTIONS
Today :We administered abatacept (Orencia) 750 mg in sodium chloride 0.9% 100 mL IV.     For:   1. Seropositive rheumatoid arthritis of multiple joints (Multi)         Your next appointment is due in:  2 weeks      Please read the  Medication Guide that was given to you and reviewed during todays visit.     (Tell all doctors including dentists that you are taking this medication)     Go to the emergency room or call 911 if:  -You have signs of allergic reaction:   -Rash, hives, itching.   -Swollen, blistered, peeling skin.   -Swelling of face, lips, mouth, tongue or throat.   -Tightness of chest, trouble breathing, swallowing or talking     Call your doctor:  - If IV / injection site gets red, warm, swollen, itchy or leaks fluid or pus.     (Leave dressing on your IV site for at least 2 hours and keep area clean and dry  - If you get sick or have symptoms of infection or are not feeling well for any reason.    (Wash your hands often, stay away from people who are sick)  - If you have side effects from your medication that do not go away or are bothersome.     (Refer to the teaching your nurse gave you for side effects to call your doctor about)    - Common side effects may include:  stuffy nose, headache, feeling tired, muscle aches, upset stomach  - Before receiving any vaccines     - [facility] at   If:  - You get sick, are on antibiotics, have had a recent vaccine, have surgery or dental work and your doctor wants your visit rescheduled.  - You need to cancel and reschedule your visit for any reason. Call at least 2 days before your visit if you need to cancel.   - Your insurance changes before your next visit.    (We will need to get approval from your new insurance. This can take up to two weeks.)     [facility] is opened Monday thru Friday. We are closed on weekends and holidays.   Voice mail will take your call if the center is closed. If you leave a message  please allow 24 hours for a call back during weekdays. If you leave a message on a weekend/holiday, we will call you back the next business day.

## 2024-07-16 NOTE — TELEPHONE ENCOUNTER
I contacted the patient via the number listed in the chart, [phone].  In preparation for her upcoming surgery, I performed an extensive literature review and discussed with several of my orthopedic foot and ankle colleagues from across the country.  Due to the significant anticipated bone loss from her to 8.0 mm cannulated screws, I will recommend performing hardware removal with bone biopsy to rule out the remote possibility of infection contributing to her ongoing subtalar arthrodesis nonunion problem as well as to allow time for screw track consolidation prior to definitive arthrodesis.  This would be a staged surgery with definitive revision subtalar arthrodesis following at least 6 weeks from the planned hardware removal.  The patient voiced understanding was in agreement.    Marlon Stafford MD, GLENN  Department of Orthopaedic Surgery  [facility]

## 2024-07-17 ENCOUNTER — OFFICE VISIT (OUTPATIENT)
Dept: PRIMARY CARE | Facility: CLINIC | Age: 64
End: 2024-07-17
Payer: COMMERCIAL

## 2024-07-17 VITALS
DIASTOLIC BLOOD PRESSURE: 80 MMHG | OXYGEN SATURATION: 99 % | BODY MASS INDEX: 40.24 KG/M2 | HEIGHT: 62 IN | SYSTOLIC BLOOD PRESSURE: 120 MMHG | HEART RATE: 63 BPM

## 2024-07-17 DIAGNOSIS — D84.9 ACQUIRED IMMUNOCOMPROMISED STATE (MULTI): ICD-10-CM

## 2024-07-17 DIAGNOSIS — I10 BENIGN ESSENTIAL HYPERTENSION: ICD-10-CM

## 2024-07-17 DIAGNOSIS — J30.1 ALLERGIC RHINITIS DUE TO POLLEN, UNSPECIFIED SEASONALITY: ICD-10-CM

## 2024-07-17 DIAGNOSIS — E03.9 ACQUIRED HYPOTHYROIDISM: ICD-10-CM

## 2024-07-17 DIAGNOSIS — M79.672 LEFT FOOT PAIN: Primary | ICD-10-CM

## 2024-07-17 DIAGNOSIS — E78.2 MIXED HYPERLIPIDEMIA: ICD-10-CM

## 2024-07-17 DIAGNOSIS — J45.20 MILD INTERMITTENT ASTHMA WITHOUT COMPLICATION (HHS-HCC): ICD-10-CM

## 2024-07-17 PROCEDURE — 99214 OFFICE O/P EST MOD 30 MIN: CPT | Performed by: FAMILY MEDICINE

## 2024-07-17 PROCEDURE — 1036F TOBACCO NON-USER: CPT | Performed by: FAMILY MEDICINE

## 2024-07-17 PROCEDURE — 3079F DIAST BP 80-89 MM HG: CPT | Performed by: FAMILY MEDICINE

## 2024-07-17 PROCEDURE — 3074F SYST BP LT 130 MM HG: CPT | Performed by: FAMILY MEDICINE

## 2024-07-17 ASSESSMENT — ENCOUNTER SYMPTOMS
NAUSEA: 0
WEAKNESS: 0
SHORTNESS OF BREATH: 0
CONSTIPATION: 0
ARTHRALGIAS: 1
DIZZINESS: 0
CHILLS: 0
WHEEZING: 0
OCCASIONAL FEELINGS OF UNSTEADINESS: 0
FEVER: 0
FREQUENCY: 0
FATIGUE: 1
MYALGIAS: 1
HEMATURIA: 0
VOMITING: 0
COUGH: 0
PALPITATIONS: 0
LOSS OF SENSATION IN FEET: 0
TREMORS: 0
DEPRESSION: 0

## 2024-07-17 ASSESSMENT — PAIN SCALES - GENERAL: PAINLEVEL: 0-NO PAIN

## 2024-07-17 NOTE — PROGRESS NOTES
"Subjective   Patient ID: Maureen Vela is a 64 y.o. female who presents for Follow-up.      Ankle/Foot:          Foot left - she has had multiple surgeries on her left foot - needs to have a 2 step surgery again - going to be happening soon, she denies any chest pain, no shortness of breath, no complications to anesthesia or surgery     Anxiety:          Anxiety F/U at patient's baseline.          Medications none- she feels she has anxiety over her physical symptoms- she does not want to take meds for anxiety.          Stressors worsening- starting back at school -has to wear a boot again         Supports significant, support from spouse, support from family, support from friends.          Mood -stable         Energy change - stable         Concentration stable         Suicidal ideations none.      Hypertension:          Patient here for F/U. at goal.          Med compliance yes , yes.          Med side effects none , none.          Chest pain none.          Dizziness lightheaded.          Palpitations asymptomatic.          Syncope none.      Hypothyroidism:          Follow Up taking hormone supplement routinely, due for a tsh.          Hypothyroidism tolerating meds with no side effects.   She has chronic allergies - on meds, her allergist moved away - she has been stable         Review of Systems   Constitutional:  Positive for fatigue. Negative for chills and fever.   HENT:  Negative for ear pain and postnasal drip.    Respiratory:  Negative for cough, shortness of breath and wheezing.    Cardiovascular:  Negative for chest pain and palpitations.   Gastrointestinal:  Negative for constipation, nausea and vomiting.   Genitourinary:  Negative for frequency and hematuria.   Musculoskeletal:  Positive for arthralgias and myalgias.   Neurological:  Negative for dizziness, tremors and weakness.       Objective   /80   Pulse 63   Ht 1.575 m (5' 2\")   SpO2 99%   BMI 40.24 kg/m²     Physical Exam  Vitals " reviewed.   Constitutional:       General: She is not in acute distress.     Appearance: Normal appearance. She is not ill-appearing.   HENT:      Right Ear: Tympanic membrane normal.      Left Ear: Tympanic membrane normal.   Cardiovascular:      Rate and Rhythm: Normal rate and regular rhythm.      Heart sounds: Normal heart sounds. No murmur heard.  Pulmonary:      Breath sounds: Normal breath sounds.   Abdominal:      General: Abdomen is flat. Bowel sounds are normal.      Palpations: Abdomen is soft.   Musculoskeletal:         General: No tenderness.      Cervical back: Neck supple.      Comments: Left foot and ankle in a boot   Skin:     Findings: No rash.   Neurological:      General: No focal deficit present.      Mental Status: She is alert and oriented to person, place, and time.   Psychiatric:         Mood and Affect: Mood normal.         Behavior: Behavior normal.         Assessment/Plan   Problem List Items Addressed This Visit             ICD-10-CM    Acquired immunocompromised state (Multi) D84.9    Allergic rhinitis J30.9    Asthma ([facility]) J45.909    Benign essential hypertension I10    Hyperlipidemia E78.5    Hypothyroid E03.9    Left foot pain - Primary M79.672    BMI 40.0-44.9, adult (Multi) Z68.41

## 2024-07-30 ENCOUNTER — APPOINTMENT (OUTPATIENT)
Dept: INFUSION THERAPY | Facility: CLINIC | Age: 64
End: 2024-07-30
Payer: COMMERCIAL

## 2024-07-30 VITALS
HEART RATE: 80 BPM | DIASTOLIC BLOOD PRESSURE: 78 MMHG | RESPIRATION RATE: 16 BRPM | WEIGHT: 219 LBS | SYSTOLIC BLOOD PRESSURE: 125 MMHG | TEMPERATURE: 97.5 F | BODY MASS INDEX: 40.06 KG/M2 | OXYGEN SATURATION: 98 %

## 2024-07-30 DIAGNOSIS — M05.79 SEROPOSITIVE RHEUMATOID ARTHRITIS OF MULTIPLE JOINTS (MULTI): ICD-10-CM

## 2024-07-30 PROCEDURE — 96365 THER/PROPH/DIAG IV INF INIT: CPT | Performed by: NURSE PRACTITIONER

## 2024-07-30 RX ORDER — FAMOTIDINE 10 MG/ML
20 INJECTION INTRAVENOUS ONCE AS NEEDED
OUTPATIENT
Start: 2024-08-13

## 2024-07-30 RX ORDER — EPINEPHRINE 0.3 MG/.3ML
0.3 INJECTION SUBCUTANEOUS EVERY 5 MIN PRN
OUTPATIENT
Start: 2024-08-13

## 2024-07-30 RX ORDER — ALBUTEROL SULFATE 0.83 MG/ML
3 SOLUTION RESPIRATORY (INHALATION) AS NEEDED
OUTPATIENT
Start: 2024-08-13

## 2024-07-30 RX ORDER — DIPHENHYDRAMINE HYDROCHLORIDE 50 MG/ML
50 INJECTION INTRAMUSCULAR; INTRAVENOUS AS NEEDED
OUTPATIENT
Start: 2024-08-13

## 2024-07-30 ASSESSMENT — PAIN SCALES - GENERAL: PAINLEVEL: 5

## 2024-07-30 NOTE — PROGRESS NOTES
[facility]   Infusion Clinic Note   Date: 2024   Name: Maureen Vela  : 1960   MRN: [id number]         Reason for Visit: OP Infusion (Orencia)         Today: We administered abatacept (Orencia) 750 mg in sodium chloride 0.9% 100 mL IV.       Visit Type: INFUSION Dose 2       Ordered By:       Accompanied by:Self      Diagnosis: Seropositive rheumatoid arthritis of multiple joints (Multi)       Allergies:   Allergies as of 2024 - Reviewed 2024   Allergen Reaction Noted   • Oxycodone-acetaminophen Nausea/vomiting 10/04/2023   • Sulfa (sulfonamide antibiotics) Headache 10/04/2023   • Sulfamethoxazole-trimethoprim Other, Rash, and Unknown 2019   • Balsam peru Hives 10/04/2023   • Clarithromycin Itching, Rash, and Unknown 2019   • Hydroxychloroquine Rash 10/04/2023   • Methotrexate Hives and Rash 2019         Current Medications has a current medication list which includes the following prescription(s): abatacept 750 mg in sodium chloride 0.9% 70 mL IV, albuterol, amlodipine, amoxicillin, aspirin, azelastine-fluticasone, cholecalciferol, clonidine, ergocalciferol, folic acid, levocetirizine, levothyroxine, montelukast, multivitamin, omeprazole, propranolol la, rizatriptan, tizanidine, tramadol, and triamcinolone, and the following Facility-Administered Medications: abatacept (Orencia) 750 mg in sodium chloride 0.9% 100 mL IV.       Vitals:   Vitals:    24 0806   BP: 127/84   Pulse: 80   Resp: 16   Temp: 36.4 °C (97.5 °F)   SpO2: 98%   Weight: 99.3 kg (219 lb)   PainSc:   5   PainLoc: Generalized             Infusion Pre-procedure Checklist:   - Allergies reviewed: yes   - Medications reviewed: yes       - Previous reaction to current treatment: no      Assess patient for the concerns below. Document provider notification as appropriate.  - Active or recent infection with/without current antibiotic use: no  - Recent or planned invasive  dental work: no  - Recent or planned surgeries: no  - Recently received or plans to receive vaccinations: no  - Has treatment related toxicities: no  - Is pregnant:  no      Pain: 0   - Is the pain different from normal: n/a   - Is the pain tolerable: n/a   - Is your Doctor aware:  n/a      Labs: N/A         Fall Risk Screening: Quiroga Fall Risk  Secondary Diagnosis: No  Ambulatory Aid: Walks without aid/bedrest/nurse assist  Intravenous Therapy/Heparin Lock: Yes  Gait/Transferring: Normal/bedrest/immobile  Mental Status: Oriented to own ability       Review Of Systems:  Review of Systems - Oncology      Infusion Readiness:   - Assessment Concerns Related to Infusion: No  - Provider notified: n/a      Document Below Only If Indicated:   New Patient Education:    N/A (returning patient for continuation of therapy. Ongoing education provided as needed.)        Treatment Conditions & Drug Specific Questions:    NOT APPLICABLE        Weight Based Drug Calculations:    WEIGHT BASED DRUGS: Abatacept  (ORENCIA)   Patient's dosing weight (kg):     10% weight variance for prescribed treatment:  kg to kg     Patient's weight today:   Vitals:    07/30/24 0806   Weight: 99.3 kg (219 lb)         weight range for prescribed dose:     Patient weight today falls outside of 10% variance or  weight range: Not applicable     Home Care pharmacist informed of weight variance: No    Doses that are weight based have an acceptable variance rule within 10% of the prescribed   order and/or within  weight range. If patient weight on day of infusion falls   outside of the 10% variance, or weight range, infusion is administered and   pharmacy contacted regarding future dosing adjustments, per policy.         Initiated By: Kristel Tellez RN   Pt.scheduled for surgery August 26th to remove hardware from the left foot.Surgeon aware that pt.is on Orencia and this dosing is 4 weeks away from scheduled surgery  date.

## 2024-08-05 ENCOUNTER — HOSPITAL ENCOUNTER (OUTPATIENT)
Dept: RADIOLOGY | Facility: HOSPITAL | Age: 64
Discharge: HOME | End: 2024-08-05
Payer: COMMERCIAL

## 2024-08-05 VITALS — WEIGHT: 210 LBS | HEIGHT: 62 IN | BODY MASS INDEX: 38.64 KG/M2

## 2024-08-05 DIAGNOSIS — Z12.31 SCREENING MAMMOGRAM FOR BREAST CANCER: ICD-10-CM

## 2024-08-05 PROCEDURE — 77067 SCR MAMMO BI INCL CAD: CPT | Performed by: STUDENT IN AN ORGANIZED HEALTH CARE EDUCATION/TRAINING PROGRAM

## 2024-08-05 PROCEDURE — 77063 BREAST TOMOSYNTHESIS BI: CPT | Performed by: STUDENT IN AN ORGANIZED HEALTH CARE EDUCATION/TRAINING PROGRAM

## 2024-08-05 PROCEDURE — 77067 SCR MAMMO BI INCL CAD: CPT

## 2024-08-08 DIAGNOSIS — Z12.31 SCREENING MAMMOGRAM FOR BREAST CANCER: Primary | ICD-10-CM

## 2024-08-13 ENCOUNTER — APPOINTMENT (OUTPATIENT)
Dept: INFUSION THERAPY | Facility: CLINIC | Age: 64
End: 2024-08-13
Payer: COMMERCIAL

## 2024-08-13 VITALS
OXYGEN SATURATION: 98 % | SYSTOLIC BLOOD PRESSURE: 108 MMHG | DIASTOLIC BLOOD PRESSURE: 72 MMHG | WEIGHT: 221 LBS | RESPIRATION RATE: 18 BRPM | HEART RATE: 68 BPM | BODY MASS INDEX: 40.42 KG/M2 | TEMPERATURE: 97.8 F

## 2024-08-13 DIAGNOSIS — M05.79 SEROPOSITIVE RHEUMATOID ARTHRITIS OF MULTIPLE JOINTS (MULTI): ICD-10-CM

## 2024-08-13 PROCEDURE — 96365 THER/PROPH/DIAG IV INF INIT: CPT | Performed by: NURSE PRACTITIONER

## 2024-08-13 RX ORDER — DIPHENHYDRAMINE HYDROCHLORIDE 50 MG/ML
50 INJECTION INTRAMUSCULAR; INTRAVENOUS AS NEEDED
OUTPATIENT
Start: 2024-08-13

## 2024-08-13 RX ORDER — EPINEPHRINE 0.3 MG/.3ML
0.3 INJECTION SUBCUTANEOUS EVERY 5 MIN PRN
OUTPATIENT
Start: 2024-08-13

## 2024-08-13 RX ORDER — FAMOTIDINE 10 MG/ML
20 INJECTION INTRAVENOUS ONCE AS NEEDED
OUTPATIENT
Start: 2024-08-13

## 2024-08-13 RX ORDER — ALBUTEROL SULFATE 0.83 MG/ML
3 SOLUTION RESPIRATORY (INHALATION) AS NEEDED
OUTPATIENT
Start: 2024-08-13

## 2024-08-13 ASSESSMENT — ENCOUNTER SYMPTOMS
ARTHRALGIAS: 1
MYALGIAS: 1
FATIGUE: 1

## 2024-08-13 ASSESSMENT — PAIN SCALES - GENERAL: PAINLEVEL: 3

## 2024-08-13 NOTE — PROGRESS NOTES
[facility]   Infusion Clinic Note   Date: 2024   Name: Maureen Vela  : 1960   MRN: [id number]          Reason for Visit: OP Infusion (Orencia)         Today: We administered abatacept (Orencia) 750 mg in sodium chloride 0.9% 100 mL IV.       Visit Type: INFUSION- Week 4       Ordered By: Dr. Welch       Accompanied by:Self       Diagnosis: Seropositive rheumatoid arthritis of multiple joints (Multi)        Allergies:   Allergies as of 2024 - Reviewed 2024   Allergen Reaction Noted    Oxycodone-acetaminophen Nausea/vomiting 10/04/2023    Sulfa (sulfonamide antibiotics) Headache 10/04/2023    Sulfamethoxazole-trimethoprim Other, Rash, and Unknown 2019    Balsam peru Hives 10/04/2023    Clarithromycin Itching, Rash, and Unknown 2019    Hydroxychloroquine Rash 10/04/2023    Methotrexate Hives and Rash 2019          Current Medications has a current medication list which includes the following prescription(s): abatacept 750 mg in sodium chloride 0.9% 70 mL IV, albuterol, amlodipine, amoxicillin, aspirin, azelastine-fluticasone, cholecalciferol, clonidine, ergocalciferol, folic acid, levocetirizine, levothyroxine, montelukast, multivitamin, omeprazole, propranolol la, rizatriptan, tizanidine, and triamcinolone.       Vitals:   Vitals:    24 0803 24 0857 24 0936   BP: 123/80 111/83 108/72   Pulse: 79 68 68   Resp: 18 18 18   Temp: 36.4 °C (97.5 °F) 36.5 °C (97.7 °F) 36.6 °C (97.8 °F)   TempSrc: Temporal     SpO2: 98%     Weight: 100 kg (221 lb)     PainSc:   3     PainLoc: Hand               Infusion Pre-procedure Checklist:   - Allergies reviewed: yes   - Medications reviewed: yes       - Previous reaction to current treatment: no      Assess patient for the concerns below. Document provider notification as appropriate.  - Active or recent infection with/without current antibiotic use: no  - Recent or planned invasive dental  work: no  - Recent or planned surgeries: Yes- scheduled for left foot surgery on 8/26/2024  - Recently received or plans to receive vaccinations: no  - Has treatment related toxicities: no  - Is pregnant:  n/a      Pain: 3   - Is the pain different from normal: no   - Is the pain tolerable: yes   - Is your Doctor aware:  yes       Labs:  reviewed          Fall Risk Screening: Quiroga Fall Risk  History of Falling, Immediate or Within 3 Months: No  Secondary Diagnosis: Yes  Ambulatory Aid: Crutches/cane/walker  Intravenous Therapy/Heparin Lock: Yes  Gait/Transferring: Impaired   Mental Status: Oriented to own ability  Quiroga Fall Risk Score: 70       Review Of Systems:  Review of Systems   Constitutional:  Positive for fatigue.   Musculoskeletal:  Positive for arthralgias, gait problem and myalgias.   Neurological:  Positive for gait problem.        Left foot boot noted.   All other systems reviewed and are negative.        ROS completed? Yes      Infusion Readiness:  - Assessment Concerns Related to Infusion: No  - Provider notified: no      Document Below Only If Indicated:   New Patient Education:    N/A (returning patient for continuation of therapy. Ongoing education provided as needed.)        Treatment Conditions & Drug Specific Questions:    Abatacept  (ORENCIA)    (Unless otherwise specified on patient specific therapy plan):    TREATMENT CONDITIONS  Unless otherwise specified on patient specific therapy plan hold treatment and notify provider prior to proceeding with infusion if patient with a:  o Positive T-Spot  o Positive Hepatitis B Surface Ag   o Positive Hepatitis B Core Antibody   o Positive Hepatitis C Antibody     Lab Results   Component Value Date    TBSIN Negative 04/29/2024    TBGRES Negative  Reference range: NEGATIVE   05/26/2020      Lab Results   Component Value Date    HEPBSAG NEGATIVE 05/26/2020      Lab Results   Component Value Date    HEPCAB  05/26/2020     Negative  Reference range:  "NEGATIVE  Performed at the [facility] unless   otherwise noted.        No results found for: \"NONUHFIRE\", \"NONUHSWGH\", \"NONUHFISH\", \"EXTHEPBSAG\"    Labs reviewed and patient meets treatment conditions? Yes    DRUG SPECIFIC QUESTIONS  Any history of COPD?  No  (If yes educate patient on possible s/s exacerbation)    If Yes any new or worsening s/s related to COPD?  No  (If patient with worsening COPD notify prescribing provider prior to proceeding with infusion)      REMINDER:  - Pregnancy Category X Drug. Obtain negative pregnancy test prior to FIRST infusion  and educate patient on risk in women of childbearing ability.   - Weight based drug.     Recommended Vitals/Observation:  Vitals: Obtain vital signs at the start; middle and end of infusion as well as at the end of observation period: 30 minutes post.   Observation: Observe patient for 30 minutes after each infusion        Weight Based Drug Calculations:    WEIGHT BASED DRUGS: Abatacept  (ORENCIA)           Patient's weight today:   Vitals:    08/13/24 0803   Weight: 100 kg (221 lb)         weight range for prescribed dose:  60-163oi=229 mg    0935  Patient tolerated infusion well.  30 minute observation complete.  VSS. No s/s adverse reaction noted.  RTC on 9/10/2024.        Initiated By: Amy Benavides RN        "

## 2024-08-13 NOTE — PATIENT INSTRUCTIONS
Today :We administered abatacept (Orencia) 750 mg in sodium chloride 0.9% 100 mL IV.     For:   1. Seropositive rheumatoid arthritis of multiple joints (Multi)         Your next appointment is due in:  9/10/2024        Please read the  Medication Guide that was given to you and reviewed during todays visit.     (Tell all doctors including dentists that you are taking this medication)     Go to the emergency room or call 911 if:  -You have signs of allergic reaction:   -Rash, hives, itching.   -Swollen, blistered, peeling skin.   -Swelling of face, lips, mouth, tongue or throat.   -Tightness of chest, trouble breathing, swallowing or talking     Call your doctor:  - If IV / injection site gets red, warm, swollen, itchy or leaks fluid or pus.     (Leave dressing on your IV site for at least 2 hours and keep area clean and dry  - If you get sick or have symptoms of infection or are not feeling well for any reason.    (Wash your hands often, stay away from people who are sick)  - If you have side effects from your medication that do not go away or are bothersome.     (Refer to the teaching your nurse gave you for side effects to call your doctor about)    - Common side effects may include:  stuffy nose, headache, feeling tired, muscle aches, upset stomach  - Before receiving any vaccines     - Call [facility] at   If:  - You get sick, are on antibiotics, have had a recent vaccine, have surgery or dental work and your doctor wants your visit rescheduled.  - You need to cancel and reschedule your visit for any reason. Call at least 2 days before your visit if you need to cancel.   - Your insurance changes before your next visit.    (We will need to get approval from your new insurance. This can take up to two weeks.)     [facility] is opened Monday thru Friday. We are closed on weekends and holidays.   Voice mail will take your call if the center is closed. If you leave a message  please allow 24 hours for a call back during weekdays. If you leave a message on a weekend/holiday, we will call you back the next business day.

## 2024-08-16 ENCOUNTER — CLINICAL SUPPORT (OUTPATIENT)
Dept: PREADMISSION TESTING | Facility: HOSPITAL | Age: 64
End: 2024-08-16
Payer: COMMERCIAL

## 2024-08-16 DIAGNOSIS — M96.0 NONUNION OF SUBTALAR ARTHRODESIS: ICD-10-CM

## 2024-08-16 RX ORDER — TRAMADOL HYDROCHLORIDE 50 MG/1
TABLET ORAL EVERY 6 HOURS PRN
COMMUNITY

## 2024-08-16 RX ORDER — DEXTROMETHORPHAN HYDROBROMIDE, GUAIFENESIN 5; 100 MG/5ML; MG/5ML
650 LIQUID ORAL EVERY 8 HOURS PRN
COMMUNITY

## 2024-08-19 ENCOUNTER — PRE-ADMISSION TESTING (OUTPATIENT)
Dept: PREADMISSION TESTING | Facility: HOSPITAL | Age: 64
End: 2024-08-19
Payer: COMMERCIAL

## 2024-08-19 ENCOUNTER — LAB (OUTPATIENT)
Dept: LAB | Facility: LAB | Age: 64
End: 2024-08-19
Payer: COMMERCIAL

## 2024-08-19 VITALS
HEART RATE: 60 BPM | WEIGHT: 211.64 LBS | HEIGHT: 62 IN | DIASTOLIC BLOOD PRESSURE: 83 MMHG | SYSTOLIC BLOOD PRESSURE: 140 MMHG | TEMPERATURE: 97.5 F | BODY MASS INDEX: 38.95 KG/M2 | OXYGEN SATURATION: 98 % | RESPIRATION RATE: 18 BRPM

## 2024-08-19 DIAGNOSIS — E55.9 VITAMIN D DEFICIENCY: ICD-10-CM

## 2024-08-19 DIAGNOSIS — R76.8 POSITIVE ANA (ANTINUCLEAR ANTIBODY): ICD-10-CM

## 2024-08-19 DIAGNOSIS — M05.79 SEROPOSITIVE RHEUMATOID ARTHRITIS OF MULTIPLE JOINTS (MULTI): ICD-10-CM

## 2024-08-19 DIAGNOSIS — E03.9 ACQUIRED HYPOTHYROIDISM: ICD-10-CM

## 2024-08-19 DIAGNOSIS — Z01.818 PREOP EXAMINATION: Primary | ICD-10-CM

## 2024-08-19 LAB
25(OH)D3 SERPL-MCNC: 68 NG/ML (ref 30–100)
ALBUMIN SERPL BCP-MCNC: 4.1 G/DL (ref 3.4–5)
ALP SERPL-CCNC: 62 U/L (ref 33–136)
ALT SERPL W P-5'-P-CCNC: 11 U/L (ref 7–45)
ANION GAP SERPL CALC-SCNC: 11 MMOL/L (ref 10–20)
APPEARANCE UR: CLEAR
AST SERPL W P-5'-P-CCNC: 13 U/L (ref 9–39)
ATRIAL RATE: 72 BPM
BASOPHILS # BLD AUTO: 0.05 X10*3/UL (ref 0–0.1)
BASOPHILS NFR BLD AUTO: 0.9 %
BILIRUB SERPL-MCNC: 0.4 MG/DL (ref 0–1.2)
BILIRUB UR STRIP.AUTO-MCNC: NEGATIVE MG/DL
BUN SERPL-MCNC: 15 MG/DL (ref 6–23)
C3 SERPL-MCNC: 135 MG/DL (ref 87–200)
C4 SERPL-MCNC: 34 MG/DL (ref 10–50)
CALCIUM SERPL-MCNC: 8.8 MG/DL (ref 8.6–10.3)
CHLORIDE SERPL-SCNC: 107 MMOL/L (ref 98–107)
CK SERPL-CCNC: 39 U/L (ref 0–215)
CO2 SERPL-SCNC: 27 MMOL/L (ref 21–32)
COLOR UR: NORMAL
CREAT SERPL-MCNC: 0.73 MG/DL (ref 0.5–1.05)
CRP SERPL-MCNC: 0.17 MG/DL
DSDNA AB SER-ACNC: <1 IU/ML
EGFRCR SERPLBLD CKD-EPI 2021: >90 ML/MIN/1.73M*2
EOSINOPHIL # BLD AUTO: 0.13 X10*3/UL (ref 0–0.7)
EOSINOPHIL NFR BLD AUTO: 2.2 %
ERYTHROCYTE [DISTWIDTH] IN BLOOD BY AUTOMATED COUNT: 12.6 % (ref 11.5–14.5)
ERYTHROCYTE [SEDIMENTATION RATE] IN BLOOD BY WESTERGREN METHOD: 3 MM/H (ref 0–30)
GLUCOSE SERPL-MCNC: 76 MG/DL (ref 74–99)
GLUCOSE UR STRIP.AUTO-MCNC: NORMAL MG/DL
HCT VFR BLD AUTO: 43.1 % (ref 36–46)
HGB BLD-MCNC: 14.4 G/DL (ref 12–16)
IMM GRANULOCYTES # BLD AUTO: 0.02 X10*3/UL (ref 0–0.7)
IMM GRANULOCYTES NFR BLD AUTO: 0.3 % (ref 0–0.9)
KETONES UR STRIP.AUTO-MCNC: NEGATIVE MG/DL
LEUKOCYTE ESTERASE UR QL STRIP.AUTO: NEGATIVE
LYMPHOCYTES # BLD AUTO: 1.25 X10*3/UL (ref 1.2–4.8)
LYMPHOCYTES NFR BLD AUTO: 21.4 %
MCH RBC QN AUTO: 31.4 PG (ref 26–34)
MCHC RBC AUTO-ENTMCNC: 33.4 G/DL (ref 32–36)
MCV RBC AUTO: 94 FL (ref 80–100)
MONOCYTES # BLD AUTO: 0.44 X10*3/UL (ref 0.1–1)
MONOCYTES NFR BLD AUTO: 7.5 %
NEUTROPHILS # BLD AUTO: 3.95 X10*3/UL (ref 1.2–7.7)
NEUTROPHILS NFR BLD AUTO: 67.7 %
NITRITE UR QL STRIP.AUTO: NEGATIVE
NRBC BLD-RTO: 0 /100 WBCS (ref 0–0)
P AXIS: 32 DEGREES
P OFFSET: 189 MS
P ONSET: 129 MS
PH UR STRIP.AUTO: 5 [PH]
PLATELET # BLD AUTO: 244 X10*3/UL (ref 150–450)
POTASSIUM SERPL-SCNC: 4.2 MMOL/L (ref 3.5–5.3)
PR INTERVAL: 156 MS
PROT SERPL-MCNC: 6.5 G/DL (ref 6.4–8.2)
PROT SERPL-MCNC: 6.7 G/DL (ref 6.4–8.2)
PROT UR STRIP.AUTO-MCNC: NEGATIVE MG/DL
PROT UR-ACNC: 10 MG/DL (ref 5–25)
Q ONSET: 207 MS
QRS COUNT: 12 BEATS
QRS DURATION: 92 MS
QT INTERVAL: 418 MS
QTC CALCULATION(BAZETT): 457 MS
QTC FREDERICIA: 444 MS
R AXIS: -24 DEGREES
RBC # BLD AUTO: 4.59 X10*6/UL (ref 4–5.2)
RBC # UR STRIP.AUTO: NEGATIVE /UL
SODIUM SERPL-SCNC: 141 MMOL/L (ref 136–145)
SP GR UR STRIP.AUTO: 1.02
T AXIS: 13 DEGREES
T OFFSET: 416 MS
URATE SERPL-MCNC: 5.5 MG/DL (ref 2.3–6.7)
UROBILINOGEN UR STRIP.AUTO-MCNC: NORMAL MG/DL
VENTRICULAR RATE: 72 BPM
WBC # BLD AUTO: 5.8 X10*3/UL (ref 4.4–11.3)

## 2024-08-19 PROCEDURE — 86160 COMPLEMENT ANTIGEN: CPT

## 2024-08-19 PROCEDURE — 81003 URINALYSIS AUTO W/O SCOPE: CPT

## 2024-08-19 PROCEDURE — 84165 PROTEIN E-PHORESIS SERUM: CPT

## 2024-08-19 PROCEDURE — 84155 ASSAY OF PROTEIN SERUM: CPT

## 2024-08-19 PROCEDURE — 36415 COLL VENOUS BLD VENIPUNCTURE: CPT

## 2024-08-19 PROCEDURE — 99204 OFFICE O/P NEW MOD 45 MIN: CPT | Performed by: NURSE PRACTITIONER

## 2024-08-19 PROCEDURE — 82550 ASSAY OF CK (CPK): CPT

## 2024-08-19 PROCEDURE — 80053 COMPREHEN METABOLIC PANEL: CPT

## 2024-08-19 PROCEDURE — 93010 ELECTROCARDIOGRAM REPORT: CPT | Performed by: INTERNAL MEDICINE

## 2024-08-19 PROCEDURE — 85025 COMPLETE CBC W/AUTO DIFF WBC: CPT

## 2024-08-19 PROCEDURE — 86225 DNA ANTIBODY NATIVE: CPT

## 2024-08-19 PROCEDURE — 85652 RBC SED RATE AUTOMATED: CPT

## 2024-08-19 PROCEDURE — 84550 ASSAY OF BLOOD/URIC ACID: CPT

## 2024-08-19 PROCEDURE — 82306 VITAMIN D 25 HYDROXY: CPT

## 2024-08-19 PROCEDURE — 84156 ASSAY OF PROTEIN URINE: CPT

## 2024-08-19 PROCEDURE — 84166 PROTEIN E-PHORESIS/URINE/CSF: CPT

## 2024-08-19 PROCEDURE — 93005 ELECTROCARDIOGRAM TRACING: CPT | Performed by: NURSE PRACTITIONER

## 2024-08-19 PROCEDURE — 86140 C-REACTIVE PROTEIN: CPT

## 2024-08-19 PROCEDURE — 86334 IMMUNOFIX E-PHORESIS SERUM: CPT

## 2024-08-19 PROCEDURE — 81490 AUTOIMMUNE RA ALYS 12 BMRK: CPT

## 2024-08-19 PROCEDURE — 86335 IMMUNFIX E-PHORSIS/URINE/CSF: CPT

## 2024-08-19 RX ORDER — LEVOTHYROXINE SODIUM 75 UG/1
75 TABLET ORAL DAILY
Qty: 90 TABLET | Refills: 3 | Status: SHIPPED | OUTPATIENT
Start: 2024-08-19

## 2024-08-19 ASSESSMENT — ENCOUNTER SYMPTOMS
ENDOCRINE NEGATIVE: 1
ARTHRALGIAS: 1
NECK NEGATIVE: 1
GASTROINTESTINAL NEGATIVE: 1
NEUROLOGICAL NEGATIVE: 1
CONSTITUTIONAL NEGATIVE: 1
RESPIRATORY NEGATIVE: 1

## 2024-08-19 NOTE — PREPROCEDURE INSTRUCTIONS
Medication List            Accurate as of August 19, 2024  8:41 AM. Always use your most recent med list.                abatacept 750 mg in sodium chloride 0.9% 70 mL IV  Notes to patient: CONTINUE CURRENT SCHEDULE     acetaminophen 650 mg ER tablet  Commonly known as: Tylenol 8 HOUR  Medication Adjustments for Surgery: Take morning of surgery with sip of water, no other fluids     amLODIPine 5 mg tablet  Commonly known as: Norvasc  TAKE 1 TABLET DAILY  Medication Adjustments for Surgery: Take morning of surgery with sip of water, no other fluids     amoxicillin 500 mg capsule  Commonly known as: Amoxil  Medication Adjustments for Surgery: Take morning of surgery with sip of water, no other fluids     aspirin 325 mg tablet  Medication Adjustments for Surgery: Stop 7 days before surgery     azelastine-fluticasone 137-50 mcg/spray nasal spray  Commonly known as: Dymista  Medication Adjustments for Surgery: Take morning of surgery with sip of water, no other fluids     cholecalciferol 50 MCG (2000 UT) tablet  Commonly known as: Vitamin D-3  Medication Adjustments for Surgery: Continue until night before surgery     cloNIDine 0.1 mg tablet  Commonly known as: Catapres  Medication Adjustments for Surgery: Take morning of surgery with sip of water, no other fluids     ergocalciferol 1.25 MG (58074 UT) capsule  Commonly known as: Vitamin D-2  Medication Adjustments for Surgery: Continue until night before surgery     folic acid 1 mg tablet  Commonly known as: Folvite  TAKE 1 TABLET DAILY  Medication Adjustments for Surgery: Continue until night before surgery     levocetirizine 5 mg tablet  Commonly known as: Xyzal  Medication Adjustments for Surgery: Continue until night before surgery     levothyroxine 75 mcg tablet  Commonly known as: Synthroid, Levoxyl  TAKE 1 TABLET DAILY  Medication Adjustments for Surgery: Take morning of surgery with sip of water, no other fluids     Maxalt 10 mg tablet  Generic drug:  rizatriptan  Medication Adjustments for Surgery: Continue until night before surgery     montelukast 10 mg tablet  Commonly known as: Singulair  Medication Adjustments for Surgery: Take morning of surgery with sip of water, no other fluids     MULTIVITAMIN ORAL  Medication Adjustments for Surgery: Stop 7 days before surgery     omeprazole 20 mg DR capsule  Commonly known as: PriLOSEC  TAKE 1 CAPSULE DAILY  Medication Adjustments for Surgery: Take morning of surgery with sip of water, no other fluids     ProAir HFA 90 mcg/actuation inhaler  Generic drug: albuterol  Medication Adjustments for Surgery: Take morning of surgery with sip of water, no other fluids     propranolol LA 80 mg 24 hr capsule  Commonly known as: Inderal LA  TAKE 1 CAPSULE DAILY  Medication Adjustments for Surgery: Take morning of surgery with sip of water, no other fluids     tiZANidine 4 mg tablet  Commonly known as: Zanaflex  TAKE 1 TABLET DAILY AT NIGHT AS NEEDED  Medication Adjustments for Surgery: Continue until night before surgery     traMADol 50 mg tablet  Commonly known as: Ultram  Medication Adjustments for Surgery: Take morning of surgery with sip of water, no other fluids     triamcinolone 0.1 % cream  Commonly known as: Kenalog  Medication Adjustments for Surgery: Continue until night before surgery                        **Concerning above medication instructions, if medication is normally taken at night, continue normal schedule.**  **DO NOT TAKE NIGHT PRIOR AND MORNING OF SURGERY**    CONTACT SURGEON'S OFFICE IF YOU DEVELOP:  * Fever = 100.4 F   * New respiratory symptoms (e.g. cough, shortness of breath, respiratory distress, sore throat)  * Recent loss of taste or smell  *Flu like symptoms such as headache, fatigue or gastrointestinal symptoms  * You develop any open sores, shingles, burning or painful urination   AND/OR:  * You no longer wish to have the surgery.  * Any other personal circumstances change that may lead to the  need to cancel or defer this surgery.  *You were admitted to any hospital within one week of your planned procedure.    SMOKING:  *Quitting smoking can make a huge difference to your health and recovery from surgery.    *If you need help with quitting, call 0-690-QUIT-NOW.    THE DAY BEFORE SURGERY:  *Do not eat any food after midnight the night before surgery.   *You are permitted to drink clear liquids (i.e. water, black coffee (no milk or cream), tea, apple juice and electrolyte drinks (gatorade)) up to 13.5 ounces, up to 2 hours before your arrival time.  *You may chew gum until 2 hours before your surgery    SURGICAL TIME  *You will be contacted between 2 p.m. and 6 p.m. the business day before your surgery with your arrival time.  *If you haven't received a call by 6pm, call [phone].  *Scheduled surgery times may change and you will be notified if this occurs-check your personal voicemail for any updates.    ON THE MORNING OF SURGERY:  *Wear comfortable, loose fitting clothing.   *Do not use moisturizers, creams, lotions or perfume.  *All jewelry and valuables should be left at home.  *Prosthetic devices such as contact lenses, hearing aids, dentures, eyelash extensions, hairpins and body piercing must be removed before surgery.    BRING WITH YOU:  *Photo ID and insurance card  *Current list of medicines and allergies  *Pacemaker/Defibrillator/Heart stent cards  *CPAP machine and mask  *Slings/splints/crutches  *Copy of your complete Advanced Directive/DHPOA-if applicable  *Neurostimulator implant remote    PARKING AND ARRIVAL:  *Check in at the Main Entrance desk and let them know you are here for surgery.  *You will be directed to the 2nd floor surgical waiting area.    AFTER OUTPATIENT SURGERY:  *A responsible adult MUST accompany you at the time of discharge and stay with you for 24 hours after your surgery.  *You may NOT drive yourself home after surgery.  *You may use a taxi or ride sharing service  (Lyft, Uber) to return home ONLY if you are accompanied by a friend or family member.  *Instructions for resuming your medications will be provided by your surgeon.

## 2024-08-19 NOTE — CPM/PAT H&P
[facility] Evaluation       Name: Maureen Vela (Maureen Vela)  /Age: 1960/64 y.o.     In-Person           HPI        Date of Consult: 24    Referring Provider: Dr. Stafford    Surgery, Date, and Length: left Foot Subtalar Hardware Removal - left  left Foot Bone Biopsy - left, 24    Maureen Del Rio is a  year-old female who presents to the [facility] for perioperative risk assessment prior to surgery.    Patient presents with a primary diagnosis of left foot pain. Patient has previously undergone 3 surgeries with an [facility] podiatrist. The first was a tendon transfer of which the details are unclear at this time, the second was a left subtalar joint arthrodesis from 2023 followed by a revision subtalar joint arthrodesis from 2024. The patient continues with severe pain that is activity dependent. Typically rated as 5-9 out of 10. She has been ambulating with use of a boot and crutch. By report, the patient's previous provider has stopped doing surgery due to a medical condition. She was quite happy with her previous practice, however sought an orthopedic surgery perspective. Prior to her index surgery she reported pain on the outside of her foot that radiated to the top. This is yet unresolved. She is reporting a burning pain on the lateral side of her foot, this is unchanged from her previous operations. Patient also notes discomfort when walking on the back of her heel from a prominent screw head.     This note was created in part upon personal review of patient's medical records.      Patient is scheduled to have left Foot Subtalar Hardware Removal - left  left Foot Bone Biopsy - left      Pt denies any past history of anesthetic complications such as awareness, prolonged sedation, dental damage, aspiration, cardiac arrest, difficult intubation, difficult I.V. access or unexpected hospital admissions.  NO malignant hyperthermia and or pseudocholinesterase deficiency.  No history of blood  transfusions     PONV-2: only had it one time after her left knee replacement    STOP BANG 2    The patient is not a [employer] and will accept blood and blood products if medically indicated.   Type and screen not sent.     Past Medical History:   Diagnosis Date    AB (asthmatic bronchitis) ([facility])     Acquired immunocompromised state ([facility])     Autoimmune RA, On Orencia every 28 days, followed by rheumatology    Actinic keratoses     Allergic rhinitis     Asthma ([facility])     only used inhaler 4-5 times this entire year, triggers are seasonal allergies    Bilateral sacroiliitis ([facility])     Chronic diarrhea     Chronic pain     Chronic sinusitis     DDD (degenerative disc disease), cervical     C5,6 BULGE    DDD (degenerative disc disease), lumbar     L4,5 BULGE    DJD (degenerative joint disease)     right ankle and foot    ETD (eustachian tube dysfunction)     GERD (gastroesophageal reflux disease)     HTN (hypertension)     Hyperlipidemia     Hypothyroidism     Joint pain     Lateral epicondylitis of elbow     Low back pain     hronic, folowed by pain management    Lumbosacral disc disease     Migraine     Neuropathy     left foot N/T and hands bilateral    Osteoarthritis     Osteopenia     PONV (postoperative nausea and vomiting)     PTTD (posterior tibial tendon dysfunction)     left lower extremity    Seronegative rheumatoid arthritis ([facility])     Spondylosis of 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     Vitamin D deficiency        Past Surgical History:   Procedure Laterality Date     SECTION, LOW TRANSVERSE      x3    CHOLECYSTECTOMY  2017    LAPAROSCOPIC    COLONOSCOPY  10/16/2018    CT ANGIO CORONARY ARTERIES-W/QUANT EVAL CORONARY CALCIUM  2023    Score=0    CYST REMOVAL Left     LEFT LEG    CYST REMOVAL Left 2015    LEFT EYE (CONJUVIAL RETENTION CYSTS)    ENDOMETRIAL ABLATION  2012    EPIDURAL BLOCK INJECTION      EYE SURGERY Left 2016    LEFT EYELID     FINGER SURGERY Left 12/23/2015    PIN IN THUMB    PAT SUBTALAR ARTHRODESIS      HAND ARTHROPLASTY Left 04/22/2015    LEFT HAND [facility] LRTI    INJECTION  12/03/2014    CORTISONE- LEFT THUMB    INJECTION Bilateral 10/25/2017    ONE IN EACH KNEE    JOINT REPLACEMENT Bilateral     bilateral TKR    KNEE CARTILAGE SURGERY Right 02/27/2018    TORN MENISCUS    KNEE SURGERY Right 07/2018    KNEE ARTHROSCOPY    KNEE SURGERY  01/31/2019    SCOPE OF LEFT KNEE    LAMINECTOMY  1996    LUMBAR    LARYNX SURGERY  09/2014    MEDIALIZATION    LIPOMA RESECTION  2008    BACK    OTHER SURGICAL HISTORY  12/2013    LUMP REMOVAL RIGHT SIDE-lipoma waistline    OTHER SURGICAL HISTORY      LEFT FOOT TENDON TRANSFER 2022, SUBTALLAR JOINT FUSION 06/28/2023, REVISION OF FAILED JOINT FUSION 02/2024    THYROIDECTOMY Right 07/2014    RIGHT LOBE    TOTAL ABDOMINAL HYSTERECTOMY W/ BILATERAL SALPINGOOPHORECTOMY  08/10/2017    TOTAL KNEE ARTHROPLASTY Right 06/18/2019    TUBAL LIGATION         Social History     Tobacco Use    Smoking status: Never    Smokeless tobacco: Never   Vaping Use    Vaping status: Never Used   Substance Use Topics    Alcohol use: Not Currently     Comment: RARELY    Drug use: Never        Family History   Problem Relation Name Age of Onset    Arthritis Mother Mom     Alzheimer's disease Father Dad     Breast cancer Sister Sima 42    Alzheimer's disease Sister Joselyn     Stroke Maternal Grandfather Grandpa     Breast cancer Mother's Sister  63       Allergies   Allergen Reactions    Oxycodone-Acetaminophen Nausea/vomiting    Sulfa (Sulfonamide Antibiotics) Headache     Hypertension    Sulfamethoxazole-Trimethoprim Other, Rash and Unknown    Balsam [place] Hives    Clarithromycin Itching, Rash and Unknown    Hydroxychloroquine Rash    Methotrexate Hives and Rash       Current Outpatient Medications:     abatacept 750 mg in sodium chloride 0.9% 70 mL IV, Infuse 750 mg into a venous catheter every 28 (twenty-eight) days., Disp: ,  Rfl:     albuterol (ProAir HFA) 90 mcg/actuation inhaler, Inhale 2 puffs every 4 hours if needed., Disp: , Rfl:     amLODIPine (Norvasc) 5 mg tablet, TAKE 1 TABLET DAILY, Disp: 90 tablet, Rfl: 3    amoxicillin (Amoxil) 500 mg capsule, Take 4 capsules (2,000 mg) by mouth. 1 HOUR BEFORE DENTIST APPT, Disp: , Rfl:     aspirin 325 mg tablet, Take 1 tablet (325 mg) by mouth once daily., Disp: , Rfl:     azelastine-fluticasone 137-50 mcg/spray spray,non-aerosol, Administer 1 spray into each nostril 2 times a day., Disp: , Rfl:     cholecalciferol (Vitamin D-3) 50 MCG (2000 UT) tablet, Take 1 tablet (2,000 Units) by mouth once daily., Disp: , Rfl:     cloNIDine (Catapres) 0.1 mg tablet, Take 1 tablet (0.1 mg) by mouth once daily as needed (FOR BLOOD PRESSURE GREATER 140/90)., Disp: , Rfl:     ergocalciferol (Vitamin D-2) 1.25 MG (12101 UT) capsule, Take 1 capsule (1,250 mcg) by mouth 1 (one) time per week., Disp: , Rfl:     folic acid (Folvite) 1 mg tablet, TAKE 1 TABLET DAILY, Disp: 90 tablet, Rfl: 3    levocetirizine (Xyzal) 5 mg tablet, Take 1 tablet (5 mg) by mouth once daily in the evening., Disp: , Rfl:     levothyroxine (Synthroid, Levoxyl) 75 mcg tablet, TAKE 1 TABLET DAILY, Disp: 90 tablet, Rfl: 1    montelukast (Singulair) 10 mg tablet, , Disp: , Rfl:     MULTIVITAMIN ORAL, Take 1 tablet by mouth once daily., Disp: , Rfl:     omeprazole (PriLOSEC) 20 mg DR capsule, TAKE 1 CAPSULE DAILY, Disp: 90 capsule, Rfl: 3    propranolol LA (Inderal LA) 80 mg 24 hr capsule, TAKE 1 CAPSULE DAILY, Disp: 90 capsule, Rfl: 3    tiZANidine (Zanaflex) 4 mg tablet, TAKE 1 TABLET DAILY AT NIGHT AS NEEDED, Disp: 90 tablet, Rfl: 3    traMADol (Ultram) 50 mg tablet, Take by mouth every 6 hours if needed for severe pain (7 - 10)., Disp: , Rfl:     acetaminophen (Tylenol 8 HOUR) 650 mg ER tablet, Take 1 tablet (650 mg) by mouth every 8 hours if needed for mild pain (1 - 3). Do not crush, chew, or split., Disp: , Rfl:     rizatriptan  "(Maxalt) 10 mg tablet, Take 1 tablet (10 mg) by mouth once daily as needed., Disp: , Rfl:     triamcinolone (Kenalog) 0.1 % cream, Apply 1 Application topically 2 times a week., Disp: , Rfl:         PAT ROS:   Constitutional:   neg    Neuro/Psych:   neg    Eyes:    WEARS GLASSES  Ears:   neg    Nose:   neg    Mouth:   neg    Throat:   neg    Neck:   neg    Cardio:    FUNCTIONAL 4 METS. DENIES SOB WALKING FROM [facility] TO PARKING LOT  Respiratory:   neg    Endocrine:   neg    GI:   neg    :   neg    Musculoskeletal:    arthralgias (LEFT FOOT)  Hematologic:   neg    Skin:  neg        Physical Exam  Vitals reviewed. Physical exam within normal limits.  (LEFT CALF BOOT. uses cane)         PAT AIRWAY:   Airway:     Mallampati::  II    Neck ROM::  Full   RIGHT LOWER SIDE: WAITING ON IMPLANT      Heart Rate:  [60]   Temp:  [36.4 °C (97.5 °F)]   Resp:  [18]   BP: (140)/(83)   Height:  [157.5 cm (5' 2\")]   Weight:  [96 kg (211 lb 10.3 oz)]   SpO2:  [98 %]      EKG IN [facility] 8/19/24  NSR  NONSPECIFIC T WAVE ABNORMALITY  ABNORMAL EKG  HR 72 BPM      Lab Results   Component Value Date    WBC 5.5 06/19/2024    HGB 14.4 06/19/2024    HCT 44.3 06/19/2024    MCV 94 06/19/2024     06/19/2024      Lab Results   Component Value Date    GLUCOSE 76 08/19/2024    CALCIUM 8.8 08/19/2024     08/19/2024    K 4.2 08/19/2024    CO2 27 08/19/2024     08/19/2024    BUN 15 08/19/2024    CREATININE 0.73 08/19/2024        Assessment and Plan:         Patient is a 64-year-old female scheduled for a with Dr. Stafford  on 8/26/24.  Patient has no active cardiac symptoms.   Patient denies any chest pain, tightness, heaviness, pressure, radiating pain, palpitations, irregular heartbeats, lightheadedness, cough, congestion, shortness of breath, ROWE, PND, near syncope, weight loss or gain.     RCRI  1  , 6 % Risk of MACE    Cardiology:  -- History of HTN: EKG, BMP ordered    Pulmonology:  -- History of asthma. Recommend to take prescribe " inhalers on DOS    Hematology:  Patient instructed to ambulate as soon as possible postoperatively to decrease thromboembolic risk.   Initiate mechanical DVT prophylaxis as soon as possible and initiate chemical prophylaxis when deemed safe from a bleeding standpoint post surgery.     Caprini: 6    Rheumatology:  --History of RA on abatacept. Last dose 8/13/24. Next Dose in a month. Continue current medication regimen    Risk assessment complete.  Patient is scheduled for a low surgical risk procedure.        Preoperative medication instructions were provided and reviewed with the patient.  Any additional testing or evaluation was explained to the patient.  Nothing by mouth instructions were discussed and patient's questions were answered prior to conclusion to this encounter.  Patient verbalized understanding of preoperative instructions given in preadmission testing; discharge instructions available in EMR.    This note was dictated by a speech recognition.  Minor errors may have been detected in a speech recognition.

## 2024-08-19 NOTE — H&P (VIEW-ONLY)
[facility] Evaluation       Name: Maureen Vela (Maureen Vela)  /Age: 1960/64 y.o.     In-Person           HPI        Date of Consult: 24    Referring Provider: Dr. Stafford    Surgery, Date, and Length: left Foot Subtalar Hardware Removal - left  left Foot Bone Biopsy - left, 24    Maureen Del Rio is a  year-old female who presents to the [facility] for perioperative risk assessment prior to surgery.    Patient presents with a primary diagnosis of left foot pain. Patient has previously undergone 3 surgeries with an [facility] podiatrist. The first was a tendon transfer of which the details are unclear at this time, the second was a left subtalar joint arthrodesis from 2023 followed by a revision subtalar joint arthrodesis from 2024. The patient continues with severe pain that is activity dependent. Typically rated as 5-9 out of 10. She has been ambulating with use of a boot and crutch. By report, the patient's previous provider has stopped doing surgery due to a medical condition. She was quite happy with her previous practice, however sought an orthopedic surgery perspective. Prior to her index surgery she reported pain on the outside of her foot that radiated to the top. This is yet unresolved. She is reporting a burning pain on the lateral side of her foot, this is unchanged from her previous operations. Patient also notes discomfort when walking on the back of her heel from a prominent screw head.     This note was created in part upon personal review of patient's medical records.      Patient is scheduled to have left Foot Subtalar Hardware Removal - left  left Foot Bone Biopsy - left      Pt denies any past history of anesthetic complications such as awareness, prolonged sedation, dental damage, aspiration, cardiac arrest, difficult intubation, difficult I.V. access or unexpected hospital admissions.  NO malignant hyperthermia and or pseudocholinesterase deficiency.  No history of blood  transfusions     PONV-2: only had it one time after her left knee replacement    STOP BANG 2    The patient is not a Scientologist and will accept blood and blood products if medically indicated.   Type and screen not sent.     Past Medical History:   Diagnosis Date    AB (asthmatic bronchitis) ([facility])     Acquired immunocompromised state ([facility])     Autoimmune RA, On Orencia every 28 days, followed by rheumatology    Actinic keratoses     Allergic rhinitis     Asthma ([facility])     only used inhaler 4-5 times this entire year, triggers are seasonal allergies    Bilateral sacroiliitis ([facility])     Chronic diarrhea     Chronic pain     Chronic sinusitis     DDD (degenerative disc disease), cervical     C5,6 BULGE    DDD (degenerative disc disease), lumbar     L4,5 BULGE    DJD (degenerative joint disease)     right ankle and foot    ETD (eustachian tube dysfunction)     GERD (gastroesophageal reflux disease)     HTN (hypertension)     Hyperlipidemia     Hypothyroidism     Joint pain     Lateral epicondylitis of elbow     Low back pain     hronic, folowed by pain management    Lumbosacral disc disease     Migraine     Neuropathy     left foot N/T and hands bilateral    Osteoarthritis     Osteopenia     PONV (postoperative nausea and vomiting)     PTTD (posterior tibial tendon dysfunction)     left lower extremity    Seronegative rheumatoid arthritis ([facility])     Spondylosis of 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     Vitamin D deficiency        Past Surgical History:   Procedure Laterality Date     SECTION, LOW TRANSVERSE      x3    CHOLECYSTECTOMY  2017    LAPAROSCOPIC    COLONOSCOPY  10/16/2018    CT ANGIO CORONARY ARTERIES-W/QUANT EVAL CORONARY CALCIUM  2023    Score=0    CYST REMOVAL Left     LEFT LEG    CYST REMOVAL Left 2015    LEFT EYE (CONJUVIAL RETENTION CYSTS)    ENDOMETRIAL ABLATION  2012    EPIDURAL BLOCK INJECTION      EYE SURGERY Left 2016    LEFT EYELID     FINGER SURGERY Left 12/23/2015    PIN IN THUMB    PAT SUBTALAR ARTHRODESIS      HAND ARTHROPLASTY Left 04/22/2015    LEFT HAND [facility] LRTI    INJECTION  12/03/2014    CORTISONE- LEFT THUMB    INJECTION Bilateral 10/25/2017    ONE IN EACH KNEE    JOINT REPLACEMENT Bilateral     bilateral TKR    KNEE CARTILAGE SURGERY Right 02/27/2018    TORN MENISCUS    KNEE SURGERY Right 07/2018    KNEE ARTHROSCOPY    KNEE SURGERY  01/31/2019    SCOPE OF LEFT KNEE    LAMINECTOMY  1996    LUMBAR    LARYNX SURGERY  09/2014    MEDIALIZATION    LIPOMA RESECTION  2008    BACK    OTHER SURGICAL HISTORY  12/2013    LUMP REMOVAL RIGHT SIDE-lipoma waistline    OTHER SURGICAL HISTORY      LEFT FOOT TENDON TRANSFER 2022, SUBTALLAR JOINT FUSION 06/28/2023, REVISION OF FAILED JOINT FUSION 02/2024    THYROIDECTOMY Right 07/2014    RIGHT LOBE    TOTAL ABDOMINAL HYSTERECTOMY W/ BILATERAL SALPINGOOPHORECTOMY  08/10/2017    TOTAL KNEE ARTHROPLASTY Right 06/18/2019    TUBAL LIGATION         Social History     Tobacco Use    Smoking status: Never    Smokeless tobacco: Never   Vaping Use    Vaping status: Never Used   Substance Use Topics    Alcohol use: Not Currently     Comment: RARELY    Drug use: Never        Family History   Problem Relation Name Age of Onset    Arthritis Mother Mom     Alzheimer's disease Father Dad     Breast cancer Sister Sima 42    Alzheimer's disease Sister Joselyn     Stroke Maternal Grandfather Grandpa     Breast cancer Mother's Sister  63       Allergies   Allergen Reactions    Oxycodone-Acetaminophen Nausea/vomiting    Sulfa (Sulfonamide Antibiotics) Headache     Hypertension    Sulfamethoxazole-Trimethoprim Other, Rash and Unknown    Balsam [place] Hives    Clarithromycin Itching, Rash and Unknown    Hydroxychloroquine Rash    Methotrexate Hives and Rash       Current Outpatient Medications:     abatacept 750 mg in sodium chloride 0.9% 70 mL IV, Infuse 750 mg into a venous catheter every 28 (twenty-eight) days., Disp: ,  Rfl:     albuterol (ProAir HFA) 90 mcg/actuation inhaler, Inhale 2 puffs every 4 hours if needed., Disp: , Rfl:     amLODIPine (Norvasc) 5 mg tablet, TAKE 1 TABLET DAILY, Disp: 90 tablet, Rfl: 3    amoxicillin (Amoxil) 500 mg capsule, Take 4 capsules (2,000 mg) by mouth. 1 HOUR BEFORE DENTIST APPT, Disp: , Rfl:     aspirin 325 mg tablet, Take 1 tablet (325 mg) by mouth once daily., Disp: , Rfl:     azelastine-fluticasone 137-50 mcg/spray spray,non-aerosol, Administer 1 spray into each nostril 2 times a day., Disp: , Rfl:     cholecalciferol (Vitamin D-3) 50 MCG (2000 UT) tablet, Take 1 tablet (2,000 Units) by mouth once daily., Disp: , Rfl:     cloNIDine (Catapres) 0.1 mg tablet, Take 1 tablet (0.1 mg) by mouth once daily as needed (FOR BLOOD PRESSURE GREATER 140/90)., Disp: , Rfl:     ergocalciferol (Vitamin D-2) 1.25 MG (09262 UT) capsule, Take 1 capsule (1,250 mcg) by mouth 1 (one) time per week., Disp: , Rfl:     folic acid (Folvite) 1 mg tablet, TAKE 1 TABLET DAILY, Disp: 90 tablet, Rfl: 3    levocetirizine (Xyzal) 5 mg tablet, Take 1 tablet (5 mg) by mouth once daily in the evening., Disp: , Rfl:     levothyroxine (Synthroid, Levoxyl) 75 mcg tablet, TAKE 1 TABLET DAILY, Disp: 90 tablet, Rfl: 1    montelukast (Singulair) 10 mg tablet, , Disp: , Rfl:     MULTIVITAMIN ORAL, Take 1 tablet by mouth once daily., Disp: , Rfl:     omeprazole (PriLOSEC) 20 mg DR capsule, TAKE 1 CAPSULE DAILY, Disp: 90 capsule, Rfl: 3    propranolol LA (Inderal LA) 80 mg 24 hr capsule, TAKE 1 CAPSULE DAILY, Disp: 90 capsule, Rfl: 3    tiZANidine (Zanaflex) 4 mg tablet, TAKE 1 TABLET DAILY AT NIGHT AS NEEDED, Disp: 90 tablet, Rfl: 3    traMADol (Ultram) 50 mg tablet, Take by mouth every 6 hours if needed for severe pain (7 - 10)., Disp: , Rfl:     acetaminophen (Tylenol 8 HOUR) 650 mg ER tablet, Take 1 tablet (650 mg) by mouth every 8 hours if needed for mild pain (1 - 3). Do not crush, chew, or split., Disp: , Rfl:     rizatriptan  "(Maxalt) 10 mg tablet, Take 1 tablet (10 mg) by mouth once daily as needed., Disp: , Rfl:     triamcinolone (Kenalog) 0.1 % cream, Apply 1 Application topically 2 times a week., Disp: , Rfl:         PAT ROS:   Constitutional:   neg    Neuro/Psych:   neg    Eyes:    WEARS GLASSES  Ears:   neg    Nose:   neg    Mouth:   neg    Throat:   neg    Neck:   neg    Cardio:    FUNCTIONAL 4 METS. DENIES SOB WALKING FROM [facility] TO PARKING LOT  Respiratory:   neg    Endocrine:   neg    GI:   neg    :   neg    Musculoskeletal:    arthralgias (LEFT FOOT)  Hematologic:   neg    Skin:  neg        Physical Exam  Vitals reviewed. Physical exam within normal limits.  (LEFT CALF BOOT. uses cane)         PAT AIRWAY:   Airway:     Mallampati::  II    Neck ROM::  Full   RIGHT LOWER SIDE: WAITING ON IMPLANT      Heart Rate:  [60]   Temp:  [36.4 °C (97.5 °F)]   Resp:  [18]   BP: (140)/(83)   Height:  [157.5 cm (5' 2\")]   Weight:  [96 kg (211 lb 10.3 oz)]   SpO2:  [98 %]      EKG IN [facility] 8/19/24  NSR  NONSPECIFIC T WAVE ABNORMALITY  ABNORMAL EKG  HR 72 BPM      Lab Results   Component Value Date    WBC 5.5 06/19/2024    HGB 14.4 06/19/2024    HCT 44.3 06/19/2024    MCV 94 06/19/2024     06/19/2024      Lab Results   Component Value Date    GLUCOSE 76 08/19/2024    CALCIUM 8.8 08/19/2024     08/19/2024    K 4.2 08/19/2024    CO2 27 08/19/2024     08/19/2024    BUN 15 08/19/2024    CREATININE 0.73 08/19/2024        Assessment and Plan:         Patient is a 64-year-old female scheduled for a with Dr. Stafford  on 8/26/24.  Patient has no active cardiac symptoms.   Patient denies any chest pain, tightness, heaviness, pressure, radiating pain, palpitations, irregular heartbeats, lightheadedness, cough, congestion, shortness of breath, ROWE, PND, near syncope, weight loss or gain.     RCRI  1  , 6 % Risk of MACE    Cardiology:  -- History of HTN: EKG, BMP ordered    Pulmonology:  -- History of asthma. Recommend to take prescribe " inhalers on DOS    Hematology:  Patient instructed to ambulate as soon as possible postoperatively to decrease thromboembolic risk.   Initiate mechanical DVT prophylaxis as soon as possible and initiate chemical prophylaxis when deemed safe from a bleeding standpoint post surgery.     Caprini: 6    Rheumatology:  --History of RA on abatacept. Last dose 8/13/24. Next Dose in a month. Continue current medication regimen    Risk assessment complete.  Patient is scheduled for a low surgical risk procedure.        Preoperative medication instructions were provided and reviewed with the patient.  Any additional testing or evaluation was explained to the patient.  Nothing by mouth instructions were discussed and patient's questions were answered prior to conclusion to this encounter.  Patient verbalized understanding of preoperative instructions given in preadmission testing; discharge instructions available in EMR.    This note was dictated by a speech recognition.  Minor errors may have been detected in a speech recognition.

## 2024-08-23 LAB
ALBUMIN MFR UR ELPH: 30.6 %
ALBUMIN: 4.2 G/DL (ref 3.4–5)
ALPHA 1 GLOBULIN: 0.3 G/DL (ref 0.2–0.6)
ALPHA 2 GLOBULIN: 0.7 G/DL (ref 0.4–1.1)
ALPHA1 GLOB MFR UR ELPH: 7.5 %
ALPHA2 GLOB MFR UR ELPH: 23.3 %
B-GLOBULIN MFR UR ELPH: 19.3 %
BETA GLOBULIN: 0.8 G/DL (ref 0.5–1.2)
GAMMA GLOB MFR UR ELPH: 19.3 %
GAMMA GLOBULIN: 0.8 G/DL (ref 0.5–1.4)
IMMUNOFIXATION COMMENT: NORMAL
IMMUNOFIXATION COMMENT: NORMAL
PATH REVIEW - SERUM IMMUNOFIXATION: NORMAL
PATH REVIEW - URINE IMMUNOFIXATION: NORMAL
PATH REVIEW-SERUM PROTEIN ELECTROPHORESIS: NORMAL
PATH REVIEW-URINE PROTEIN ELECTROPHORESIS: NORMAL
PROTEIN ELECTROPHORESIS COMMENT: NORMAL
URINE ELECTROPHORESIS COMMENT: NORMAL

## 2024-08-24 ENCOUNTER — ANESTHESIA EVENT (OUTPATIENT)
Dept: OPERATING ROOM | Facility: HOSPITAL | Age: 64
End: 2024-08-24
Payer: COMMERCIAL

## 2024-08-26 ENCOUNTER — APPOINTMENT (OUTPATIENT)
Dept: RADIOLOGY | Facility: HOSPITAL | Age: 64
End: 2024-08-26
Payer: COMMERCIAL

## 2024-08-26 ENCOUNTER — HOSPITAL ENCOUNTER (OUTPATIENT)
Facility: HOSPITAL | Age: 64
Setting detail: OUTPATIENT SURGERY
Discharge: HOME | End: 2024-08-26
Attending: STUDENT IN AN ORGANIZED HEALTH CARE EDUCATION/TRAINING PROGRAM | Admitting: STUDENT IN AN ORGANIZED HEALTH CARE EDUCATION/TRAINING PROGRAM
Payer: COMMERCIAL

## 2024-08-26 ENCOUNTER — ANESTHESIA (OUTPATIENT)
Dept: OPERATING ROOM | Facility: HOSPITAL | Age: 64
End: 2024-08-26
Payer: COMMERCIAL

## 2024-08-26 ENCOUNTER — PHARMACY VISIT (OUTPATIENT)
Dept: PHARMACY | Facility: CLINIC | Age: 64
End: 2024-08-26
Payer: COMMERCIAL

## 2024-08-26 VITALS
SYSTOLIC BLOOD PRESSURE: 121 MMHG | HEIGHT: 62 IN | WEIGHT: 218.03 LBS | DIASTOLIC BLOOD PRESSURE: 68 MMHG | RESPIRATION RATE: 16 BRPM | HEART RATE: 63 BPM | BODY MASS INDEX: 40.12 KG/M2 | OXYGEN SATURATION: 97 % | TEMPERATURE: 97.2 F

## 2024-08-26 DIAGNOSIS — G89.29 CHRONIC PAIN OF LEFT ANKLE: ICD-10-CM

## 2024-08-26 DIAGNOSIS — M96.0 NONUNION OF SUBTALAR ARTHRODESIS: Primary | ICD-10-CM

## 2024-08-26 DIAGNOSIS — M25.879 IMPINGEMENT OF ANTERIOR PART OF ANKLE: ICD-10-CM

## 2024-08-26 DIAGNOSIS — T84.84XA PAINFUL ORTHOPAEDIC HARDWARE (CMS-HCC): ICD-10-CM

## 2024-08-26 DIAGNOSIS — M25.572 CHRONIC PAIN OF LEFT ANKLE: ICD-10-CM

## 2024-08-26 DIAGNOSIS — M25.579 PAIN IN JOINT INVOLVING ANKLE AND FOOT: ICD-10-CM

## 2024-08-26 PROCEDURE — 2500000005 HC RX 250 GENERAL PHARMACY W/O HCPCS: Performed by: ANESTHESIOLOGIST ASSISTANT

## 2024-08-26 PROCEDURE — 20680 REMOVAL OF IMPLANT DEEP: CPT | Performed by: STUDENT IN AN ORGANIZED HEALTH CARE EDUCATION/TRAINING PROGRAM

## 2024-08-26 PROCEDURE — 7100000009 HC PHASE TWO TIME - INITIAL BASE CHARGE: Performed by: STUDENT IN AN ORGANIZED HEALTH CARE EDUCATION/TRAINING PROGRAM

## 2024-08-26 PROCEDURE — 20245 BONE BIOPSY OPEN DEEP: CPT | Performed by: STUDENT IN AN ORGANIZED HEALTH CARE EDUCATION/TRAINING PROGRAM

## 2024-08-26 PROCEDURE — 88304 TISSUE EXAM BY PATHOLOGIST: CPT | Performed by: PATHOLOGY

## 2024-08-26 PROCEDURE — 7100000002 HC RECOVERY ROOM TIME - EACH INCREMENTAL 1 MINUTE: Performed by: STUDENT IN AN ORGANIZED HEALTH CARE EDUCATION/TRAINING PROGRAM

## 2024-08-26 PROCEDURE — 2500000004 HC RX 250 GENERAL PHARMACY W/ HCPCS (ALT 636 FOR OP/ED): Performed by: ANESTHESIOLOGY

## 2024-08-26 PROCEDURE — 7100000010 HC PHASE TWO TIME - EACH INCREMENTAL 1 MINUTE: Performed by: STUDENT IN AN ORGANIZED HEALTH CARE EDUCATION/TRAINING PROGRAM

## 2024-08-26 PROCEDURE — 2500000005 HC RX 250 GENERAL PHARMACY W/O HCPCS: Performed by: STUDENT IN AN ORGANIZED HEALTH CARE EDUCATION/TRAINING PROGRAM

## 2024-08-26 PROCEDURE — A20680 PR REMOVAL DEEP IMPLANT: Performed by: ANESTHESIOLOGY

## 2024-08-26 PROCEDURE — 87070 CULTURE OTHR SPECIMN AEROBIC: CPT | Mod: AHULAB | Performed by: STUDENT IN AN ORGANIZED HEALTH CARE EDUCATION/TRAINING PROGRAM

## 2024-08-26 PROCEDURE — 3700000001 HC GENERAL ANESTHESIA TIME - INITIAL BASE CHARGE: Performed by: STUDENT IN AN ORGANIZED HEALTH CARE EDUCATION/TRAINING PROGRAM

## 2024-08-26 PROCEDURE — 7100000001 HC RECOVERY ROOM TIME - INITIAL BASE CHARGE: Performed by: STUDENT IN AN ORGANIZED HEALTH CARE EDUCATION/TRAINING PROGRAM

## 2024-08-26 PROCEDURE — A20680 PR REMOVAL DEEP IMPLANT: Performed by: ANESTHESIOLOGIST ASSISTANT

## 2024-08-26 PROCEDURE — 3700000002 HC GENERAL ANESTHESIA TIME - EACH INCREMENTAL 1 MINUTE: Performed by: STUDENT IN AN ORGANIZED HEALTH CARE EDUCATION/TRAINING PROGRAM

## 2024-08-26 PROCEDURE — 87205 SMEAR GRAM STAIN: CPT | Mod: AHULAB | Performed by: STUDENT IN AN ORGANIZED HEALTH CARE EDUCATION/TRAINING PROGRAM

## 2024-08-26 PROCEDURE — 2720000007 HC OR 272 NO HCPCS: Performed by: STUDENT IN AN ORGANIZED HEALTH CARE EDUCATION/TRAINING PROGRAM

## 2024-08-26 PROCEDURE — 76000 FLUOROSCOPY <1 HR PHYS/QHP: CPT | Mod: LT

## 2024-08-26 PROCEDURE — 87102 FUNGUS ISOLATION CULTURE: CPT | Mod: AHULAB | Performed by: STUDENT IN AN ORGANIZED HEALTH CARE EDUCATION/TRAINING PROGRAM

## 2024-08-26 PROCEDURE — RXMED WILLOW AMBULATORY MEDICATION CHARGE

## 2024-08-26 PROCEDURE — 88304 TISSUE EXAM BY PATHOLOGIST: CPT | Mod: TC,AHULAB | Performed by: STUDENT IN AN ORGANIZED HEALTH CARE EDUCATION/TRAINING PROGRAM

## 2024-08-26 PROCEDURE — 3600000009 HC OR TIME - EACH INCREMENTAL 1 MINUTE - PROCEDURE LEVEL FOUR: Performed by: STUDENT IN AN ORGANIZED HEALTH CARE EDUCATION/TRAINING PROGRAM

## 2024-08-26 PROCEDURE — 2500000004 HC RX 250 GENERAL PHARMACY W/ HCPCS (ALT 636 FOR OP/ED): Performed by: ANESTHESIOLOGIST ASSISTANT

## 2024-08-26 PROCEDURE — 3600000004 HC OR TIME - INITIAL BASE CHARGE - PROCEDURE LEVEL FOUR: Performed by: STUDENT IN AN ORGANIZED HEALTH CARE EDUCATION/TRAINING PROGRAM

## 2024-08-26 DEVICE — IMPLANTABLE DEVICE: Type: IMPLANTABLE DEVICE | Site: FOOT | Status: FUNCTIONAL

## 2024-08-26 RX ORDER — SENNOSIDES 8.6 MG/1
1 TABLET ORAL DAILY PRN
Qty: 15 TABLET | Refills: 0 | Status: SHIPPED | OUTPATIENT
Start: 2024-08-26 | End: 2024-09-10

## 2024-08-26 RX ORDER — BUPIVACAINE HYDROCHLORIDE 2.5 MG/ML
INJECTION, SOLUTION EPIDURAL; INFILTRATION; INTRACAUDAL
Status: DISPENSED
Start: 2024-08-26 | End: 2024-08-26

## 2024-08-26 RX ORDER — ONDANSETRON HYDROCHLORIDE 2 MG/ML
4 INJECTION, SOLUTION INTRAVENOUS ONCE AS NEEDED
Status: ACTIVE | OUTPATIENT
Start: 2024-08-26

## 2024-08-26 RX ORDER — ALBUTEROL SULFATE 0.83 MG/ML
2.5 SOLUTION RESPIRATORY (INHALATION) ONCE AS NEEDED
Status: ACTIVE | OUTPATIENT
Start: 2024-08-26

## 2024-08-26 RX ORDER — KETOROLAC TROMETHAMINE 30 MG/ML
INJECTION, SOLUTION INTRAMUSCULAR; INTRAVENOUS AS NEEDED
Status: DISCONTINUED | OUTPATIENT
Start: 2024-08-26 | End: 2024-08-26

## 2024-08-26 RX ORDER — ROCURONIUM BROMIDE 10 MG/ML
INJECTION, SOLUTION INTRAVENOUS AS NEEDED
Status: DISCONTINUED | OUTPATIENT
Start: 2024-08-26 | End: 2024-08-26

## 2024-08-26 RX ORDER — NAPROXEN SODIUM 220 MG/1
81 TABLET, FILM COATED ORAL 2 TIMES DAILY
Qty: 60 TABLET | Refills: 0 | Status: SHIPPED | OUTPATIENT
Start: 2024-08-26 | End: 2024-08-27 | Stop reason: WASHOUT

## 2024-08-26 RX ORDER — LIDOCAINE HYDROCHLORIDE 10 MG/ML
0.1 INJECTION, SOLUTION EPIDURAL; INFILTRATION; INTRACAUDAL; PERINEURAL ONCE
Status: ACTIVE | OUTPATIENT
Start: 2024-08-26

## 2024-08-26 RX ORDER — ONDANSETRON HYDROCHLORIDE 2 MG/ML
INJECTION, SOLUTION INTRAVENOUS AS NEEDED
Status: DISCONTINUED | OUTPATIENT
Start: 2024-08-26 | End: 2024-08-26

## 2024-08-26 RX ORDER — SODIUM CHLORIDE, SODIUM LACTATE, POTASSIUM CHLORIDE, CALCIUM CHLORIDE 600; 310; 30; 20 MG/100ML; MG/100ML; MG/100ML; MG/100ML
100 INJECTION, SOLUTION INTRAVENOUS CONTINUOUS
Status: DISCONTINUED | OUTPATIENT
Start: 2024-08-26 | End: 2024-08-27

## 2024-08-26 RX ORDER — LIDOCAINE HYDROCHLORIDE 20 MG/ML
INJECTION, SOLUTION EPIDURAL; INFILTRATION; INTRACAUDAL; PERINEURAL AS NEEDED
Status: DISCONTINUED | OUTPATIENT
Start: 2024-08-26 | End: 2024-08-26

## 2024-08-26 RX ORDER — ONDANSETRON 4 MG/1
4 TABLET, FILM COATED ORAL EVERY 8 HOURS PRN
Qty: 9 TABLET | Refills: 0 | Status: SHIPPED | OUTPATIENT
Start: 2024-08-26

## 2024-08-26 RX ORDER — BUPIVACAINE HCL/EPINEPHRINE 0.5-1:200K
VIAL (ML) INJECTION
Status: DISPENSED
Start: 2024-08-26 | End: 2024-08-26

## 2024-08-26 RX ORDER — ACETAMINOPHEN 325 MG/1
650 TABLET ORAL EVERY 4 HOURS PRN
Status: DISPENSED | OUTPATIENT
Start: 2024-08-26

## 2024-08-26 RX ORDER — PHENYLEPHRINE HCL IN 0.9% NACL 1 MG/10 ML
SYRINGE (ML) INTRAVENOUS AS NEEDED
Status: DISCONTINUED | OUTPATIENT
Start: 2024-08-26 | End: 2024-08-26

## 2024-08-26 RX ORDER — HYDROCODONE BITARTRATE AND ACETAMINOPHEN 5; 325 MG/1; MG/1
1 TABLET ORAL EVERY 4 HOURS PRN
Status: ACTIVE | OUTPATIENT
Start: 2024-08-26

## 2024-08-26 RX ORDER — IPRATROPIUM BROMIDE 0.5 MG/2.5ML
500 SOLUTION RESPIRATORY (INHALATION) ONCE
Status: ACTIVE | OUTPATIENT
Start: 2024-08-26

## 2024-08-26 RX ORDER — FENTANYL CITRATE 50 UG/ML
INJECTION, SOLUTION INTRAMUSCULAR; INTRAVENOUS AS NEEDED
Status: DISCONTINUED | OUTPATIENT
Start: 2024-08-26 | End: 2024-08-26

## 2024-08-26 RX ORDER — CEFAZOLIN 1 G/1
INJECTION, POWDER, FOR SOLUTION INTRAVENOUS AS NEEDED
Status: DISCONTINUED | OUTPATIENT
Start: 2024-08-26 | End: 2024-08-26

## 2024-08-26 RX ORDER — SODIUM CHLORIDE 0.9 G/100ML
IRRIGANT IRRIGATION AS NEEDED
Status: DISCONTINUED | OUTPATIENT
Start: 2024-08-26 | End: 2024-08-26 | Stop reason: HOSPADM

## 2024-08-26 RX ORDER — SODIUM CHLORIDE, SODIUM LACTATE, POTASSIUM CHLORIDE, CALCIUM CHLORIDE 600; 310; 30; 20 MG/100ML; MG/100ML; MG/100ML; MG/100ML
20 INJECTION, SOLUTION INTRAVENOUS CONTINUOUS
Status: DISCONTINUED | OUTPATIENT
Start: 2024-08-26 | End: 2024-08-27

## 2024-08-26 RX ORDER — DROPERIDOL 2.5 MG/ML
0.62 INJECTION, SOLUTION INTRAMUSCULAR; INTRAVENOUS ONCE AS NEEDED
Status: ACTIVE | OUTPATIENT
Start: 2024-08-26

## 2024-08-26 RX ORDER — DOCUSATE SODIUM 100 MG/1
100 CAPSULE, LIQUID FILLED ORAL 2 TIMES DAILY PRN
Qty: 30 CAPSULE | Refills: 0 | Status: SHIPPED | OUTPATIENT
Start: 2024-08-26 | End: 2024-09-10

## 2024-08-26 RX ORDER — MIDAZOLAM HYDROCHLORIDE 1 MG/ML
INJECTION INTRAMUSCULAR; INTRAVENOUS
Status: DISPENSED
Start: 2024-08-26 | End: 2024-08-26

## 2024-08-26 RX ORDER — HYDROMORPHONE HYDROCHLORIDE 1 MG/ML
INJECTION, SOLUTION INTRAMUSCULAR; INTRAVENOUS; SUBCUTANEOUS AS NEEDED
Status: DISCONTINUED | OUTPATIENT
Start: 2024-08-26 | End: 2024-08-26

## 2024-08-26 RX ORDER — PROPOFOL 10 MG/ML
INJECTION, EMULSION INTRAVENOUS AS NEEDED
Status: DISCONTINUED | OUTPATIENT
Start: 2024-08-26 | End: 2024-08-26

## 2024-08-26 RX ORDER — CEFAZOLIN SODIUM 2 G/100ML
2 INJECTION, SOLUTION INTRAVENOUS ONCE
Status: ACTIVE | OUTPATIENT
Start: 2024-08-26

## 2024-08-26 RX ORDER — OXYCODONE HYDROCHLORIDE 5 MG/1
5 TABLET ORAL EVERY 6 HOURS PRN
Qty: 28 TABLET | Refills: 0 | Status: SHIPPED | OUTPATIENT
Start: 2024-08-26 | End: 2024-09-02

## 2024-08-26 ASSESSMENT — PAIN - FUNCTIONAL ASSESSMENT
PAIN_FUNCTIONAL_ASSESSMENT: 0-10

## 2024-08-26 ASSESSMENT — PAIN DESCRIPTION - ORIENTATION: ORIENTATION: LEFT

## 2024-08-26 ASSESSMENT — PAIN SCALES - GENERAL
PAINLEVEL_OUTOF10: 2
PAINLEVEL_OUTOF10: 0 - NO PAIN
PAINLEVEL_OUTOF10: 0 - NO PAIN
PAINLEVEL_OUTOF10: 3
PAINLEVEL_OUTOF10: 3
PAINLEVEL_OUTOF10: 0 - NO PAIN
PAINLEVEL_OUTOF10: 2
PAINLEVEL_OUTOF10: 0 - NO PAIN
PAINLEVEL_OUTOF10: 0 - NO PAIN

## 2024-08-26 ASSESSMENT — PAIN DESCRIPTION - LOCATION: LOCATION: LEG

## 2024-08-26 NOTE — ANESTHESIA POSTPROCEDURE EVALUATION
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    Anesthesia Post Evaluation    Patient location during evaluation: bedside  Patient participation: complete - patient participated  Level of consciousness: awake  Pain management: adequate  Airway patency: patent  Cardiovascular status: acceptable  Respiratory status: acceptable  Hydration status: acceptable  Postoperative Nausea and Vomiting: none        No notable events documented.

## 2024-08-26 NOTE — ANESTHESIA PROCEDURE NOTES
Airway  Date/Time: 8/26/2024 7:42 AM  Urgency: elective    Airway not difficult    Staffing  Performed: EHIDY   Authorized by: Luke Farmer MD    Performed by: HEIDY Woodward  Patient location during procedure: OR    Indications and Patient Condition  Indications for airway management: anesthesia  Spontaneous Ventilation: absent  Sedation level: deep  Preoxygenated: yes  Mask difficulty assessment: 1 - vent by mask    Final Airway Details  Final airway type: endotracheal airway      Successful airway: ETT  Cuffed: yes   Successful intubation technique: direct laryngoscopy  Blade: Berny  Blade size: #3  ETT size (mm): 7.0  Cormack-Lehane Classification: grade I - full view of glottis  Placement verified by: chest auscultation and capnometry   Measured from: lips  ETT to lips (cm): 22  Number of attempts at approach: 1

## 2024-08-26 NOTE — BRIEF OP NOTE
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]     * Chronic pain of left ankle [M25.572, G89.29]     * Impingement of anterior part of ankle [M25.879]     * Painful orthopaedic hardware (CMS-HCC) [T84.84XA]     Procedures  Left Foot Subtalar Hardware Removal   - KY REMOVAL IMPLANT DEEP    Left Foot Bone Biopsy   - KY BIOPSY BONE OPEN DEEP      Surgeons      * Marlon Stafford - Primary    Resident/Fellow/Other Assistant:  Surgeons and Role:     * Danny Pineda DO - Resident - Assisting    Procedure Summary  Anesthesia: Regional, General  ASA: III  Anesthesia Staff: Anesthesiologist: Luke Farmer MD  C-AA: HEIDY Woodward  Estimated Blood Loss: <5mL  Intra-op Medications: * Intraprocedure medication information is unavailable because the case start and end events have not been set *           Anesthesia Record               Intraprocedure I/O Totals          Intake    LR bolus 600.00 mL    Total Intake 600 mL       Output    Est. Blood Loss 5 mL    Total Output 5 mL       Net    Net Volume 595 mL          Specimen:   ID Type Source Tests Collected by Time   1 : BONE BIOPSY LEFT TALUS Tissue SOFT TISSUE BIOPSY SURGICAL PATHOLOGY EXAM Marlon Stafford MD 2024 0828   2 : LEFT CALCANEOUS WOUND Tissue SOFT TISSUE BIOPSY SURGICAL PATHOLOGY EXAM Marlon Stafford MD 2024 0855   A : LEFT TALUS - 1 Swab ABSCESS FUNGAL CULTURE/SMEAR, TISSUE/WOUND CULTURE/SMEAR Marlon Stafford MD 2024 0823   B : LEFT TALUS - 2 Swab ABSCESS FUNGAL CULTURE/SMEAR, TISSUE/WOUND CULTURE/SMEAR Marlon Stafford MD 2024  0823   C : LEFT TALUS - 3 Swab ABSCESS FUNGAL CULTURE/SMEAR, TISSUE/WOUND CULTURE/SMEAR Marlon Stafford MD 8/26/2024 0823        Staff:   Circulator: Nighat Baker Person: Boyd Le Scrub: Shefali          Findings: intact hardware from prior subtalar fusion procedure    Complications:  None; patient tolerated the procedure well.     Disposition: PACU - hemodynamically stable.  Condition: stable  Specimens Collected:   ID Type Source Tests Collected by Time   1 : BONE BIOPSY LEFT TALUS Tissue SOFT TISSUE BIOPSY SURGICAL PATHOLOGY EXAM Marlon Stafford MD 8/26/2024 0828   2 : LEFT CALCANEOUS WOUND Tissue SOFT TISSUE BIOPSY SURGICAL PATHOLOGY EXAM Marlon Stafford MD 8/26/2024 0855   A : LEFT TALUS - 1 Swab ABSCESS FUNGAL CULTURE/SMEAR, TISSUE/WOUND CULTURE/SMEAR Marlon Stafford MD 8/26/2024 0823   B : LEFT TALUS - 2 Swab ABSCESS FUNGAL CULTURE/SMEAR, TISSUE/WOUND CULTURE/SMEAR Marlon Stafford MD 8/26/2024 0823   C : LEFT TALUS - 3 Swab ABSCESS FUNGAL CULTURE/SMEAR, TISSUE/WOUND CULTURE/SMEAR Marlon Stafford MD 8/26/2024 0823       Postop plan:  64F s/p LLE HARSHAL from prior subtalar fusions.     Patient brought to PACU in stable condition.    Resting comfortably room air. NAD. WWP throughout. Motor / sensory exam limited in operative extremity due to block.     Plan for discharge home. NWB LLE splint. Keep DCI. Medication for dvt ppx, pain control, constipation, nausea sent to [facility]. Follow up outpatient with Dr. Stafford in 10-14 days.       Attending Attestation:     Marlon Stafford  Phone Number: [phone]

## 2024-08-26 NOTE — OP NOTE
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]     * Chronic pain of left ankle [M25.572, G89.29]     * Impingement of anterior part of ankle [M25.879]     * Painful orthopaedic hardware (CMS-HCC) [T84.84XA]     Procedures  Left Foot Subtalar Hardware Removal   - NV REMOVAL IMPLANT DEEP    Left Foot Bone Biopsy   - NV BIOPSY BONE OPEN DEEP      Surgeons      * Marlon Stafford - Primary    Resident/Fellow/Other Assistant:  Surgeons and Role:     * Danny Pineda DO - Resident - Assisting    Procedure Summary  Anesthesia: Regional, General  ASA: III  Anesthesia Staff: Anesthesiologist: Luke Farmer MD  C-AA: HEIDY Woodward  Estimated Blood Loss: <5 mL  Intra-op Medications: * Intraprocedure medication information is unavailable because the case start and end events have not been set *           Anesthesia Record               Intraprocedure I/O Totals          Intake    LR bolus 600.00 mL    Total Intake 600 mL       Output    Est. Blood Loss 5 mL    Total Output 5 mL       Net    Net Volume 595 mL          Specimen:   ID Type Source Tests Collected by Time   1 : BONE BIOPSY LEFT TALUS Tissue SOFT TISSUE BIOPSY SURGICAL PATHOLOGY EXAM Marlon Stafford MD 2024 0828   2 : LEFT CALCANEOUS WOUND Tissue SOFT TISSUE BIOPSY SURGICAL PATHOLOGY EXAM Marlon Stafford MD 2024 0855   A : LEFT TALUS - 1 Swab ABSCESS FUNGAL CULTURE/SMEAR, TISSUE/WOUND CULTURE/SMEAR Marlon Stafford MD 2024 0812   B : LEFT TALUS - 2 Swab  ABSCESS FUNGAL CULTURE/SMEAR, TISSUE/WOUND CULTURE/SMEAR Marlon Stafford MD 8/26/2024 0823   C : LEFT TALUS - 3 Swab ABSCESS FUNGAL CULTURE/SMEAR, TISSUE/WOUND CULTURE/SMEAR Marlon Stafford MD 8/26/2024 0823        Staff:   Circulator: Nighat  Scrub Person: Boyd Le Scrub: Shefali         Drains and/or Catheters: * None in log *    Tourniquet Times:     Total Tourniquet Time Documented:  Thigh (Left) - 82 minutes  Total: Thigh (Left) - 82 minutes      Patient Name: Maureen Vela  MRN: [id number]  YOB: 1960  Date of Service: 08/26/24  Report Type: Operative    PREOPERATIVE DIAGNOSIS:    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   Chronic pain of left ankle [M25.572, G89.29]  3.   Impingement of anterior part of ankle [M25.879]  4.   Painful orthopaedic hardware (CMS-HCC) [T84.84XA]      OPERATION/PROCEDURE:    Left Hardware Removal Talus  Left Hardware Removal Calcaneus  Left Bone Biopsy    SURGEON:  Marlon Stafford MD    ASSISTANT(S):      ANESTHESIA:  Regional, General    ESTIMATED BLOOD LOSS: < 5cc    COMPLICATIONS: None apparent    DISPOSITION: PACU/RNF    BRIEF CLINICAL HISTORY: 64-year-old female with a who presented for a complex evaluation of left foot pain.  Past medical history including HTN, HLD, GERD, asthma, RA, neuropathy and vitamin D deficiency patient underwent a left tendon transfer followed by left subtalar joint arthrodesis from 06/28/2023 and revision left subtalar joint arthrodesis from 02/07/2024 with known sural neuritis.  Patient unfortunately has persisted with moderate to severe pain.  She has difficulty ambulating.  She ambulates primarily with the use of a boot and a crutch.  Her OS surgeon has closed their practice reportedly due to a  medical condition.  Patient voiced that she was happy with the care that she had previously received but was concerned that she would require surgery that involved ICBG and so presented for orthopedic surgery evaluation.  She has previously trialed a bone stimulator without relief.  On my evaluation of the patient she had severe pain localizing diffusely about the foot with focal tenderness to palpation of the sinus Tarsi.  She also noted pain with attempted subtalar joint as well as ankle dorsiflexion with impingement.  Review of radiographs including x-ray as well as CT demonstrated no obvious consolidation of subtalar arthrodesis site and anterior ankle impingement likely secondary to prominent talar screw head.  I had a long discussion with the patient regarding treatment options.  She has had a difficult clinical course and I recommended removing her left subtalar joint hardware with bone biopsy to eliminate the possibility of subclinical infection.  Previous inflammatory markers reviewed from April and repeated in August 2024 were within normal limits.  I recommended proceeding in a staged fashion due to the anticipated bone loss from her large 8.0 mm cannulated screws.  Risks of surgery include but were not limited to infection, wound healing complications, need for further surgery, persistent or worsening pain, postoperative stiffness, bleeding, blood loss requiring a blood transfusion, neurovascular injury, keisha-implant fracture, mall or nonunion, recurrent deformity, avascular necrosis of the talus with progression to posttraumatic osteoarthritis of the ankle, hardware failure, hardware pain, failure the procedure, complications of anesthesia, stroke, DVT/PE, myocardial infarction and death.  Benefits include decreased pain, improved function and ruling out the possibility of subclinical infection contributing to repeat arthrodesis failure.  Alternatives included continued conservative management or  single-stage revision.  The patient voiced understanding the risk, benefits alternatives and the informed consent was signed by myself and the patient present.    OPERATIVE REPORT: On the day of surgery 08/26/2024, the patient was met in the preoperative holding area by members of the orthopedic, anesthesia and nursing teams.  I marked the patient's left lower extremity with indelible ink with the patient as my witness.  The informed consent was again reviewed.  All remaining questions were answered.  In the preoperative holding area, the anesthesia team performed a regional block. The patient had been previously medically optimized for surgery by ALESHA.    The patient was then brought back to the operating room on [facility].  I led a preoperative timeout, verifying the correct patient, procedure and laterality of procedure to be performed.  We confirmed the appropriate availability of all surgical equipment,  implants, utilization of fluoroscopy and confirmed the administration of pre-surgical IV antibiotic prophylaxis within 1 hour of incision time, 2 g Ancef.  All present were in agreement.    Care was handed over to the anesthesia team who provided GETA.  The patient was then transferred onto the operating room table in the supine position.  All bony prominences were padded and an SCD was placed on the contra-lateral extremity. A nonsterile, well-padded thigh tourniquet was placed.  The patient's left lower extremity was then prepped and draped in usual sterile fashion with sterile prep with ChloraPrep.    I marked out the patient's previous dorsal medial foot incision as well as the plantar calcaneal incision.  I let a preprocedure pause verifying the correct patient, procedure and laterality procedure to be performed.  All present were in agreement.  The patient's extremity was exsanguinated with the use of an Esmarch and the tourniquet was inflated to 275 mmHg.  Beginning dorsally, I incised through skin  and subcutaneous tissue, meticulous hemostasis was achieved.  The EHL tendon was identified, mobilized and protected throughout the duration of the procedure.  Blunt dissection was carried down to the level of the dorsal talus.  Minimal subperiosteal flaps were developed medially and laterally.  The prominent dorsal talar screw head was then identified and cannulated with a Steinmann pin.  Prominent fibrous and bony growth covering the screw head was then excised with a combination of rongeur and curette.  There was fibrous appearing tissue that emanated from the screw head which was cultured and sent for pathologic analysis.  The dorsal talar screw was then removed in its entirety without complication.  The screw tract was then curetted and bone fragments were sent for pathologic analysis.    I then turned my attention to the plantar heel.  Under fluoroscopic guidance the plantar screw was cannulated with a guidewire.  I incised in line with the previous surgical incision, meticulous hemostasis was achieved, blunt dissection was carried down to the level of the calcaneal bone.  The calcaneal screw was then backed out in its entirety without complication.  Once again, the screw tract was curetted and bone fragments were sent for pathologic analysis.  This completed calcaneal screw removal in its entirety.    Final fluoroscopic films including AP ankle, AP foot, axial heel view and lateral ankle views were obtained demonstrating complete hardware removal without keisha-implant fracture.    The wounds were copiously irrigated and closed in layers.  3-0 PDS was used for the deep layer with 3-0 Monocryl for the deep dermal layer and 3-0 nylon for skin.  The skin was cleansed and dried and dry sterile dressings were placed.  The sterile drapes were removed.  The patient was then placed into a well-padded short leg splint.  By this time, the tourniquet had been released and there was excellent reperfusion of the extremity  with palpable 2+ DP pulses.    All surgical counts were noted to be correct. The patient was then awoken from anesthesia without complication and transferred from the operating room table onto the [facility] and then brought back to the PACU in stable condition.    Post-Operative Plan:   The patient will remain nonweightbearing in their left lower extremity in a short leg plaster splint.  They will begin aspirin for DVT prophylaxis.  I have encouraged early mobilization and extremity elevation as tolerated.  I will plan to see the patient back in approximately 2 weeks for their first postoperative visit.    Complications:  None; patient tolerated the procedure well.    Disposition: PACU - hemodynamically stable.  Condition: stable     Attending Attestation: I was present and scrubbed for the entire procedure.    Marlon Stafford MD, GLENN  Department of Orthopaedic Surgery  [facility]    The diagnosis and treatment plan were reviewed with the patient. All questions were answered. The patient verbalized understanding of the treatment plan. There were no barriers to understanding identified.    Note dictated with [employer] software.  Completed without full type editing and sent to avoid delay.

## 2024-08-26 NOTE — DISCHARGE INSTRUCTIONS
"POST-OPERATIVE DISCHARGE INSTRUCTIONS:    ACTIVITY:    Non-Weightbearing LLE in a Short Leg Plaster Splint     You are advised to go home directly from the hospital or surgical center. Restrict your activities.    Return to school/work will be determined at next clinic visit, unless previously discussed with the surgical team     BLOOD CLOT PREVENTION:    To prevent blood clot formation, you have been prescribed ASA to be taken as prescribed until your surgeon or his Physician's Assistant (PA) states you can stop taking it.    Moving around and walking (with crutches) helps decrease the risk of blood clots. You must get up and \"move around\" once every hour, unless your are sleeping.    While you are sleeping and when you are not being active, continue to keep your leg elevated as much as possible.    It is common to have swelling in your feet after surgery for even a year afterwards, so the more you keep the leg elevated after surgery, the less swelling you will have later on.     GENERAL INSTRUCTIONS:  1.  Keep your surgical site elevated above your heart for at least 7 days or longer to prevent swelling (a good way to remember this is \"toes above nose\"). This will improve your comfort and your overall recovery following surgery.  Avoid pressure under the heel and keep the heel clear to avoid ulceration     2. Be alert for signs of infection including redness, streaking, odor, fever or chills. Be alert for excessive pain or bleeding and notify your surgeon immediately. Also, notify your surgeon of nausea, vomiting, or chills, numbness or weakness, bleeding, redness, swelling, pain, or drainage from surgical incision(s), bowel or bladder dysfunction, severe pain not relieved by pain medications, temperature greater than 101.0 F (38.3 C) degrees.    3. If you smoke (or have smoked within the last year), we strongly recommend that you do not smoke. This may lead to increased risk of wound infection and will decrease " your chances of healing (bone, soft tissue, etc).     WOUND INSTRUCTIONS:    Leave your Short Leg Plaster Splint until your post operative visit.  Keep it clean and dry.     Always keep the incision clean and dry until the staples/sutures are removed. If there is no drainage from the incision you should keep it open to air. If there is drainage from the incision you must keep it covered at all times until the drainage stops.    You may shower carefully (avoid falling) but the incision must be covered with Tegaderm or a water proof dressing so the wound does not get wet; once the staples/sutures are removed, the Tegaderm is no longer necessary and the wound may be washed with soap and water.     If you do not have sutures that need to be removed  then the incision should remain clean and dry for a minimum of 14 days.    Do not soak in a bath tub, hot tub, pool, lake or other body of water until atleast 21 days after your surgery and your incision is completely dry and healed. Or until approved by Dr. Stafford.    If you have removable sutures (or staples) they will be removed in approximately 14-21 days (unless otherwise instructed) from the day of your surgery.     If you have a fiberglass cast or splint in place, please consider the following instructions:  1)  Elevate the extremity as much as possible.  2)  Keep the cast or splint clean and dry.  3)  Please call us if the cast becomes wet or dirty.  4)  If you are experiencing worsening pain or worsening swelling, please call.  5)  Itching can be bothersome.  You can try:  - Scratching the opposite limb in the same spot.  - Running air through the cast or splint with a blow dryer on cold.  - Do NOT stick anything inside the cast as it may become lodged    DIET:    Return to your regular diet as tolerated. Begin with light or bland foods. Drink plenty of fluids.      MEDICATIONS:    Resume all previous home medications at the previous prescribed dose and frequency  "unless otherwise noted    PAIN CONTROL:     For pain control, you have been prescribed medications.    Narcotic medication in the form of oxycodone. Take as prescribed and wean as able. Do not take with medications that are sedating, especially benzodiazepines.  You may take Ibuprofen (800mg every 6hrs) and Tylenol (500mg every 6hrs) for the first three days only.   - Alternate/stagger these two medications so that you're not taking them both at the same time. Take as prescribed for the first three days then stop when prescriptions done. This will provide a strong anti-inflammatory effect for the first few days.  3. The anesthesia block will wear off eventually. Some patients it will last only a few hours after surgery and in others it may last a few days. As soon as you start feeling any sensation at all in your leg, start taking the medications, so that you're not caught playing \"catch up\".   4. Remember to keep elevated. Avoid using ice while your extremity nerve block is still working. If you can't feel your leg and it is still numb, then the ice may induce injury. Ice behind the knee can help as well. You may apply ice to your cast/splint/dressing for the first few days. 20 minutes of icing followed by 2 hours of no ice. Keep the splint/cast/dressing dry and place a towel between the ice and the dressing. DO NOT GET YOUR DRESSING/SPLINT/CAST WET!  5. While taking narcotics, you may have constipation.  A stool softener is recommended. You may use an over-the-counter stool softener or use the one that has been prescribed for you. Colace and Senokot are common over-the-counter medications.  6. Do not drink alcohol or drive while using narcotic pain medication.  7. Do NOT take additional tylenol if your pain medication already has tylenol in it     IMPORTANT INFORMATION:    Please call your surgeon' s clinic with questions Monday-Friday during business hours. If no one answers, please leave a message and someone " should get back to the patient within 24 hours.  For after-hours calls, please call the  and request to contact the answering service for Dr. Stafford.   For emergencies, call 911 or present to the nearest ER for immediate evaluation.     FOLLOWUP INSTRUCTIONS:    Please contact [phone] to confirm scheduled follow-up appointment or to make changes to your scheduled appointment.

## 2024-08-26 NOTE — ANESTHESIA PROCEDURE NOTES
Peripheral Block    Patient location during procedure: pre-op  Start time: 8/26/2024 7:05 AM  End time: 8/26/2024 7:22 AM  Reason for block: procedure for pain, at surgeon's request and post-op pain management  Staffing  Performed: attending   Authorized by: Luke Farmer MD    Performed by: Luke Farmer MD  Preanesthetic Checklist  Completed: patient identified, IV checked, site marked, risks and benefits discussed, surgical consent, monitors and equipment checked, pre-op evaluation and timeout performed   Timeout performed at: 8/26/2024 7:05 AM  Peripheral Block  Patient position: laying flat  Prep: alcohol swabs  Patient monitoring: continuous pulse ox  Block type: adductor canal and popliteal  Laterality: left  Injection technique: single-shot  Guidance: ultrasound guided  Local infiltration: lidocaine  Needle  Needle type: short-bevel   Needle gauge: 22 G  Needle length: 8 cm  Needle localization: ultrasound guidance     image stored in chart  Test dose: negative  Assessment  Injection assessment: negative aspiration for heme, no paresthesia on injection, incremental injection and local visualized surrounding nerve on ultrasound  Heart rate change: no  Slow fractionated injection: yes  Additional Notes  40 ml 0.375% bupivacaine with 1:200K epi and 2.67 mg dexamethasone injected for popliteal  20 ml 0.375% bupivacaine with 1:200K epi and 1.33 mg dexamethasone injected for adductor

## 2024-08-26 NOTE — ANESTHESIA PREPROCEDURE EVALUATION
Patient: Maureen Vela    Procedure Information       Date/Time: 08/26/24 0730    Procedures:       Left Foot Subtalar Hardware Removal (Left: Foot)      Left Foot Bone Biopsy (Left: Foot)    Location: [facility] OR 09 / [facility] OR    Surgeons: Marlon Stafford MD            Relevant Problems   Anesthesia   (+) PONV (postoperative nausea and vomiting)      Cardiac   (+) Benign essential hypertension   (+) Hyperlipidemia      Pulmonary   (+) Asthma (HHS-HCC)      GI   (+) Chronic diarrhea   (+) Gastroesophageal reflux disease      Liver   (+) Calculus of gallbladder with acute on chronic cholecystitis without obstruction      Endocrine   (+) BMI 40.0-44.9, adult (Multi)   (+) Hypothyroid   (+) Obesity      Musculoskeletal   (+) DJD (degenerative joint disease), ankle and foot, right   (+) Osteoarthritis of knee   (+) Rheumatoid arthritis with positive rheumatoid factor (Multi)   (+) Seropositive rheumatoid arthritis of multiple joints (Multi)   (+) 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 : N  Date of Last Liquid: 08/26/24  Time of Last Liquid: 0515  Date of Last Solid: 08/25/24  Time of Last Solid: 1800  Last Intake Type: Clear fluids  Time of Last Void: 0640         Physical Exam    Airway  Mallampati: II     Cardiovascular   Rhythm: regular  Rate: normal     Dental    Pulmonary    Abdominal            Anesthesia Plan    History of general anesthesia?: yes  History of complications of general anesthesia?: no    ASA 3     general and regional   (Patient has previous injury to sural nerve on operative side.  I had an extensive discussion of risks and benefits of a  nerve block for her.  She elected to proceed with me performing a nerve block for her, with the expectation that gabapentin might be needed to deal with post-operative neuropathic discomfort)  intravenous induction   Anesthetic plan and risks discussed with patient.    Plan discussed with HEIDY.

## 2024-08-26 NOTE — SIGNIFICANT EVENT
MEDS to BEDS at bedside,    brought to bedside  Reviewed homegoing instructions with patient and family. All questions answered, pt and family verbalize an understanding.

## 2024-08-27 ENCOUNTER — PATIENT MESSAGE (OUTPATIENT)
Dept: ORTHOPEDIC SURGERY | Facility: CLINIC | Age: 64
End: 2024-08-27
Payer: COMMERCIAL

## 2024-08-27 ENCOUNTER — TELEPHONE (OUTPATIENT)
Dept: ORTHOPEDIC SURGERY | Facility: CLINIC | Age: 64
End: 2024-08-27
Payer: COMMERCIAL

## 2024-08-27 DIAGNOSIS — M96.0 NONUNION OF SUBTALAR ARTHRODESIS: Primary | ICD-10-CM

## 2024-08-27 LAB — SCAN RESULT: NORMAL

## 2024-08-27 RX ORDER — ASPIRIN 81 MG/1
81 TABLET ORAL
Qty: 84 TABLET | Refills: 0 | Status: SHIPPED | OUTPATIENT
Start: 2024-08-27 | End: 2024-10-08

## 2024-08-27 ASSESSMENT — PAIN SCALES - GENERAL: PAINLEVEL_OUTOF10: 6

## 2024-08-28 LAB
BACTERIA SPEC CULT: NORMAL
GRAM STN SPEC: NORMAL

## 2024-08-29 LAB — C1Q SERPL-MCNC: 9.1 MG/DL (ref 10.3–20.5)

## 2024-08-30 LAB
FUNGUS SPEC CULT: NORMAL
FUNGUS SPEC FUNGUS STN: NORMAL

## 2024-09-03 LAB
FUNGUS SPEC CULT: NORMAL
FUNGUS SPEC FUNGUS STN: NORMAL

## 2024-09-04 LAB
LABORATORY COMMENT REPORT: NORMAL
PATH REPORT.FINAL DX SPEC: NORMAL
PATH REPORT.GROSS SPEC: NORMAL
PATH REPORT.RELEVANT HX SPEC: NORMAL
PATH REPORT.TOTAL CANCER: NORMAL

## 2024-09-06 ENCOUNTER — OFFICE VISIT (OUTPATIENT)
Dept: ORTHOPEDIC SURGERY | Facility: CLINIC | Age: 64
End: 2024-09-06
Payer: COMMERCIAL

## 2024-09-06 ENCOUNTER — OFFICE VISIT (OUTPATIENT)
Dept: RHEUMATOLOGY | Facility: CLINIC | Age: 64
End: 2024-09-06
Payer: COMMERCIAL

## 2024-09-06 VITALS
BODY MASS INDEX: 38.83 KG/M2 | WEIGHT: 211 LBS | OXYGEN SATURATION: 98 % | DIASTOLIC BLOOD PRESSURE: 88 MMHG | SYSTOLIC BLOOD PRESSURE: 133 MMHG | HEIGHT: 62 IN | HEART RATE: 64 BPM

## 2024-09-06 DIAGNOSIS — M96.0 NONUNION OF SUBTALAR ARTHRODESIS: Primary | ICD-10-CM

## 2024-09-06 DIAGNOSIS — E55.9 VITAMIN D DEFICIENCY: ICD-10-CM

## 2024-09-06 DIAGNOSIS — Z79.899 ENCOUNTER FOR LONG-TERM (CURRENT) USE OF MEDICATIONS: ICD-10-CM

## 2024-09-06 DIAGNOSIS — M05.79 SEROPOSITIVE RHEUMATOID ARTHRITIS OF MULTIPLE JOINTS (MULTI): Primary | ICD-10-CM

## 2024-09-06 DIAGNOSIS — M86.9 OSTEOMYELITIS OF FOOT, UNSPECIFIED LATERALITY, UNSPECIFIED TYPE (MULTI): ICD-10-CM

## 2024-09-06 DIAGNOSIS — R76.8 POSITIVE ANA (ANTINUCLEAR ANTIBODY): ICD-10-CM

## 2024-09-06 DIAGNOSIS — L80 VITILIGO: ICD-10-CM

## 2024-09-06 DIAGNOSIS — M85.80 OSTEOPENIA WITH HIGH RISK OF FRACTURE: ICD-10-CM

## 2024-09-06 DIAGNOSIS — T84.84XA PAINFUL ORTHOPAEDIC HARDWARE (CMS-HCC): ICD-10-CM

## 2024-09-06 PROCEDURE — 99213 OFFICE O/P EST LOW 20 MIN: CPT | Performed by: INTERNAL MEDICINE

## 2024-09-06 PROCEDURE — 1036F TOBACCO NON-USER: CPT | Performed by: STUDENT IN AN ORGANIZED HEALTH CARE EDUCATION/TRAINING PROGRAM

## 2024-09-06 PROCEDURE — 3079F DIAST BP 80-89 MM HG: CPT | Performed by: INTERNAL MEDICINE

## 2024-09-06 PROCEDURE — 3008F BODY MASS INDEX DOCD: CPT | Performed by: INTERNAL MEDICINE

## 2024-09-06 PROCEDURE — 99211 OFF/OP EST MAY X REQ PHY/QHP: CPT | Performed by: STUDENT IN AN ORGANIZED HEALTH CARE EDUCATION/TRAINING PROGRAM

## 2024-09-06 PROCEDURE — 3075F SYST BP GE 130 - 139MM HG: CPT | Performed by: INTERNAL MEDICINE

## 2024-09-06 ASSESSMENT — ROUTINE ASSESSMENT OF PATIENT INDEX DATA (RAPID3)
ON A SCALE OF ONE TO TEN, HOW MUCH PAIN HAVE YOU HAD BECAUSE OF YOUR CONDITION OVER THE PAST WEEK?: 7
FEELINGS_ANXIETY_NERVOUS: WITHOUT ANY DIFFICULTY
TOTAL RAPID3 SCORE: 19.2
WALK_KILOMETERS: UNABLE TO DO
FN_SCORE: 5.7
FEELINGS_DEPRESSION: WITHOUT ANY DIFFICULTY
WALK_FLAT_GROUND: UNABLE TO DO
LIFT_CUP_TO_MOUTH: WITHOUT ANY DIFFICULTY
SUM OF QUESTIONS A TO J: 17
TURN_FAUCETS_OFF: WITH SOME DIFFICULTY
ON A SCALE OF ONE TO TEN, HOW MUCH PAIN HAVE YOU HAD BECAUSE OF YOUR CONDITION OVER THE PAST WEEK?: 7
SEVERITY_SCORE: HIGH SEVERITY (HS)
SEVERITY_SCORE: 0
DRESS_YOURSELF: WITH MUCH DIFFICULTY
IN_OUT_TRANSPORT: WITH MUCH DIFFICULTY
WEIGHTED_TOTAL_SCORE: 6.4
IN_OUT_BED: WITH SOME DIFFICULTY
WASH_DRY_BODY: WITH SOME DIFFICULTY
ON A SCALE OF ONE TO TEN, CONSIDERING ALL THE WAYS IN WHICH ILLNESS AND HEALTH CONDITIONS MAY AFFECT YOU AT THIS TIME, PLEASE INDICATE BELOW HOW YOU ARE DOING:: 6.5
GOOD_NIGHTS_SLEEP: WITH SOME DIFFICULTY
ON A SCALE OF ONE TO TEN, CONSIDERING ALL THE WAYS IN WHICH ILLNESS AND HEALTH CONDITIONS MAY AFFECT YOU AT THIS TIME, PLEASE INDICATE BELOW HOW YOU ARE DOING:: 6.5
PARTIPATE_RECREATIONAL_ACTIVITIES: UNABLE TO DO
PICK_CLOTHES_OFF_FLOOR: WITH SOME DIFFICULTY

## 2024-09-06 ASSESSMENT — ENCOUNTER SYMPTOMS
LOSS OF SENSATION IN FEET: 1
DEPRESSION: 0
OCCASIONAL FEELINGS OF UNSTEADINESS: 0

## 2024-09-06 ASSESSMENT — PAIN SCALES - GENERAL
PAINLEVEL_OUTOF10: 2
PAINLEVEL: 4

## 2024-09-06 NOTE — PROGRESS NOTES
[facility]/  Rheumatology  [facility], [address]  [place] [address]  Phone: [phone]  Fax: [phone]    Rheumatology Progress Note 5/7/24    Maureen Vela is a 64 y.o. female here for follow upl      Last Visit: 11/15/2021    Rheum Hx    Component      Latest Ref Rng 5/26/2020   C-ANCA (PR3) BY EIA (Note)…    C-ANCA FLOURESCENCE Negative…    P-ANCA (MPO) BY EIA (Note)…    P-ANCA FLOURESCENCE Negative…    ANCP INTERPRETATION (Note)…    Staff Review (Note)…    IgG Cardiolipin Ab 9…    IgM Cardiolipin Ab 10…    IgA Cardiolipin Ab <9…    ALEXANDER Positive…    ALEXANDER Titer 1:160…    ALEXANDER Pattern Atypical speckled…    Beta-2 Glyco 1 IgG <9…    Beta 2 Glyco 1 IgM <9…    SSA ANTIBODIES 0.2…    SSB ANTIBODIES <0.2…    Thyroid Peroxidase (TPO) Antibody <1.0…    Beta-2 Glyco 1 IgA 3…    Citrulline Antibody, IgG 27… (H)    Centromere Ab Negative…    DAVID-1 ANTIBODY <0.2…    Rheumatoid Factor      0 - 20 IU/ML 10    RNP Antibody <0.2…    SCL-70 ABS EIA <0.2…    SM Antibody <0.2…    Anti-Thyroglobulin AB 1.5…    SUZY, Broad Spectrum Fátima Serum NEGATIVE Performed at [address] [address]      Component      Latest Ref Rng 1/2/2023   Total Protein, Spe 6.2… (L)    Albumin, SPE 3.9…    Alpha 1 Globulin 0.3…    Alpha 2 Globulin 0.7…    Beta Glob SPE 0.7…    Gamma Glob,Spe 0.7…    Protein Electrophoresis Comment NORMAL    UMPA Interpretation See Note…    UMPA Interpretation N/A    UMPA Review See Note…     Previous Tx    Sulfa allergy  Methotrexate- did not tolerate  HCQ- did not tolerate  Leflunomide- discontinued due to lack of efficacy  Enbrel- discontinued due to lack of efficacy    Health Maintenance  DXA T-1.3 (hip; 3/2024); FRAX 7.1%/0.5%  Malignancy Hx- none  Immunization History   Administered Date(s) Administered    Flu vaccine (IIV4), preservative free *Check age/dose* 12/12/2015, 09/22/2016, 11/01/2022, 10/11/2023    Flu vaccine, quadrivalent, high-dose,  preservative free, age 65y+ (FLUZONE) 10/19/2020, 11/04/2021    Influenza, injectable, quadrivalent 10/31/2017, 10/01/2018, 01/23/2020    Influenza, seasonal, injectable 10/07/2009, 10/29/2010, 10/04/2012, 08/23/2013, 09/25/2014, 11/28/2015    Moderna COVID-19 vaccine, Fall 2023, 12 yeasrs and older (50mcg/0.5mL) 12/17/2023    Moderna COVID-19 vaccine, bivalent, blue cap/gray label *Check age/dose* 11/01/2022    Moderna SARS-CoV-2 Vaccination 03/01/2021, 03/27/2021, 11/04/2021, 06/15/2022    Novel influenza-H1N1-09, preservative-free 11/05/2009    Pneumococcal polysaccharide vaccine, 23-valent, age 2 years and older (PNEUMOVAX 23) 10/31/2017    Td vaccine, age 7 years and older (TDVAX) 12/31/2008, 01/26/2018    Tdap vaccine, age 7 year and older (BOOSTRIX, ADACEL) 11/04/2021    Zoster vaccine, recombinant, adult (SHINGRIX) 01/21/2019, 05/16/2019          Past Medical History:   Diagnosis Date    AB (asthmatic bronchitis) ([facility])     Acquired immunocompromised state (Multi)     Autoimmune RA, On Orencia every 28 days, followed by rheumatology    Actinic keratoses     Allergic rhinitis     Asthma ([facility])     only used inhaler 4-5 times this entire year, triggers are seasonal allergies    Bilateral sacroiliitis (CMS-HCC)     Chronic diarrhea     Chronic pain     Chronic sinusitis     DDD (degenerative disc disease), cervical     C5,6 BULGE    DDD (degenerative disc disease), lumbar     L4,5 BULGE    DJD (degenerative joint disease)     right ankle and foot    ETD (eustachian tube dysfunction)     GERD (gastroesophageal reflux disease)     HTN (hypertension)     Hyperlipidemia     Hypothyroidism     Joint pain     Lateral epicondylitis of elbow     Low back pain     hronic, folowed by pain management    Lumbosacral disc disease     Migraine     Neuropathy     left foot N/T and hands bilateral    Osteoarthritis     Osteopenia     PONV (postoperative nausea and vomiting)     PTTD (posterior tibial tendon dysfunction)      left lower extremity    Seronegative rheumatoid arthritis (Multi)     Spondylosis of 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     Vitamin D deficiency       Past Surgical History:   Procedure Laterality Date    ANKLE SURGERY Left 2024    removed bolts & bx     SECTION, LOW TRANSVERSE      x3    CHOLECYSTECTOMY  2017    LAPAROSCOPIC    COLONOSCOPY  10/16/2018    CT ANGIO CORONARY ARTERIES-W/QUANT EVAL CORONARY CALCIUM  2023    Score=0    CYST REMOVAL Left     LEFT LEG    CYST REMOVAL Left 2015    LEFT EYE (CONJUVIAL RETENTION CYSTS)    ENDOMETRIAL ABLATION  2012    EPIDURAL BLOCK INJECTION      EYE SURGERY Left 2016    LEFT EYELID    FINGER SURGERY Left 2015    PIN IN THUMB    PAT SUBTALAR ARTHRODESIS      HAND ARTHROPLASTY Left 2015    LEFT HAND [facility] LRTI    INJECTION  2014    CORTISONE- LEFT THUMB    INJECTION Bilateral 10/25/2017    ONE IN EACH KNEE    JOINT REPLACEMENT Bilateral     bilateral TKR    KNEE CARTILAGE SURGERY Right 2018    TORN MENISCUS    KNEE SURGERY Right 2018    KNEE ARTHROSCOPY    KNEE SURGERY  2019    SCOPE OF LEFT KNEE    LAMINECTOMY  1996    LUMBAR    LARYNX SURGERY  2014    MEDIALIZATION    LIPOMA RESECTION  2008    BACK    OTHER SURGICAL HISTORY  2013    LUMP REMOVAL RIGHT SIDE-lipoma waistline    OTHER SURGICAL HISTORY      LEFT FOOT TENDON TRANSFER , SUBTALLAR JOINT FUSION 2023, REVISION OF FAILED JOINT FUSION 2024    THYROIDECTOMY Right 2014    RIGHT LOBE    TOTAL ABDOMINAL HYSTERECTOMY W/ BILATERAL SALPINGOOPHORECTOMY  08/10/2017    TOTAL KNEE ARTHROPLASTY Right 2019    TUBAL LIGATION        No current facility-administered medications for this visit.     Current Outpatient Medications   Medication Sig Dispense Refill    aspirin 81 mg EC tablet Take 1 tablet (81 mg) by mouth 2 times daily (morning and late afternoon). For DVT PPX 84 tablet 0    docusate sodium (Colace) 100 mg  capsule Take 1 capsule (100 mg) by mouth 2 times a day as needed for constipation for up to 15 days. 30 capsule 0    ondansetron (Zofran) 4 mg tablet Take 1 tablet (4 mg) by mouth every 8 hours if needed for nausea or vomiting. 9 tablet 0    sennosides (Senokot) 8.6 mg tablet Take 1 tablet (8.6 mg) by mouth once daily as needed for constipation for up to 15 days. 15 tablet 0     Facility-Administered Medications Ordered in Other Visits   Medication Dose Route Frequency Provider Last Rate Last Admin    acetaminophen (Tylenol) tablet 650 mg  650 mg oral q4h PRN Luke Farmer MD   650 mg at 08/26/24 1025    albuterol 2.5 mg /3 mL (0.083 %) nebulizer solution 2.5 mg  2.5 mg nebulization Once PRN Luke Farmer MD        ceFAZolin (Ancef) 2 g in dextrose (iso)  mL  2 g intravenous Once Marlon Stafford MD        droperidol (Inapsine) injection 0.625 mg  0.625 mg intravenous Once PRN Luke Farmer MD        HYDROcodone-acetaminophen (Norco) 5-325 mg per tablet 1 tablet  1 tablet oral q4h PRN Luke Farmer MD        HYDROmorphone (Dilaudid) injection 0.25 mg  0.25 mg intravenous q5 min PRN Luke Farmer MD        HYDROmorphone (Dilaudid) injection 0.5 mg  0.5 mg intravenous q5 min PRN Luke Farmer MD        ipratropium (Atrovent) 0.02 % nebulizer solution 500 mcg  500 mcg nebulization Once Luke Farmer MD        lidocaine PF (Xylocaine) 10 mg/mL (1 %) injection 1 mg  0.1 mL subcutaneous Once Luke Farmer MD        ondansetron (Zofran) injection 4 mg  4 mg intravenous Once PRN Luke Farmer MD        oxygen (O2) therapy   inhalation Continuous - 02/gases Luke Farmer MD          Allergies   Allergen Reactions    Oxycodone-Acetaminophen Nausea/vomiting    Sulfa (Sulfonamide Antibiotics) Headache    Sulfamethoxazole-Trimethoprim Rash    Balsam [place] Hives     Pt states in dental adhesives and make up    Clarithromycin Itching and Rash    Hydroxychloroquine Hives and Rash    Methotrexate Hives and Rash        Visit Vitals  BP  "133/88 (BP Location: Left arm, Patient Position: Sitting, BP Cuff Size: Large adult)   Pulse 64   Ht 1.575 m (5' 2\")   Wt 95.7 kg (211 lb)   SpO2 98%   BMI 38.59 kg/m²   OB Status Hysterectomy   Smoking Status Never   BSA 2.05 m²            Rapid 3  Function Score (FN): 5.7  Pain Score (PN) (0-10): 7  Patient Global (PTGL) (0-10): 6.5  Rapid3 Score: 19.2  RAPID3 Weighted Score: 6.4       Workup    Component      Latest Ref Rng 8/19/2024   GLUCOSE      74 - 99 mg/dL 76    SODIUM      136 - 145 mmol/L 141    POTASSIUM      3.5 - 5.3 mmol/L 4.2    CHLORIDE      98 - 107 mmol/L 107    Bicarbonate      21 - 32 mmol/L 27    Anion Gap      10 - 20 mmol/L 11    Blood Urea Nitrogen      6 - 23 mg/dL 15    Creatinine      0.50 - 1.05 mg/dL 0.73    EGFR      >60 mL/min/1.73m*2 >90    Calcium      8.6 - 10.3 mg/dL 8.8    Albumin      3.4 - 5.0 g/dL 4.1    Alkaline Phosphatase      33 - 136 U/L 62    Total Protein      6.4 - 8.2 g/dL 6.5    Total Protein      6.4 - 8.2 g/dL 6.7    AST      9 - 39 U/L 13    Bilirubin Total      0.0 - 1.2 mg/dL 0.4    ALT      7 - 45 U/L 11    Color, Urine      Light-Yellow, Yellow, Dark-Yellow  Light-Yellow    Appearance, Urine      Clear  Clear    Specific Gravity, Urine      1.005 - 1.035  1.022    pH, Urine      5.0, 5.5, 6.0, 6.5, 7.0, 7.5, 8.0  5.0    Protein, Urine      NEGATIVE, 10 (TRACE), 20 (TRACE) mg/dL NEGATIVE    Glucose, Urine      Normal mg/dL Normal    Blood, Urine      NEGATIVE  NEGATIVE    Ketones, Urine      NEGATIVE mg/dL NEGATIVE    Bilirubin, Urine      NEGATIVE  NEGATIVE    Urobilinogen, Urine      Normal mg/dL Normal    Nitrite, Urine      NEGATIVE  NEGATIVE    Leukocyte Esterase, Urine      NEGATIVE  NEGATIVE    Albumin      3.4 - 5.0 g/dL 4.2    Alpha 1 Globulin      0.2 - 0.6 g/dL 0.3    Alpha 2 Globulin      0.4 - 1.1 g/dL 0.7    Beta Globulin      0.5 - 1.2 g/dL 0.8    Gamma      0.5 - 1.4 g/dL 0.8    Protein Electrophoresis Comment Normal.    Immunofixation Comment " No monoclonal protein detected by immunofixation.    Immunofixation Comment No monoclonal protein detected by immunofixation.    Path Review - Serum Protein Electrophoresis  Reviewed and approved by JAIR STEEL on 8/23/24 at 12:42 PM.    Path Review - Serum Immunofixation Reviewed and approved by JAIR STEEL on 8/23/24 at 12:42 PM.    Albumin %      % 30.6    Alpha 1 Globulin %      % 7.5    Alpha 2 Globulin %      % 23.3    Beta Globulin %      % 19.3    Gamma Globulin %      % 19.3    Urine Electrophoresis Comment Normal.    Path Review-Urine Protein Electrophoresis  Reviewed and approved by JAIR STEEL on 8/23/24 at 10:48 AM.    Path Review - Urine Immunofixation Reviewed and approved by JAIR STEEL on 8/23/24 at 10:48 AM.    C-Reactive Protein      <1.00 mg/dL 0.17    Creatine Kinase      0 - 215 U/L 39    Sed Rate      0 - 30 mm/h 3    URIC ACID      2.3 - 6.7 mg/dL 5.5    Vitamin D, 25-Hydroxy, Total      30 - 100 ng/mL 68    Scan Result Vectra 28 (low)   Total Protein, Urine      5 - 25 mg/dL 10        Assessment/Plan  No diagnosis found.     No orders of the defined types were placed in this encounter.         Since last appt, adherent and tolerating Orencia 750 mg.  Last infusion   Tendon inflammation,  Subatlar joint fusion end of June 2023. Also failed  Bone stim- still has it.   Broke R 5th toe/   Feb 7 th surgery  including cadaver bone. Hardware taken out and new screws placed front to back.  Ingrown toe nail tx and cleared with doxycycline.   For follow up testiong next week.  L ankle still healing. Hands are very sore and stiff.   PT for both shoulders after fall. Checking when to start back on meds.   N ablation for back worked. Tramadol helpful.   Gabapemtin for the sural nerve shooting pain which has eased off.   Denies any recent or current infection.  Not on any NSAIDs or glucocorticoids.  ROS+ for fatigue, GERD, rashes, sun sens, joint pain/swelling/stiffness all day, worse in the  AM, raynaud's without pitting/ulceration, numbness/tingling.   Rapid 3 consistent with high severity.  Labs reviewed  D/w pt tx options and decided on   Orencia on hold for now.   Advised of possible side effects and importance of monitoring.   All questions answered.  Patient to follow up with primary care provider regarding all other medical issues not addressed today and for medical chart updating.    Cathy Vargas MD      Patient Care Team:  Florentino Thompson MD as PCP - General (Family Medicine)  Florentino Thompson MD as PCP - [facility] PCP  Cathy Vargas MD as Consulting Physician (Rheumatology)

## 2024-09-07 NOTE — PROGRESS NOTES
ORTHOPAEDIC SURGERY PROGRESS NOTE    Chief Complaint:  Left foot pain    History Of Present Illness  Maureen Vela is a 64 y.o. female who presents for complex evaluation of left foot pain, self-referred.  Patient has previously undergone 3 surgeries with an [facility] podiatrist.  The first was a tendon transfer of which the details are unclear at this time, the second was a left subtalar joint arthrodesis from 06/28/2023 followed by a revision subtalar joint arthrodesis from 02/07/2024.  The patient continues with severe pain that is activity dependent.  Typically rated as 5-9 out of 10.  She has been ambulating with use of a boot and crutch.  By report, the patient's previous provider has stopped doing surgery due to a medical condition.  She was quite happy with her previous practice, however sought an orthopedic surgery perspective.  Prior to her index surgery she reported pain on the outside of her foot that radiated to the top.  This is yet unresolved.  Patient works as a  and is on her feet extensively throughout the day.  She denies any fevers, chills or night sweats but does report paresthesias on the outside of her foot.    Patient has a past medical history of rheumatoid arthritis, she has been off of her rheumatologic medications from the winter 2023.  She has been using a bone stimulator.    07/12/2024: Patient returns for follow-up of her left foot pain as above.  She continues with severe pain in her left foot and ankle.  Rated as 8 out of 10.  Pain is improved with boot wear.  She has been ambulating with use of crutches.  She is reporting a burning pain on the lateral side of her foot, this is unchanged from her previous operations.  Patient also notes discomfort when walking on the back of her heel from a prominent screw head.    09/06/2024: Patient returns s/p left foot hardware removal and bone biopsy from 08/26/2024.  Patient's intraoperative cultures were negative but  biopsy of the talus was consistent with osteomyelitis.  She has been compliant with nonweightbearing restrictions and reporting 2 out of 10 pain.  She has been following closely with her rheumatologist.    Past Medical History  Past Medical History:   Diagnosis Date    AB (asthmatic bronchitis) ([facility])     Acquired immunocompromised state (Multi)     Autoimmune RA, On Orencia every 28 days, followed by rheumatology    Actinic keratoses     Allergic rhinitis     Asthma ([facility])     only used inhaler 4-5 times this entire year, triggers are seasonal allergies    Bilateral sacroiliitis ([facility])     Chronic diarrhea     Chronic pain     Chronic sinusitis     DDD (degenerative disc disease), cervical     C5,6 BULGE    DDD (degenerative disc disease), lumbar     L4,5 BULGE    DJD (degenerative joint disease)     right ankle and foot    ETD (eustachian tube dysfunction)     GERD (gastroesophageal reflux disease)     HTN (hypertension)     Hyperlipidemia     Hypothyroidism     Joint pain     Lateral epicondylitis of elbow     Low back pain     hronic, folowed by pain management    Lumbosacral disc disease     Migraine     Neuropathy     left foot N/T and hands bilateral    Osteoarthritis     Osteopenia     PONV (postoperative nausea and vomiting)     PTTD (posterior tibial tendon dysfunction)     left lower extremity    Seronegative rheumatoid arthritis (Multi)     Spondylosis of 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     Vitamin D deficiency        Surgical History  Recent Surgeries in Orthopaedic Surgery            No cases to display             Social History  Social History     Socioeconomic History    Marital status:    Tobacco Use    Smoking status: Never    Smokeless tobacco: Never   Vaping Use    Vaping status: Never Used   Substance and Sexual Activity    Alcohol use: Not Currently     Comment: RARELY    Drug use: Never    Sexual activity: Defer     Partners: Male     Birth  control/protection: Post-menopausal, Female Sterilization       Family History  Family History   Problem Relation Name Age of Onset    Arthritis Mother Mom     Alzheimer's disease Father Dad     Breast cancer Sister Sima 42    Alzheimer's disease Sister Joselyn     Breast cancer Mother's Sister  63    Stroke Maternal Grandfather Grandpa         Allergies  Allergies   Allergen Reactions    Oxycodone-Acetaminophen Nausea/vomiting    Sulfa (Sulfonamide Antibiotics) Headache    Sulfamethoxazole-Trimethoprim Rash    Balsam [place] Hives     Pt states in dental adhesives and make up    Clarithromycin Itching and Rash    Hydroxychloroquine Hives and Rash    Methotrexate Hives and Rash       Review of Systems  REVIEW OF SYSTEMS  Constitutional: no unplanned weight loss  Psychiatric: no suicidal ideation  ENT: no vision changes, no sinus problems  Pulmonary: no shortness of breath  Lymphatic: no enlarged lymph nodes  Cardiovascular: no chest pain or shortness of breath  Gastrointestinal: no stomach problems  Genitourinary: no dysuria   Skin: no rashes  Endocrine: no thyroid problems  Neurological: no headache, no numbness  Hematological: no easy bruising  Musculoskeletal: Left foot pain    Physical Exam  PHYSICAL EXAMINATION  Constitutional Exam: well developed and well nourished  Psychiatric Exam: alert and oriented, appropriate mood and behavior  Eye Exam: EOMI  Pulmonary Exam: breathing non-labored, no apparent distress  Lymphatic exam: no appreciable lymphadenopathy in the lower extremities  Cardiovascular exam: RRR to peripheral palpation, DP pulses 2+, PT 2+, toes are pink with good capillary refill, no pitting edema  Skin exam: no open lesions, rashes, abrasions or ulcerations  Neurological exam: sensation to light touch intact in both lower extremities in peripheral and dermatomal distributions (except for any abnormalities noted in musculoskeletal exam)    Musculoskeletal exam: Left lower extremity examination.  Patient  anterior ankle incision with suture line and skin edges well-approximated.  Posterior calcaneal incision also with skin edges and suture line well-approximated.  There is no keisha-incisional erythema, induration, fluctuance or drainage.  There is no obvious ankle effusion. Patient has sensation intact light touch grossly in a saphenous, sural, superficial peroneal, deep peroneal and tibial nerve distribution.  She has intact PF/DF/EHL.  She has 2+ DP/PT pulse palpated.    Last Recorded Vitals  There were no vitals taken for this visit.    Laboratory Results    No results found for this or any previous visit (from the past 24 hour(s)).    Radiology Results   No new imaging at this visit.    Assessment/Plan:  64-year-old female who presents for complex evaluation of painful and multiply revised left subtalar arthrodesis by [facility] podiatrist with sural nerve neuritis and anterior ankle impingement s/p L HARSHAL and bone biopsy from 08/26/2024 and pathology findings concerning for OM.  I have reviewed the diagnosis and treatment options extensively with the patient.  I would recommend the patient continue nonweightbearing to her left lower extremity.  I will provide her a walking boot for offloading and for close wound monitoring.  I reviewed with the patient that this is certainly a surprising finding given her otherwise relatively normal inflammatory markers including ESR/CRP but this may more completely explain her tendency towards nonunion.  I discussed personally with Dr. Antoine of ID regarding the patient's pathology results, I will refer her formally to Dr. Antoine for consideration of antibiotic management in this clinical setting.  I would anticipate a formal course of antibiotic treatment.  I reviewed with the patient that if she were to develop any new symptoms including fevers, chills, night sweats or findings concerning for infection that she should present to the ER for evaluation.  I will tentatively plan to see  the patient back in 2 weeks for a wound check and anticipated suture removal.  Upon return, patient would require three-view nonweightbearing left ankle.    Marlon Stafford MD, GLENN  Department of Orthopaedic Surgery  [facility]    The diagnosis and treatment plan were reviewed with the patient. All questions were answered. The patient verbalized understanding of the treatment plan. There were no barriers to understanding identified.    Note dictated with [employer] software.  Completed without full type editing and sent to avoid delay.

## 2024-09-09 LAB
FUNGUS SPEC CULT: NORMAL
FUNGUS SPEC FUNGUS STN: NORMAL

## 2024-09-10 ENCOUNTER — DOCUMENTATION (OUTPATIENT)
Dept: INFUSION THERAPY | Facility: CLINIC | Age: 64
End: 2024-09-10

## 2024-09-10 ENCOUNTER — APPOINTMENT (OUTPATIENT)
Dept: INFUSION THERAPY | Facility: CLINIC | Age: 64
End: 2024-09-10
Payer: COMMERCIAL

## 2024-09-10 NOTE — PROGRESS NOTES
"CLINICAL CLEARANCE FOR OUTPATIENT INFUSION      Patient to be scheduled for  New Start of Orencia [facility]. She has follow-up with ID who did not feel that her clinical situation and pathology was consistent with osteomyelitis requiring formal IV antibiotics.  She has been restarted on her RA medications.       For Diagnosis: Rheumatoid Arthritis     Dosing is weight based at:     60 to 100 k mg    With a Dosing weight of: 95.7 kg  For a total dose of: 750 mg every 2 weeks x3 (induction) then every 4 weeks thereafter (maintenance)    Labs…  T-Spot Drawn/Results:   Lab Results   Component Value Date    TBSIN Negative 2024    TBGRES Negative  Reference range: NEGATIVE   2020      Hep B SAg Drawn/Results:   Lab Results   Component Value Date    HEPBSAG NEGATIVE 2020      No results found for: \"NONUHFIRE\", \"NONUHSWGH\", \"NONUHFISH\", \"EXTHEPBSAG\"    Premeds ordered? No    Does the patient have a diagnosis of COPD? No  (If yes assure provider aware and okay to proceed. May cause COPD exacerbations)   Patient Active Problem List   Diagnosis    Chronic pain of both shoulders    Chronic pain of left ankle    Acquired immunocompromised state (Multi)    Allergic rhinitis    ALEXANDER positive    Artificial knee joint present    Asthma (HHS-HCC)    Atopic dermatitis    Basal cell carcinoma of skin of other parts of face    Bilateral sacroiliitis (CMS-HCC)    Calculus of gallbladder with acute on chronic cholecystitis without obstruction    Chronic diarrhea    Gastroesophageal reflux disease    Benign essential hypertension    Hyperlipidemia    Hypothyroid    Immunodeficiency (Multi)    Migraine    Myalgia    Neuropathy    Obesity    Osteoarthritis of knee    Paresthesia    Postmenopausal bleeding    Seropositive rheumatoid arthritis of multiple joints (Multi)    Spondylosis of lumbosacral spine without myelopathy    Vitamin D deficiency    Other chronic pain    PONV (postoperative nausea and vomiting)    " Allergic rhinitis due to pollen    Displacement of cervical intervertebral disc without myelopathy    DJD (degenerative joint disease), ankle and foot, right    Headache    History of arthrodesis    Left foot pain    Pain in right foot    Pain in joint involving ankle and foot    Pain of right upper extremity    Posterior tibial tendon dysfunction (PTTD) of left lower extremity    Rheumatoid arthritis with positive rheumatoid factor (Multi)    Skin sensation disturbance    Osteopenia with high risk of fracture    Nonunion of subtalar arthrodesis    Impingement of anterior part of ankle    Painful orthopaedic hardware (CMS-HCC)    BMI 40.0-44.9, adult (Multi)      Past Medical History:   Diagnosis Date    AB (asthmatic bronchitis) ([facility])     Acquired immunocompromised state (Multi)     Autoimmune RA, On Orencia every 28 days, followed by rheumatology    Actinic keratoses     Allergic rhinitis     Asthma ([facility])     only used inhaler 4-5 times this entire year, triggers are seasonal allergies    Bilateral sacroiliitis (CMS-HCC)     Chronic diarrhea     Chronic pain     Chronic sinusitis     DDD (degenerative disc disease), cervical     C5,6 BULGE    DDD (degenerative disc disease), lumbar     L4,5 BULGE    DJD (degenerative joint disease)     right ankle and foot    ETD (eustachian tube dysfunction)     GERD (gastroesophageal reflux disease)     HTN (hypertension)     Hyperlipidemia     Hypothyroidism     Joint pain     Lateral epicondylitis of elbow     Low back pain     hronic, folowed by pain management    Lumbosacral disc disease     Migraine     Neuropathy     left foot N/T and hands bilateral    Osteoarthritis     Osteopenia     PONV (postoperative nausea and vomiting)     PTTD (posterior tibial tendon dysfunction)     left lower extremity    Seronegative rheumatoid arthritis (Multi)     Spondylosis of 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     Vitamin D deficiency         Urine Hcg test ordered  prior to first infusion? Not applicable (If female pt <60 years of age and with reproductive capability)    Last infusion received: New start (if continuation) Due: New start. OK to schedule     Okay to schedule for treatment as ordered by prescribing provider.

## 2024-09-11 ENCOUNTER — TELEPHONE (OUTPATIENT)
Dept: RHEUMATOLOGY | Facility: CLINIC | Age: 64
End: 2024-09-11
Payer: COMMERCIAL

## 2024-09-11 NOTE — TELEPHONE ENCOUNTER
PT CALLED, STATES SHE SAW DR ALDRICH & HAS SOME INFORMATION FOR YOU. PER PT HE TOLD HER THAT HER OSTEOMYELITIS IS COMING FROM HER RA OR SOME OTHER NON BACTERIAL SOURCE. PT STATES HER TISSUE Bx & BONE bX WERE GOOD, ONLY SHOWED BONE DETORTION. ARVA HAS BEEN ON HOLD & PT STATES DR ALDRICH SUGGESTED SHE GO BACK ON FULL RA REGIMEN. PLEASE ADVISE HOW TO PROCEED

## 2024-09-12 NOTE — TELEPHONE ENCOUNTER
Yes, we did discuss and they agree no active infection and to go ahead with tx. The expectation is the she continues to improve with treatment. My apologies, I thought she was contacted about this. Let us know how she is doing within a a month or sooner if doing worse.

## 2024-09-17 LAB
FUNGUS SPEC CULT: NORMAL
FUNGUS SPEC FUNGUS STN: NORMAL

## 2024-09-20 ENCOUNTER — HOSPITAL ENCOUNTER (OUTPATIENT)
Dept: RADIOLOGY | Facility: CLINIC | Age: 64
Discharge: HOME | End: 2024-09-20
Payer: COMMERCIAL

## 2024-09-20 ENCOUNTER — OFFICE VISIT (OUTPATIENT)
Dept: ORTHOPEDIC SURGERY | Facility: CLINIC | Age: 64
End: 2024-09-20
Payer: COMMERCIAL

## 2024-09-20 DIAGNOSIS — M96.0 NONUNION OF SUBTALAR ARTHRODESIS: ICD-10-CM

## 2024-09-20 DIAGNOSIS — M25.879 IMPINGEMENT OF ANTERIOR PART OF ANKLE: ICD-10-CM

## 2024-09-20 PROCEDURE — 73610 X-RAY EXAM OF ANKLE: CPT | Mod: LT

## 2024-09-20 PROCEDURE — 1036F TOBACCO NON-USER: CPT | Performed by: STUDENT IN AN ORGANIZED HEALTH CARE EDUCATION/TRAINING PROGRAM

## 2024-09-20 PROCEDURE — 99211 OFF/OP EST MAY X REQ PHY/QHP: CPT | Performed by: STUDENT IN AN ORGANIZED HEALTH CARE EDUCATION/TRAINING PROGRAM

## 2024-09-20 ASSESSMENT — PAIN SCALES - GENERAL: PAINLEVEL_OUTOF10: 3

## 2024-09-20 ASSESSMENT — PAIN - FUNCTIONAL ASSESSMENT: PAIN_FUNCTIONAL_ASSESSMENT: 0-10

## 2024-09-21 NOTE — PROGRESS NOTES
ORTHOPAEDIC SURGERY PROGRESS NOTE    Chief Complaint:  Left foot pain    History Of Present Illness  Maureen Vela is a 64 y.o. female who presents for complex evaluation of left foot pain, self-referred.  Patient has previously undergone 3 surgeries with an [facility] podiatrist.  The first was a tendon transfer of which the details are unclear at this time, the second was a left subtalar joint arthrodesis from 06/28/2023 followed by a revision subtalar joint arthrodesis from 02/07/2024.  The patient continues with severe pain that is activity dependent.  Typically rated as 5-9 out of 10.  She has been ambulating with use of a boot and crutch.  By report, the patient's previous provider has stopped doing surgery due to a medical condition.  She was quite happy with her previous practice, however sought an orthopedic surgery perspective.  Prior to her index surgery she reported pain on the outside of her foot that radiated to the top.  This is yet unresolved.  Patient works as a  and is on her feet extensively throughout the day.  She denies any fevers, chills or night sweats but does report paresthesias on the outside of her foot.    Patient has a past medical history of rheumatoid arthritis, she has been off of her rheumatologic medications from the winter 2023.  She has been using a bone stimulator.    07/12/2024: Patient returns for follow-up of her left foot pain as above.  She continues with severe pain in her left foot and ankle.  Rated as 8 out of 10.  Pain is improved with boot wear.  She has been ambulating with use of crutches.  She is reporting a burning pain on the lateral side of her foot, this is unchanged from her previous operations.  Patient also notes discomfort when walking on the back of her heel from a prominent screw head.    09/06/2024: Patient returns s/p left foot hardware removal and bone biopsy from 08/26/2024.  Patient's intraoperative cultures were negative but  biopsy of the talus was consistent with osteomyelitis.  She has been compliant with nonweightbearing restrictions and reporting 2 out of 10 pain.  She has been following closely with her rheumatologist.    09/20/2024: Patient returns s/p left foot hardware removal and bone biopsy from 08/26/2024.  She is currently reporting 3 out of 10 pain.  She has been compliant with nonweightbearing restrictions in a walking boot and crutches.  She has follow-up with ID who did not feel that her clinical situation and pathology was consistent with osteomyelitis requiring formal IV antibiotics.  She has been restarted on her RA medications.    Past Medical History  Past Medical History:   Diagnosis Date    AB (asthmatic bronchitis) ([facility])     Acquired immunocompromised state (Multi)     Autoimmune RA, On Orencia every 28 days, followed by rheumatology    Actinic keratoses     Allergic rhinitis     Asthma ([facility])     only used inhaler 4-5 times this entire year, triggers are seasonal allergies    Bilateral sacroiliitis (CMS-HCC)     Chronic diarrhea     Chronic pain     Chronic sinusitis     DDD (degenerative disc disease), cervical     C5,6 BULGE    DDD (degenerative disc disease), lumbar     L4,5 BULGE    DJD (degenerative joint disease)     right ankle and foot    ETD (eustachian tube dysfunction)     GERD (gastroesophageal reflux disease)     HTN (hypertension)     Hyperlipidemia     Hypothyroidism     Joint pain     Lateral epicondylitis of elbow     Low back pain     hronic, folowed by pain management    Lumbosacral disc disease     Migraine     Neuropathy     left foot N/T and hands bilateral    Osteoarthritis     Osteopenia     PONV (postoperative nausea and vomiting)     PTTD (posterior tibial tendon dysfunction)     left lower extremity    Seronegative rheumatoid arthritis (Multi)     Spondylosis of 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     Vitamin D deficiency        Surgical History  Recent Surgeries  in Orthopaedic Surgery            No cases to display             Social History  Social History     Socioeconomic History    Marital status:    Tobacco Use    Smoking status: Never    Smokeless tobacco: Never   Vaping Use    Vaping status: Never Used   Substance and Sexual Activity    Alcohol use: Not Currently     Comment: RARELY    Drug use: Never    Sexual activity: Defer     Partners: Male     Birth control/protection: Post-menopausal, Female Sterilization       Family History  Family History   Problem Relation Name Age of Onset    Arthritis Mother Mom     Alzheimer's disease Father Dad     Breast cancer Sister Sima 42    Alzheimer's disease Sister Joselyn     Breast cancer Mother's Sister  63    Stroke Maternal Grandfather Grandpa         Allergies  Allergies   Allergen Reactions    Oxycodone-Acetaminophen Nausea/vomiting    Sulfa (Sulfonamide Antibiotics) Headache    Sulfamethoxazole-Trimethoprim Rash    Balsam [place] Hives     Pt states in dental adhesives and make up    Clarithromycin Itching and Rash    Hydroxychloroquine Hives and Rash    Methotrexate Hives and Rash       Review of Systems  REVIEW OF SYSTEMS  Constitutional: no unplanned weight loss  Psychiatric: no suicidal ideation  ENT: no vision changes, no sinus problems  Pulmonary: no shortness of breath  Lymphatic: no enlarged lymph nodes  Cardiovascular: no chest pain or shortness of breath  Gastrointestinal: no stomach problems  Genitourinary: no dysuria   Skin: no rashes  Endocrine: no thyroid problems  Neurological: no headache, no numbness  Hematological: no easy bruising  Musculoskeletal: Left foot pain    Physical Exam  PHYSICAL EXAMINATION  Constitutional Exam: well developed and well nourished  Psychiatric Exam: alert and oriented, appropriate mood and behavior  Eye Exam: EOMI  Pulmonary Exam: breathing non-labored, no apparent distress  Lymphatic exam: no appreciable lymphadenopathy in the lower extremities  Cardiovascular exam: RRR  to peripheral palpation, DP pulses 2+, PT 2+, toes are pink with good capillary refill, no pitting edema  Skin exam: no open lesions, rashes, abrasions or ulcerations  Neurological exam: sensation to light touch intact in both lower extremities in peripheral and dermatomal distributions (except for any abnormalities noted in musculoskeletal exam)    Musculoskeletal exam: Left lower extremity examination.  Patient anterior ankle incision well-healed with suture line intact, posterior calcaneal incision also well-healed with suture line intact.  Patient has improved ankle range of motion without obvious effusion. Patient has sensation intact light touch grossly in a saphenous, sural, superficial peroneal, deep peroneal and tibial nerve distribution.  She has intact PF/DF/EHL.  She has 2+ DP/PT pulse palpated.    Last Recorded Vitals  There were no vitals taken for this visit.    Laboratory Results    No results found for this or any previous visit (from the past 24 hour(s)).    Radiology Results   X-ray imaging 3 view weightbearing left ankle reviewed and independently evaluated by me on 09/20/2024 demonstrating no acute fracture or dislocation.  There appears to be interval consolidation of the previous screw tracks.    Assessment/Plan:  64-year-old female who presents for complex evaluation of painful and multiply revised left subtalar arthrodesis by [facility] podiatrist with sural nerve neuritis and anterior ankle impingement s/p L HARSHAL and bone biopsy from 08/26/2024. I have reviewed the diagnosis and treatment options extensively with the patient.  I would recommend the patient begin weightbearing to her tolerance in the left lower extremity, due to pain understandably the patient will likely continue nonweightbearing with the use of a boot and crutches.  She has not yet been using her bone stimulator to this junction.  In anticipation of her second stage surgery we will plan for approximately 2 months for left revision  subtalar arthrodesis with BMAC/graft as well as possible ICBG allograft and BMP.  I will plan to see the patient back in approximately 1 month to follow her clinical course.  Upon return, patient require three-view weightbearing left ankle.    Marlon Stafford MD, GLENN  Department of Orthopaedic Surgery  [facility]    The diagnosis and treatment plan were reviewed with the patient. All questions were answered. The patient verbalized understanding of the treatment plan. There were no barriers to understanding identified.    Note dictated with [employer] software.  Completed without full type editing and sent to avoid delay.

## 2024-09-23 DIAGNOSIS — M96.0 NONUNION OF SUBTALAR ARTHRODESIS: ICD-10-CM

## 2024-09-23 LAB
FUNGUS SPEC CULT: NORMAL
FUNGUS SPEC FUNGUS STN: NORMAL

## 2024-09-25 ENCOUNTER — DOCUMENTATION (OUTPATIENT)
Dept: PHYSICAL THERAPY | Facility: CLINIC | Age: 64
End: 2024-09-25
Payer: COMMERCIAL

## 2024-09-25 NOTE — PROGRESS NOTES
Physical Therapy    Discharge Summary    Name: Maureen Vela  MRN: [id number]  : 1960  Date: 24    Discharge Summary: PT    Discharge Information: Date of discharge 24, Date of last visit 23, and Date of evaluation 23    Therapy Summary: bilateral shoulder pain and left ankle pain. Pt was seen 22 times for PT. Gradual improvement.        Rehab Discharge Reason: Achieved all and/or the most significant goals(s)

## 2024-10-08 ENCOUNTER — APPOINTMENT (OUTPATIENT)
Dept: PAIN MEDICINE | Facility: CLINIC | Age: 64
End: 2024-10-08
Payer: COMMERCIAL

## 2024-10-08 ENCOUNTER — APPOINTMENT (OUTPATIENT)
Dept: INFUSION THERAPY | Facility: CLINIC | Age: 64
End: 2024-10-08
Payer: COMMERCIAL

## 2024-10-08 VITALS
WEIGHT: 221.3 LBS | SYSTOLIC BLOOD PRESSURE: 142 MMHG | DIASTOLIC BLOOD PRESSURE: 80 MMHG | HEART RATE: 64 BPM | TEMPERATURE: 98 F | BODY MASS INDEX: 40.48 KG/M2 | OXYGEN SATURATION: 100 % | RESPIRATION RATE: 18 BRPM

## 2024-10-08 DIAGNOSIS — M05.79 SEROPOSITIVE RHEUMATOID ARTHRITIS OF MULTIPLE JOINTS (MULTI): ICD-10-CM

## 2024-10-08 PROCEDURE — 96365 THER/PROPH/DIAG IV INF INIT: CPT | Performed by: NURSE PRACTITIONER

## 2024-10-08 RX ORDER — DIPHENHYDRAMINE HYDROCHLORIDE 50 MG/ML
50 INJECTION INTRAMUSCULAR; INTRAVENOUS AS NEEDED
OUTPATIENT
Start: 2024-11-05

## 2024-10-08 RX ORDER — EPINEPHRINE 0.3 MG/.3ML
0.3 INJECTION SUBCUTANEOUS EVERY 5 MIN PRN
OUTPATIENT
Start: 2024-11-05

## 2024-10-08 RX ORDER — ALBUTEROL SULFATE 0.83 MG/ML
3 SOLUTION RESPIRATORY (INHALATION) AS NEEDED
OUTPATIENT
Start: 2024-11-05

## 2024-10-08 RX ORDER — FAMOTIDINE 10 MG/ML
20 INJECTION INTRAVENOUS ONCE AS NEEDED
OUTPATIENT
Start: 2024-11-05

## 2024-10-08 ASSESSMENT — ENCOUNTER SYMPTOMS
MYALGIAS: 1
NAUSEA: 0
ABDOMINAL PAIN: 0
DIARRHEA: 0
FATIGUE: 0
SHORTNESS OF BREATH: 0
ARTHRALGIAS: 1
FREQUENCY: 0
LEG SWELLING: 0
VOMITING: 0
FEVER: 0
COUGH: 0
CONSTIPATION: 0

## 2024-10-08 ASSESSMENT — PAIN SCALES - GENERAL: PAINLEVEL: 6

## 2024-10-08 NOTE — PROGRESS NOTES
[facility]   Infusion Clinic Note   Date: 2024   Name: Maureen Vela  : 1960   MRN: [id number]          Reason for Visit: OP Infusion (Orencia)         Today: We administered abatacept (Orencia) 750 mg in sodium chloride 0.9% 100 mL IV.       Visit Type: INFUSION- First Maintenance Dose- Every 28 days       Ordered By: Dr. Vargas       Accompanied by:Self       Diagnosis: Seropositive rheumatoid arthritis of multiple joints (Multi)        Allergies:   Allergies as of 10/08/2024 - Reviewed 10/08/2024   Allergen Reaction Noted    Oxycodone-acetaminophen Nausea/vomiting 10/04/2023    Sulfa (sulfonamide antibiotics) Headache 10/04/2023    Sulfamethoxazole-trimethoprim Rash 2019    Balsam peru Hives 10/04/2023    Clarithromycin Itching and Rash 2019    Hydroxychloroquine Hives and Rash 10/04/2023    Methotrexate Hives and Rash 2019          Current Medications has a current medication list which includes the following prescription(s): abatacept 750 mg in sodium chloride 0.9% 70 mL IV, acetaminophen, albuterol, amlodipine, amoxicillin, aspirin, azelastine-fluticasone, cholecalciferol, clonidine, ergocalciferol, folic acid, levocetirizine, levothyroxine, montelukast, multivitamin, omeprazole, ondansetron, propranolol la, rizatriptan, tizanidine, tramadol, and triamcinolone, and the following Facility-Administered Medications: acetaminophen, albuterol, cefazolin, droperidol, hydrocodone-acetaminophen, hydromorphone, hydromorphone, ipratropium, lidocaine pf, ondansetron, and oxygen.       Vitals:   Vitals:    10/08/24 1405 10/08/24 1508   BP: 161/80 139/80   Pulse: 82 69   Resp: 18 17   Temp: 36.7 °C (98.1 °F) 36.5 °C (97.7 °F)   TempSrc: Temporal    SpO2: 100%    Weight: 100 kg (221 lb 4.8 oz)    PainSc:   6    PainLoc: Finger              Infusion Pre-procedure Checklist:   - Allergies reviewed: yes   - Medications reviewed: yes       - Previous reaction to current  treatment: no      Assess patient for the concerns below. Document provider notification as appropriate.  - Active or recent infection with/without current antibiotic use: no, Finished doxycycline on 9/30/2024  - Recent or planned invasive dental work: no  - Recent or planned surgeries: Yes - Left ankle surgery scheduled 11/19/2024  - Recently received or plans to receive vaccinations: no  - Has treatment related toxicities: no  - Is pregnant:  no- s/p hysterectomy      Pain: 5   - Is the pain different from normal: yes   - Is your Doctor aware:  yes       Labs:  REVIEWED          Fall Risk Screening: Quiroga Fall Risk  History of Falling, Immediate or Within 3 Months: No  Secondary Diagnosis: Yes  Ambulatory Aid: Crutches/cane/walker  Intravenous Therapy/Heparin Lock: No  Gait/Transferring: Impaired   Mental Status: Oriented to own ability  Quiroga Fall Risk Score: 50       Review Of Systems:  Review of Systems   Constitutional:  Negative for fatigue and fever.   Respiratory:  Negative for cough and shortness of breath.    Cardiovascular:  Negative for chest pain and leg swelling.   Gastrointestinal:  Negative for abdominal pain, constipation, diarrhea, nausea and vomiting.   Genitourinary:  Negative for frequency.    Musculoskeletal:  Positive for arthralgias, gait problem and myalgias.   Skin:  Positive for rash.        Pink dry rash noted to BUE- Patient reporting she had a reaction to the sun while taking doxycycline.  Patient reports OTC cream has improved rash.  Proceed with infusion per Dr. Cuelalr (Dermatologist)  and Dr. Alicia denton.   Neurological:  Positive for gait problem.        Left orthotic boot intact- Minimal weight baring - ambulating with crutches   All other systems reviewed and are negative.        ROS completed? Yes      Infusion Readiness:  - Assessment Concerns Related to Infusion: No  - Provider notified: no      Document Below Only If Indicated:   New Patient Education:    N/A (returning patient  "for continuation of therapy. Ongoing education provided as needed.)        Treatment Conditions & Drug Specific Questions:    Abatacept  (ORENCIA)    (Unless otherwise specified on patient specific therapy plan):    TREATMENT CONDITIONS  Unless otherwise specified on patient specific therapy plan hold treatment and notify provider prior to proceeding with infusion if patient with a:  o Positive T-Spot  o Positive Hepatitis B Surface Ag   o Positive Hepatitis B Core Antibody   o Positive Hepatitis C Antibody     Lab Results   Component Value Date    TBSIN Negative 04/29/2024    TBGRES Negative  Reference range: NEGATIVE   05/26/2020      Lab Results   Component Value Date    HEPBSAG NEGATIVE 05/26/2020      Lab Results   Component Value Date    HEPCAB  05/26/2020     Negative  Reference range: NEGATIVE  Performed at [facility] unless   otherwise noted.        No results found for: \"NONUHFIRE\", \"NONUHSWGH\", \"NONUHFISH\", \"EXTHEPBSAG\"    Labs reviewed and patient meets treatment conditions? Yes    DRUG SPECIFIC QUESTIONS  Any history of COPD?  No  (If yes educate patient on possible s/s exacerbation)    If Yes any new or worsening s/s related to COPD?  Not applicable  (If patient with worsening COPD notify prescribing provider prior to proceeding with infusion)      REMINDER:  - Pregnancy Category X Drug. Obtain negative pregnancy test prior to FIRST infusion  and educate patient on risk in women of childbearing ability.   - Weight based drug.     Recommended Vitals/Observation:  Vitals: Obtain vital signs at the start; middle and end of infusion as well as at the end of observation period: 30 minutes post.   Observation: Observe patient for 30 minutes after each infusion        Weight Based Drug Calculations:    WEIGHT BASED DRUGS: Abatacept  (ORENCIA)   Patient's dosing weight (kg): 60KG  KG+ 750 MG        Patient's weight today:   Vitals:    10/08/24 1405   Weight: 100 kg (221 lb " 4.8 oz)         weight range for prescribed dose: Orencia:  <60 k mg  60 to 100 k mg  >100 k g    Patient weight today falls outside of 10% variance or  weight range: Not applicable     Home Care pharmacist informed of weight variance: No    Doses that are weight based have an acceptable variance rule within 10% of the prescribed   order and/or within  weight range. If patient weight on day of infusion falls   outside of the 10% variance, or weight range, infusion is administered and   pharmacy contacted regarding future dosing adjustments, per policy.    1545   Patient tolerated infusion well.  30 minute post infusion observation complete.  VSS.  No s/s adverse reaction noted.  RTC in 28 days- will schedule next appointment after infusion complete.        Initiated By: Amy Benavides RN

## 2024-10-08 NOTE — PATIENT INSTRUCTIONS
Today :We administered abatacept (Orencia) 750 mg in sodium chloride 0.9% 100 mL IV.     For:   1. Seropositive rheumatoid arthritis of multiple joints (Multi)         Your next appointment is due in:  in 28 days from today        Please read the  Medication Guide that was given to you and reviewed during todays visit.     (Tell all doctors including dentists that you are taking this medication)     Go to the emergency room or call 911 if:  -You have signs of allergic reaction:   -Rash, hives, itching.   -Swollen, blistered, peeling skin.   -Swelling of face, lips, mouth, tongue or throat.   -Tightness of chest, trouble breathing, swallowing or talking     Call your doctor:  - If IV / injection site gets red, warm, swollen, itchy or leaks fluid or pus.     (Leave dressing on your IV site for at least 2 hours and keep area clean and dry  - If you get sick or have symptoms of infection or are not feeling well for any reason.    (Wash your hands often, stay away from people who are sick)  - If you have side effects from your medication that do not go away or are bothersome.     (Refer to the teaching your nurse gave you for side effects to call your doctor about)    - Common side effects may include:  stuffy nose, headache, feeling tired, muscle aches, upset stomach  - Before receiving any vaccines     - Call the [facility] at   If:  - You get sick, are on antibiotics, have had a recent vaccine, have surgery or dental work and your doctor wants your visit rescheduled.  - You need to cancel and reschedule your visit for any reason. Call at least 2 days before your visit if you need to cancel.   - Your insurance changes before your next visit.    (We will need to get approval from your new insurance. This can take up to two weeks.)     The [facility] is opened Monday thru Friday. We are closed on weekends and holidays.   Voice mail will take your call if the center is closed. If you  leave a message please allow 24 hours for a call back during weekdays. If you leave a message on a weekend/holiday, we will call you back the next business day.

## 2024-10-10 ENCOUNTER — OFFICE VISIT (OUTPATIENT)
Dept: PAIN MEDICINE | Facility: CLINIC | Age: 64
End: 2024-10-10
Payer: COMMERCIAL

## 2024-10-10 VITALS
HEIGHT: 62 IN | BODY MASS INDEX: 40.67 KG/M2 | WEIGHT: 221 LBS | DIASTOLIC BLOOD PRESSURE: 89 MMHG | OXYGEN SATURATION: 99 % | HEART RATE: 73 BPM | SYSTOLIC BLOOD PRESSURE: 146 MMHG

## 2024-10-10 DIAGNOSIS — Z13.0 SCREENING FOR ENDOCRINE, NUTRITIONAL, METABOLIC AND IMMUNITY DISORDER: ICD-10-CM

## 2024-10-10 DIAGNOSIS — M46.1 BILATERAL SACROILIITIS (CMS-HCC): ICD-10-CM

## 2024-10-10 DIAGNOSIS — Z13.21 SCREENING FOR ENDOCRINE, NUTRITIONAL, METABOLIC AND IMMUNITY DISORDER: ICD-10-CM

## 2024-10-10 DIAGNOSIS — Z13.228 SCREENING FOR ENDOCRINE, NUTRITIONAL, METABOLIC AND IMMUNITY DISORDER: ICD-10-CM

## 2024-10-10 DIAGNOSIS — Z13.29 SCREENING FOR ENDOCRINE, NUTRITIONAL, METABOLIC AND IMMUNITY DISORDER: ICD-10-CM

## 2024-10-10 DIAGNOSIS — M19.071 DJD (DEGENERATIVE JOINT DISEASE), ANKLE AND FOOT, RIGHT: Primary | ICD-10-CM

## 2024-10-10 PROCEDURE — 99401 PREV MED CNSL INDIV APPRX 15: CPT | Performed by: NURSE PRACTITIONER

## 2024-10-10 PROCEDURE — 3077F SYST BP >= 140 MM HG: CPT | Performed by: NURSE PRACTITIONER

## 2024-10-10 PROCEDURE — 99214 OFFICE O/P EST MOD 30 MIN: CPT | Performed by: NURSE PRACTITIONER

## 2024-10-10 PROCEDURE — 3079F DIAST BP 80-89 MM HG: CPT | Performed by: NURSE PRACTITIONER

## 2024-10-10 PROCEDURE — 3008F BODY MASS INDEX DOCD: CPT | Performed by: NURSE PRACTITIONER

## 2024-10-10 ASSESSMENT — PAIN SCALES - GENERAL
PAINLEVEL: 6
PAINLEVEL_OUTOF10: 6

## 2024-10-10 ASSESSMENT — PAIN - FUNCTIONAL ASSESSMENT: PAIN_FUNCTIONAL_ASSESSMENT: 0-10

## 2024-10-10 ASSESSMENT — PAIN DESCRIPTION - DESCRIPTORS: DESCRIPTORS: ACHING

## 2024-10-11 ENCOUNTER — LAB (OUTPATIENT)
Dept: LAB | Facility: LAB | Age: 64
End: 2024-10-11
Payer: COMMERCIAL

## 2024-10-11 DIAGNOSIS — Z13.228 SCREENING FOR ENDOCRINE, NUTRITIONAL, METABOLIC AND IMMUNITY DISORDER: ICD-10-CM

## 2024-10-11 DIAGNOSIS — Z13.29 SCREENING FOR ENDOCRINE, NUTRITIONAL, METABOLIC AND IMMUNITY DISORDER: ICD-10-CM

## 2024-10-11 DIAGNOSIS — Z13.0 SCREENING FOR ENDOCRINE, NUTRITIONAL, METABOLIC AND IMMUNITY DISORDER: ICD-10-CM

## 2024-10-11 DIAGNOSIS — Z13.21 SCREENING FOR ENDOCRINE, NUTRITIONAL, METABOLIC AND IMMUNITY DISORDER: ICD-10-CM

## 2024-10-11 LAB
ALBUMIN SERPL BCP-MCNC: 4.2 G/DL (ref 3.4–5)
ALP SERPL-CCNC: 59 U/L (ref 33–136)
ALT SERPL W P-5'-P-CCNC: 20 U/L (ref 7–45)
ANION GAP SERPL CALCULATED.3IONS-SCNC: 12 MMOL/L (ref 10–20)
AST SERPL W P-5'-P-CCNC: 17 U/L (ref 9–39)
BILIRUB SERPL-MCNC: 0.5 MG/DL (ref 0–1.2)
BUN SERPL-MCNC: 9 MG/DL (ref 6–23)
CALCIUM SERPL-MCNC: 9.2 MG/DL (ref 8.6–10.3)
CHLORIDE SERPL-SCNC: 108 MMOL/L (ref 98–107)
CO2 SERPL-SCNC: 29 MMOL/L (ref 21–32)
CREAT SERPL-MCNC: 0.68 MG/DL (ref 0.5–1.05)
EGFRCR SERPLBLD CKD-EPI 2021: >90 ML/MIN/1.73M*2
EST. AVERAGE GLUCOSE BLD GHB EST-MCNC: 100 MG/DL
FERRITIN SERPL-MCNC: 78 NG/ML (ref 8–150)
GLUCOSE SERPL-MCNC: 91 MG/DL (ref 74–99)
HBA1C MFR BLD: 5.1 %
INSULIN P FAST SERPL-ACNC: 13 UIU/ML (ref 3–25)
POTASSIUM SERPL-SCNC: 4 MMOL/L (ref 3.5–5.3)
PROT SERPL-MCNC: 6.4 G/DL (ref 6.4–8.2)
SODIUM SERPL-SCNC: 145 MMOL/L (ref 136–145)
VIT B12 SERPL-MCNC: 416 PG/ML (ref 211–911)

## 2024-10-11 PROCEDURE — 36415 COLL VENOUS BLD VENIPUNCTURE: CPT

## 2024-10-11 PROCEDURE — 82728 ASSAY OF FERRITIN: CPT

## 2024-10-11 PROCEDURE — 82607 VITAMIN B-12: CPT

## 2024-10-11 PROCEDURE — 83036 HEMOGLOBIN GLYCOSYLATED A1C: CPT

## 2024-10-11 PROCEDURE — 83525 ASSAY OF INSULIN: CPT

## 2024-10-11 PROCEDURE — 80053 COMPREHEN METABOLIC PANEL: CPT

## 2024-10-13 RX ORDER — TRAMADOL HYDROCHLORIDE 50 MG/1
50 TABLET ORAL EVERY 12 HOURS PRN
Qty: 60 TABLET | Refills: 2 | Status: SHIPPED | OUTPATIENT
Start: 2024-10-23 | End: 2024-11-22

## 2024-10-13 NOTE — ASSESSMENT & PLAN NOTE
Orders:    traMADol (Ultram) 50 mg tablet; Take 1 tablet (50 mg) by mouth every 12 hours if needed for severe pain (7 - 10). Do not fill before October 23, 2024.

## 2024-10-13 NOTE — PROGRESS NOTES
Subjective   Patient ID: Maureen Vela is a 64 y.o. female who presents for Med Refill.    HPI 65 YO Female with a PMHx significant for Anxiety, HLD, Migraines, GERD, Myalgias, Vitamin D Deficiency, Chronic Pain, Lumbosacral Spondylosis, HTN and RA. She presents for a pain management follow-up and medication refill. In September Maureen underwent removal of hardware to her left foot and ankle with a bone bx. The bone bx of the talus was consistent with chronic osteomyelitis and pseudoarthrosis. She is scheduled for November 19th for a left foot excision bone with bone graft due to left foot arthrodesis/nonunion of subtalar arthrodesis.    Review of Systems Unless noted in the HPI all other systems have been reviewed and are negative for complaint    Objective   Physical Exam  General: No acute distress, well appearing and well nourished.  Eyes Conjunctiva and lids: No erythema, swelling or discharge  Neck: Supple, no cervical lymphadenopathy.  Pulmonary: Respiratory effort: Normal respiration.  Cardiovascular: Normal rate.  Examination of extremities: LLE partial weight bearing.   Abdomen: Non-distended, no obvious abdominal masses.  Musculoskeletal: Range of motion: reduced ROM to the spine and LLE.   Skin: Skin and subcutaneous tissue: Normal without rashes or lesions.  Neurologic: Reflexes: Normal. Coordination: LLE only weight-bearing when boot is on, use of rollator/scooter for distance ambulation, use of cane/crutches for short distances when boot is on.  Psychiatric: Orientation to person, place, and time: Normal. Mood and affect: Normal.    Assessment/Plan   Assessment & Plan  Screening for endocrine, nutritional, metabolic and immunity disorder    Orders:    Comprehensive Metabolic Panel; Future    Ferritin; Future    Hemoglobin A1C; Future    Insulin, Fasting; Future    Vitamin B12; Future    Bilateral sacroiliitis (CMS-HCC)    Orders:    traMADol (Ultram) 50 mg tablet; Take 1 tablet (50 mg) by mouth  every 12 hours if needed for severe pain (7 - 10). Do not fill before October 23, 2024.    DJD (degenerative joint disease), ankle and foot, right    Orders:    traMADol (Ultram) 50 mg tablet; Take 1 tablet (50 mg) by mouth every 12 hours if needed for severe pain (7 - 10). Do not fill before October 23, 2024.    TREATMENT PLAN:  I had a nice discussion with the patient today and our plan will be as follows:  Radiology: Reviewed available imaging.   Physically: Will defer to ortho regarding LLE.   Psychologically: No acute psychological needs at this time.  Medication: I will refill the patient's opioids today for [ 3 ] month.  The patient continues to see benefit and improvement in their quality of life and ability to maintain ADLs.  Patient educated about the risks of taking opioids and operating a motor vehicle.  Patient reports no adverse side effects to current medication regimen.  Current regimen does allow patient to maintain ADLs.  Patient reports no new neurologic symptoms, new pain areas, or exacerbation in pain today.  Patient reports they are happy with current treatment care path. Patient has been educated on the risks, benefits, and alternatives of controlled substances as well as the proper way to store these medications. The patient and I discussed the nature of this medication and its side effects.  We discussed tolerance, physical dependence, psychological dependence, addiction and opioid-induced hyperalgesia.  We discussed the potential need to wean from this medication.  We discussed the availability of programs that can help with this process if necessary.  We discussed safety issues related to opioids including safe storage.  We discussed the fact that the patient should not drive an automobile or operate heavy machinery while taking this medication.  A prescription for naloxone was offered to the patient.  The patient will be re-evaluated for the need to continue opioid therapy in 60-90  days.  Duration: Chronic/Ongoing  Intervention: Nothing at this time. Will defer to ortho until she is fully recovered.   Other: Discussed pt's obesity: BMI 40. Discussed its potential affect on worsening chronic pain through mechanical stress as well as neuro-inflammatory chemicals. This is in addition to contribution to metabolic syndrome and overal health and perioperative risk. Counseled on wt loss strategy. Will check nutritional/metabolic labs.

## 2024-10-13 NOTE — ASSESSMENT & PLAN NOTE
Orders:    Comprehensive Metabolic Panel; Future    Ferritin; Future    Hemoglobin A1C; Future    Insulin, Fasting; Future    Vitamin B12; Future

## 2024-10-18 ENCOUNTER — OFFICE VISIT (OUTPATIENT)
Dept: ORTHOPEDIC SURGERY | Facility: CLINIC | Age: 64
End: 2024-10-18
Payer: COMMERCIAL

## 2024-10-18 ENCOUNTER — HOSPITAL ENCOUNTER (OUTPATIENT)
Dept: RADIOLOGY | Facility: CLINIC | Age: 64
Discharge: HOME | End: 2024-10-18
Payer: COMMERCIAL

## 2024-10-18 DIAGNOSIS — M96.0 NONUNION OF SUBTALAR ARTHRODESIS: ICD-10-CM

## 2024-10-18 PROCEDURE — 99211 OFF/OP EST MAY X REQ PHY/QHP: CPT | Performed by: STUDENT IN AN ORGANIZED HEALTH CARE EDUCATION/TRAINING PROGRAM

## 2024-10-18 PROCEDURE — 73610 X-RAY EXAM OF ANKLE: CPT | Mod: LT

## 2024-10-18 PROCEDURE — 1036F TOBACCO NON-USER: CPT | Performed by: STUDENT IN AN ORGANIZED HEALTH CARE EDUCATION/TRAINING PROGRAM

## 2024-10-18 RX ORDER — CEPHALEXIN 500 MG/1
TABLET ORAL
Status: ON HOLD | COMMUNITY
Start: 2024-10-16

## 2024-10-18 ASSESSMENT — PAIN SCALES - GENERAL: PAINLEVEL_OUTOF10: 5 - MODERATE PAIN

## 2024-10-18 ASSESSMENT — PAIN DESCRIPTION - DESCRIPTORS: DESCRIPTORS: STABBING;ACHING

## 2024-10-18 NOTE — PROGRESS NOTES
ORTHOPAEDIC SURGERY PROGRESS NOTE    Chief Complaint:  Left foot pain    History Of Present Illness  Maureen Vela is a 64 y.o. female who presents for complex evaluation of left foot pain, self-referred.  Patient has previously undergone 3 surgeries with an [facility] podiatrist.  The first was a tendon transfer of which the details are unclear at this time, the second was a left subtalar joint arthrodesis from 06/28/2023 followed by a revision subtalar joint arthrodesis from 02/07/2024.  The patient continues with severe pain that is activity dependent.  Typically rated as 5-9 out of 10.  She has been ambulating with use of a boot and crutch.  By report, the patient's previous provider has stopped doing surgery due to a medical condition.  She was quite happy with her previous practice, however sought an orthopedic surgery perspective.  Prior to her index surgery she reported pain on the outside of her foot that radiated to the top.  This is yet unresolved.  Patient works as a  and is on her feet extensively throughout the day.  She denies any fevers, chills or night sweats but does report paresthesias on the outside of her foot.    Patient has a past medical history of rheumatoid arthritis, she has been off of her rheumatologic medications from the winter 2023.  She has been using a bone stimulator.    07/12/2024: Patient returns for follow-up of her left foot pain as above.  She continues with severe pain in her left foot and ankle.  Rated as 8 out of 10.  Pain is improved with boot wear.  She has been ambulating with use of crutches.  She is reporting a burning pain on the lateral side of her foot, this is unchanged from her previous operations.  Patient also notes discomfort when walking on the back of her heel from a prominent screw head.    09/06/2024: Patient returns s/p left foot hardware removal and bone biopsy from 08/26/2024.  Patient's intraoperative cultures were negative but  biopsy of the talus was consistent with osteomyelitis.  She has been compliant with nonweightbearing restrictions and reporting 2 out of 10 pain.  She has been following closely with her rheumatologist.    09/20/2024: Patient returns s/p left foot hardware removal and bone biopsy from 08/26/2024.  She is currently reporting 3 out of 10 pain.  She has been compliant with nonweightbearing restrictions in a walking boot and crutches.  She has follow-up with ID who did not feel that her clinical situation and pathology was consistent with osteomyelitis requiring formal IV antibiotics.  She has been restarted on her RA medications.    10/18/2024: Patient returns s/p left foot hardware removal and bone biopsy from 08/26/2024.  She is reporting 5 out of 10 pain.  She has continued to protect her weightbearing with a walking boot and crutches.  She has had a drug rash/response to doxycycline and underwent a biopsy for this.    Past Medical History  Past Medical History:   Diagnosis Date    AB (asthmatic bronchitis) ([facility])     Acquired immunocompromised state     Autoimmune RA, On Orencia every 28 days, followed by rheumatology    Actinic keratoses     Allergic rhinitis     Asthma     only used inhaler 4-5 times this entire year, triggers are seasonal allergies    Bilateral sacroiliitis ([facility])     Chronic diarrhea     Chronic pain     Chronic sinusitis     DDD (degenerative disc disease), cervical     C5,6 BULGE    DDD (degenerative disc disease), lumbar     L4,5 BULGE    DJD (degenerative joint disease)     right ankle and foot    ETD (eustachian tube dysfunction)     GERD (gastroesophageal reflux disease)     HTN (hypertension)     Hyperlipidemia     Hypothyroidism     Joint pain     Lateral epicondylitis of elbow     Low back pain     hronic, folowed by pain management    Lumbosacral disc disease     Migraine     Neuropathy     left foot N/T and hands bilateral    Osteoarthritis     Osteopenia     PONV  (postoperative nausea and vomiting)     PTTD (posterior tibial tendon dysfunction)     left lower extremity    Seronegative rheumatoid arthritis (Multi)     Spondylosis of 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     Vitamin D deficiency        Surgical History  Recent Surgeries in Orthopaedic Surgery            No cases to display             Social History  Social History     Socioeconomic History    Marital status:    Tobacco Use    Smoking status: Never    Smokeless tobacco: Never   Vaping Use    Vaping status: Never Used   Substance and Sexual Activity    Alcohol use: Not Currently     Comment: RARELY    Drug use: Never    Sexual activity: Defer     Partners: Male     Birth control/protection: Post-menopausal, Female Sterilization       Family History  Family History   Problem Relation Name Age of Onset    Arthritis Mother Mom     Alzheimer's disease Father Dad     Breast cancer Sister Sima 42    Alzheimer's disease Sister Joselyn     Breast cancer Mother's Sister  63    Stroke Maternal Grandfather Grandpa         Allergies  Allergies   Allergen Reactions    Doxycycline Rash     Severe rash or burn.    Oxycodone-Acetaminophen Nausea/vomiting    Sulfa (Sulfonamide Antibiotics) Headache    Sulfamethoxazole-Trimethoprim Rash    Balsam [place] Hives     Pt states in dental adhesives and make up    Clarithromycin Itching and Rash    Hydroxychloroquine Hives and Rash    Methotrexate Hives and Rash       Review of Systems  REVIEW OF SYSTEMS  Constitutional: no unplanned weight loss  Psychiatric: no suicidal ideation  ENT: no vision changes, no sinus problems  Pulmonary: no shortness of breath  Lymphatic: no enlarged lymph nodes  Cardiovascular: no chest pain or shortness of breath  Gastrointestinal: no stomach problems  Genitourinary: no dysuria   Skin: no rashes  Endocrine: no thyroid problems  Neurological: no headache, no numbness  Hematological: no easy bruising  Musculoskeletal: Left foot  pain    Physical Exam  PHYSICAL EXAMINATION  Constitutional Exam: well developed and well nourished  Psychiatric Exam: alert and oriented, appropriate mood and behavior  Eye Exam: EOMI  Pulmonary Exam: breathing non-labored, no apparent distress  Lymphatic exam: no appreciable lymphadenopathy in the lower extremities  Cardiovascular exam: RRR to peripheral palpation, DP pulses 2+, PT 2+, toes are pink with good capillary refill, no pitting edema  Skin exam: no open lesions, rashes, abrasions or ulcerations  Neurological exam: sensation to light touch intact in both lower extremities in peripheral and dermatomal distributions (except for any abnormalities noted in musculoskeletal exam)    Musculoskeletal exam: Left lower extremity examination.  Patient anterior ankle incision well-healed.  Posterior Calc incision well-healed.  No obvious erythema, induration, fluctuance, drainage or effusion from either the ankle or subtalar joint.  Patient has improved ankle range of motion but with painful and restricted subtalar joint range of motion. Patient has sensation intact light touch grossly in a saphenous, sural (reporting neuritic type symptoms with positive tinel's), superficial peroneal, deep peroneal and tibial nerve distribution.  She has intact PF/DF/EHL.  She has 2+ DP/PT pulse palpated.    Last Recorded Vitals  There were no vitals taken for this visit.    Laboratory Results    No results found. However, due to the size of the patient record, not all encounters were searched. Please check Results Review for a complete set of results.    Radiology Results   X-ray imaging 3 view weightbearing left ankle reviewed and independently evaluated by me on 10/18/2024 demonstrates no acute fracture or dislocation.  There continues to be apparent interval consolidation of prior screw tracks.  No obvious osseous consolidation nor subsidence about posterior facet of subtalar joint.    Assessment/Plan:  64-year-old female who  presents for complex evaluation of painful and multiply revised left subtalar arthrodesis by [facility] podiatrist with sural nerve neuritis and anterior ankle impingement s/p L HARSHAL and bone biopsy from 08/26/2024. I have reviewed the diagnosis and treatment options extensively with the patient.  I would recommend the patient continue weightbearing to her tolerance in her left lower extremity.  She may transition out of the walking boot as she feels comfortable as long as this has continued to be quite limiting for her.  She may continue with her bone stimulator use.  We discussed once again the risk, benefits and alternatives regarding her second stage left revision subtalar arthrodesis with B MAC/graft as well as possible ICBG allograft and BMP.  In brief, risks include but are not limited to infection, wound healing complications, arthrodesis failure need for further surgery, persistent or worsening pain, postoperative stiffness, bleeding, blood loss requiring a blood transfusion, neurovascular injury, mal- or non-union, recurrent deformity, hardware failure, hardware pain, failure of the procedure, complications of anesthesia, stroke, DVT/PE, myocardial infarction and death. Benefits included decreased pain and improved function. Alternatives included continued conservative management which I would not recommend given the patient's clinical course and prior failed revision subtalar arthrodesis. The patient voiced understanding of the risks, benefits and alternatives. I will plan to see the patient back approximately 2 weeks following her surgery for postoperative evaluation, upon return, patient would not require further imaging.    Marlon Stafford MD, GLENN  Department of Orthopaedic Surgery  [facility]    The diagnosis and treatment plan were reviewed with the patient. All questions were answered. The patient verbalized understanding of the treatment plan. There were no barriers  to understanding identified.    Note dictated with [employer] software.  Completed without full type editing and sent to avoid delay.

## 2024-10-22 ENCOUNTER — APPOINTMENT (OUTPATIENT)
Dept: ORTHOPEDIC SURGERY | Facility: CLINIC | Age: 64
End: 2024-10-22
Payer: COMMERCIAL

## 2024-10-29 ENCOUNTER — CLINICAL SUPPORT (OUTPATIENT)
Dept: PREADMISSION TESTING | Facility: HOSPITAL | Age: 64
End: 2024-10-29
Payer: COMMERCIAL

## 2024-10-29 DIAGNOSIS — M96.0 NONUNION OF SUBTALAR ARTHRODESIS: ICD-10-CM

## 2024-10-29 RX ORDER — LEFLUNOMIDE 10 MG/1
10 TABLET ORAL DAILY
Status: ON HOLD | COMMUNITY

## 2024-11-04 ENCOUNTER — APPOINTMENT (OUTPATIENT)
Dept: INFUSION THERAPY | Facility: CLINIC | Age: 64
End: 2024-11-04
Payer: COMMERCIAL

## 2024-11-04 VITALS
WEIGHT: 219.6 LBS | SYSTOLIC BLOOD PRESSURE: 160 MMHG | OXYGEN SATURATION: 96 % | HEART RATE: 65 BPM | RESPIRATION RATE: 16 BRPM | BODY MASS INDEX: 40.17 KG/M2 | TEMPERATURE: 97.3 F | DIASTOLIC BLOOD PRESSURE: 97 MMHG

## 2024-11-04 DIAGNOSIS — M05.79 SEROPOSITIVE RHEUMATOID ARTHRITIS OF MULTIPLE JOINTS (MULTI): ICD-10-CM

## 2024-11-04 PROCEDURE — 96365 THER/PROPH/DIAG IV INF INIT: CPT

## 2024-11-04 RX ORDER — EPINEPHRINE 0.3 MG/.3ML
0.3 INJECTION SUBCUTANEOUS EVERY 5 MIN PRN
OUTPATIENT
Start: 2024-11-05

## 2024-11-04 RX ORDER — FAMOTIDINE 10 MG/ML
20 INJECTION INTRAVENOUS ONCE AS NEEDED
OUTPATIENT
Start: 2024-11-05

## 2024-11-04 RX ORDER — DIPHENHYDRAMINE HYDROCHLORIDE 50 MG/ML
50 INJECTION INTRAMUSCULAR; INTRAVENOUS AS NEEDED
OUTPATIENT
Start: 2024-11-05

## 2024-11-04 RX ORDER — ALBUTEROL SULFATE 0.83 MG/ML
3 SOLUTION RESPIRATORY (INHALATION) AS NEEDED
OUTPATIENT
Start: 2024-11-05

## 2024-11-04 ASSESSMENT — ENCOUNTER SYMPTOMS
FREQUENCY: 0
DIZZINESS: 0
NUMBNESS: 0
HEMATURIA: 0
HEADACHES: 0
SORE THROAT: 0
BLOOD IN STOOL: 0
FEVER: 0
CHILLS: 0
TROUBLE SWALLOWING: 0
ABDOMINAL PAIN: 0
DYSURIA: 0
VOICE CHANGE: 0
LEG SWELLING: 0
WOUND: 0
FATIGUE: 1
CONSTIPATION: 0
NAUSEA: 0
WHEEZING: 0
COUGH: 0
APPETITE CHANGE: 1
PALPITATIONS: 0
ARTHRALGIAS: 1
EYE PROBLEMS: 0
MYALGIAS: 0
LIGHT-HEADEDNESS: 0
VOMITING: 0
SHORTNESS OF BREATH: 0
EXTREMITY WEAKNESS: 0
UNEXPECTED WEIGHT CHANGE: 0
DIARRHEA: 0

## 2024-11-04 ASSESSMENT — PAIN SCALES - GENERAL: PAINLEVEL_OUTOF10: 4

## 2024-11-04 NOTE — PATIENT INSTRUCTIONS
Today :We administered abatacept (Orencia) 750 mg in sodium chloride 0.9% 100 mL IV.     For:   1. Seropositive rheumatoid arthritis of multiple joints (Multi)         Your next appointment is due in:  12/3/2024        Please read the  Medication Guide that was given to you and reviewed during todays visit.     (Tell all doctors including dentists that you are taking this medication)     Go to the emergency room or call 911 if:  -You have signs of allergic reaction:   -Rash, hives, itching.   -Swollen, blistered, peeling skin.   -Swelling of face, lips, mouth, tongue or throat.   -Tightness of chest, trouble breathing, swallowing or talking     Call your doctor:  - If IV / injection site gets red, warm, swollen, itchy or leaks fluid or pus.     (Leave dressing on your IV site for at least 2 hours and keep area clean and dry  - If you get sick or have symptoms of infection or are not feeling well for any reason.    (Wash your hands often, stay away from people who are sick)  - If you have side effects from your medication that do not go away or are bothersome.     (Refer to the teaching your nurse gave you for side effects to call your doctor about)    - Common side effects may include:  stuffy nose, headache, feeling tired, muscle aches, upset stomach  - Before receiving any vaccines     - Call the [facility] at   If:  - You get sick, are on antibiotics, have had a recent vaccine, have surgery or dental work and your doctor wants your visit rescheduled.  - You need to cancel and reschedule your visit for any reason. Call at least 2 days before your visit if you need to cancel.   - Your insurance changes before your next visit.    (We will need to get approval from your new insurance. This can take up to two weeks.)     The [facility] is opened Monday thru Friday. We are closed on weekends and holidays.   Voice mail will take your call if the center is closed. If you leave a message  please allow 24 hours for a call back during weekdays. If you leave a message on a weekend/holiday, we will call you back the next business day.    A pharmacist is available Monday - Friday from 8:30AM to 3:30PM to help answer any questions you may have about your prescriptions(s). Please call pharmacy at:    [facility]: [phone]  [facility]: [phone]  [facility]: [phone]

## 2024-11-04 NOTE — PROGRESS NOTES
[facility]   Infusion Clinic Note   Date: 2024   Name: Maureen Vela  : 1960   MRN: [id number]          Reason for Visit: OP Infusion (Orencia)         Today: We administered abatacept (Orencia) 750 mg in sodium chloride 0.9% 100 mL IV.       Visit Type: INFUSION       Ordered By: Ramsey       Accompanied by:Self       Diagnosis: Seropositive rheumatoid arthritis of multiple joints (Multi)        Allergies:   Allergies as of 2024 - Reviewed 2024   Allergen Reaction Noted    Doxycycline Rash 10/18/2024    Oxycodone-acetaminophen Nausea/vomiting 10/04/2023    Sulfa (sulfonamide antibiotics) Headache 10/04/2023    Sulfamethoxazole-trimethoprim Rash 2019    Balsam peru Hives 10/04/2023    Clarithromycin Itching and Rash 2019    Hydroxychloroquine Hives and Rash 10/04/2023    Methotrexate Hives and Rash 2019          Current Medications has a current medication list which includes the following prescription(s): abatacept 750 mg in sodium chloride 0.9% 70 mL IV, acetaminophen, albuterol, amlodipine, amoxicillin, azelastine-fluticasone, cholecalciferol, clonidine, ergocalciferol, folic acid, leflunomide, levocetirizine, levothyroxine, montelukast, multivitamin, omeprazole, propranolol la, rizatriptan, tizanidine, tramadol, and triamcinolone, and the following Facility-Administered Medications: abatacept (Orencia) 750 mg in sodium chloride 0.9% 100 mL IV, acetaminophen, albuterol, cefazolin, droperidol, hydrocodone-acetaminophen, hydromorphone, hydromorphone, ipratropium, lidocaine pf, ondansetron, and oxygen.       Vitals:   Vitals:    24 0702   BP: (!) 148/97   Pulse: 97   Resp: 18   Temp: 36.2 °C (97.1 °F)   SpO2: 96%   Weight: 99.6 kg (219 lb 9.6 oz)   PainSc:   4   PainLoc: Comment: bilat hands             Infusion Pre-procedure Checklist:   - Allergies reviewed: yes   - Medications reviewed: yes       - Previous reaction to current  treatment: no      Assess patient for the concerns below. Document provider notification as appropriate.  - Active or recent infection with/without current antibiotic use: no,   - Recent or planned invasive dental work: no  - Recent or planned surgeries: yes, 11/19/2024 left foot arthrodesis. Dr Mustafa aware  - Recently received or plans to receive vaccinations: yes  - Has treatment related toxicities: no  - Is pregnant:  n/a      Pain: 4   - Is the pain different from normal: no   - Is your Doctor aware:  n/a       Labs: N/A          Fall Risk Screening: Quiroga Fall Risk  History of Falling, Immediate or Within 3 Months: No  Secondary Diagnosis: No  Ambulatory Aid: Crutches/cane/walker  Intravenous Therapy/Heparin Lock: No  Gait/Transferring: Impaired   Mental Status: Oriented to own ability  Qu[place]ga Fall Risk Score: 35       Review Of Systems:  Review of Systems   Constitutional:  Positive for appetite change and fatigue. Negative for chills, fever and unexpected weight change.   HENT:   Negative for hearing loss, mouth sores, sore throat, tinnitus, trouble swallowing and voice change.    Eyes:  Negative for eye problems.   Respiratory:  Negative for cough, shortness of breath and wheezing.    Cardiovascular:  Negative for chest pain, leg swelling and palpitations.   Gastrointestinal:  Negative for abdominal pain, blood in stool, constipation, diarrhea, nausea and vomiting.   Genitourinary:  Negative for dysuria, frequency and hematuria.    Musculoskeletal:  Positive for arthralgias and gait problem (walking boot to left foot, crutches). Negative for myalgias.   Skin:  Positive for rash (bilat arms healing, rash reaction to antibiotic). Negative for itching and wound.   Neurological:  Positive for gait problem (walking boot to left foot, crutches). Negative for dizziness, extremity weakness, headaches, light-headedness and numbness.         ROS completed? Yes      Infusion Readiness:  - Assessment Concerns Related  "to Infusion: No  - Provider notified: yes      Document Below Only If Indicated:   New Patient Education:    N/A (returning patient for continuation of therapy. Ongoing education provided as needed.)        Treatment Conditions & Drug Specific Questions:    Abatacept  (ORENCIA)    (Unless otherwise specified on patient specific therapy plan):    TREATMENT CONDITIONS  Unless otherwise specified on patient specific therapy plan hold treatment and notify provider prior to proceeding with infusion if patient with a:  o Positive T-Spot  o Positive Hepatitis B Surface Ag   o Positive Hepatitis B Core Antibody   o Positive Hepatitis C Antibody     Lab Results   Component Value Date    TBSIN Negative 04/29/2024    TBGRES Negative  Reference range: NEGATIVE   05/26/2020      Lab Results   Component Value Date    HEPBSAG NEGATIVE 05/26/2020      Lab Results   Component Value Date    HEPCAB  05/26/2020     Negative  Reference range: NEGATIVE  Performed at the [facility] unless   otherwise noted.        No results found for: \"NONUHFIRE\", \"NONUHSWGH\", \"NONUHFISH\", \"EXTHEPBSAG\"    Labs reviewed and patient meets treatment conditions? Yes    DRUG SPECIFIC QUESTIONS  Any history of COPD?  No  (If yes educate patient on possible s/s exacerbation)    If Yes any new or worsening s/s related to COPD?  Not applicable  (If patient with worsening COPD notify prescribing provider prior to proceeding with infusion)      REMINDER:  - Pregnancy Category X Drug. Obtain negative pregnancy test prior to FIRST infusion  and educate patient on risk in women of childbearing ability.   - Weight based drug.     Recommended Vitals/Observation:  Vitals: Obtain vital signs at the start; middle and end of infusion as well as at the end of observation period: 30 minutes post.   Observation: Observe patient for 30 minutes after each infusion        Weight Based Drug Calculations:    WEIGHT BASED DRUGS: Abatacept  (ORENCIA) "   Patient's dosing weight (kg):  kg        Patient's weight today:   Vitals:    24 0702   Weight: 99.6 kg (219 lb 9.6 oz)         weight range for prescribed dose: Orencia:  <60 k mg  60 to 100 k mg  >100 k g    Patient weight today falls outside of 10% variance or  weight range: No     Home Care pharmacist informed of weight variance: Not applicable    Doses that are weight based have an acceptable variance rule within 10% of the prescribed   order and/or within  weight range. If patient weight on day of infusion falls   outside of the 10% variance, or weight range, infusion is administered and   pharmacy contacted regarding future dosing adjustments, per policy.              Initiated By: Sherri Meza RN   0815- NS flush completed. Patient tolerated infusion well.   0832- BP remains elevated. Patient states she has not taken her clonidine today. Patient encouraged to take med when she returns home, verified understanding.

## 2024-11-05 ENCOUNTER — LAB (OUTPATIENT)
Dept: LAB | Facility: LAB | Age: 64
End: 2024-11-05
Payer: COMMERCIAL

## 2024-11-05 ENCOUNTER — DOCUMENTATION (OUTPATIENT)
Dept: PREADMISSION TESTING | Facility: HOSPITAL | Age: 64
End: 2024-11-05

## 2024-11-05 ENCOUNTER — PRE-ADMISSION TESTING (OUTPATIENT)
Dept: PREADMISSION TESTING | Facility: HOSPITAL | Age: 64
End: 2024-11-05
Payer: COMMERCIAL

## 2024-11-05 VITALS
WEIGHT: 218.26 LBS | HEART RATE: 66 BPM | TEMPERATURE: 98.2 F | SYSTOLIC BLOOD PRESSURE: 138 MMHG | BODY MASS INDEX: 40.16 KG/M2 | HEIGHT: 62 IN | OXYGEN SATURATION: 96 % | DIASTOLIC BLOOD PRESSURE: 88 MMHG | RESPIRATION RATE: 16 BRPM

## 2024-11-05 DIAGNOSIS — Z01.818 PRE-OP TESTING: ICD-10-CM

## 2024-11-05 DIAGNOSIS — Z01.818 PREOP EXAMINATION: ICD-10-CM

## 2024-11-05 DIAGNOSIS — Z01.818 PRE-OP TESTING: Primary | ICD-10-CM

## 2024-11-05 LAB
ABO GROUP (TYPE) IN BLOOD: NORMAL
ANION GAP SERPL CALC-SCNC: 9 MMOL/L (ref 10–20)
ANTIBODY SCREEN: NORMAL
BASOPHILS # BLD AUTO: 0.05 X10*3/UL (ref 0–0.1)
BASOPHILS NFR BLD AUTO: 1.1 %
BUN SERPL-MCNC: 17 MG/DL (ref 6–23)
CALCIUM SERPL-MCNC: 9 MG/DL (ref 8.6–10.3)
CHLORIDE SERPL-SCNC: 108 MMOL/L (ref 98–107)
CO2 SERPL-SCNC: 28 MMOL/L (ref 21–32)
CREAT SERPL-MCNC: 0.82 MG/DL (ref 0.5–1.05)
EGFRCR SERPLBLD CKD-EPI 2021: 81 ML/MIN/1.73M*2
EOSINOPHIL # BLD AUTO: 0.08 X10*3/UL (ref 0–0.7)
EOSINOPHIL NFR BLD AUTO: 1.7 %
ERYTHROCYTE [DISTWIDTH] IN BLOOD BY AUTOMATED COUNT: 12.4 % (ref 11.5–14.5)
GLUCOSE SERPL-MCNC: 99 MG/DL (ref 74–99)
HCT VFR BLD AUTO: 43.3 % (ref 36–46)
HGB BLD-MCNC: 14.2 G/DL (ref 12–16)
IMM GRANULOCYTES # BLD AUTO: 0.01 X10*3/UL (ref 0–0.7)
IMM GRANULOCYTES NFR BLD AUTO: 0.2 % (ref 0–0.9)
LYMPHOCYTES # BLD AUTO: 1.39 X10*3/UL (ref 1.2–4.8)
LYMPHOCYTES NFR BLD AUTO: 30.3 %
MCH RBC QN AUTO: 30.1 PG (ref 26–34)
MCHC RBC AUTO-ENTMCNC: 32.8 G/DL (ref 32–36)
MCV RBC AUTO: 92 FL (ref 80–100)
MONOCYTES # BLD AUTO: 0.53 X10*3/UL (ref 0.1–1)
MONOCYTES NFR BLD AUTO: 11.5 %
NEUTROPHILS # BLD AUTO: 2.53 X10*3/UL (ref 1.2–7.7)
NEUTROPHILS NFR BLD AUTO: 55.2 %
NRBC BLD-RTO: 0 /100 WBCS (ref 0–0)
PLATELET # BLD AUTO: 211 X10*3/UL (ref 150–450)
POTASSIUM SERPL-SCNC: 4.4 MMOL/L (ref 3.5–5.3)
RBC # BLD AUTO: 4.72 X10*6/UL (ref 4–5.2)
RH FACTOR (ANTIGEN D): NORMAL
SODIUM SERPL-SCNC: 141 MMOL/L (ref 136–145)
WBC # BLD AUTO: 4.6 X10*3/UL (ref 4.4–11.3)

## 2024-11-05 PROCEDURE — 87081 CULTURE SCREEN ONLY: CPT | Mod: AHULAB | Performed by: NURSE PRACTITIONER

## 2024-11-05 PROCEDURE — 99214 OFFICE O/P EST MOD 30 MIN: CPT | Performed by: NURSE PRACTITIONER

## 2024-11-05 PROCEDURE — 36415 COLL VENOUS BLD VENIPUNCTURE: CPT

## 2024-11-05 RX ORDER — CHLORHEXIDINE GLUCONATE ORAL RINSE 1.2 MG/ML
SOLUTION DENTAL
Qty: 475 ML | Refills: 0 | Status: SHIPPED | OUTPATIENT
Start: 2024-11-05

## 2024-11-05 ASSESSMENT — ENCOUNTER SYMPTOMS
NECK NEGATIVE: 1
DIARRHEA: 1
RESPIRATORY NEGATIVE: 1
ABDOMINAL PAIN: 0
DYSPNEA WITH EXERTION: 0
DYSPNEA AT REST: 0
BRUISES/BLEEDS EASILY: 1
CONSTIPATION: 0
PALPITATIONS: 0
CONSTITUTIONAL NEGATIVE: 1
NAUSEA: 0
NEUROLOGICAL NEGATIVE: 1
VOMITING: 0

## 2024-11-05 NOTE — H&P (VIEW-ONLY)
[facility]/PAT Evaluation       Name: Maureen Vela (Maureen Vela)  /Age: 1960/64 y.o.         Date of Consult: 24    Referring Provider: Dr. Stafford    Surgery, Date, and Length: Left Foot Arthrodesis - Left  Left Foot Excision Bone with Bone Graft - Left , 24, 150 min    Maureen Vela is a 64 year-old female who presents to the [facility] for perioperative risk assessment prior to surgery.    Patient presents with a primary diagnosis of Nonunion of subtalar arthrodesis.    Patient has previously undergone 3 surgeries with an [facility] podiatrist.  The first was a tendon transfer of which the details are unclear at this time, the second was a left subtalar joint arthrodesis from 2023 followed by a revision subtalar joint arthrodesis from 2024.  The patient continues with severe pain that is activity dependent.  Typically rated as 5-9 out of 10.  She has been ambulating with use of a boot and crutch.  By report, the patient's previous provider has stopped doing surgery due to a medical condition.  She was quite happy with her previous practice, however sought an orthopedic surgery perspective.  Prior to her index surgery she reported pain on the outside of her foot that radiated to the top.  This is yet unresolved.  Patient works as a  and is on her feet extensively throughout the day.  She denies any fevers, chills or night sweats but does report paresthesias on the outside of her foot.     Patient has a past medical history of rheumatoid arthritis, she has been off of her rheumatologic medications from the winter 2023.  She has been using a bone stimulator.      This note was created in part upon personal review of patient's medical records.      Patient is scheduled to have a Left Foot Arthrodesis - Left  Left Foot Excision Bone with Bone Graft - Left.    +PONV  Pt denies any past history of anesthetic complications such as awareness, prolonged sedation, dental  damage, aspiration, cardiac arrest, difficult intubation, difficult I.V. access or unexpected hospital admissions.  NO malignant hyperthermia and or pseudocholinesterase deficiency.  No history of blood transfusions     The patient is not a [employer] and will accept blood and blood products if medically indicated.       Past Medical History:   Diagnosis Date    AB (asthmatic bronchitis) ([facility])     Acquired immunocompromised state     Autoimmune RA, On Orencia every 28 days, followed by rheumatology    Actinic keratoses     Allergic rhinitis     Asthma     only used inhaler 4-5 times this entire year, triggers are seasonal allergies    Bilateral sacroiliitis ([facility])     Chronic pain     Chronic sinusitis     DDD (degenerative disc disease), cervical     C5,6 BULGE    DDD (degenerative disc disease), lumbar     L4,5 BULGE    DJD (degenerative joint disease)     right ankle and foot    ETD (eustachian tube dysfunction)     GERD (gastroesophageal reflux disease)     H/O arthrodesis     Hiatal hernia     HTN (hypertension)     Hyperlipidemia     Hypothyroidism     Joint pain     Lateral epicondylitis of elbow     Low back pain     hronic, folowed by pain management    Low back pain     Lumbosacral disc disease     Migraine     Neuropathy     left foot N/T and hands bilateral    Osteoarthritis     Osteopenia     PONV (postoperative nausea and vomiting)     PTTD (posterior tibial tendon dysfunction)     left lower extremity    Seronegative rheumatoid arthritis (Multi)     Skin cancer     Basal nd squamous removed    Spondylosis of 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     Vitamin D deficiency        Past Surgical History:   Procedure Laterality Date    ANKLE SURGERY Left 2024    removed bolts & bx     SECTION, LOW TRANSVERSE      x3    CHOLECYSTECTOMY  2017    LAPAROSCOPIC    COLONOSCOPY  10/16/2018    CT ANGIO CORONARY ARTERIES-W/QUANT EVAL CORONARY CALCIUM  2023     Score=0    CYST REMOVAL Left 2007    LEFT LEG    CYST REMOVAL Left 01/2015    LEFT EYE (CONJUVIAL RETENTION CYSTS)    ENDOMETRIAL ABLATION  03/2012    EPIDURAL BLOCK INJECTION      EYE SURGERY Left 03/2016    LEFT EYELID    FINGER SURGERY Left 12/23/2015    PIN IN THUMB    PAT SUBTALAR ARTHRODESIS      HAND ARTHROPLASTY Left 04/22/2015    LEFT HAND [facility] LRTI    INJECTION  12/03/2014    CORTISONE- LEFT THUMB    INJECTION Bilateral 10/25/2017    ONE IN EACH KNEE    JOINT REPLACEMENT Bilateral     bilateral TKR    KNEE CARTILAGE SURGERY Right 02/27/2018    TORN MENISCUS    KNEE SURGERY Right 07/2018    KNEE ARTHROSCOPY    KNEE SURGERY  01/31/2019    SCOPE OF LEFT KNEE    LAMINECTOMY  1996    LUMBAR    LARYNX SURGERY  09/2014    MEDIALIZATION    LIPOMA RESECTION  2008    BACK    OTHER SURGICAL HISTORY  12/2013    LUMP REMOVAL RIGHT SIDE-lipoma waistline    OTHER SURGICAL HISTORY      LEFT FOOT TENDON TRANSFER 2022, SUBTALLAR JOINT FUSION 06/28/2023, REVISION OF FAILED JOINT FUSION 02/2024    THYROIDECTOMY Right 07/2014    RIGHT LOBE    TOTAL ABDOMINAL HYSTERECTOMY W/ BILATERAL SALPINGOOPHORECTOMY  08/10/2017    TOTAL KNEE ARTHROPLASTY Right 06/18/2019    TUBAL LIGATION         Social History     Socioeconomic History    Marital status:    Tobacco Use    Smoking status: Never    Smokeless tobacco: Never   Vaping Use    Vaping status: Never Used   Substance and Sexual Activity    Alcohol use: Not Currently     Comment: RARELY    Drug use: Never    Sexual activity: Defer     Partners: Male     Birth control/protection: Post-menopausal, Female Sterilization        Family History   Problem Relation Name Age of Onset    Arthritis Mother Mom     Alzheimer's disease Father Dad     Breast cancer Sister Sima 42    Alzheimer's disease Sister Joselyn     Breast cancer Mother's Sister  63    Stroke Maternal Grandfather Grandpa        Allergies   Allergen Reactions    Doxycycline Rash     Severe rash or burn.     Oxycodone-Acetaminophen Nausea/vomiting    Sulfa (Sulfonamide Antibiotics) Headache    Sulfamethoxazole-Trimethoprim Rash    Balsam [place] Hives     Pt states in dental adhesives and make up    Clarithromycin Itching and Rash    Hydroxychloroquine Hives and Rash    Methotrexate Hives and Rash       Abatacept Infusion (11/04/24)  Infusion every 28 days     Tylenol-may take on day of surgery if needed     Albuterol inhaler-may use on the day of surgery if needed     Amlodipine (norvasc)-take on the day of surgery     Amoxicillin -take as directed     Azelastine-fluticasone nasal spray-may use on the day of surgery if needed     Peridex mouth wash-take as instructed on the bottle     Vitamin D3- Do not take on  the day of surgery     Clonidine (catapres)-take on the day of surgery     Ergocalciferol (vitamin D2)-Do not take on the day of surgery.     Folic Acid- do not take on the day of surgery     Leflunomide (Arava)-Hold two weeks prior to surgery and two weeks after/per rheumatology.     Xyzal (levocetirizine)- do not take on the day of surgery     Synthroid (levothyroxine)-take on the day of surgery     Montelukast (Singulair)- take the night before surgery     Multivitamins -Stop 7 days prior to surgery     Omeprazole (prilosec)-take on the day of surgery     Propranolol (Inderal)-take on the day of surgery     Rizatriptan (Maxalt)-Do not take on the day of surgery     Tizanidine (Zanaflex)-May take on the night before surgery     Tramadol (Ultram)-ok to take on the day of surgery if needed     Triamcinolone (Kenalog)-hold on the day of surgery        PAT ROS:   Constitutional:   neg    Neuro/Psych:   neg    Eyes:    use of corrective lenses  Ears:    no hearing aides  Nose:   Mouth:    Implant  on right lower side  Throat:   Neck:   neg    Cardio:    Functional 4 Mets. Patient denies SOB walking up 2 flights of stairs /Patient uses crutches to get around but ambulates on crutches daily.   no chest pain   no  palpitations   no dyspnea   no ROWE  Respiratory:   neg    Endocrine:   GI:    no abdominal pain   no constipation   diarrhea (intermittently with certain foods)   no nausea   no vomiting  :   neg    Musculoskeletal:    Left foot pain/especially with weight bearing  Bilateral hand arthralgia  Hematologic:    bruises/bleeds easily   no history of blood transfusion   no blood clots  Skin:  neg        Physical Exam  Physical exam within normal limits.   Musculoskeletal:      Comments: Left foot boot present          PAT AIRWAY:   Airway:     Mallampati::  III    Neck ROM::  Full   Implant on right lower side      Plan    Cardiovascular:  Patient denies any chest pain, tightness, heaviness, pressure, radiating pain, palpitations, irregular heartbeats, lightheadedness, cough, congestion, shortness of breath, ROWE, PND, near syncope, weight loss or gain.    HLD: Not currently on medication for this    HTN:  Amlodipine (norvasc)-take on the day of surgery  Clonidine (catapres)-take on the day of surgery  Propranolol (Inderal)-take on the day of surgery    RCRI: 0 Risk of Mace: 3.9%    EKG   Encounter Date: 08/19/24   ECG 12 lead (Clinic Performed)   Result Value    Ventricular Rate 72    Atrial Rate 72    TN Interval 156    QRS Duration 92    QT Interval 418    QTC Calculation(Bazett) 457    P Axis 32    R Axis -24    T Axis 13    QRS Count 12    Q Onset 207    P Onset 129    P Offset 189    T Offset 416    QTC Fredericia 444    Narrative    Normal sinus rhythm  Nonspecific T wave abnormality  Abnormal ECG  No previous ECGs available  Confirmed by Ignacio Crespo ([id number]) on 8/19/2024 10:06:46 AM       Pulm:  Asthma  Albuterol inhaler-may use on the day of surgery if needed       Stop Bang= 2 (age, htn) low risk      Renal/endo:  Hypothyroidism     Synthroid (levothyroxine)-take on the day of surgery    GI/:  GERD  Omeprazole (prilosec)-take on the day of surgery    Rheum:  Abatacept Infusion (11/04/24)  Infusion every 28  days     Leflunomide (Arava)-Hold two weeks prior to surgery and two weeks after/per rheumatology.    Heme:  Patient instructed to ambulate as soon as possible postoperatively to decrease thromboembolic risk.    Initiate mechanical DVT prophylaxis as soon as possible and initiate chemical prophylaxis when deemed safe from a bleeding standpoint post surgery.    Caprini= 7    Risk assessment complete.  Patient is scheduled for an INTERMEDIATE surgical risk procedure.         Labs/testing obtained in PAT on 11/05/24  CBC    Lab Results   Component Value Date    WBC 4.6 11/05/2024    HGB 14.2 11/05/2024    HCT 43.3 11/05/2024    MCV 92 11/05/2024     11/05/2024     Lab Results   Component Value Date    GLUCOSE 99 11/05/2024    CALCIUM 9.0 11/05/2024     11/05/2024    K 4.4 11/05/2024    CO2 28 11/05/2024     (H) 11/05/2024    BUN 17 11/05/2024    CREATININE 0.82 11/05/2024     Lab Results   Component Value Date    ALT 20 10/11/2024    AST 17 10/11/2024    ALKPHOS 59 10/11/2024    BILITOT 0.5 10/11/2024      Lab Results   Component Value Date    HGBA1C 5.1 10/11/2024        Labs reviewed, unremarkable.      Follow up/communication: MRSA pending      Preoperative medication instructions were provided and reviewed with the patient.  Any additional testing or evaluation was explained to the patient.  Nothing by mouth instructions were discussed and patient's questions were answered prior to conclusion to this encounter.  Patient verbalized understanding of preoperative instructions given in preadmission testing; discharge instructions available in EMR.    This note was dictated with speech recognition.  Minor errors may have been detected during use of speech recognition.

## 2024-11-05 NOTE — CPM/PAT NURSE NOTE
CPM/PAT Nurse Note      Name: Maureen Vela (Maureen Vela)  /Age: 1960/64 y.o.       Past Medical History:   Diagnosis Date    AB (asthmatic bronchitis) ([facility])     Acquired immunocompromised state     Autoimmune RA, On Orencia every 28 days, followed by rheumatology    Actinic keratoses     Allergic rhinitis     Asthma     only used inhaler 4-5 times this entire year, triggers are seasonal allergies    Bilateral sacroiliitis ([facility])     Chronic pain     Chronic sinusitis     DDD (degenerative disc disease), cervical     C5,6 BULGE    DDD (degenerative disc disease), lumbar     L4,5 BULGE    DJD (degenerative joint disease)     right ankle and foot    ETD (eustachian tube dysfunction)     GERD (gastroesophageal reflux disease)     H/O arthrodesis     Hiatal hernia     HTN (hypertension)     Hyperlipidemia     Hypothyroidism     Joint pain     Lateral epicondylitis of elbow     Low back pain     hronic, folowed by pain management    Low back pain     Lumbosacral disc disease     Migraine     Neuropathy     left foot N/T and hands bilateral    Osteoarthritis     Osteopenia     PONV (postoperative nausea and vomiting)     PTTD (posterior tibial tendon dysfunction)     left lower extremity    Seronegative rheumatoid arthritis (Multi)     Skin cancer     Basal nd squamous removed    Spondylosis of 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     Vitamin D deficiency        Past Surgical History:   Procedure Laterality Date    ANKLE SURGERY Left 2024    removed bolts & bx     SECTION, LOW TRANSVERSE      x3    CHOLECYSTECTOMY  2017    LAPAROSCOPIC    COLONOSCOPY  10/16/2018    CT ANGIO CORONARY ARTERIES-W/QUANT EVAL CORONARY CALCIUM  2023    Score=0    CYST REMOVAL Left     LEFT LEG    CYST REMOVAL Left 2015    LEFT EYE (CONJUVIAL RETENTION CYSTS)    ENDOMETRIAL ABLATION  2012    EPIDURAL BLOCK INJECTION      EYE SURGERY Left 2016    LEFT EYELID    FINGER SURGERY  Left 12/23/2015    PIN IN THUMB    PAT SUBTALAR ARTHRODESIS      HAND ARTHROPLASTY Left 04/22/2015    LEFT HAND [facility] LRTI    INJECTION  12/03/2014    CORTISONE- LEFT THUMB    INJECTION Bilateral 10/25/2017    ONE IN EACH KNEE    JOINT REPLACEMENT Bilateral     bilateral TKR    KNEE CARTILAGE SURGERY Right 02/27/2018    TORN MENISCUS    KNEE SURGERY Right 07/2018    KNEE ARTHROSCOPY    KNEE SURGERY  01/31/2019    SCOPE OF LEFT KNEE    LAMINECTOMY  1996    LUMBAR    LARYNX SURGERY  09/2014    MEDIALIZATION    LIPOMA RESECTION  2008    BACK    OTHER SURGICAL HISTORY  12/2013    LUMP REMOVAL RIGHT SIDE-lipoma waistline    OTHER SURGICAL HISTORY      LEFT FOOT TENDON TRANSFER 2022, SUBTALLAR JOINT FUSION 06/28/2023, REVISION OF FAILED JOINT FUSION 02/2024    THYROIDECTOMY Right 07/2014    RIGHT LOBE    TOTAL ABDOMINAL HYSTERECTOMY W/ BILATERAL SALPINGOOPHORECTOMY  08/10/2017    TOTAL KNEE ARTHROPLASTY Right 06/18/2019    TUBAL LIGATION         Patient Sexual activity questions deferred to the physician.    Family History   Problem Relation Name Age of Onset    Arthritis Mother Mom     Alzheimer's disease Father Dad     Breast cancer Sister Sima 42    Alzheimer's disease Sister Joselyn     Breast cancer Mother's Sister  63    Stroke Maternal Grandfather Grandpa        Allergies   Allergen Reactions    Doxycycline Rash     Severe rash or burn.    Oxycodone-Acetaminophen Nausea/vomiting    Sulfa (Sulfonamide Antibiotics) Headache    Sulfamethoxazole-Trimethoprim Rash    Balsam [place] Hives     Pt states in dental adhesives and make up    Clarithromycin Itching and Rash    Hydroxychloroquine Hives and Rash    Methotrexate Hives and Rash       Prior to Admission medications    Medication Sig Start Date End Date Taking? Authorizing Provider   abatacept 750 mg in sodium chloride 0.9% 70 mL IV Infuse 750 mg into a venous catheter every 28 (twenty-eight) days. 8/13/24 last dose    Historical Provider, MD   acetaminophen (Tylenol 8  HOUR) 650 mg ER tablet Take 1 tablet (650 mg) by mouth every 8 hours if needed for mild pain (1 - 3). Do not crush, chew, or split.  Patient not taking: Reported on 11/5/2024    Historical Provider, MD   albuterol (ProAir HFA) 90 mcg/actuation inhaler Inhale 2 puffs every 4 hours if needed.    Historical Provider, MD   amLODIPine (Norvasc) 5 mg tablet TAKE 1 TABLET DAILY 7/8/24   Florentino Thompson MD   amoxicillin (Amoxil) 500 mg capsule Take 4 capsules (2,000 mg) by mouth. 1 HOUR BEFORE DENTIST APPT  Patient not taking: Reported on 11/5/2024 9/13/23   Historical Provider, MD   azelastine-fluticasone 137-50 mcg/spray spray,non-aerosol Administer 1 spray into each nostril 2 times a day.    Historical Provider, MD   cholecalciferol (Vitamin D-3) 50 MCG (2000 UT) tablet Take 1 tablet (2,000 Units) by mouth once daily.    Historical Provider, MD   cloNIDine (Catapres) 0.1 mg tablet Take 1 tablet (0.1 mg) by mouth once daily as needed (FOR BLOOD PRESSURE GREATER 140/90).    Historical Provider, MD   ergocalciferol (Vitamin D-2) 1.25 MG (07636 UT) capsule Take 1 capsule (1,250 mcg) by mouth 1 (one) time per week. 3/1/24   Historical Provider, MD   folic acid (Folvite) 1 mg tablet TAKE 1 TABLET DAILY 7/10/24   Cathy Vargas MD   leflunomide (Arava) 10 mg tablet Take 1 tablet (10 mg) by mouth once daily.    Historical Provider, MD   levocetirizine (Xyzal) 5 mg tablet Take 1 tablet (5 mg) by mouth once daily in the evening.    Historical Provider, MD   levothyroxine (Synthroid, Levoxyl) 75 mcg tablet TAKE 1 TABLET DAILY 8/19/24   Florentino Thompson MD   montelukast (Singulair) 10 mg tablet once daily at bedtime. 4/11/24   Historical Provider, MD   MULTIVITAMIN ORAL Take 1 tablet by mouth once daily.    Historical Provider, MD   omeprazole (PriLOSEC) 20 mg DR capsule TAKE 1 CAPSULE DAILY 5/7/24   Florentino Thompson MD   propranolol LA (Inderal LA) 80 mg 24 hr capsule TAKE 1 CAPSULE DAILY 4/17/24   Florentino Thompson,  MD   rizatriptan (Maxalt) 10 mg tablet Take 1 tablet (10 mg) by mouth once daily as needed. 11/11/14   Historical Provider, MD   tiZANidine (Zanaflex) 4 mg tablet TAKE 1 TABLET DAILY AT NIGHT AS NEEDED 2/1/24   Maykel Gómez PA-C   traMADol (Ultram) 50 mg tablet Take 1 tablet (50 mg) by mouth every 12 hours if needed for severe pain (7 - 10). Do not fill before October 23, 2024. 10/23/24 11/22/24  Arlen Redding, APRN-CNP   triamcinolone (Kenalog) 0.1 % cream Apply 1 Application topically if needed.    Historical Provider, MD ALESHA SANDOVAL     DASI Risk Score      Flowsheet Row Pre-Admission Testing from 1/24/2024 in [facility]   Can you take care of yourself (eat, dress, bathe, or use toilet)?  2.75 filed at 01/24/2024 0717   Can you walk indoors, such as around your house? 1.75 filed at 01/24/2024 0717   Can you walk a block or two on level ground?  2.75 filed at 01/24/2024 0717   Can you climb a flight of stairs or walk up a hill? 5.5 filed at 01/24/2024 0717   Can you run a short distance? 0 filed at 01/24/2024 0717   Can you do light work around the house like dusting or washing dishes? 2.7 filed at 01/24/2024 0717   Can you do moderate work around the house like vacuuming, sweeping floors or carrying groceries? 3.5 filed at 01/24/2024 0717   Can you do heavy work around the house like scrubbing floors or lifting and moving heavy furniture?  0 filed at 01/24/2024 0717   Can you do yard work like raking leaves, weeding or pushing a mower? 0 filed at 01/24/2024 0717   Can you have sexual relations? 5.25 filed at 01/24/2024 0717   Can you participate in moderate recreational activities like golf, bowling, dancing, doubles tennis or throwing a baseball or football? 0 filed at 01/24/2024 0717   Can you participate in strenous sports like swimming, singles tennis, football, basketball, or skiing? 0 filed at 01/24/2024 0717   DASI SCORE 24.2 filed at 01/24/2024 0717   METS Score (Will be  calculated only when all the questions are answered) 5.7 filed at 01/24/2024 0717          Caprini DVT Assessment      Flowsheet Row Office Visit from 7/12/2024 in [facility] with Marlon Stafford MD   DVT Score 11 filed at 07/12/2024 1012   BMI 31-40 (Obesity) filed at 07/12/2024 1012   RETIRED: Current Status Major surgery planned, lasting over 3 hours filed at 07/12/2024 1012   RETIRED: History Prior major surgery filed at 07/12/2024 1012   RETIRED: Age 60-75 years filed at 07/12/2024 1012          Modified Frailty Index    No data to display       CHADS2 Stroke Risk  Current as of 3 hours ago        N/A 3 to 100%: High Risk   2 to < 3%: Medium Risk   0 to < 2%: Low Risk     Last Change: N/A          This score determines the patient's risk of having a stroke if the patient has atrial fibrillation.        This score is not applicable to this patient. Components are not calculated.          Revised Cardiac Risk Index      Flowsheet Row Pre-Admission Testing from 1/24/2024 in [facility]   High-Risk Surgery (Intraperitoneal, Intrathoracic,Suprainguinal vascular) 0 filed at 01/24/2024 0712   History of ischemic heart disease (History of MI, History of positive exercuse test, Current chest paint considered due to myocardial ischemia, Use of nitrate therapy, ECG with pathological Q Waves) 0 filed at 01/24/2024 0712   History of congestive heart failure (pulmonary edemia, bilateral rales or S3 gallop, Paroxysmal nocturnal dyspnea, CXR showing pulmonary vascular redistribution) 0 filed at 01/24/2024 0712   History of cerebrovascular disease (Prior TIA or stroke) 0 filed at 01/24/2024 0712   Pre-operative insulin treatment 0 filed at 01/24/2024 0712   Pre-operative creatinine>2 mg/dl 0 filed at 01/24/2024 0712   Revised Cardiac Risk Calculator 0 filed at 01/24/2024 0712          Apfel Simplified Score    No data to display       Risk Analysis Index Results This  Encounter    No data found in the last 10 encounters.       Stop Bang Score      Flowsheet Row Pre-Admission Testing from 1/24/2024 in [facility]   Do you snore loudly? 0 filed at 01/24/2024 0717   Do you often feel tired or fatigued after your sleep? 0 filed at 01/24/2024 0717   Has anyone ever observed you stop breathing in your sleep? 0 filed at 01/24/2024 0717   Do you have or are you being treated for high blood pressure? 1 filed at 01/24/2024 0717   Recent BMI (Calculated) 38.7 filed at 01/24/2024 0717   Is BMI greater than 35 kg/m2? 1=Yes filed at 01/24/2024 0717   Age older than 50 years old? 1=Yes filed at 01/24/2024 0717   Is your neck circumference greater than 17 inches (Male) or 16 inches (Female)? 0 filed at 01/24/2024 0717   Gender - Male 0=No filed at 01/24/2024 0717   STOP-BANG Total Score 3 filed at 01/24/2024 0717          Prodigy: High Risk  Total Score: 11              Prodigy Age Score      Prodigy Previous Opioid Use Score           ARISCAT Score for Postoperative Pulmonary Complications    No data to display       [place] Perioperative Risk for Myocardial Infarction or Cardiac Arrest (AUDRA)    No data to display         Nurse Plan of Action:   After Visit Summary (AVS) reviewed and patient verbalized good understanding of medications and NPO instructions.  Pre-op infection prevention measures:  CHG showers and mouthwash reviewed, understanding voiced.  CHG soap given and patient verbalized need to pick CHG mouthwash at their preferred local pharmacy.

## 2024-11-05 NOTE — PREPROCEDURE INSTRUCTIONS
Medication List    Abatacept Infusion (11/04/24)  Infusion every 28 days    Tylenol-may take on day of surgery if needed    Albuterol inhaler-may use on the day of surgery if needed    Amlodipine (norvasc)-take on the day of surgery    Amoxicillin -take as directed    Azelastine-fluticasone nasal spray-may use on the day of surgery if needed    Peridex mouth wash-take as instructed on the bottle    Vitamin D3- Do not take on  the day of surgery    Clonidine (catapres)-take on the day of surgery    Ergocalciferol (vitamin D2)-Do not take on the day of surgery.    Folic Acid- do not take on the day of surgery    Leflunomide (Arava)-Hold two weeks prior to surgery and two weeks after/per rheumatology.    Xyzal (levocetirizine)- do not take on the day of surgery    Synthroid (levothyroxine)-take on the day of surgery    Montelukast (Singulair)- take the night before surgery    Multivitamins -Stop 7 days prior to surgery    Omeprazole (prilosec)-take on the day of surgery    Propranolol (Inderal)-take on the day of surgery    Rizatriptan (Maxalt)-Do not take on the day of surgery    Tizanidine (Zanaflex)-May take on the night before surgery    Tramadol (Ultram)-ok to take on the day of surgery if needed    Triamcinolone (Kenalog)-hold on the day of surgery    **Concerning above medication instructions, if medication is normally taken at night, continue normal schedule.**  **DO NOT TAKE NIGHT PRIOR AND MORNING OF SURGERY**    CONTACT SURGEON'S OFFICE IF YOU DEVELOP:  * Fever = 100.4 F   * New respiratory symptoms (e.g. cough, shortness of breath, respiratory distress, sore throat)  * Recent loss of taste or smell  *Flu like symptoms such as headache, fatigue or gastrointestinal symptoms  * You develop any open sores, shingles, burning or painful urination   AND/OR:  * You no longer wish to have the surgery.  * Any other personal circumstances change that may lead to the need to cancel or defer this surgery.  *You were  admitted to any hospital within one week of your planned procedure.    SMOKING:  *Quitting smoking can make a huge difference to your health and recovery from surgery.    *If you need help with quitting, call 8-325-QUIT-NOW.    THE DAY BEFORE SURGERY:  *Do not eat any food after midnight the night before surgery.   *You are permitted to drink clear liquids (i.e. water, black coffee (no milk or cream), tea, apple juice and electrolyte drinks (gatorade)) up to 13.5 ounces, up to 2 hours before your arrival time.  *You may chew gum until 2 hours before your surgery    SURGICAL TIME  *You will be contacted between 2 p.m. and 6 p.m. the business day before your surgery with your arrival time.  *If you haven't received a call by 6pm, call [phone].  *Scheduled surgery times may change and you will be notified if this occurs-check your personal voicemail for any updates.    ON THE MORNING OF SURGERY:  *Wear comfortable, loose fitting clothing.   *Do not use moisturizers, creams, lotions or perfume.  *All jewelry and valuables should be left at home.  *Prosthetic devices such as contact lenses, hearing aids, dentures, eyelash extensions, hairpins and body piercing must be removed before surgery.    BRING WITH YOU:  *Photo ID and insurance card  *Current list of medicines and allergies  *Pacemaker/Defibrillator/Heart stent cards  *CPAP machine and mask  *Slings/splints/crutches  *Copy of your complete Advanced Directive/DHPOA-if applicable  *Neurostimulator implant remote    PARKING AND ARRIVAL:  *Check in at the Main Entrance desk and let them know you are here for surgery.  *You will be directed to the 2nd floor surgical waiting area.    AFTER OUTPATIENT SURGERY:  *A responsible adult MUST accompany you at the time of discharge and stay with you for 24 hours after your surgery.  *You may NOT drive yourself home after surgery.  *You may use a taxi or ride sharing service (Lyft, Uber) to return home ONLY if you are  accompanied by a friend or family member.  *Instructions for resuming your medications will be provided by your surgeon.         Patient Information: Oral/Dental Rinse  **This is a prescription; pick it up at your preferred local pharmacy **  What is oral/dental rinse?   It is a mouthwash. It is a way of cleaning the mouth with a germ killing solution before your surgery.  The solution contains chlorhexidine, commonly known as CHG.   It is used inside the mouth to kill a bacteria known as Staphylococcus aureus.  Let your doctor know if you are allergic to Chlorhexidine.    Why do I need to use CHG oral/dental rinse?  The CHG oral/dental rinse helps to kill a bacteria in your mouth known a Staphylococcus aureus.     This reduces the risk of infection at the surgical site.      Using your CHG oral/dental rinse  STEPS:  Use your CHG oral/dental rinse after you brush your teeth the night before (at bedtime) and the morning of your surgery.  Follow all directions on your prescription label.    Use the cap on the container to measure 15ml (fill cap to fill line)  Swish (gargle if you can) the mouthwash in your mouth for at least 30 seconds, (do not to swallow) spit out  After you use your CHG rinse, do not rinse your mouth with water, drink or eat.  Please refer to prescription label for the appropriate time to resume oral intake  Dental rinse comes in one size bottle: 473ml ~16oz.  You will have leftover    rinse, discard after this use.    What side effects might I have using the CHG oral/dental rinse?  CHG rinse will stick to plaque on the teeth.  Brush and floss just before use.  Teeth brushing will help avoid staining of plaque during use.    Who should I contact if I have questions about the CHG oral/dental rinse?  Please call [facility], Preadmission Testing at [phone] if you have any questions    Home Preoperative Antibacterial Shower     What is a home preoperative  antibacterial shower?  This shower is a way of cleaning the skin with a germ killing soap before surgery.  The soap contains chlorhexidine, commonly known as CHG.  CHG is a soap for your skin with germ killing ability.  Let your doctor know if you are allergic to chlorhexidine.    Why do I need to take a preoperative antibacterial shower?  Skin is not sterile.  It is best to try to make your skin as free of germs as possible before surgery.  Proper cleansing with a germ killing soap before surgery can lower the number of germs on your skin.  This helps to reduce the risk of infection at the surgical site.  Following the instructions listed below will help you prepare your skin for surgery.      How do I use the CHG skin cleanser?  Steps:  Begin using your CHG soap five days before your scheduled surgery on ________________________.    Days 1-4 Shower before bed:  Wash your face and genitals with your normal soap and rinse.    Wash and rinse your hair using the CHG soap. Rinse completely, do not condition your   Hair.          3.    Apply the CHG soap to a clean wet washcloth.  Turn the water off or move away                From the water spray to avoid premature rinsing of the CHG soap as you are applying.     4.   Lather your entire body from the neck down.  Do not use on your face or genitals.   Pay special attention to the area(s) where your incision(s) will be located unless they are on your face.  Avoid scrubbing your skin too hard.  The important point is to have the CHG soap sit on your skin for 3 minutes.    When the 3 minutes are up, turn on the water and rinse the CHG soap off your body completely.   Pat yourself dry with a clean, freshly-laundered towel.  Dress in clean, freshly laundered night clothes.    Be sure to sleep with clean, freshly laundered sheets.  Day 5:  Last shower is the morning before surgery: Follow above Instructions.    NOTE:    *Hair extensions should be removed    *Keep CHG soap out  of eyes and ear canals   *DO NOT wash with regular soap on your body after you have used the CHG        soap solution  *DO NOT apply powders, lotions, or perfume.  *Deodorant may be used days 1-4, BUT NOT the day of surgery    Who should I contact if I have any questions regarding the use of CHG soap?  Call the [facility], Preadmission Testing at [phone] if you have any questions.              Patient Information: Pre-Operative Infection Prevention Measures     Why did I have my nose, under my arms and groin swabbed?  The purpose of the swab is to identify Staphylococcus aureus inside your nose or on your skin.  The swab was sent to the laboratory for culture.  A positive swab/culture for Staphylococcus aureus is called colonization or carriage.      What is Staphylococcus aureus?  Staphylococcus aureus, also known as “staph”, is a germ found on the skin or in the nose of healthy people.  Sometimes Staphylococcus aureus can get into the body and cause an infection.  This can be minor (such as pimples, boils or other skin problems).  It might also be serious (such as blood infection, pneumonia or a surgical site infection).    What is Staphylococcus aureus colonization or carriage?  Colonization or carriage means that a person has the germ but is not sick from it.  These bacteria can be spread on the hands or when breathing or sneezing.    How is Staphylococcus aureus spread?  It is most often spread by close contact with a person or item that carries it.    What happens if my culture is positive for Staphylococcus aureus?  Your doctor/medical team will use this information to guide any antibiotic treatment which may be necessary.  Regardless of the culture results, we will clean the inside of your nose with a betadine swab just before you have your surgery.      Will I get an infection if I have Staphylococcus aureus in my nose or on my skin?  Anyone can get an infection with  Staphylococcus aureus.  However, the best way to reduce your risk of infection is to follow the instructions provided to you for the use of your CHG soap and dental rinse.        Who should I contact if I have any questions?  Call the [facility], Preadmission Testing at [phone] if you have any questions.

## 2024-11-05 NOTE — CPM/PAT H&P
[facility]/PAT Evaluation       Name: Maureen Vela (Maureen Vela)  /Age: 1960/64 y.o.         Date of Consult: 24    Referring Provider: Dr. Stafford    Surgery, Date, and Length: Left Foot Arthrodesis - Left  Left Foot Excision Bone with Bone Graft - Left , 24, 150 min    Maureen Vela is a 64 year-old female who presents to the [facility] for perioperative risk assessment prior to surgery.    Patient presents with a primary diagnosis of Nonunion of subtalar arthrodesis.    Patient has previously undergone 3 surgeries with an [facility] podiatrist.  The first was a tendon transfer of which the details are unclear at this time, the second was a left subtalar joint arthrodesis from 2023 followed by a revision subtalar joint arthrodesis from 2024.  The patient continues with severe pain that is activity dependent.  Typically rated as 5-9 out of 10.  She has been ambulating with use of a boot and crutch.  By report, the patient's previous provider has stopped doing surgery due to a medical condition.  She was quite happy with her previous practice, however sought an orthopedic surgery perspective.  Prior to her index surgery she reported pain on the outside of her foot that radiated to the top.  This is yet unresolved.  Patient works as a  and is on her feet extensively throughout the day.  She denies any fevers, chills or night sweats but does report paresthesias on the outside of her foot.     Patient has a past medical history of rheumatoid arthritis, she has been off of her rheumatologic medications from the winter 2023.  She has been using a bone stimulator.      This note was created in part upon personal review of patient's medical records.      Patient is scheduled to have a Left Foot Arthrodesis - Left  Left Foot Excision Bone with Bone Graft - Left.    +PONV  Pt denies any past history of anesthetic complications such as awareness, prolonged sedation, dental  damage, aspiration, cardiac arrest, difficult intubation, difficult I.V. access or unexpected hospital admissions.  NO malignant hyperthermia and or pseudocholinesterase deficiency.  No history of blood transfusions     The patient is not a Caodaism and will accept blood and blood products if medically indicated.       Past Medical History:   Diagnosis Date    AB (asthmatic bronchitis) ([facility])     Acquired immunocompromised state     Autoimmune RA, On Orencia every 28 days, followed by rheumatology    Actinic keratoses     Allergic rhinitis     Asthma     only used inhaler 4-5 times this entire year, triggers are seasonal allergies    Bilateral sacroiliitis ([facility])     Chronic pain     Chronic sinusitis     DDD (degenerative disc disease), cervical     C5,6 BULGE    DDD (degenerative disc disease), lumbar     L4,5 BULGE    DJD (degenerative joint disease)     right ankle and foot    ETD (eustachian tube dysfunction)     GERD (gastroesophageal reflux disease)     H/O arthrodesis     Hiatal hernia     HTN (hypertension)     Hyperlipidemia     Hypothyroidism     Joint pain     Lateral epicondylitis of elbow     Low back pain     hronic, folowed by pain management    Low back pain     Lumbosacral disc disease     Migraine     Neuropathy     left foot N/T and hands bilateral    Osteoarthritis     Osteopenia     PONV (postoperative nausea and vomiting)     PTTD (posterior tibial tendon dysfunction)     left lower extremity    Seronegative rheumatoid arthritis (Multi)     Skin cancer     Basal nd squamous removed    Spondylosis of 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     Vitamin D deficiency        Past Surgical History:   Procedure Laterality Date    ANKLE SURGERY Left 2024    removed bolts & bx     SECTION, LOW TRANSVERSE      x3    CHOLECYSTECTOMY  2017    LAPAROSCOPIC    COLONOSCOPY  10/16/2018    CT ANGIO CORONARY ARTERIES-W/QUANT EVAL CORONARY CALCIUM  2023     Score=0    CYST REMOVAL Left 2007    LEFT LEG    CYST REMOVAL Left 01/2015    LEFT EYE (CONJUVIAL RETENTION CYSTS)    ENDOMETRIAL ABLATION  03/2012    EPIDURAL BLOCK INJECTION      EYE SURGERY Left 03/2016    LEFT EYELID    FINGER SURGERY Left 12/23/2015    PIN IN THUMB    PAT SUBTALAR ARTHRODESIS      HAND ARTHROPLASTY Left 04/22/2015    LEFT HAND [facility] LRTI    INJECTION  12/03/2014    CORTISONE- LEFT THUMB    INJECTION Bilateral 10/25/2017    ONE IN EACH KNEE    JOINT REPLACEMENT Bilateral     bilateral TKR    KNEE CARTILAGE SURGERY Right 02/27/2018    TORN MENISCUS    KNEE SURGERY Right 07/2018    KNEE ARTHROSCOPY    KNEE SURGERY  01/31/2019    SCOPE OF LEFT KNEE    LAMINECTOMY  1996    LUMBAR    LARYNX SURGERY  09/2014    MEDIALIZATION    LIPOMA RESECTION  2008    BACK    OTHER SURGICAL HISTORY  12/2013    LUMP REMOVAL RIGHT SIDE-lipoma waistline    OTHER SURGICAL HISTORY      LEFT FOOT TENDON TRANSFER 2022, SUBTALLAR JOINT FUSION 06/28/2023, REVISION OF FAILED JOINT FUSION 02/2024    THYROIDECTOMY Right 07/2014    RIGHT LOBE    TOTAL ABDOMINAL HYSTERECTOMY W/ BILATERAL SALPINGOOPHORECTOMY  08/10/2017    TOTAL KNEE ARTHROPLASTY Right 06/18/2019    TUBAL LIGATION         Social History     Socioeconomic History    Marital status:    Tobacco Use    Smoking status: Never    Smokeless tobacco: Never   Vaping Use    Vaping status: Never Used   Substance and Sexual Activity    Alcohol use: Not Currently     Comment: RARELY    Drug use: Never    Sexual activity: Defer     Partners: Male     Birth control/protection: Post-menopausal, Female Sterilization        Family History   Problem Relation Name Age of Onset    Arthritis Mother Mom     Alzheimer's disease Father Dad     Breast cancer Sister Sima 42    Alzheimer's disease Sister Joselyn     Breast cancer Mother's Sister  63    Stroke Maternal Grandfather Grandpa        Allergies   Allergen Reactions    Doxycycline Rash     Severe rash or burn.     Oxycodone-Acetaminophen Nausea/vomiting    Sulfa (Sulfonamide Antibiotics) Headache    Sulfamethoxazole-Trimethoprim Rash    Balsam [place] Hives     Pt states in dental adhesives and make up    Clarithromycin Itching and Rash    Hydroxychloroquine Hives and Rash    Methotrexate Hives and Rash       Abatacept Infusion (11/04/24)  Infusion every 28 days     Tylenol-may take on day of surgery if needed     Albuterol inhaler-may use on the day of surgery if needed     Amlodipine (norvasc)-take on the day of surgery     Amoxicillin -take as directed     Azelastine-fluticasone nasal spray-may use on the day of surgery if needed     Peridex mouth wash-take as instructed on the bottle     Vitamin D3- Do not take on  the day of surgery     Clonidine (catapres)-take on the day of surgery     Ergocalciferol (vitamin D2)-Do not take on the day of surgery.     Folic Acid- do not take on the day of surgery     Leflunomide (Arava)-Hold two weeks prior to surgery and two weeks after/per rheumatology.     Xyzal (levocetirizine)- do not take on the day of surgery     Synthroid (levothyroxine)-take on the day of surgery     Montelukast (Singulair)- take the night before surgery     Multivitamins -Stop 7 days prior to surgery     Omeprazole (prilosec)-take on the day of surgery     Propranolol (Inderal)-take on the day of surgery     Rizatriptan (Maxalt)-Do not take on the day of surgery     Tizanidine (Zanaflex)-May take on the night before surgery     Tramadol (Ultram)-ok to take on the day of surgery if needed     Triamcinolone (Kenalog)-hold on the day of surgery        PAT ROS:   Constitutional:   neg    Neuro/Psych:   neg    Eyes:    use of corrective lenses  Ears:    no hearing aides  Nose:   Mouth:    Implant  on right lower side  Throat:   Neck:   neg    Cardio:    Functional 4 Mets. Patient denies SOB walking up 2 flights of stairs /Patient uses crutches to get around but ambulates on crutches daily.   no chest pain   no  palpitations   no dyspnea   no ROWE  Respiratory:   neg    Endocrine:   GI:    no abdominal pain   no constipation   diarrhea (intermittently with certain foods)   no nausea   no vomiting  :   neg    Musculoskeletal:    Left foot pain/especially with weight bearing  Bilateral hand arthralgia  Hematologic:    bruises/bleeds easily   no history of blood transfusion   no blood clots  Skin:  neg        Physical Exam  Physical exam within normal limits.   Musculoskeletal:      Comments: Left foot boot present          PAT AIRWAY:   Airway:     Mallampati::  III    Neck ROM::  Full   Implant on right lower side      Plan    Cardiovascular:  Patient denies any chest pain, tightness, heaviness, pressure, radiating pain, palpitations, irregular heartbeats, lightheadedness, cough, congestion, shortness of breath, ROWE, PND, near syncope, weight loss or gain.    HLD: Not currently on medication for this    HTN:  Amlodipine (norvasc)-take on the day of surgery  Clonidine (catapres)-take on the day of surgery  Propranolol (Inderal)-take on the day of surgery    RCRI: 0 Risk of Mace: 3.9%    EKG   Encounter Date: 08/19/24   ECG 12 lead (Clinic Performed)   Result Value    Ventricular Rate 72    Atrial Rate 72    GA Interval 156    QRS Duration 92    QT Interval 418    QTC Calculation(Bazett) 457    P Axis 32    R Axis -24    T Axis 13    QRS Count 12    Q Onset 207    P Onset 129    P Offset 189    T Offset 416    QTC Fredericia 444    Narrative    Normal sinus rhythm  Nonspecific T wave abnormality  Abnormal ECG  No previous ECGs available  Confirmed by Ignacio Crespo ([id number]) on 8/19/2024 10:06:46 AM       Pulm:  Asthma  Albuterol inhaler-may use on the day of surgery if needed       Stop Bang= 2 (age, htn) low risk      Renal/endo:  Hypothyroidism     Synthroid (levothyroxine)-take on the day of surgery    GI/:  GERD  Omeprazole (prilosec)-take on the day of surgery    Rheum:  Abatacept Infusion (11/04/24)  Infusion every 28  days     Leflunomide (Arava)-Hold two weeks prior to surgery and two weeks after/per rheumatology.    Heme:  Patient instructed to ambulate as soon as possible postoperatively to decrease thromboembolic risk.    Initiate mechanical DVT prophylaxis as soon as possible and initiate chemical prophylaxis when deemed safe from a bleeding standpoint post surgery.    Caprini= 7    Risk assessment complete.  Patient is scheduled for an INTERMEDIATE surgical risk procedure.         Labs/testing obtained in PAT on 11/05/24  CBC    Lab Results   Component Value Date    WBC 4.6 11/05/2024    HGB 14.2 11/05/2024    HCT 43.3 11/05/2024    MCV 92 11/05/2024     11/05/2024     Lab Results   Component Value Date    GLUCOSE 99 11/05/2024    CALCIUM 9.0 11/05/2024     11/05/2024    K 4.4 11/05/2024    CO2 28 11/05/2024     (H) 11/05/2024    BUN 17 11/05/2024    CREATININE 0.82 11/05/2024     Lab Results   Component Value Date    ALT 20 10/11/2024    AST 17 10/11/2024    ALKPHOS 59 10/11/2024    BILITOT 0.5 10/11/2024      Lab Results   Component Value Date    HGBA1C 5.1 10/11/2024        Labs reviewed, unremarkable.      Follow up/communication: MRSA pending      Preoperative medication instructions were provided and reviewed with the patient.  Any additional testing or evaluation was explained to the patient.  Nothing by mouth instructions were discussed and patient's questions were answered prior to conclusion to this encounter.  Patient verbalized understanding of preoperative instructions given in preadmission testing; discharge instructions available in EMR.    This note was dictated with speech recognition.  Minor errors may have been detected during use of speech recognition.

## 2024-11-07 LAB — STAPHYLOCOCCUS SPEC CULT: NORMAL

## 2024-11-08 ENCOUNTER — APPOINTMENT (OUTPATIENT)
Dept: PAIN MEDICINE | Facility: CLINIC | Age: 64
End: 2024-11-08
Payer: COMMERCIAL

## 2024-11-19 ENCOUNTER — HOSPITAL ENCOUNTER (OUTPATIENT)
Facility: HOSPITAL | Age: 64
Setting detail: OUTPATIENT SURGERY
Discharge: HOME | End: 2024-11-19
Attending: STUDENT IN AN ORGANIZED HEALTH CARE EDUCATION/TRAINING PROGRAM | Admitting: STUDENT IN AN ORGANIZED HEALTH CARE EDUCATION/TRAINING PROGRAM
Payer: COMMERCIAL

## 2024-11-19 ENCOUNTER — APPOINTMENT (OUTPATIENT)
Dept: RADIOLOGY | Facility: HOSPITAL | Age: 64
End: 2024-11-19
Payer: COMMERCIAL

## 2024-11-19 ENCOUNTER — ANESTHESIA EVENT (OUTPATIENT)
Dept: OPERATING ROOM | Facility: HOSPITAL | Age: 64
End: 2024-11-19
Payer: COMMERCIAL

## 2024-11-19 ENCOUNTER — ANESTHESIA (OUTPATIENT)
Dept: OPERATING ROOM | Facility: HOSPITAL | Age: 64
End: 2024-11-19
Payer: COMMERCIAL

## 2024-11-19 ENCOUNTER — PHARMACY VISIT (OUTPATIENT)
Dept: PHARMACY | Facility: CLINIC | Age: 64
End: 2024-11-19
Payer: COMMERCIAL

## 2024-11-19 VITALS
HEART RATE: 75 BPM | OXYGEN SATURATION: 95 % | BODY MASS INDEX: 39.84 KG/M2 | RESPIRATION RATE: 16 BRPM | HEIGHT: 62 IN | SYSTOLIC BLOOD PRESSURE: 141 MMHG | WEIGHT: 216.49 LBS | TEMPERATURE: 97.2 F | DIASTOLIC BLOOD PRESSURE: 83 MMHG

## 2024-11-19 DIAGNOSIS — M96.0 NONUNION OF SUBTALAR ARTHRODESIS: Primary | ICD-10-CM

## 2024-11-19 LAB
ABO GROUP (TYPE) IN BLOOD: NORMAL
RH FACTOR (ANTIGEN D): NORMAL

## 2024-11-19 PROCEDURE — 2780000003 HC OR 278 NO HCPCS: Performed by: STUDENT IN AN ORGANIZED HEALTH CARE EDUCATION/TRAINING PROGRAM

## 2024-11-19 PROCEDURE — 2720000007 HC OR 272 NO HCPCS: Performed by: STUDENT IN AN ORGANIZED HEALTH CARE EDUCATION/TRAINING PROGRAM

## 2024-11-19 PROCEDURE — 7100000001 HC RECOVERY ROOM TIME - INITIAL BASE CHARGE: Performed by: STUDENT IN AN ORGANIZED HEALTH CARE EDUCATION/TRAINING PROGRAM

## 2024-11-19 PROCEDURE — 3600000003 HC OR TIME - INITIAL BASE CHARGE - PROCEDURE LEVEL THREE: Performed by: STUDENT IN AN ORGANIZED HEALTH CARE EDUCATION/TRAINING PROGRAM

## 2024-11-19 PROCEDURE — 3700000001 HC GENERAL ANESTHESIA TIME - INITIAL BASE CHARGE: Performed by: STUDENT IN AN ORGANIZED HEALTH CARE EDUCATION/TRAINING PROGRAM

## 2024-11-19 PROCEDURE — 2500000005 HC RX 250 GENERAL PHARMACY W/O HCPCS: Performed by: ANESTHESIOLOGY

## 2024-11-19 PROCEDURE — A28725 PR FUSION FOOT BONES,SUBTALAR

## 2024-11-19 PROCEDURE — C1734 ORTH/DEVIC/DRUG BN/BN,TIS/BN: HCPCS | Performed by: STUDENT IN AN ORGANIZED HEALTH CARE EDUCATION/TRAINING PROGRAM

## 2024-11-19 PROCEDURE — 64445 NJX AA&/STRD SCIATIC NRV IMG: CPT | Performed by: ANESTHESIOLOGY

## 2024-11-19 PROCEDURE — 7100000002 HC RECOVERY ROOM TIME - EACH INCREMENTAL 1 MINUTE: Performed by: STUDENT IN AN ORGANIZED HEALTH CARE EDUCATION/TRAINING PROGRAM

## 2024-11-19 PROCEDURE — C1821 INTERSPINOUS IMPLANT: HCPCS | Performed by: STUDENT IN AN ORGANIZED HEALTH CARE EDUCATION/TRAINING PROGRAM

## 2024-11-19 PROCEDURE — 7100000009 HC PHASE TWO TIME - INITIAL BASE CHARGE: Performed by: STUDENT IN AN ORGANIZED HEALTH CARE EDUCATION/TRAINING PROGRAM

## 2024-11-19 PROCEDURE — 2500000004 HC RX 250 GENERAL PHARMACY W/ HCPCS (ALT 636 FOR OP/ED): Performed by: STUDENT IN AN ORGANIZED HEALTH CARE EDUCATION/TRAINING PROGRAM

## 2024-11-19 PROCEDURE — 96372 THER/PROPH/DIAG INJ SC/IM: CPT | Mod: 59 | Performed by: STUDENT IN AN ORGANIZED HEALTH CARE EDUCATION/TRAINING PROGRAM

## 2024-11-19 PROCEDURE — C1769 GUIDE WIRE: HCPCS | Performed by: STUDENT IN AN ORGANIZED HEALTH CARE EDUCATION/TRAINING PROGRAM

## 2024-11-19 PROCEDURE — 3600000008 HC OR TIME - EACH INCREMENTAL 1 MINUTE - PROCEDURE LEVEL THREE: Performed by: STUDENT IN AN ORGANIZED HEALTH CARE EDUCATION/TRAINING PROGRAM

## 2024-11-19 PROCEDURE — 36415 COLL VENOUS BLD VENIPUNCTURE: CPT | Performed by: STUDENT IN AN ORGANIZED HEALTH CARE EDUCATION/TRAINING PROGRAM

## 2024-11-19 PROCEDURE — 76000 FLUOROSCOPY <1 HR PHYS/QHP: CPT | Mod: 59

## 2024-11-19 PROCEDURE — 2500000005 HC RX 250 GENERAL PHARMACY W/O HCPCS

## 2024-11-19 PROCEDURE — 7100000010 HC PHASE TWO TIME - EACH INCREMENTAL 1 MINUTE: Performed by: STUDENT IN AN ORGANIZED HEALTH CARE EDUCATION/TRAINING PROGRAM

## 2024-11-19 PROCEDURE — C1713 ANCHOR/SCREW BN/BN,TIS/BN: HCPCS | Performed by: STUDENT IN AN ORGANIZED HEALTH CARE EDUCATION/TRAINING PROGRAM

## 2024-11-19 PROCEDURE — 64447 NJX AA&/STRD FEMORAL NRV IMG: CPT | Performed by: ANESTHESIOLOGY

## 2024-11-19 PROCEDURE — 2500000004 HC RX 250 GENERAL PHARMACY W/ HCPCS (ALT 636 FOR OP/ED): Performed by: ANESTHESIOLOGY

## 2024-11-19 PROCEDURE — 2500000001 HC RX 250 WO HCPCS SELF ADMINISTERED DRUGS (ALT 637 FOR MEDICARE OP): Performed by: ANESTHESIOLOGY

## 2024-11-19 PROCEDURE — 20900 REMOVAL OF BONE FOR GRAFT: CPT | Performed by: STUDENT IN AN ORGANIZED HEALTH CARE EDUCATION/TRAINING PROGRAM

## 2024-11-19 PROCEDURE — 2500000004 HC RX 250 GENERAL PHARMACY W/ HCPCS (ALT 636 FOR OP/ED)

## 2024-11-19 PROCEDURE — A28725 PR FUSION FOOT BONES,SUBTALAR: Performed by: ANESTHESIOLOGY

## 2024-11-19 PROCEDURE — 3700000002 HC GENERAL ANESTHESIA TIME - EACH INCREMENTAL 1 MINUTE: Performed by: STUDENT IN AN ORGANIZED HEALTH CARE EDUCATION/TRAINING PROGRAM

## 2024-11-19 PROCEDURE — RXMED WILLOW AMBULATORY MEDICATION CHARGE

## 2024-11-19 PROCEDURE — 2500000005 HC RX 250 GENERAL PHARMACY W/O HCPCS: Performed by: STUDENT IN AN ORGANIZED HEALTH CARE EDUCATION/TRAINING PROGRAM

## 2024-11-19 PROCEDURE — 28725 ARTHRODESIS SUBTALAR: CPT | Performed by: STUDENT IN AN ORGANIZED HEALTH CARE EDUCATION/TRAINING PROGRAM

## 2024-11-19 DEVICE — IMPLANTABLE DEVICE: Type: IMPLANTABLE DEVICE | Site: FOOT | Status: FUNCTIONAL

## 2024-11-19 DEVICE — BONE GRAFT KIT 7510200 INFUSE SMALL
Type: IMPLANTABLE DEVICE | Site: FOOT | Status: FUNCTIONAL
Brand: INFUSE® BONE GRAFT

## 2024-11-19 RX ORDER — HYDROCODONE BITARTRATE AND ACETAMINOPHEN 5; 325 MG/1; MG/1
1 TABLET ORAL EVERY 6 HOURS PRN
Qty: 28 TABLET | Refills: 0 | Status: SHIPPED | OUTPATIENT
Start: 2024-11-19

## 2024-11-19 RX ORDER — HEPARIN SODIUM 5000 [USP'U]/ML
INJECTION, SOLUTION INTRAVENOUS; SUBCUTANEOUS AS NEEDED
Status: DISCONTINUED | OUTPATIENT
Start: 2024-11-19 | End: 2024-11-19 | Stop reason: HOSPADM

## 2024-11-19 RX ORDER — SODIUM CHLORIDE, SODIUM LACTATE, POTASSIUM CHLORIDE, CALCIUM CHLORIDE 600; 310; 30; 20 MG/100ML; MG/100ML; MG/100ML; MG/100ML
100 INJECTION, SOLUTION INTRAVENOUS CONTINUOUS
Status: DISCONTINUED | OUTPATIENT
Start: 2024-11-19 | End: 2024-11-19 | Stop reason: HOSPADM

## 2024-11-19 RX ORDER — IPRATROPIUM BROMIDE 0.5 MG/2.5ML
500 SOLUTION RESPIRATORY (INHALATION) ONCE
Status: DISCONTINUED | OUTPATIENT
Start: 2024-11-19 | End: 2024-11-19 | Stop reason: HOSPADM

## 2024-11-19 RX ORDER — BUPIVACAINE HCL/EPINEPHRINE 0.5-1:200K
VIAL (ML) INJECTION
Status: DISCONTINUED
Start: 2024-11-19 | End: 2024-11-19 | Stop reason: HOSPADM

## 2024-11-19 RX ORDER — CEFAZOLIN 1 G/1
INJECTION, POWDER, FOR SOLUTION INTRAVENOUS AS NEEDED
Status: DISCONTINUED | OUTPATIENT
Start: 2024-11-19 | End: 2024-11-19

## 2024-11-19 RX ORDER — ONDANSETRON HYDROCHLORIDE 2 MG/ML
INJECTION, SOLUTION INTRAVENOUS AS NEEDED
Status: DISCONTINUED | OUTPATIENT
Start: 2024-11-19 | End: 2024-11-19

## 2024-11-19 RX ORDER — ONDANSETRON HYDROCHLORIDE 2 MG/ML
4 INJECTION, SOLUTION INTRAVENOUS ONCE AS NEEDED
Status: DISCONTINUED | OUTPATIENT
Start: 2024-11-19 | End: 2024-11-19 | Stop reason: HOSPADM

## 2024-11-19 RX ORDER — MIDAZOLAM HYDROCHLORIDE 1 MG/ML
INJECTION, SOLUTION INTRAMUSCULAR; INTRAVENOUS AS NEEDED
Status: DISCONTINUED | OUTPATIENT
Start: 2024-11-19 | End: 2024-11-19

## 2024-11-19 RX ORDER — ACETAMINOPHEN 325 MG/1
650 TABLET ORAL EVERY 4 HOURS PRN
Status: DISCONTINUED | OUTPATIENT
Start: 2024-11-19 | End: 2024-11-19 | Stop reason: HOSPADM

## 2024-11-19 RX ORDER — SODIUM CHLORIDE 9 MG/ML
INJECTION, SOLUTION INTRAVENOUS CONTINUOUS PRN
Status: DISCONTINUED | OUTPATIENT
Start: 2024-11-19 | End: 2024-11-19

## 2024-11-19 RX ORDER — PROPOFOL 10 MG/ML
INJECTION, EMULSION INTRAVENOUS AS NEEDED
Status: DISCONTINUED | OUTPATIENT
Start: 2024-11-19 | End: 2024-11-19

## 2024-11-19 RX ORDER — SODIUM CHLORIDE 0.9 G/100ML
IRRIGANT IRRIGATION AS NEEDED
Status: DISCONTINUED | OUTPATIENT
Start: 2024-11-19 | End: 2024-11-19 | Stop reason: HOSPADM

## 2024-11-19 RX ORDER — MIDAZOLAM HYDROCHLORIDE 1 MG/ML
INJECTION INTRAMUSCULAR; INTRAVENOUS
Status: DISCONTINUED
Start: 2024-11-19 | End: 2024-11-19 | Stop reason: HOSPADM

## 2024-11-19 RX ORDER — LIDOCAINE HYDROCHLORIDE 10 MG/ML
0.1 INJECTION, SOLUTION EPIDURAL; INFILTRATION; INTRACAUDAL; PERINEURAL ONCE
Status: DISCONTINUED | OUTPATIENT
Start: 2024-11-19 | End: 2024-11-19 | Stop reason: HOSPADM

## 2024-11-19 RX ORDER — BUPIVACAINE HYDROCHLORIDE 2.5 MG/ML
INJECTION, SOLUTION EPIDURAL; INFILTRATION; INTRACAUDAL
Status: DISCONTINUED
Start: 2024-11-19 | End: 2024-11-19 | Stop reason: HOSPADM

## 2024-11-19 RX ORDER — ROCURONIUM BROMIDE 10 MG/ML
INJECTION, SOLUTION INTRAVENOUS AS NEEDED
Status: DISCONTINUED | OUTPATIENT
Start: 2024-11-19 | End: 2024-11-19

## 2024-11-19 RX ORDER — ASPIRIN 81 MG/1
81 TABLET ORAL 2 TIMES DAILY
Qty: 84 TABLET | Refills: 0 | Status: SHIPPED | OUTPATIENT
Start: 2024-11-19 | End: 2024-12-31

## 2024-11-19 RX ORDER — ALBUTEROL SULFATE 0.83 MG/ML
2.5 SOLUTION RESPIRATORY (INHALATION) ONCE AS NEEDED
Status: DISCONTINUED | OUTPATIENT
Start: 2024-11-19 | End: 2024-11-19 | Stop reason: HOSPADM

## 2024-11-19 RX ORDER — LIDOCAINE HYDROCHLORIDE 20 MG/ML
INJECTION, SOLUTION EPIDURAL; INFILTRATION; INTRACAUDAL; PERINEURAL AS NEEDED
Status: DISCONTINUED | OUTPATIENT
Start: 2024-11-19 | End: 2024-11-19

## 2024-11-19 RX ORDER — DOCUSATE SODIUM 100 MG/1
100 CAPSULE, LIQUID FILLED ORAL 2 TIMES DAILY PRN
Qty: 30 CAPSULE | Refills: 0 | Status: SHIPPED | OUTPATIENT
Start: 2024-11-19 | End: 2024-12-04

## 2024-11-19 RX ORDER — FERROUS SULFATE, DRIED 160(50) MG
1 TABLET, EXTENDED RELEASE ORAL 2 TIMES DAILY
Qty: 180 TABLET | Refills: 0 | Status: SHIPPED | OUTPATIENT
Start: 2024-11-19 | End: 2025-02-17

## 2024-11-19 RX ORDER — FENTANYL CITRATE 50 UG/ML
INJECTION, SOLUTION INTRAMUSCULAR; INTRAVENOUS AS NEEDED
Status: DISCONTINUED | OUTPATIENT
Start: 2024-11-19 | End: 2024-11-19

## 2024-11-19 RX ORDER — DROPERIDOL 2.5 MG/ML
0.62 INJECTION, SOLUTION INTRAMUSCULAR; INTRAVENOUS ONCE AS NEEDED
Status: DISCONTINUED | OUTPATIENT
Start: 2024-11-19 | End: 2024-11-19 | Stop reason: HOSPADM

## 2024-11-19 RX ORDER — HYDROCODONE BITARTRATE AND ACETAMINOPHEN 5; 325 MG/1; MG/1
1 TABLET ORAL EVERY 4 HOURS PRN
Status: DISCONTINUED | OUTPATIENT
Start: 2024-11-19 | End: 2024-11-19 | Stop reason: HOSPADM

## 2024-11-19 ASSESSMENT — PAIN - FUNCTIONAL ASSESSMENT
PAIN_FUNCTIONAL_ASSESSMENT: 0-10
PAIN_FUNCTIONAL_ASSESSMENT: UNABLE TO SELF-REPORT

## 2024-11-19 ASSESSMENT — COLUMBIA-SUICIDE SEVERITY RATING SCALE - C-SSRS
1. IN THE PAST MONTH, HAVE YOU WISHED YOU WERE DEAD OR WISHED YOU COULD GO TO SLEEP AND NOT WAKE UP?: NO
2. HAVE YOU ACTUALLY HAD ANY THOUGHTS OF KILLING YOURSELF?: NO
6. HAVE YOU EVER DONE ANYTHING, STARTED TO DO ANYTHING, OR PREPARED TO DO ANYTHING TO END YOUR LIFE?: NO

## 2024-11-19 ASSESSMENT — PAIN SCALES - GENERAL
PAINLEVEL_OUTOF10: 5 - MODERATE PAIN
PAINLEVEL_OUTOF10: 4
PAINLEVEL_OUTOF10: 5 - MODERATE PAIN
PAINLEVEL_OUTOF10: 0 - NO PAIN
PAINLEVEL_OUTOF10: 4
PAINLEVEL_OUTOF10: 4
PAINLEVEL_OUTOF10: 5 - MODERATE PAIN
PAINLEVEL_OUTOF10: 4

## 2024-11-19 ASSESSMENT — PAIN DESCRIPTION - DESCRIPTORS
DESCRIPTORS: SORE

## 2024-11-19 NOTE — ANESTHESIA PREPROCEDURE EVALUATION
"Patient: Maureen Vela \"Mendy\"    Procedure Information       Date/Time: 11/19/24 0830    Procedures:       Left Foot Arthrodesis (Left: Foot)      Left Foot Excision Bone with Bone Graft (Left: Foot)    Location: [facility] OR 09 / [facility] OR    Surgeons: Marlon Stafford MD            Relevant Problems   Anesthesia   (+) PONV (postoperative nausea and vomiting)      Cardiac   (+) Benign essential hypertension   (+) Hyperlipidemia      Pulmonary   (+) Asthma      GI   (+) Chronic diarrhea   (+) Gastroesophageal reflux disease      Liver   (+) Calculus of gallbladder with acute on chronic cholecystitis without obstruction      Endocrine   (+) Hypothyroid   (+) Obesity      Musculoskeletal   (+) DJD (degenerative joint disease), ankle and foot, right   (+) Osteoarthritis of knee   (+) Rheumatoid arthritis with positive rheumatoid factor (Multi)   (+) Seropositive rheumatoid arthritis of multiple joints (Multi)   (+) Spondylosis of lumbosacral spine without myelopathy      ID   (+) Acquired immunocompromised state      Skin   (+) Atopic dermatitis   (+) Basal cell carcinoma of skin of other parts of face       Clinical information reviewed:   Tobacco  Allergies  Meds   Med Hx  Surg Hx  OB Status  Fam Hx  Soc   Hx        NPO Detail:  NPO/Void Status  Carbohydrate Drink Given Prior to Surgery? : N  Date of Last Liquid: 11/19/24  Time of Last Liquid: 0500  Date of Last Solid: 11/18/24  Time of Last Solid: 1930  Last Intake Type: Clear fluids  Time of Last Void: 0600         Physical Exam    Airway  Mallampati: II     Cardiovascular   Rhythm: regular  Rate: normal     Dental    Pulmonary    Abdominal            Anesthesia Plan    History of general anesthesia?: yes  History of complications of general anesthesia?: no    ASA 3     general and regional     intravenous induction   Anesthetic plan and risks discussed with patient.    Plan discussed with CRNA and CAA.      "

## 2024-11-19 NOTE — BRIEF OP NOTE
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]     Procedures  Left Foot Arthrodesis   - OH ARTHRODESIS SUBTALAR    Left Foot Arthrodesis   - OH BONE GRAFT ANY DONOR AREA MINOR/SMALL    Left Foot Arthrodesis   - OH INTERCALARY ALLOGRAFT COMPLETE      Surgeons      * Marlon Stafford - Primary    Resident/Fellow/Other Assistant:  Surgeons and Role:     * Akua Moon DO - Resident - Assisting    Staff:   Circulator: Laina Baker Person: Boyd    Anesthesia Staff: Anesthesiologist: Luke Farmer MD  C-AA: HEIDY Ortega    Procedure Summary  Anesthesia: Regional, General  ASA: III  Estimated Blood Loss: 20mL  Intra-op Medications:   Administrations occurring from 0830 to 1100 on 24:   Medication Name Total Dose   sodium chloride 0.9 % irrigation solution 1,000 mL   heparin (porcine) injection 5,000 Units   ceFAZolin (Ancef) vial 1 g 2 g   dexAMETHasone (Decadron) injection 4 mg/mL 8 mg   fentaNYL (Sublimaze) injection 50 mcg/mL 150 mcg   lidocaine PF (Xylocaine-MPF) local injection 2 % 100 mg   lubricating eye drops ophthalmic solution 2 drop   propofol (Diprivan) injection 10 mg/mL 200 mg   rocuronium (ZeMuron) 50 mg/5 mL injection 120 mg   sodium chloride 0.9 % infusion 250 mL              Anesthesia Record               Intraprocedure I/O Totals          Intake    sodium chloride 0.9% 250.00 mL    Total Intake 250 mL          Specimen: No specimens collected               Findings: nonunion of previous subtalar fusion with evidence of osteonecrosis of inferior talus    Complications:  None; patient tolerated the procedure well.     Disposition: PACU - hemodynamically stable.  Condition: stable  Specimens Collected: No specimens collected    Postop Plan:   Patient " brought to PACU in stable condition.     Resting comfortably room air. NAD. WWP throughout. Motor / sensory exam limited in operative extremity due to block.      Plan for discharge home. NWB LLE splint. Keep DCI. Medication for dvt ppx, pain control, constipation sent to [facility]. Follow up outpatient with Dr. Stafford in 10-14 days.     Attending Attestation: I was present and scrubbed for the entire procedure.    Marlon Stafford  Phone Number: [phone]

## 2024-11-19 NOTE — SIGNIFICANT EVENT
Pt and family educated and provided with discharge instructions. Both state an understanding of the teaching and all questions answered at this time. IV removed and bleeding controlled

## 2024-11-19 NOTE — DISCHARGE INSTRUCTIONS
"POST-OPERATIVE DISCHARGE INSTRUCTIONS:    ACTIVITY:    Non-Weightbearing left lower extremity in a Short Leg Plaster Splint     You are advised to go home directly from the hospital or surgical center. Restrict your activities.    Return to school/work will be determined at next clinic visit, unless previously discussed with the surgical team     BLOOD CLOT PREVENTION:    To prevent blood clot formation, you have been prescribed ASA to be taken as prescribed until your surgeon or his Physician's Assistant (PA) states you can stop taking it.    Moving around and walking (with crutches) helps decrease the risk of blood clots. You must get up and \"move around\" once every hour, unless your are sleeping.    While you are sleeping and when you are not being active, continue to keep your leg elevated as much as possible.    It is common to have swelling in your feet after surgery for even a year afterwards, so the more you keep the leg elevated after surgery, the less swelling you will have later on.     GENERAL INSTRUCTIONS:  1.  Keep your surgical site elevated above your heart for at least 7 days or longer to prevent swelling (a good way to remember this is \"toes above nose\"). This will improve your comfort and your overall recovery following surgery.  Avoid pressure under the heel and keep the heel clear to avoid ulceration     2. Be alert for signs of infection including redness, streaking, odor, fever or chills. Be alert for excessive pain or bleeding and notify your surgeon immediately. Also, notify your surgeon of nausea, vomiting, or chills, numbness or weakness, bleeding, redness, swelling, pain, or drainage from surgical incision(s), bowel or bladder dysfunction, severe pain not relieved by pain medications, temperature greater than 101.0 F (38.3 C) degrees.    3. If you smoke (or have smoked within the last year), we strongly recommend that you do not smoke. This may lead to increased risk of wound infection " and will decrease your chances of healing (bone, soft tissue, etc).     WOUND INSTRUCTIONS:     Leave your Short Leg Plaster Splint until your post operative visit.  Keep it clean and dry.     Always keep the incision clean and dry until the staples/sutures are removed. If there is no drainage from the incision you should keep it open to air. If there is drainage from the incision you must keep it covered at all times until the drainage stops.    You may shower carefully (avoid falling) but the incision must be covered with Tegaderm or a water proof dressing so the wound does not get wet; once the staples/sutures are removed, the Tegaderm is no longer necessary and the wound may be washed with soap and water.     If you do not have sutures that need to be removed  then the incision should remain clean and dry for a minimum of 14 days.    Do not soak in a bath tub, hot tub, pool, lake or other body of water until atleast 21 days after your surgery and your incision is completely dry and healed. Or until approved by Dr. Stafford.    If you have removable sutures (or staples) they will be removed in approximately 14-21 days (unless otherwise instructed) from the day of your surgery.     If you have a fiberglass cast or splint in place, please consider the following instructions:  1)  Elevate the extremity as much as possible.  2)  Keep the cast or splint clean and dry.  3)  Please call us if the cast becomes wet or dirty.  4)  If you are experiencing worsening pain or worsening swelling, please call.  5)  Itching can be bothersome.  You can try:  - Scratching the opposite limb in the same spot.  - Running air through the cast or splint with a blow dryer on cold.  - Do NOT stick anything inside the cast as it may become lodged    DIET:    Return to your regular diet as tolerated. Begin with light or bland foods. Drink plenty of fluids.      MEDICATIONS:    Resume all previous home medications at the previous prescribed  "dose and frequency unless otherwise noted    PAIN CONTROL:     For pain control, you have been prescribed medications.    Narcotic medication in the form of hydrocodone-acetaminophen (Norco). Take as prescribed and wean as able. Do not take with medications that are sedating, especially benzodiazepines.  You may take Ibuprofen (800mg every 6hrs) and Tylenol (500mg every 6hrs) for the first three days only.   - Alternate/stagger these two medications so that you're not taking them both at the same time. Take as prescribed for the first three days then stop when prescriptions done. This will provide a strong anti-inflammatory effect for the first few days.  3. The anesthesia block will wear off eventually. Some patients it will last only a few hours after surgery and in others it may last a few days. As soon as you start feeling any sensation at all in your leg, start taking the medications, so that you're not caught playing \"catch up\".   4. Remember to keep elevated. Avoid using ice while your extremity nerve block is still working. If you can't feel your leg and it is still numb, then the ice may induce injury. Ice behind the knee can help as well. You may apply ice to your cast/splint/dressing for the first few days. 20 minutes of icing followed by 2 hours of no ice. Keep the splint/cast/dressing dry and place a towel between the ice and the dressing. DO NOT GET YOUR DRESSING/SPLINT/CAST WET!  5. While taking narcotics, you may have constipation.  A stool softener is recommended. You may use an over-the-counter stool softener or use the one that has been prescribed for you. Colace and Senokot are common over-the-counter medications.  6. Do not drink alcohol or drive while using narcotic pain medication.  7. Do NOT take additional tylenol if your pain medication already has tylenol in it     IMPORTANT INFORMATION:    Please call your surgeon' s clinic with questions Monday-Friday during business hours. If no one " answers, please leave a message and someone should get back to the patient within 24 hours.  For after-hours calls, please call the  and request to contact the answering service for Dr. Stafford.   For emergencies, call 911 or present to the nearest ER for immediate evaluation.     FOLLOWUP INSTRUCTIONS:    Please contact [phone] to confirm scheduled follow-up appointment or to make changes to your scheduled appointment.

## 2024-11-19 NOTE — ANESTHESIA PROCEDURE NOTES
Airway  Date/Time: 11/19/2024 8:37 AM  Urgency: elective    Airway not difficult    Staffing  Performed: HEIDY   Authorized by: Luke Farmer MD    Performed by: HEIDY Ortega  Patient location during procedure: OR    Indications and Patient Condition  Indications for airway management: anesthesia  Spontaneous Ventilation: absent  Sedation level: deep  Preoxygenated: yes  Patient position: sniffing  Mask difficulty assessment: 1 - vent by mask  No planned trial extubation    Final Airway Details  Final airway type: endotracheal airway      Successful airway: ETT  Cuffed: yes   Successful intubation technique: direct laryngoscopy  Endotracheal tube insertion site: oral  Blade: Berny  Blade size: #3  ETT size (mm): 7.0  Cormack-Lehane Classification: grade I - full view of glottis  Placement verified by: chest auscultation and capnometry   Cuff volume (mL): 7  Measured from: lips  ETT to lips (cm): 21  Number of attempts at approach: 1

## 2024-11-19 NOTE — OP NOTE
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]     Procedures  Left Foot Arthrodesis   - NV ARTHRODESIS SUBTALAR    Left Foot Arthrodesis   - NV BONE GRAFT ANY DONOR AREA MINOR/SMALL    Left Foot Arthrodesis   - NV INTERCALARY ALLOGRAFT COMPLETE      Surgeons      * Marlon Stafford - Primary    Resident/Fellow/Other Assistant:  Surgeons and Role:     * Akua Moon DO - Resident - Assisting    Staff:   Circulator: Laina Reyesub Person: Boyd    Anesthesia Staff: Anesthesiologist: Luke Farmer MD  C-AA: HEIDY Ortega    Procedure Summary  Anesthesia: Regional, General  ASA: III  Estimated Blood Loss: 20 mL  Intra-op Medications:   Administrations occurring from 0830 to 1100 on 24:   Medication Name Total Dose   sodium chloride 0.9 % irrigation solution 1,000 mL   heparin (porcine) injection 5,000 Units   ceFAZolin (Ancef) vial 1 g 2 g   dexAMETHasone (Decadron) injection 4 mg/mL 8 mg   fentaNYL (Sublimaze) injection 50 mcg/mL 150 mcg   lidocaine PF (Xylocaine-MPF) local injection 2 % 100 mg   lubricating eye drops ophthalmic solution 2 drop   propofol (Diprivan) injection 10 mg/mL 200 mg   rocuronium (ZeMuron) 50 mg/5 mL injection 120 mg   sodium chloride 0.9 % infusion 250 mL              Anesthesia Record               Intraprocedure I/O Totals          Intake    sodium chloride 0.9% 250.00 mL    Total Intake 250 mL          Specimen: No specimens collected              Drains and/or Catheters: * None in log *    Tourniquet Times:     Total Tourniquet Time Documented:  Thigh (Left) - 124 minutes  Total: Thigh (Left) - 124 minutes      Implants:  Implants       Type Name Action Serial No.      Graft INFUSE BMP SMALL - " SN/A - [id number] Implanted N/A     Graft ALLOPURE TRICORTICAL BLOCK- SMALL Implanted [id number]     Screw 6.5x75mm fully threaded screw Implanted N/A     Screw 5.0mm fully threaded screw Implanted N/A              Patient Name: Maureen Vela  MRN: [id number]  YOB: 1960  Date of Service: 11/19/24  Report Type: Operative    PREOPERATIVE DIAGNOSIS:    Left Subtalar Arthrodesis Failure    POSTOPERATIVE DIAGNOSIS:    Left Subtalar Arthrodesis Failure    OPERATION/PROCEDURE:    1) Left Subtalar Arthrodesis  2) Left ICBG Allograft  3) Left Hip BMAC    SURGEON:  Marlon Stafford MD    ASSISTANT(S):  Yaz Moon DO (PGY-III)    ANESTHESIA:  Regional, General    COMPLICATIONS: None apparent    DISPOSITION: PACU/Home    BRIEF CLINICAL HISTORY: 64-year-old female well-known to me at this time who presented for evaluation of a painful and multiply revised left subtalar arthrodesis by an outside provider.  I had previously performed a left subtalar joint hardware removal with bone biopsy from 08/26/2024.  She is now returning for her second stage surgery for now third time revision subtalar arthrodesis.  I had previously discussed the risks, benefits and alternatives to surgery.  Risks include but are not limited to infection, wound healing complications, arthrodesis failure, talar AVN, need for further surgery, persistent or worsening pain, postoperative stiffness, bleeding, blood loss requiring a blood transfusion, neurovascular injury, mall or nonunion, recurrent deformity, hardware failure, hardware pain, failure the procedure, complications of anesthesia, stroke, DVT/PE, myocardial infarction and death.  Benefits included decreased pain, improved function and alternatives included continued conservative management which I would not recommend given the patient's complicated clinical course and failed prior revision subtalar arthrodesis.  The patient voiced understanding of the risk, benefits and  alternatives and the informed consent was signed with both myself and the patient present.    OPERATIVE REPORT: On the day of surgery 11/19/2024, the patient was met in the preoperative holding area by members of the orthopedic, anesthesia and nursing teams.  I marked the patient's left lower extremity with indelible ink with the patient as my witness.  The informed consent was again reviewed.  All remaining questions were answered.  In the preoperative holding area, the anesthesia team performed a regional block. The patient had been previously medically optimized for surgery by ALESHA.    The patient was then brought back to the operating room on [facility].  I led a preoperative timeout, verifying the correct patient, procedure and laterality of procedure to be performed.  We confirmed the appropriate availability of all surgical equipment,  implants, utilization of fluoroscopy and confirmed the administration of pre-surgical IV antibiotic prophylaxis within 1 hour of incision time, 2 g Ancef.  All present were in agreement.    Care was handed over to the anesthesia team who provided GETA supine.  The patient was then transferred onto the operating room table in the supine position.  All bony prominences were padded and an SCD was placed on the contra-lateral extremity. The patient's left lower extremity was then prepped and draped in usual sterile fashion with sterile prep with ChloraPrep.    I marked out an incision overlying the patient's iliac crest well posterior to the ASIS.  I led a preprocedure pause verifying the correct patient, procedure and laterality of procedure to be performed.  All present were in agreement.  I incised through skin and subcutaneous tissue.  I spread bluntly to bone with a hemostat.  I localized the midpoint between the inner and outer table.  The Jamshidi needle was then malleted into place and there was excellent backflow of marrow elements, two syringes were collected with  adequate sample and spun down for the MAC.  The wound was then closed with nylon in a simple interrupted fashion and sterile dressings were applied.    I then turned my attention to the patient's previous extensile Ollier type incision, this was marked out.  A sterile thigh tourniquet was applied, the patient's extremity was exsanguinated with an Esmarch and the tourniquet was inflated to 275 mmHg.  I incised through skin and subcutaneous tissue.  Meticulous hemostasis was achieved.  I was careful to look out for but did not identify the sural nerve coursing through the operative field.  The peroneal was identified, mobilized and protected throughout the duration of the procedure.  The subtalar joint was then localized under fluoroscopic guidance.  A small osteotome was then directed from lateral to medial under multiplanar fluoroscopic imaging.  There was gross mobility noted.  Intervening fibrous and callus tissue was then resected back to cancellous bone.  Under fluoroscopic guidance, a pin distractor was then utilized to improve visualization of the subtalar joint.      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  Both the talar and calcaneal sides of the subtalar arthrodesis were debrided back to bleeding cancellous bone with a combination of curette, rongeur and high-speed vikas.  After completion of arthrodesis site preparation.  The talus was obviously subsiding into the calcaneus with a subsignificant body defect.  The resultant defect was then sized with a lamina  without teeth and noted to be approximately 1 cm.  It was evident at this point that a interpositional allograft would be required to restore talus alignment.  The tricortical allograft was then opened and rehydrated with BMAC.    At this point I preloaded guidewires for the Valier cannulated screws into the subchondral bone of the calcaneus on multiplanar fluoroscopic imaging including AP ankle, lateral ankle and Molina heel axial.  The  rehydrated iliac crest bone allograft was then further contoured to match the resultant defect in the posterior facet of the subtalar joint.  This was then impacted into place under fluoroscopic guidance as an interpositional allograft.  The previously preloaded guidewires for the calcaneal screws were then directed into the subchondral bone of the talus under multiplanar fluoroscopic imaging.  The screw size was measured and then overdrilled with a cannulated drill and then 2 x 6.5 mm x 75 mm fully threaded cannulated screws were secured with excellent fixation across the ICBG allograft and posterior facet of the subtalar joint.      I then turned my attention to placing an additional screw from the plantar surface of the anterior process of the calcaneus into the talus neck.  Screw position was confirmed under multiplanar fluoroscopic imaging including lateral foot as well as AP foot.  The screw size was measured with a cannulated measuring guide and overdrilled and then secured with excellent fixation noted. The remaining tricortical graft was then morselized and impacted around the interpositional allograft.  The small infuse had been prepared and was placed about the arthrodesis site. This completed revision subtalar joint arthrodesis with interpositional allograft.    Final fluoroscopic films including AP ankle, foot, Molina axial and lateral views were obtained demonstrating appropriate position alignment following tricortical iliac crest allograft and revision subtalar arthrodesis with excellent position as well as alignment.  There is appropriate screw length as well as trajectory..    The wounds were copiously irrigated and closed in layers.  2-0 Vicryl was used for the deep layer with 3-0 Vicryl for the deep dermal layer and 3-0 nylon for skin.  The skin was cleansed and dried and dry sterile dressings were placed.  The sterile drapes were removed.  The patient was then placed into a well-padded short leg  splint.  By this time, the tourniquet had been released and there was excellent reperfusion of the extremity with palpable 2+ DP/PT pulses.    All surgical counts were noted to be correct. The patient was then awoken from anesthesia without complication and transferred from the operating room table onto the [facility] and then brought back to the PACU in stable condition.    Post-Operative Plan:   The patient will remain nonweightbearing in their left lower extremity in a short leg plaster splint.  They will begin ASA for DVT prophylaxis.  I have encouraged early mobilization and extremity elevation as tolerated.  I will plan to see the patient back in approximately 3 weeks for their first postoperative visit.    Complications:  None; patient tolerated the procedure well.    Disposition: PACU - hemodynamically stable.  Condition: stable     Attending Attestation: I was present and scrubbed for the entire procedure.    Marlon Stafford MD, GLENN  Department of Orthopaedic Surgery  [facility]    Note dictated with WeatherBug software.  Completed without full type editing and sent to avoid delay.

## 2024-11-19 NOTE — ANESTHESIA POSTPROCEDURE EVALUATION
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    Anesthesia Post Evaluation    Patient location during evaluation: bedside  Patient participation: complete - patient participated  Level of consciousness: awake  Pain management: adequate  Airway patency: patent  Cardiovascular status: acceptable  Respiratory status: acceptable  Hydration status: acceptable  Postoperative Nausea and Vomiting: none        No notable events documented.    "

## 2024-11-19 NOTE — ANESTHESIA PROCEDURE NOTES
Peripheral Block    Patient location during procedure: pre-op  Start time: 11/19/2024 7:31 AM  End time: 11/19/2024 7:46 AM  Reason for block: procedure for pain, at surgeon's request and post-op pain management  Staffing  Performed: attending   Authorized by: Luke Farmer MD    Performed by: Luke Farmer MD  Preanesthetic Checklist  Completed: patient identified, IV checked, site marked, risks and benefits discussed, surgical consent, monitors and equipment checked, pre-op evaluation and timeout performed   Timeout performed at: 11/19/2024 7:31 AM  Peripheral Block  Patient position: laying flat  Prep: alcohol swabs  Patient monitoring: continuous pulse ox  Block type: adductor canal and popliteal  Laterality: left  Injection technique: single-shot  Guidance: ultrasound guided  Local infiltration: lidocaine  Needle  Needle type: short-bevel   Needle gauge: 22 G  Needle length: 8 cm  Needle localization: ultrasound guidance     image stored in chart  Test dose: negative  Assessment  Injection assessment: negative aspiration for heme, no paresthesia on injection, incremental injection and local visualized surrounding nerve on ultrasound  Heart rate change: no  Slow fractionated injection: yes  Additional Notes  40 ml 0.375% bupivacaine with 1:200K epi and 2.67 mg dexamethasone injected for popliteal   20 ml 0.375% bupivacaine with 1:200K epi and 1.33 mg dexamethasone injected for adductor

## 2024-12-03 ENCOUNTER — APPOINTMENT (OUTPATIENT)
Dept: INFUSION THERAPY | Facility: CLINIC | Age: 64
End: 2024-12-03
Payer: COMMERCIAL

## 2024-12-03 VITALS
DIASTOLIC BLOOD PRESSURE: 68 MMHG | BODY MASS INDEX: 40.04 KG/M2 | SYSTOLIC BLOOD PRESSURE: 120 MMHG | HEART RATE: 65 BPM | WEIGHT: 218.9 LBS | TEMPERATURE: 97.2 F | OXYGEN SATURATION: 99 % | RESPIRATION RATE: 16 BRPM

## 2024-12-03 DIAGNOSIS — M05.79 SEROPOSITIVE RHEUMATOID ARTHRITIS OF MULTIPLE JOINTS (MULTI): ICD-10-CM

## 2024-12-03 PROCEDURE — 96365 THER/PROPH/DIAG IV INF INIT: CPT | Performed by: NURSE PRACTITIONER

## 2024-12-03 RX ORDER — FAMOTIDINE 10 MG/ML
20 INJECTION INTRAVENOUS ONCE AS NEEDED
OUTPATIENT
Start: 2024-12-30

## 2024-12-03 RX ORDER — ALBUTEROL SULFATE 0.83 MG/ML
3 SOLUTION RESPIRATORY (INHALATION) AS NEEDED
OUTPATIENT
Start: 2024-12-30

## 2024-12-03 RX ORDER — EPINEPHRINE 0.3 MG/.3ML
0.3 INJECTION SUBCUTANEOUS EVERY 5 MIN PRN
OUTPATIENT
Start: 2024-12-30

## 2024-12-03 RX ORDER — DIPHENHYDRAMINE HYDROCHLORIDE 50 MG/ML
50 INJECTION INTRAMUSCULAR; INTRAVENOUS AS NEEDED
OUTPATIENT
Start: 2024-12-30

## 2024-12-03 ASSESSMENT — ENCOUNTER SYMPTOMS
FREQUENCY: 0
HEADACHES: 0
LIGHT-HEADEDNESS: 0
NAUSEA: 0
CONSTIPATION: 0
LEG SWELLING: 0
HEMATURIA: 0
ARTHRALGIAS: 1
SLEEP DISTURBANCE: 1
SHORTNESS OF BREATH: 0
DYSURIA: 0
DIARRHEA: 0
VOMITING: 0
DIZZINESS: 0
COUGH: 0
MYALGIAS: 1
WOUND: 1
ABDOMINAL PAIN: 0
FATIGUE: 1
APPETITE CHANGE: 1

## 2024-12-03 ASSESSMENT — PAIN SCALES - GENERAL: PAINLEVEL_OUTOF10: 3

## 2024-12-03 NOTE — PATIENT INSTRUCTIONS
Today :We administered abatacept (Orencia) 750 mg in sodium chloride 0.9% 100 mL IV.     For:   1. Seropositive rheumatoid arthritis of multiple joints (Multi)         Your next appointment is due in:  12/31/2024        Please read the  Medication Guide that was given to you and reviewed during todays visit.     (Tell all doctors including dentists that you are taking this medication)     Go to the emergency room or call 911 if:  -You have signs of allergic reaction:   -Rash, hives, itching.   -Swollen, blistered, peeling skin.   -Swelling of face, lips, mouth, tongue or throat.   -Tightness of chest, trouble breathing, swallowing or talking     Call your doctor:  - If IV / injection site gets red, warm, swollen, itchy or leaks fluid or pus.     (Leave dressing on your IV site for at least 2 hours and keep area clean and dry  - If you get sick or have symptoms of infection or are not feeling well for any reason.    (Wash your hands often, stay away from people who are sick)  - If you have side effects from your medication that do not go away or are bothersome.     (Refer to the teaching your nurse gave you for side effects to call your doctor about)    - Common side effects may include:  stuffy nose, headache, feeling tired, muscle aches, upset stomach  - Before receiving any vaccines     - Call the [facility] at   If:  - You get sick, are on antibiotics, have had a recent vaccine, have surgery or dental work and your doctor wants your visit rescheduled.  - You need to cancel and reschedule your visit for any reason. Call at least 2 days before your visit if you need to cancel.   - Your insurance changes before your next visit.    (We will need to get approval from your new insurance. This can take up to two weeks.)     The [facility] is opened Monday thru Friday. We are closed on weekends and holidays.   Voice mail will take your call if the center is closed. If you leave a message  please allow 24 hours for a call back during weekdays. If you leave a message on a weekend/holiday, we will call you back the next business day.    A pharmacist is available Monday - Friday from 8:30AM to 3:30PM to help answer any questions you may have about your prescriptions(s). Please call pharmacy at:    [facility]: [phone]  [facility]: [phone]  [facility]: [phone]

## 2024-12-03 NOTE — PROGRESS NOTES
[facility]   Infusion Clinic Note   Date: December 3, 2024   Name: Maureen Vela  : 1960   MRN: [id number]          Reason for Visit: OP Infusion (Maintenance Orencia 750 mg Every 28 days)         Today: We administered abatacept (Orencia) 750 mg in sodium chloride 0.9% 100 mL IV.       Visit Type: INFUSION       Ordered By: Dr. Vargas       Accompanied by:Self       Diagnosis: Seropositive rheumatoid arthritis of multiple joints (Multi)        Allergies:   Allergies as of 2024 - Reviewed 2024   Allergen Reaction Noted    Doxycycline Rash 10/18/2024    Oxycodone-acetaminophen Nausea/vomiting 10/04/2023    Sulfa (sulfonamide antibiotics) Headache 10/04/2023    Sulfamethoxazole-trimethoprim Rash 2019    Balsam peru Hives 10/04/2023    Clarithromycin Itching and Rash 2019    Hydroxychloroquine Hives and Rash 10/04/2023    Methotrexate Hives and Rash 2019          Current Medications has a current medication list which includes the following prescription(s): abatacept 750 mg in sodium chloride 0.9% 70 mL IV, albuterol, amlodipine, amoxicillin, aspirin, azelastine-fluticasone, calcium carbonate-vitamin d3, cholecalciferol, clonidine, docusate sodium, ergocalciferol, folic acid, hydrocodone-acetaminophen, leflunomide, levocetirizine, levothyroxine, montelukast, multivitamin, omeprazole, propranolol la, rizatriptan, tizanidine, and triamcinolone.       Vitals:   Vitals:    24 1302 24 1355 24 1427   BP: 127/79 120/74 120/68   Pulse: 68 64 65   Resp: 18 16 16   Temp: 36.2 °C (97.2 °F) 36.3 °C (97.3 °F) 36.2 °C (97.2 °F)   TempSrc: Temporal     SpO2: 99%     Weight: 99.3 kg (218 lb 14.4 oz)     PainSc:   3     PainLoc: Hand               Infusion Pre-procedure Checklist:   - Allergies reviewed: yes   - Medications reviewed: yes       - Previous reaction to current treatment: no      Assess patient for the concerns below. Document provider  notification as appropriate.  - Active or recent infection with/without current antibiotic use: no  - Recent or planned invasive dental work: no  - Recent or planned surgeries: yes-11/19/2024  s/p left ankle surgery - Dr. Vargas aware and has been working with patient to arrange infusion 2 weeks after surgery.    - Recently received or plans to receive vaccinations: no  - Has treatment related toxicities: no  - Is pregnant:  n/a s/p hysterectomy      Pain: 3   - Is the pain different from normal: no   - Is your Doctor aware:  yes       Labs:  reviewed          Fall Risk Screening: Junaid Fall Risk  History of Falling, Immediate or Within 3 Months: No  Secondary Diagnosis: Yes  Ambulatory Aid: Crutches/cane/walker  Intravenous Therapy/Heparin Lock: No  Gait/Transferring: Impaired   Mental Status: Oriented to own ability  Quiroga Fall Risk Score: 50       Review Of Systems:  Review of Systems   Constitutional:  Positive for appetite change and fatigue.        Good appetite   Respiratory:  Negative for cough and shortness of breath.    Cardiovascular:  Negative for chest pain and leg swelling.   Gastrointestinal:  Negative for abdominal pain, constipation, diarrhea, nausea and vomiting.   Genitourinary:  Negative for bladder incontinence, dysuria, frequency, hematuria and nocturia.    Musculoskeletal:  Positive for arthralgias, gait problem and myalgias.        S/p left ankle surgery on 11/19/2024 Left ankle/ foot orthopedic splint with ace wrap intact.  Patient arrives via easy scooter (-) LLE weight bearing.  (+) bilateral hand stiffness   Skin:  Positive for wound.        (+) Left ankle/foot dressing noted with ace wrap intact.   Neurological:  Positive for gait problem. Negative for dizziness, headaches and light-headedness.        Arrives using easy scooter (-) LLE weight bearing.    Psychiatric/Behavioral:  Positive for sleep disturbance.         Patient having difficulty getting into position of comfort related to  "LLE splint and pain.         ROS completed? Yes      Infusion Readiness:  - Assessment Concerns Related to Infusion: No  - Provider notified: no      Document Below Only If Indicated:   New Patient Education:    N/A (returning patient for continuation of therapy. Ongoing education provided as needed.)        Treatment Conditions & Drug Specific Questions:    Abatacept  (ORENCIA)    (Unless otherwise specified on patient specific therapy plan):    TREATMENT CONDITIONS  Unless otherwise specified on patient specific therapy plan hold treatment and notify provider prior to proceeding with infusion if patient with a:  o Positive T-Spot  o Positive Hepatitis B Surface Ag   o Positive Hepatitis B Core Antibody   o Positive Hepatitis C Antibody     Lab Results   Component Value Date    TBSIN Negative 04/29/2024    TBGRES Negative  Reference range: NEGATIVE   05/26/2020      Lab Results   Component Value Date    HEPBSAG NEGATIVE 05/26/2020      Lab Results   Component Value Date    HEPCAB  05/26/2020     Negative  Reference range: NEGATIVE  Performed at the [facility] unless   otherwise noted.        No results found for: \"NONUHFIRE\", \"NONUHSWGH\", \"NONUHFISH\", \"EXTHEPBSAG\"    Labs reviewed and patient meets treatment conditions? Yes    DRUG SPECIFIC QUESTIONS  Any history of COPD?  No  (If yes educate patient on possible s/s exacerbation)    If Yes any new or worsening s/s related to COPD?  Not applicable  (If patient with worsening COPD notify prescribing provider prior to proceeding with infusion)      REMINDER:  - Pregnancy Category X Drug. Obtain negative pregnancy test prior to FIRST infusion  and educate patient on risk in women of childbearing ability.   - Weight based drug.     Recommended Vitals/Observation:  Vitals: Obtain vital signs at the start; middle and end of infusion as well as at the end of observation period: 30 minutes post.   Observation: Observe patient for 30 minutes after " each infusion        Weight Based Drug Calculations:    WEIGHT BASED DRUGS: Abatacept  (ORENCIA)       Patient's weight today:   Vitals:    24 1302   Weight: 99.3 kg (218 lb 14.4 oz)         weight range for prescribed dose: Orencia:  <60 k mg  60 to 100 k mg  >100 k g    Patient weight today falls outside of 10% variance or  weight range: Not applicable    [facility] pharmacist informed of weight variance: Not applicable    Doses that are weight based have an acceptable variance rule within 10% of the prescribed   order and/or within  weight range. If patient weight on day of infusion falls   outside of the 10% variance, or weight range, infusion is administered and   pharmacy contacted regarding future dosing adjustments, per policy.    1430  Patient tolerated infusion well.  30 minute post infusion observation complete.  No s/s adverse reaction noted.  VSS.  RTC on 2024.              Initiated By: DEVONTE Rivas-CNP

## 2024-12-06 ENCOUNTER — OFFICE VISIT (OUTPATIENT)
Dept: ORTHOPEDIC SURGERY | Facility: CLINIC | Age: 64
End: 2024-12-06
Payer: COMMERCIAL

## 2024-12-06 ENCOUNTER — HOSPITAL ENCOUNTER (OUTPATIENT)
Dept: RADIOLOGY | Facility: CLINIC | Age: 64
Discharge: HOME | End: 2024-12-06
Payer: COMMERCIAL

## 2024-12-06 DIAGNOSIS — M96.0 NONUNION OF SUBTALAR ARTHRODESIS: ICD-10-CM

## 2024-12-06 DIAGNOSIS — Z29.9 ENCOUNTER FOR DEEP VEIN THROMBOSIS PROPHYLAXIS: ICD-10-CM

## 2024-12-06 PROCEDURE — 99211 OFF/OP EST MAY X REQ PHY/QHP: CPT | Performed by: STUDENT IN AN ORGANIZED HEALTH CARE EDUCATION/TRAINING PROGRAM

## 2024-12-06 PROCEDURE — 73630 X-RAY EXAM OF FOOT: CPT | Mod: LT

## 2024-12-06 RX ORDER — ENOXAPARIN SODIUM 100 MG/ML
40 INJECTION SUBCUTANEOUS DAILY
Qty: 30 EACH | Refills: 1 | Status: SHIPPED | OUTPATIENT
Start: 2024-12-06 | End: 2025-02-04

## 2024-12-06 RX ORDER — APIXABAN 2.5 MG/1
2.5 TABLET, FILM COATED ORAL 2 TIMES DAILY
Qty: 60 TABLET | Refills: 0 | Status: SHIPPED | OUTPATIENT
Start: 2024-12-06 | End: 2024-12-06 | Stop reason: WASHOUT

## 2024-12-06 ASSESSMENT — PAIN - FUNCTIONAL ASSESSMENT: PAIN_FUNCTIONAL_ASSESSMENT: 0-10

## 2024-12-06 ASSESSMENT — PAIN DESCRIPTION - DESCRIPTORS: DESCRIPTORS: ACHING;BURNING;THROBBING

## 2024-12-06 ASSESSMENT — PAIN SCALES - GENERAL: PAINLEVEL_OUTOF10: 5 - MODERATE PAIN

## 2024-12-07 NOTE — PROGRESS NOTES
"ORTHOPAEDIC SURGERY PROGRESS NOTE    Chief Complaint:  Left foot pain    History Of Present Illness  Maureen Vela \"Mendy\" is a 64 y.o. female who presents for complex evaluation of left foot pain, self-referred.  Patient has previously undergone 3 surgeries with an [facility] podiatrist.  The first was a tendon transfer of which the details are unclear at this time, the second was a left subtalar joint arthrodesis from 06/28/2023 followed by a revision subtalar joint arthrodesis from 02/07/2024.  The patient continues with severe pain that is activity dependent.  Typically rated as 5-9 out of 10.  She has been ambulating with use of a boot and crutch.  By report, the patient's previous provider has stopped doing surgery due to a medical condition.  She was quite happy with her previous practice, however sought an orthopedic surgery perspective.  Prior to her index surgery she reported pain on the outside of her foot that radiated to the top.  This is yet unresolved.  Patient works as a  and is on her feet extensively throughout the day.  She denies any fevers, chills or night sweats but does report paresthesias on the outside of her foot.    Patient has a past medical history of rheumatoid arthritis, she has been off of her rheumatologic medications from the winter 2023.  She has been using a bone stimulator.    07/12/2024: Patient returns for follow-up of her left foot pain as above.  She continues with severe pain in her left foot and ankle.  Rated as 8 out of 10.  Pain is improved with boot wear.  She has been ambulating with use of crutches.  She is reporting a burning pain on the lateral side of her foot, this is unchanged from her previous operations.  Patient also notes discomfort when walking on the back of her heel from a prominent screw head.    09/06/2024: Patient returns s/p left foot hardware removal and bone biopsy from 08/26/2024.  Patient's intraoperative cultures were negative " but biopsy of the talus was consistent with osteomyelitis.  She has been compliant with nonweightbearing restrictions and reporting 2 out of 10 pain.  She has been following closely with her rheumatologist.    09/20/2024: Patient returns s/p left foot hardware removal and bone biopsy from 08/26/2024.  She is currently reporting 3 out of 10 pain.  She has been compliant with nonweightbearing restrictions in a walking boot and crutches.  She has follow-up with ID who did not feel that her clinical situation and pathology was consistent with osteomyelitis requiring formal IV antibiotics.  She has been restarted on her RA medications.    10/18/2024: Patient returns s/p left foot hardware removal and bone biopsy from 08/26/2024.  She is reporting 5 out of 10 pain.  She has continued to protect her weightbearing with a walking boot and crutches.  She has had a drug rash/response to doxycycline and underwent a biopsy for this.    12/06/2024: Patient returns s/p left revision bone block ICBG subtalar arthrodesis with BMAC from 11/19/2024.  Patient has been compliant with nonweightbearing restrictions.  She is reporting similar swelling to her preoperative state.  Pain continues as 5 out of 10.  She is present with her  today.    Past Medical History  Past Medical History:   Diagnosis Date    AB (asthmatic bronchitis) ([facility])     Acquired immunocompromised state     Autoimmune RA, On Orencia every 28 days, followed by rheumatology    Actinic keratoses     Allergic rhinitis     Asthma     only used inhaler 4-5 times this entire year, triggers are seasonal allergies    Bilateral sacroiliitis ([facility])     Chronic pain     Chronic sinusitis     DDD (degenerative disc disease), cervical     C5,6 BULGE    DDD (degenerative disc disease), lumbar     L4,5 BULGE    DJD (degenerative joint disease)     right ankle and foot    ETD (eustachian tube dysfunction)     GERD (gastroesophageal reflux disease)     H/O arthrodesis      Hiatal hernia     HTN (hypertension)     Hyperlipidemia     Hypothyroidism     Joint pain     Lateral epicondylitis of elbow     Low back pain     hronic, folowed by pain management    Low back pain     Lumbosacral disc disease     Migraine     Neuropathy     left foot N/T and hands bilateral    Osteoarthritis     Osteopenia     PONV (postoperative nausea and vomiting)     PTTD (posterior tibial tendon dysfunction)     left lower extremity    Seronegative rheumatoid arthritis (Multi)     Skin cancer     Basal nd squamous removed    Spondylosis of 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     Vitamin D deficiency        Surgical History  Recent Surgeries in Orthopaedic Surgery            No cases to display             Social History  Social History     Socioeconomic History    Marital status:    Tobacco Use    Smoking status: Never    Smokeless tobacco: Never   Vaping Use    Vaping status: Never Used   Substance and Sexual Activity    Alcohol use: Not Currently     Comment: RARELY    Drug use: Never    Sexual activity: Defer     Partners: Male     Birth control/protection: Post-menopausal, Female Sterilization       Family History  Family History   Problem Relation Name Age of Onset    Arthritis Mother Mom     Alzheimer's disease Father Dad     Breast cancer Sister Sima 42    Alzheimer's disease Sister Joselyn     Breast cancer Mother's Sister  63    Stroke Maternal Grandfather Grandpa         Allergies  Allergies   Allergen Reactions    Doxycycline Rash     Severe rash or burn.    Oxycodone-Acetaminophen Nausea/vomiting    Sulfa (Sulfonamide Antibiotics) Headache    Sulfamethoxazole-Trimethoprim Rash    Balsam [place] Hives     Pt states in dental adhesives and make up    Clarithromycin Itching and Rash    Hydroxychloroquine Hives and Rash    Methotrexate Hives and Rash       Review of Systems  REVIEW OF SYSTEMS  Constitutional: no unplanned weight loss  Psychiatric: no suicidal ideation  ENT: no vision  changes, no sinus problems  Pulmonary: no shortness of breath  Lymphatic: no enlarged lymph nodes  Cardiovascular: no chest pain or shortness of breath  Gastrointestinal: no stomach problems  Genitourinary: no dysuria   Skin: no rashes  Endocrine: no thyroid problems  Neurological: no headache, no numbness  Hematological: no easy bruising  Musculoskeletal: Left foot pain    Physical Exam  PHYSICAL EXAMINATION  Constitutional Exam: well developed and well nourished  Psychiatric Exam: alert and oriented, appropriate mood and behavior  Eye Exam: EOMI  Pulmonary Exam: breathing non-labored, no apparent distress  Lymphatic exam: no appreciable lymphadenopathy in the lower extremities  Cardiovascular exam: RRR to peripheral palpation, DP pulses 2+, PT 2+, toes are pink with good capillary refill, no pitting edema  Skin exam: no open lesions, rashes, abrasions or ulcerations  Neurological exam: sensation to light touch intact in both lower extremities in peripheral and dermatomal distributions (except for any abnormalities noted in musculoskeletal exam)    Musculoskeletal exam: Left lower extremity examination.  Patient previous anterior ankle incision well-healed.  Sinus Tarsi and posterior heel as well as pelvic brim incisions well-healed with skin edges and suture line well-approximated.  There is appropriate but minimal keisha-incisional erythema without induration, fluctuance or drainage to suggest underlying infection.  Patient has supple and pain-free ankle range of motion without effusion, pain-free and appropriately limited subtalar joint range of motion. Patient has sensation intact light touch grossly in a saphenous, sural (reporting neuritic type symptoms with positive tinel's baseline for patient from prior surgery), superficial peroneal, deep peroneal and tibial nerve distribution.  She has intact PF/DF/EHL.  She has 2+ DP/PT pulse palpated.    Last Recorded Vitals  There were no vitals taken for this  visit.    Laboratory Results    No results found. However, due to the size of the patient record, not all encounters were searched. Please check Results Review for a complete set of results.    Radiology Results   X-ray imaging 3 view nonweightbearing left foot reviewed and independently evaluated by me on 12/06/2024 demonstrates appropriate position alignment following revision subtalar arthrodesis with bone block technique, there is suggestion of early arthrodesis healing particularly posteriorly at the posterior facet of the subtalar joint.    Assessment/Plan:  64-year-old female who presents for complex evaluation of painful and multiply revised left subtalar arthrodesis by [facility] podiatrist with sural nerve neuritis and anterior ankle impingement s/p L HARSHAL and bone biopsy from 08/26/2024 s/p left revision bone block ICBG subtalar arthrodesis with BMAC from 11/19/2024 who presents with clinical evidence of wound healing and retained alignment by radiographic review.  I would recommend the patient continue nonweightbearing in her left lower extremity.  I will remove her sutures today and provide her with a prescription for enoxaparin for an anticipated period of prolonged nonweightbearing following surgery.  I will transition her into a nonweightbearing walking boot for ease of self-care and the potential use of her bone stimulator from prior surgery.  I will continue to recommend extremity elevation for both pain and swelling control.  I we will plan to see the patient back in 3 weeks to follow her clinical course.  I provisionally discussed with the patient and her  that I would intend to follow the patient both clinically and radiographically with x-ray as opposed to with CT scan.  Upon return patient would require three-view nonweightbearing left foot.    Marlon Stafford MD, GLENN  Department of Orthopaedic Surgery  [facility]    The diagnosis and treatment plan were  reviewed with the patient. All questions were answered. The patient verbalized understanding of the treatment plan. There were no barriers to understanding identified.    Note dictated with Blipify software.  Completed without full type editing and sent to avoid delay.

## 2024-12-18 PROCEDURE — RXMED WILLOW AMBULATORY MEDICATION CHARGE

## 2024-12-19 ENCOUNTER — PHARMACY VISIT (OUTPATIENT)
Dept: PHARMACY | Facility: CLINIC | Age: 64
End: 2024-12-19
Payer: COMMERCIAL

## 2024-12-26 ENCOUNTER — HOSPITAL ENCOUNTER (OUTPATIENT)
Dept: RADIOLOGY | Facility: CLINIC | Age: 64
Discharge: HOME | End: 2024-12-26
Payer: COMMERCIAL

## 2024-12-26 ENCOUNTER — OFFICE VISIT (OUTPATIENT)
Dept: ORTHOPEDIC SURGERY | Facility: CLINIC | Age: 64
End: 2024-12-26
Payer: COMMERCIAL

## 2024-12-26 DIAGNOSIS — M96.0 NONUNION OF SUBTALAR ARTHRODESIS: ICD-10-CM

## 2024-12-26 PROCEDURE — 99211 OFF/OP EST MAY X REQ PHY/QHP: CPT | Performed by: STUDENT IN AN ORGANIZED HEALTH CARE EDUCATION/TRAINING PROGRAM

## 2024-12-26 PROCEDURE — 73630 X-RAY EXAM OF FOOT: CPT | Mod: LT

## 2024-12-26 ASSESSMENT — PAIN SCALES - GENERAL: PAINLEVEL_OUTOF10: 4

## 2024-12-26 ASSESSMENT — PAIN DESCRIPTION - DESCRIPTORS: DESCRIPTORS: DULL;DISCOMFORT

## 2024-12-26 ASSESSMENT — PAIN - FUNCTIONAL ASSESSMENT: PAIN_FUNCTIONAL_ASSESSMENT: 0-10

## 2024-12-26 NOTE — PROGRESS NOTES
"ORTHOPAEDIC SURGERY PROGRESS NOTE    Chief Complaint:  Left foot pain    History Of Present Illness  Maureen Vela \"Mendy\" is a 64 y.o. female who presents for complex evaluation of left foot pain, self-referred.  Patient has previously undergone 3 surgeries with an [facility] podiatrist.  The first was a tendon transfer of which the details are unclear at this time, the second was a left subtalar joint arthrodesis from 06/28/2023 followed by a revision subtalar joint arthrodesis from 02/07/2024.  The patient continues with severe pain that is activity dependent.  Typically rated as 5-9 out of 10.  She has been ambulating with use of a boot and crutch.  By report, the patient's previous provider has stopped doing surgery due to a medical condition.  She was quite happy with her previous practice, however sought an orthopedic surgery perspective.  Prior to her index surgery she reported pain on the outside of her foot that radiated to the top.  This is yet unresolved.  Patient works as a  and is on her feet extensively throughout the day.  She denies any fevers, chills or night sweats but does report paresthesias on the outside of her foot.    Patient has a past medical history of rheumatoid arthritis, she has been off of her rheumatologic medications from the winter 2023.  She has been using a bone stimulator.    07/12/2024: Patient returns for follow-up of her left foot pain as above.  She continues with severe pain in her left foot and ankle.  Rated as 8 out of 10.  Pain is improved with boot wear.  She has been ambulating with use of crutches.  She is reporting a burning pain on the lateral side of her foot, this is unchanged from her previous operations.  Patient also notes discomfort when walking on the back of her heel from a prominent screw head.    09/06/2024: Patient returns s/p left foot hardware removal and bone biopsy from 08/26/2024.  Patient's intraoperative cultures were negative " but biopsy of the talus was consistent with osteomyelitis.  She has been compliant with nonweightbearing restrictions and reporting 2 out of 10 pain.  She has been following closely with her rheumatologist.    09/20/2024: Patient returns s/p left foot hardware removal and bone biopsy from 08/26/2024.  She is currently reporting 3 out of 10 pain.  She has been compliant with nonweightbearing restrictions in a walking boot and crutches.  She has follow-up with ID who did not feel that her clinical situation and pathology was consistent with osteomyelitis requiring formal IV antibiotics.  She has been restarted on her RA medications.    10/18/2024: Patient returns s/p left foot hardware removal and bone biopsy from 08/26/2024.  She is reporting 5 out of 10 pain.  She has continued to protect her weightbearing with a walking boot and crutches.  She has had a drug rash/response to doxycycline and underwent a biopsy for this.    12/06/2024: Patient returns s/p left revision bone block ICBG subtalar arthrodesis with BMAC from 11/19/2024.  Patient has been compliant with nonweightbearing restrictions.  She is reporting similar swelling to her preoperative state.  Pain continues as 5 out of 10.  She is present with her  today.    12/26/2024: Patient returns s/p left revision bone block ICBG subtalar arthrodesis with BMAC from 11/19/2024.  Patient has been compliant with nonweightbearing restrictions.  She is reporting 4 out of 10 pain today that is improving overall.  She is present with her  today.  Unfortunately her previous bone stimulator has timed out.  She has not had any fevers, chills or night sweats.    Past Medical History  Past Medical History:   Diagnosis Date    AB (asthmatic bronchitis) ([facility])     Acquired immunocompromised state     Autoimmune RA, On Orencia every 28 days, followed by rheumatology    Actinic keratoses     Allergic rhinitis     Asthma     only used inhaler 4-5 times this entire  year, triggers are seasonal allergies    Bilateral sacroiliitis (CMS-HCC)     Chronic pain     Chronic sinusitis     DDD (degenerative disc disease), cervical     C5,6 BULGE    DDD (degenerative disc disease), lumbar     L4,5 BULGE    DJD (degenerative joint disease)     right ankle and foot    ETD (eustachian tube dysfunction)     GERD (gastroesophageal reflux disease)     H/O arthrodesis     Hiatal hernia     HTN (hypertension)     Hyperlipidemia     Hypothyroidism     Joint pain     Lateral epicondylitis of elbow     Low back pain     hronic, folowed by pain management    Low back pain     Lumbosacral disc disease     Migraine     Neuropathy     left foot N/T and hands bilateral    Osteoarthritis     Osteopenia     PONV (postoperative nausea and vomiting)     PTTD (posterior tibial tendon dysfunction)     left lower extremity    Seronegative rheumatoid arthritis (Multi)     Skin cancer     Basal nd squamous removed    Spondylosis of 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     Vitamin D deficiency        Surgical History  Recent Surgeries in Orthopaedic Surgery            No cases to display             Social History  Social History     Socioeconomic History    Marital status:    Tobacco Use    Smoking status: Never    Smokeless tobacco: Never   Vaping Use    Vaping status: Never Used   Substance and Sexual Activity    Alcohol use: Not Currently     Comment: RARELY    Drug use: Never    Sexual activity: Defer     Partners: Male     Birth control/protection: Post-menopausal, Female Sterilization       Family History  Family History   Problem Relation Name Age of Onset    Arthritis Mother Mom     Alzheimer's disease Father Dad     Breast cancer Sister Sima 42    Alzheimer's disease Sister Joselyn     Breast cancer Mother's Sister  63    Stroke Maternal Grandfather Grandpa         Allergies  Allergies   Allergen Reactions    Doxycycline Rash     Severe rash or burn.    Oxycodone-Acetaminophen  Nausea/vomiting    Sulfa (Sulfonamide Antibiotics) Headache    Sulfamethoxazole-Trimethoprim Rash    Balsam [place] Hives     Pt states in dental adhesives and make up    Clarithromycin Itching and Rash    Hydroxychloroquine Hives and Rash    Methotrexate Hives and Rash       Review of Systems  REVIEW OF SYSTEMS  Constitutional: no unplanned weight loss  Psychiatric: no suicidal ideation  ENT: no vision changes, no sinus problems  Pulmonary: no shortness of breath  Lymphatic: no enlarged lymph nodes  Cardiovascular: no chest pain or shortness of breath  Gastrointestinal: no stomach problems  Genitourinary: no dysuria   Skin: no rashes  Endocrine: no thyroid problems  Neurological: no headache, no numbness  Hematological: no easy bruising  Musculoskeletal: Left foot pain    Physical Exam  PHYSICAL EXAMINATION  Constitutional Exam: well developed and well nourished  Psychiatric Exam: alert and oriented, appropriate mood and behavior  Eye Exam: EOMI  Pulmonary Exam: breathing non-labored, no apparent distress  Lymphatic exam: no appreciable lymphadenopathy in the lower extremities  Cardiovascular exam: RRR to peripheral palpation, DP pulses 2+, PT 2+, toes are pink with good capillary refill, no pitting edema  Skin exam: no open lesions, rashes, abrasions or ulcerations  Neurological exam: sensation to light touch intact in both lower extremities in peripheral and dermatomal distributions (except for any abnormalities noted in musculoskeletal exam)    Musculoskeletal exam: Left lower extremity examination.  Patient anterior ankle incision well-healed.  Patient sinus Tarsi and posterior heel incisions well-healed.  There is resolved keisha-incisional erythema without evidence of infection.  Patient has supple and pain-free ankle range of motion without effusion, she has pain-free and appropriately limited subtalar with supple and pain-free midtarsal joint range of motion. Patient has sensation intact light touch grossly in a  saphenous, sural (reporting neuritic type symptoms with positive tinel's baseline for patient from prior surgery), superficial peroneal, deep peroneal and tibial nerve distribution.  She has intact PF/DF/EHL.  She has 2+ DP/PT pulse palpated.    Last Recorded Vitals  There were no vitals taken for this visit.    Laboratory Results    No results found. However, due to the size of the patient record, not all encounters were searched. Please check Results Review for a complete set of results.    Radiology Results   X-ray imaging 3 view nonweightbearing left foot reviewed and independently evaluated by me on 12/26/2024 demonstrates maintained position and alignment following revision subtalar arthrodesis with bone block technique.  There are projectional differences by comparison to interval imaging but continued note of arthrodesis healing at the posterior facet of the subtalar joint.    Assessment/Plan:  64-year-old female who presents for complex evaluation of painful and multiply revised left subtalar arthrodesis by [facility] podiatrist with sural nerve neuritis and anterior ankle impingement s/p L HARSHAL and bone biopsy from 08/26/2024 s/p left revision bone block ICBG subtalar arthrodesis with BMAC from 11/19/2024 who presents with clinical evidence of wound healing and retained alignment by radiographic review.  I would recommend the patient continue nonweightbearing in her left lower extremity.  She should continue on Lovenox for DVT prophylaxis at this point due to the anticipated prolonged period of nonweightbearing following surgery.  She has been using her bone stimulator from prior surgery which is now timed out.  As this will be her third attempt for arthrodesis at her subtalar joint, I will recommend obtaining a new bone stimulator to support healing at this point.  I believe this is medically necessary to support her healing in this clinically complex situation.  I will plan to see the patient back in 1 month to  follow her clinical course.  I again discussed with the patient and her  that I would plan to obtain a CT scan in approximately 3 months postoperatively to assess healing.  Upon return, patient would require three-view nonweightbearing left foot.    Marlon Stafford MD, GLENN  Department of Orthopaedic Surgery  [facility]    The diagnosis and treatment plan were reviewed with the patient. All questions were answered. The patient verbalized understanding of the treatment plan. There were no barriers to understanding identified.    Note dictated with [employer] software.  Completed without full type editing and sent to avoid delay.

## 2024-12-31 ENCOUNTER — APPOINTMENT (OUTPATIENT)
Dept: INFUSION THERAPY | Facility: CLINIC | Age: 64
End: 2024-12-31
Payer: COMMERCIAL

## 2024-12-31 ENCOUNTER — LAB (OUTPATIENT)
Dept: LAB | Facility: LAB | Age: 64
End: 2024-12-31
Payer: COMMERCIAL

## 2024-12-31 ENCOUNTER — TRANSCRIBE ORDERS (OUTPATIENT)
Dept: RHEUMATOLOGY | Facility: CLINIC | Age: 64
End: 2024-12-31

## 2024-12-31 VITALS
TEMPERATURE: 97.5 F | RESPIRATION RATE: 18 BRPM | OXYGEN SATURATION: 99 % | HEART RATE: 72 BPM | DIASTOLIC BLOOD PRESSURE: 70 MMHG | WEIGHT: 216.6 LBS | SYSTOLIC BLOOD PRESSURE: 140 MMHG | BODY MASS INDEX: 39.62 KG/M2

## 2024-12-31 DIAGNOSIS — E55.9 VITAMIN D DEFICIENCY: ICD-10-CM

## 2024-12-31 DIAGNOSIS — M05.79 SEROPOSITIVE RHEUMATOID ARTHRITIS OF MULTIPLE JOINTS (MULTI): ICD-10-CM

## 2024-12-31 LAB
25(OH)D3 SERPL-MCNC: 98 NG/ML (ref 30–100)
ALBUMIN SERPL BCP-MCNC: 4.2 G/DL (ref 3.4–5)
ALP SERPL-CCNC: 58 U/L (ref 33–136)
ALT SERPL W P-5'-P-CCNC: 41 U/L (ref 7–45)
ANION GAP SERPL CALCULATED.3IONS-SCNC: 8 MMOL/L (ref 10–20)
APPEARANCE UR: CLEAR
AST SERPL W P-5'-P-CCNC: 22 U/L (ref 9–39)
BASOPHILS # BLD AUTO: 0.05 X10*3/UL (ref 0–0.1)
BASOPHILS NFR BLD AUTO: 1.1 %
BILIRUB SERPL-MCNC: 0.4 MG/DL (ref 0–1.2)
BILIRUB UR STRIP.AUTO-MCNC: NEGATIVE MG/DL
BUN SERPL-MCNC: 12 MG/DL (ref 6–23)
C3 SERPL-MCNC: 138 MG/DL (ref 87–200)
C4 SERPL-MCNC: 37 MG/DL (ref 10–50)
CALCIUM SERPL-MCNC: 8.9 MG/DL (ref 8.6–10.3)
CHLORIDE SERPL-SCNC: 107 MMOL/L (ref 98–107)
CK SERPL-CCNC: 42 U/L (ref 0–215)
CO2 SERPL-SCNC: 30 MMOL/L (ref 21–32)
COLOR UR: YELLOW
CREAT SERPL-MCNC: 0.63 MG/DL (ref 0.5–1.05)
CRP SERPL-MCNC: <0.1 MG/DL
DSDNA AB SER-ACNC: <1 IU/ML
EGFRCR SERPLBLD CKD-EPI 2021: >90 ML/MIN/1.73M*2
EOSINOPHIL # BLD AUTO: 0.12 X10*3/UL (ref 0–0.7)
EOSINOPHIL NFR BLD AUTO: 2.6 %
ERYTHROCYTE [DISTWIDTH] IN BLOOD BY AUTOMATED COUNT: 13.2 % (ref 11.5–14.5)
ERYTHROCYTE [SEDIMENTATION RATE] IN BLOOD BY WESTERGREN METHOD: 3 MM/H (ref 0–30)
GLUCOSE SERPL-MCNC: 95 MG/DL (ref 74–99)
GLUCOSE UR STRIP.AUTO-MCNC: NORMAL MG/DL
HCT VFR BLD AUTO: 43.1 % (ref 36–46)
HGB BLD-MCNC: 14.4 G/DL (ref 12–16)
HOLD SPECIMEN: NORMAL
IMM GRANULOCYTES # BLD AUTO: 0.01 X10*3/UL (ref 0–0.7)
IMM GRANULOCYTES NFR BLD AUTO: 0.2 % (ref 0–0.9)
KETONES UR STRIP.AUTO-MCNC: NEGATIVE MG/DL
LEUKOCYTE ESTERASE UR QL STRIP.AUTO: NEGATIVE
LYMPHOCYTES # BLD AUTO: 1.19 X10*3/UL (ref 1.2–4.8)
LYMPHOCYTES NFR BLD AUTO: 26.2 %
MCH RBC QN AUTO: 31.2 PG (ref 26–34)
MCHC RBC AUTO-ENTMCNC: 33.4 G/DL (ref 32–36)
MCV RBC AUTO: 93 FL (ref 80–100)
MONOCYTES # BLD AUTO: 0.43 X10*3/UL (ref 0.1–1)
MONOCYTES NFR BLD AUTO: 9.5 %
NEUTROPHILS # BLD AUTO: 2.74 X10*3/UL (ref 1.2–7.7)
NEUTROPHILS NFR BLD AUTO: 60.4 %
NITRITE UR QL STRIP.AUTO: NEGATIVE
NRBC BLD-RTO: 0 /100 WBCS (ref 0–0)
PH UR STRIP.AUTO: 6 [PH]
PLATELET # BLD AUTO: 209 X10*3/UL (ref 150–450)
POTASSIUM SERPL-SCNC: 4.1 MMOL/L (ref 3.5–5.3)
PROT SERPL-MCNC: 6.2 G/DL (ref 6.4–8.2)
PROT UR STRIP.AUTO-MCNC: NEGATIVE MG/DL
RBC # BLD AUTO: 4.62 X10*6/UL (ref 4–5.2)
RBC # UR STRIP.AUTO: NEGATIVE /UL
SODIUM SERPL-SCNC: 141 MMOL/L (ref 136–145)
SP GR UR STRIP.AUTO: 1.02
URATE SERPL-MCNC: 3.4 MG/DL (ref 2.3–6.7)
UROBILINOGEN UR STRIP.AUTO-MCNC: NORMAL MG/DL
WBC # BLD AUTO: 4.5 X10*3/UL (ref 4.4–11.3)

## 2024-12-31 PROCEDURE — 80053 COMPREHEN METABOLIC PANEL: CPT

## 2024-12-31 PROCEDURE — 82550 ASSAY OF CK (CPK): CPT

## 2024-12-31 PROCEDURE — 85652 RBC SED RATE AUTOMATED: CPT

## 2024-12-31 PROCEDURE — 81003 URINALYSIS AUTO W/O SCOPE: CPT

## 2024-12-31 PROCEDURE — 84550 ASSAY OF BLOOD/URIC ACID: CPT

## 2024-12-31 PROCEDURE — 85025 COMPLETE CBC W/AUTO DIFF WBC: CPT

## 2024-12-31 PROCEDURE — 82306 VITAMIN D 25 HYDROXY: CPT

## 2024-12-31 PROCEDURE — 36415 COLL VENOUS BLD VENIPUNCTURE: CPT

## 2024-12-31 PROCEDURE — 86140 C-REACTIVE PROTEIN: CPT

## 2024-12-31 PROCEDURE — 86160 COMPLEMENT ANTIGEN: CPT

## 2024-12-31 PROCEDURE — 96365 THER/PROPH/DIAG IV INF INIT: CPT | Performed by: NURSE PRACTITIONER

## 2024-12-31 PROCEDURE — 86225 DNA ANTIBODY NATIVE: CPT

## 2024-12-31 RX ORDER — FAMOTIDINE 10 MG/ML
20 INJECTION INTRAVENOUS ONCE AS NEEDED
OUTPATIENT
Start: 2025-01-28

## 2024-12-31 RX ORDER — EPINEPHRINE 0.3 MG/.3ML
0.3 INJECTION SUBCUTANEOUS EVERY 5 MIN PRN
OUTPATIENT
Start: 2025-01-28

## 2024-12-31 RX ORDER — DIPHENHYDRAMINE HYDROCHLORIDE 50 MG/ML
50 INJECTION INTRAMUSCULAR; INTRAVENOUS AS NEEDED
OUTPATIENT
Start: 2025-01-28

## 2024-12-31 RX ORDER — ALBUTEROL SULFATE 0.83 MG/ML
3 SOLUTION RESPIRATORY (INHALATION) AS NEEDED
OUTPATIENT
Start: 2025-01-28

## 2024-12-31 ASSESSMENT — ENCOUNTER SYMPTOMS
DYSURIA: 0
WOUND: 1
COUGH: 0
LEG SWELLING: 0
SHORTNESS OF BREATH: 0
FREQUENCY: 0
CHILLS: 0
ABDOMINAL PAIN: 0
NAUSEA: 0
DIZZINESS: 0
FATIGUE: 0
FEVER: 0
MYALGIAS: 1
ARTHRALGIAS: 1
VOMITING: 0
LIGHT-HEADEDNESS: 0
HEADACHES: 0
DIARRHEA: 0

## 2024-12-31 ASSESSMENT — PAIN SCALES - GENERAL: PAINLEVEL_OUTOF10: 6

## 2024-12-31 NOTE — PROGRESS NOTES
[facility]   Infusion Clinic Note   Date: 2024   Name: Maureen Vela  : 1960   MRN: [id number]          Reason for Visit: OP Infusion (Orencia 750 mg every 28 days)         Today: We administered abatacept (Orencia) 750 mg in sodium chloride 0.9% 100 mL IV.       Visit Type: INFUSION- Maintenance       Ordered By: Dr. Vargas       Accompanied by:Self       Diagnosis: Seropositive rheumatoid arthritis of multiple joints (Multi)        Allergies:   Allergies as of 2024 - Reviewed 2024   Allergen Reaction Noted    Doxycycline Rash 10/18/2024    Oxycodone-acetaminophen Nausea/vomiting 10/04/2023    Sulfa (sulfonamide antibiotics) Headache 10/04/2023    Sulfamethoxazole-trimethoprim Rash 2019    Balsam peru Hives 10/04/2023    Clarithromycin Itching and Rash 2019    Hydroxychloroquine Hives and Rash 10/04/2023    Methotrexate Hives and Rash 2019          Current Medications has a current medication list which includes the following prescription(s): abatacept 750 mg in sodium chloride 0.9% 70 mL IV, albuterol, amlodipine, amoxicillin, aspirin, azelastine-fluticasone, calcium carbonate-vitamin d3, cholecalciferol, clonidine, enoxaparin, ergocalciferol, folic acid, hydrocodone-acetaminophen, leflunomide, levocetirizine, levothyroxine, montelukast, multivitamin, omeprazole, propranolol la, rizatriptan, tizanidine, and triamcinolone.       Vitals:   Vitals:    24 0807 24 0907 24 0946   BP: 146/83 144/72 140/70   Pulse: 100 76 72   Resp: 18 18 18   Temp: 36.4 °C (97.5 °F) 36.3 °C (97.4 °F) 36.4 °C (97.5 °F)   TempSrc: Temporal     SpO2: 97% 99% 99%   Weight: 98.2 kg (216 lb 9.6 oz)     PainSc:   6     PainLoc: Hand               Infusion Pre-procedure Checklist:   - Allergies reviewed: yes   - Medications reviewed: yes       - Previous reaction to current treatment: no      Assess patient for the concerns below. Document provider  notification as appropriate.  - Active or recent infection with/without current antibiotic use: no  - Recent or planned invasive dental work: yes, (+) toothe extraction 3 weeks ago  - Recent or planned surgeries: no  - Recently received or plans to receive vaccinations: no  - Has treatment related toxicities: no  - Is pregnant:  N/A s/p hysterectomy      Pain: 5   - Is the pain different from normal: no   - Is your Doctor aware:  yes       Labs:  Reviewed          Fall Risk Screening: Qu[place]ga Fall Risk  History of Falling, Immediate or Within 3 Months: No  Secondary Diagnosis: Yes  Ambulatory Aid: Crutches/cane/walker  Intravenous Therapy/Heparin Lock: Yes  Gait/Transferring: Impaired   Mental Status: Oriented to own ability  Qu[place]ga Fall Risk Score: 70       Review Of Systems:  Review of Systems   Constitutional:  Negative for chills, fatigue and fever.   Respiratory:  Negative for cough and shortness of breath.    Cardiovascular:  Negative for chest pain and leg swelling.   Gastrointestinal:  Negative for abdominal pain, diarrhea, nausea and vomiting.   Genitourinary:  Negative for dysuria and frequency.    Musculoskeletal:  Positive for arthralgias, gait problem and myalgias.        S/P Left ankle surgery on  11/19/2024.  Bilateral hand stiffness and pain.  LLE orthotic boot noted  (-) LLE weight bearing.  Ambulates with knee scooter   Skin:  Positive for wound.        Left ankle/foot wound- Unable to assess at this time.  orthotic boot noted.    Neurological:  Positive for gait problem. Negative for dizziness, headaches and light-headedness.         ROS completed? Yes      Infusion Readiness:  - Assessment Concerns Related to Infusion: No  - Provider notified: no      Document Below Only If Indicated:   New Patient Education:    N/A (returning patient for continuation of therapy. Ongoing education provided as needed.)        Treatment Conditions & Drug Specific Questions:    Abatacept  (ORENCIA)    (Unless otherwise  "specified on patient specific therapy plan):    TREATMENT CONDITIONS  Unless otherwise specified on patient specific therapy plan hold treatment and notify provider prior to proceeding with infusion if patient with a:  o Positive T-Spot  o Positive Hepatitis B Surface Ag   o Positive Hepatitis B Core Antibody   o Positive Hepatitis C Antibody     Lab Results   Component Value Date    TBSIN Negative 2024    TBGRES Negative  Reference range: NEGATIVE   2020      Lab Results   Component Value Date    HEPBSAG NEGATIVE 2020      Lab Results   Component Value Date    HEPCAB  2020     Negative  Reference range: NEGATIVE  Performed at the [facility] unless   otherwise noted.        No results found for: \"NONUHFIRE\", \"NONUHSWGH\", \"NONUHFISH\", \"EXTHEPBSAG\"    Labs reviewed and patient meets treatment conditions? Yes    DRUG SPECIFIC QUESTIONS  Any history of COPD?  No  (If yes educate patient on possible s/s exacerbation)    If Yes any new or worsening s/s related to COPD?  Not applicable  (If patient with worsening COPD notify prescribing provider prior to proceeding with infusion)      REMINDER:  - Pregnancy Category X Drug. Obtain negative pregnancy test prior to FIRST infusion  and educate patient on risk in women of childbearing ability.   - Weight based drug.     Recommended Vitals/Observation:  Vitals: Obtain vital signs at the start; middle and end of infusion as well as at the end of observation period: 30 minutes post.   Observation: Observe patient for 30 minutes after each infusion Observation complete.          Weight Based Drug Calculations:    WEIGHT BASED DRUGS: Abatacept  (ORENCIA)   Patient's dosing weight (kg): 97.1         Patient's weight today:   Vitals:    24 0807   Weight: 98.2 kg (216 lb 9.6 oz)         weight range for prescribed dose: Orencia:  <60 k mg  60 to 100 k mg  >100 k g    Patient weight today falls outside " of 10% variance or  weight range: Not applicable     Home Care pharmacist informed of weight variance: Not applicable    Doses that are weight based have an acceptable variance rule within 10% of the prescribed   order and/or within  weight range. If patient weight on day of infusion falls   outside of the 10% variance, or weight range, infusion is administered and   pharmacy contacted regarding future dosing adjustments, per policy.     0945   Patient tolerated infusion well.  Post infusion 30 minute observation complete.  No s/s adverse reaction noted.  VSS.  RTC on 1/28/2025.        Initiated By: Amy Benavides RN   _________________________________________________________________________    Note authored and patient cared for by: Amy Benavides RN  Note/Encounter reviewed by: Yisel MCKEON NP. This provider on site at time of patient infusion. Infusion staff to notify this provider of any questions, concerns, abnormals or issues during infusion.    Final check of medication completed by this LINDA / or on-site pharmacist with administering nurse using positive identification prior to administration. Final appearance of product checked for accuracy and conformity to the formula of the prepared product. Assured use of correct ingredients, accurate calculations and precise measurements under appropriate conditions and procedures.    No issues reported during today's encounter. Pt. tolerated infusion without difficulty. Pt. not independently evaluated by this provider during today's encounter.  (Yisel MCKEON NP)

## 2024-12-31 NOTE — PATIENT INSTRUCTIONS
Today :We administered abatacept (Orencia) 750 mg in sodium chloride 0.9% 100 mL IV.     For:   1. Seropositive rheumatoid arthritis of multiple joints (Multi)         Your next appointment is due in:  1/28/2025        Please read the  Medication Guide that was given to you and reviewed during todays visit.     (Tell all doctors including dentists that you are taking this medication)     Go to the emergency room or call 911 if:  -You have signs of allergic reaction:   -Rash, hives, itching.   -Swollen, blistered, peeling skin.   -Swelling of face, lips, mouth, tongue or throat.   -Tightness of chest, trouble breathing, swallowing or talking     Call your doctor:  - If IV / injection site gets red, warm, swollen, itchy or leaks fluid or pus.     (Leave dressing on your IV site for at least 2 hours and keep area clean and dry  - If you get sick or have symptoms of infection or are not feeling well for any reason.    (Wash your hands often, stay away from people who are sick)  - If you have side effects from your medication that do not go away or are bothersome.     (Refer to the teaching your nurse gave you for side effects to call your doctor about)    - Common side effects may include:  stuffy nose, headache, feeling tired, muscle aches, upset stomach  - Before receiving any vaccines     - Call the [facility] at   If:  - You get sick, are on antibiotics, have had a recent vaccine, have surgery or dental work and your doctor wants your visit rescheduled.  - You need to cancel and reschedule your visit for any reason. Call at least 2 days before your visit if you need to cancel.   - Your insurance changes before your next visit.    (We will need to get approval from your new insurance. This can take up to two weeks.)     The [facility] is opened Monday thru Friday. We are closed on weekends and holidays.   Voice mail will take your call if the center is closed. If you leave a message  please allow 24 hours for a call back during weekdays. If you leave a message on a weekend/holiday, we will call you back the next business day.    A pharmacist is available Monday - Friday from 8:30AM to 3:30PM to help answer any questions you may have about your prescriptions(s). Please call pharmacy at:    [facility]: [phone]  [facility]: [phone]  [facility]: [phone]

## 2025-01-02 ENCOUNTER — APPOINTMENT (OUTPATIENT)
Dept: PAIN MEDICINE | Facility: CLINIC | Age: 65
End: 2025-01-02
Payer: COMMERCIAL

## 2025-01-06 ENCOUNTER — PREP FOR PROCEDURE (OUTPATIENT)
Dept: PAIN MEDICINE | Facility: CLINIC | Age: 65
End: 2025-01-06
Payer: COMMERCIAL

## 2025-01-06 ENCOUNTER — OFFICE VISIT (OUTPATIENT)
Dept: PAIN MEDICINE | Facility: CLINIC | Age: 65
End: 2025-01-06
Payer: COMMERCIAL

## 2025-01-06 VITALS
OXYGEN SATURATION: 99 % | DIASTOLIC BLOOD PRESSURE: 82 MMHG | HEIGHT: 62 IN | HEART RATE: 62 BPM | SYSTOLIC BLOOD PRESSURE: 124 MMHG | BODY MASS INDEX: 39.75 KG/M2 | WEIGHT: 216 LBS | RESPIRATION RATE: 24 BRPM

## 2025-01-06 DIAGNOSIS — M46.1 SACROILIITIS (CMS-HCC): ICD-10-CM

## 2025-01-06 DIAGNOSIS — M47.817 LUMBOSACRAL SPONDYLOSIS WITHOUT MYELOPATHY: Primary | ICD-10-CM

## 2025-01-06 PROCEDURE — 3008F BODY MASS INDEX DOCD: CPT | Performed by: ANESTHESIOLOGY

## 2025-01-06 PROCEDURE — 3074F SYST BP LT 130 MM HG: CPT | Performed by: ANESTHESIOLOGY

## 2025-01-06 PROCEDURE — 3079F DIAST BP 80-89 MM HG: CPT | Performed by: ANESTHESIOLOGY

## 2025-01-06 PROCEDURE — 99214 OFFICE O/P EST MOD 30 MIN: CPT | Performed by: ANESTHESIOLOGY

## 2025-01-06 PROCEDURE — G2211 COMPLEX E/M VISIT ADD ON: HCPCS | Performed by: ANESTHESIOLOGY

## 2025-01-06 PROCEDURE — 1036F TOBACCO NON-USER: CPT | Performed by: ANESTHESIOLOGY

## 2025-01-06 ASSESSMENT — ENCOUNTER SYMPTOMS
ACTIVITY CHANGE: 1
BACK PAIN: 1

## 2025-01-06 ASSESSMENT — PATIENT HEALTH QUESTIONNAIRE - PHQ9
2. FEELING DOWN, DEPRESSED OR HOPELESS: NOT AT ALL
SUM OF ALL RESPONSES TO PHQ9 QUESTIONS 1 & 2: 0
1. LITTLE INTEREST OR PLEASURE IN DOING THINGS: NOT AT ALL

## 2025-01-06 ASSESSMENT — PAIN - FUNCTIONAL ASSESSMENT: PAIN_FUNCTIONAL_ASSESSMENT: 0-10

## 2025-01-06 ASSESSMENT — PAIN SCALES - GENERAL
PAINLEVEL_OUTOF10: 4
PAINLEVEL_OUTOF10: 4

## 2025-01-06 ASSESSMENT — PAIN DESCRIPTION - DESCRIPTORS: DESCRIPTORS: ACHING

## 2025-01-06 NOTE — PROGRESS NOTES
The patient is a 64-year-old female with low back pain.  The patient reports that her pain returned after her lumbar radiofrequency ablation at the L4-5 and the L5-S1 levels in 2022.  The patient reports least 75% relief of her arthritic low back pain for 1 year before the pain started to return.  The patient would like to undergo this procedure again.  The patient gets some relief with use of tramadol without side effects.  She is not in need of a refill today.  The patient is seeing Dr. Stafford regarding her left foot.    Review of Systems   Constitutional:  Positive for activity change.   Musculoskeletal:  Positive for back pain and gait problem.   All other systems reviewed and are negative.    GENERAL: alert and appropriate, in no distress, well-hydrated, well-nourished and happy, smiling, interactive  SKIN: no rash noted  RESPIRATORY: breathing non-labored and no grunting/flaring/retractions  CHEST: equal chest rise with normal respiratory effort  ABDOMEN: soft and non-tender  BACK: back normal in appearance, spine with reduced ROM  EXTREMITIES: strength intact  NEUROLOGIC: gait antalgic, SLR negative, sensation grossly intact.    Assessment and Plan    -Chronicity--chronic spinal pain    -Diagnostics--no new imaging ordered    -Pharmacologic--continue tramadol    -Psychologic--no need for psychologic intervention from my standpoint.  There are no mental health issues of which I am aware that are contributing to the patient's pain.  There are no substance abuse or alcohol abuse issues of which I am aware that are contributing to the patient's pain.    -Physical--we discussed the importance of physical therapy and exercise.  We discussed avoidance and modification techniques.    -Intervention--the patient is a candidate for radiofrequency ablation of the facet medial nerve branches bilaterally at the L4-5 and the L5-S1 levels.  I explained the risks benefits and alternatives of the procedure to the patient.  The  patient wishes to proceed.    I spent time educating the patient on the condition including the treatment and the prognosis.  I invited the patient to call at anytime with any questions.

## 2025-01-06 NOTE — PROGRESS NOTES
MEDICATION NAME: TRAMADOL  STRENGTH: 50 MG  LAST FILL DATE: 2024  DATE LAST TAKEN: today  QUANTITY FILLED: 60  QUANTITY REMAININ  COUNT COMPLETED BY: SHAHRZAD GONZALEZ LPN and YAZAN LYNN RN      UDS LAST COMPLETED: 2024

## 2025-01-10 ENCOUNTER — APPOINTMENT (OUTPATIENT)
Dept: RHEUMATOLOGY | Facility: CLINIC | Age: 65
End: 2025-01-10
Payer: COMMERCIAL

## 2025-01-10 LAB — C1Q SERPL-MCNC: 16 MG/DL (ref 10.3–20.5)

## 2025-01-14 DIAGNOSIS — G43.009 MIGRAINE WITHOUT AURA AND WITHOUT STATUS MIGRAINOSUS, NOT INTRACTABLE: Primary | ICD-10-CM

## 2025-01-14 DIAGNOSIS — E03.9 ACQUIRED HYPOTHYROIDISM: ICD-10-CM

## 2025-01-14 DIAGNOSIS — M25.512 CHRONIC PAIN OF BOTH SHOULDERS: ICD-10-CM

## 2025-01-14 DIAGNOSIS — M25.511 CHRONIC PAIN OF BOTH SHOULDERS: ICD-10-CM

## 2025-01-14 DIAGNOSIS — G89.29 CHRONIC PAIN OF BOTH SHOULDERS: ICD-10-CM

## 2025-01-14 DIAGNOSIS — K21.9 GASTROESOPHAGEAL REFLUX DISEASE WITHOUT ESOPHAGITIS: ICD-10-CM

## 2025-01-14 RX ORDER — RIZATRIPTAN BENZOATE 10 MG/1
10 TABLET ORAL DAILY PRN
Qty: 27 TABLET | Refills: 1 | Status: SHIPPED | OUTPATIENT
Start: 2025-01-14 | End: 2026-01-14

## 2025-01-14 RX ORDER — OMEPRAZOLE 20 MG/1
20 CAPSULE, DELAYED RELEASE ORAL DAILY
Qty: 90 CAPSULE | Refills: 3 | Status: SHIPPED | OUTPATIENT
Start: 2025-01-14

## 2025-01-14 RX ORDER — LEVOTHYROXINE SODIUM 75 UG/1
75 TABLET ORAL DAILY
Qty: 90 TABLET | Refills: 1 | Status: SHIPPED | OUTPATIENT
Start: 2025-01-14

## 2025-01-14 RX ORDER — TIZANIDINE 4 MG/1
4 TABLET ORAL EVERY 8 HOURS PRN
Qty: 90 TABLET | Refills: 0 | Status: SHIPPED | OUTPATIENT
Start: 2025-01-14 | End: 2025-02-13

## 2025-01-17 ENCOUNTER — TELEPHONE (OUTPATIENT)
Dept: PAIN MEDICINE | Facility: CLINIC | Age: 65
End: 2025-01-17
Payer: COMMERCIAL

## 2025-01-17 ENCOUNTER — OFFICE VISIT (OUTPATIENT)
Dept: RHEUMATOLOGY | Facility: CLINIC | Age: 65
End: 2025-01-17
Payer: COMMERCIAL

## 2025-01-17 VITALS
DIASTOLIC BLOOD PRESSURE: 94 MMHG | OXYGEN SATURATION: 100 % | SYSTOLIC BLOOD PRESSURE: 150 MMHG | WEIGHT: 216 LBS | HEIGHT: 62 IN | BODY MASS INDEX: 39.75 KG/M2 | HEART RATE: 73 BPM

## 2025-01-17 DIAGNOSIS — M85.80 OSTEOPENIA WITH HIGH RISK OF FRACTURE: ICD-10-CM

## 2025-01-17 DIAGNOSIS — R76.8 POSITIVE ANA (ANTINUCLEAR ANTIBODY): ICD-10-CM

## 2025-01-17 DIAGNOSIS — Z79.899 ENCOUNTER FOR LONG-TERM (CURRENT) USE OF MEDICATIONS: ICD-10-CM

## 2025-01-17 DIAGNOSIS — M05.79 SEROPOSITIVE RHEUMATOID ARTHRITIS OF MULTIPLE JOINTS (MULTI): Primary | ICD-10-CM

## 2025-01-17 DIAGNOSIS — L80 VITILIGO: ICD-10-CM

## 2025-01-17 DIAGNOSIS — E55.9 VITAMIN D DEFICIENCY: ICD-10-CM

## 2025-01-17 PROCEDURE — 3077F SYST BP >= 140 MM HG: CPT | Performed by: INTERNAL MEDICINE

## 2025-01-17 PROCEDURE — 3080F DIAST BP >= 90 MM HG: CPT | Performed by: INTERNAL MEDICINE

## 2025-01-17 PROCEDURE — 3008F BODY MASS INDEX DOCD: CPT | Performed by: INTERNAL MEDICINE

## 2025-01-17 PROCEDURE — 99214 OFFICE O/P EST MOD 30 MIN: CPT | Performed by: INTERNAL MEDICINE

## 2025-01-17 RX ORDER — TRAMADOL HYDROCHLORIDE 50 MG/1
TABLET ORAL
COMMUNITY
Start: 2024-12-23

## 2025-01-17 RX ORDER — LEFLUNOMIDE 20 MG/1
20 TABLET ORAL DAILY
Qty: 30 TABLET | Refills: 5 | Status: SHIPPED | OUTPATIENT
Start: 2025-01-17 | End: 2025-07-16

## 2025-01-17 RX ORDER — FOLIC ACID 1 MG/1
1 TABLET ORAL DAILY
Qty: 90 TABLET | Refills: 3 | Status: SHIPPED | OUTPATIENT
Start: 2025-01-17

## 2025-01-17 ASSESSMENT — ROUTINE ASSESSMENT OF PATIENT INDEX DATA (RAPID3)
LIFT_CUP_TO_MOUTH: WITHOUT ANY DIFFICULTY
PARTIPATE_RECREATIONAL_ACTIVITIES: UNABLE TO DO
TOTAL RAPID3 SCORE: 11
GOOD_NIGHTS_SLEEP: WITHOUT ANY DIFFICULTY
ON A SCALE OF ONE TO TEN, HOW MUCH PAIN HAVE YOU HAD BECAUSE OF YOUR CONDITION OVER THE PAST WEEK?: 4
ON A SCALE OF ONE TO TEN, CONSIDERING ALL THE WAYS IN WHICH ILLNESS AND HEALTH CONDITIONS MAY AFFECT YOU AT THIS TIME, PLEASE INDICATE BELOW HOW YOU ARE DOING:: 4
DRESS_YOURSELF: WITH SOME DIFFICULTY
SEVERITY_SCORE: MODERATE SEVERITY (MS)
WALK_FLAT_GROUND: WITH SOME DIFFICULTY
SEVERITY_SCORE: 0
FN_SCORE: 3
ON A SCALE OF ONE TO TEN, HOW MUCH PAIN HAVE YOU HAD BECAUSE OF YOUR CONDITION OVER THE PAST WEEK?: 4
IN_OUT_TRANSPORT: WITHOUT ANY DIFFICULTY
FEELINGS_ANXIETY_NERVOUS: WITHOUT ANY DIFFICULTY
TURN_FAUCETS_OFF: WITHOUT ANY DIFFICULTY
ON A SCALE OF ONE TO TEN, CONSIDERING ALL THE WAYS IN WHICH ILLNESS AND HEALTH CONDITIONS MAY AFFECT YOU AT THIS TIME, PLEASE INDICATE BELOW HOW YOU ARE DOING:: 4
PICK_CLOTHES_OFF_FLOOR: WITH SOME DIFFICULTY
WASH_DRY_BODY: WITHOUT ANY DIFFICULTY
IN_OUT_BED: WITHOUT ANY DIFFICULTY
FEELINGS_DEPRESSION: WITHOUT ANY DIFFICULTY
WEIGHTED_TOTAL_SCORE: 3.67
SUM OF QUESTIONS A TO J: 9
WALK_KILOMETERS: UNABLE TO DO

## 2025-01-17 ASSESSMENT — PAIN SCALES - GENERAL: PAINLEVEL_OUTOF10: 4

## 2025-01-17 ASSESSMENT — LIFESTYLE VARIABLES
HOW MANY STANDARD DRINKS CONTAINING ALCOHOL DO YOU HAVE ON A TYPICAL DAY: PATIENT DOES NOT DRINK
HOW OFTEN DO YOU HAVE SIX OR MORE DRINKS ON ONE OCCASION: NEVER
HOW OFTEN DO YOU HAVE A DRINK CONTAINING ALCOHOL: NEVER
SKIP TO QUESTIONS 9-10: 1
AUDIT-C TOTAL SCORE: 0

## 2025-01-17 ASSESSMENT — ENCOUNTER SYMPTOMS
OCCASIONAL FEELINGS OF UNSTEADINESS: 1
LOSS OF SENSATION IN FEET: 0
DEPRESSION: 0

## 2025-01-17 ASSESSMENT — PATIENT HEALTH QUESTIONNAIRE - PHQ9: 1. LITTLE INTEREST OR PLEASURE IN DOING THINGS: NOT AT ALL

## 2025-01-17 NOTE — PROGRESS NOTES
"          [facility]/  Rheumatology  [address], [address]  [place] [address]  Phone: [phone]  Fax: [phone]    Rheumatology Progress Note 01/17/2025      Maureen Vela \"Mendy\" is a 64 y.o. female here for follow up.      Last Visit: 9/6/24    Rheum Hx  Initial HPI:         Referred by Dr Thompson for eval and treat myalgia, +ALEXANDER         CC: stiff, sore joints, hands elbows, knees (both replaced), toes         Since 4 yrs or so         Slow onset         Progressive course         No precipating factor         Associated with tiredness, aches         Worse with cold or heat         3/4 hrs, sometimes all day stiffness         Currently joint pain hands, wrists, elbows, toes, ACs         Swelling of R hand, R knee         Seen previously by Dr Colby- roxanna juan seroneg RA and also with strongly +ALEXANDER         Hx of renee knee meniscal repair then bone on bone - s/p renee knee replacements; due workup by Ortho for continued issues with L knee, sometimes leeroy         Hx of lumbar spine surgery >20 yrs ago         Previous Tx:         naproxen         ibuprofen         meloxicam         acetaminophen- no help         steroids- very helpful but weight gain and mood swings         MTX- 7/2019 0% helpful d/c due to allergy to medication- severe rash and hives reaction         leflunomide for 3 yrs- about 50% helpful, currently on         No other DMARDs         ROS +         fatigue sometimes         LE swelling by end of day  callused- told by Derm to see Rheum         asthma/allergies         frequent sinus and pneumonia         GERD         diarrhea         rashes/allergies         sun sens- tiny blisters         joint pain, swelling, stiffness         back pain from bulging disc         myalgias         knees, hands, elbows weakness         numbness hands, toes, feet- intermittent, L med ankle numbness         history of low thyroid, 1/2 removed         menopause s/p uterine ablation, " hysterectomy 8/17, LMP 2/12         thyroid nodule hx w/p resection         FHx: mat aunt- RA         mother- PBC.  Component      Latest Ref Rng 5/26/2020   C-ANCA (PR3) BY EIA (Note)…    C-ANCA FLOURESCENCE Negative…    P-ANCA (MPO) BY EIA (Note)…    P-ANCA FLOURESCENCE Negative…    ANCP INTERPRETATION (Note)…    Staff Review (Note)…    IgG Cardiolipin Ab 9…    IgM Cardiolipin Ab 10…    IgA Cardiolipin Ab <9…    ALEXANDER Positive…    ALEXANDER Titer 1:160…    ALEXANDER Pattern Atypical speckled…    Beta-2 Glyco 1 IgG <9…    Beta 2 Glyco 1 IgM <9…    SSA ANTIBODIES 0.2…    SSB ANTIBODIES <0.2…    Thyroid Peroxidase (TPO) Antibody <1.0…    Beta-2 Glyco 1 IgA 3…    Citrulline Antibody, IgG 27… (H)    Centromere Ab Negative…    DAVID-1 ANTIBODY <0.2…    Rheumatoid Factor      0 - 20 IU/ML 10    RNP Antibody <0.2…    SCL-70 ABS EIA <0.2…    SM Antibody <0.2…    Anti-Thyroglobulin AB 1.5…    SUZY, Broad Spectrum Fátima Serum NEGATIVE Performed at [address] [address]      Component      Latest Ref Rng 1/2/2023   Total Protein, Spe 6.2… (L)    Albumin, SPE 3.9…    Alpha 1 Globulin 0.3…    Alpha 2 Globulin 0.7…    Beta Glob SPE 0.7…    Gamma Glob,Spe 0.7…    Protein Electrophoresis Comment NORMAL    UMPA Interpretation See Note…    UMPA Interpretation N/A    UMPA Review See Note…     Previous Tx  Sulfa allergy  Methotrexate- did not tolerate  HCQ- did not tolerate  Leflunomide- discontinued due to lack of efficacy  Enbrel- discontinued due to lack of efficacy    Health Maintenance  DXA T-1.3 (hip; 3/2024); FRAX 7.1%/0.5%  Malignancy Hx- none  Immunization History   Administered Date(s) Administered    Flu vaccine (IIV4), preservative free *Check age/dose* 12/12/2015, 09/22/2016, 11/01/2022, 10/11/2023    Flu vaccine, quadrivalent, high-dose, preservative free, age 65y+ (FLUZONE) 10/19/2020, 11/04/2021    Flu vaccine, trivalent, preservative free, age 6 months and greater (Fluarix/Fluzone/Flulaval) 10/27/2024     Influenza, injectable, quadrivalent 10/31/2017, 10/01/2018, 01/23/2020    Influenza, seasonal, injectable 10/07/2009, 10/29/2010, 10/04/2012, 08/23/2013, 09/25/2014, 11/28/2015    Moderna COVID-19 vaccine, 12 years and older (50mcg/0.5mL)(Spikevax) 12/17/2023, 10/27/2024    Moderna COVID-19 vaccine, bivalent, blue cap/gray label *Check age/dose* 11/01/2022    Moderna SARS-CoV-2 Vaccination 03/01/2021, 03/27/2021, 11/04/2021, 06/15/2022    Novel influenza-H1N1-09, preservative-free 11/05/2009    Pneumococcal polysaccharide vaccine, 23-valent, age 2 years and older (PNEUMOVAX 23) 10/31/2017    Td vaccine, age 7 years and older (TDVAX) 12/31/2008, 01/26/2018    Tdap vaccine, age 7 year and older (BOOSTRIX, ADACEL) 11/04/2021    Zoster vaccine, recombinant, adult (SHINGRIX) 01/21/2019, 05/16/2019          Past Medical History:   Diagnosis Date    AB (asthmatic bronchitis) ([facility])     Acquired immunocompromised state     Autoimmune RA, On Orencia every 28 days, followed by rheumatology    Actinic keratoses     Allergic rhinitis     Asthma     only used inhaler 4-5 times this entire year, triggers are seasonal allergies    Bilateral sacroiliitis (CMS-HCC)     Chronic pain     Chronic sinusitis     DDD (degenerative disc disease), cervical     C5,6 BULGE    DDD (degenerative disc disease), lumbar     L4,5 BULGE    DJD (degenerative joint disease)     right ankle and foot    ETD (eustachian tube dysfunction)     GERD (gastroesophageal reflux disease)     H/O arthrodesis     Hiatal hernia     HTN (hypertension)     Hyperlipidemia     Hypothyroidism     Joint pain     Lateral epicondylitis of elbow     Low back pain     hronic, folowed by pain management    Low back pain     Lumbosacral disc disease     Migraine     Neuropathy     left foot N/T and hands bilateral    Osteoarthritis     Osteopenia     PONV (postoperative nausea and vomiting)     PTTD (posterior tibial tendon dysfunction)     left lower extremity     Seronegative rheumatoid arthritis (Multi)     Skin cancer     Basal nd squamous removed    Spondylosis of 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     Vitamin D deficiency       Past Surgical History:   Procedure Laterality Date    ANKLE SURGERY Left 2024    removed bolts & bx     SECTION, LOW TRANSVERSE      x3    CHOLECYSTECTOMY  2017    LAPAROSCOPIC    COLONOSCOPY  10/16/2018    CT ANGIO CORONARY ARTERIES-W/QUANT EVAL CORONARY CALCIUM  2023    Score=0    CYST REMOVAL Left     LEFT LEG    CYST REMOVAL Left 2015    LEFT EYE (CONJUVIAL RETENTION CYSTS)    ENDOMETRIAL ABLATION  2012    EPIDURAL BLOCK INJECTION      EYE SURGERY Left 2016    LEFT EYELID    FINGER SURGERY Left 2015    PIN IN THUMB    PAT SUBTALAR ARTHRODESIS      HAND ARTHROPLASTY Left 2015    LEFT HAND [facility] LRTI    HYSTERECTOMY      INJECTION  2014    CORTISONE- LEFT THUMB    INJECTION Bilateral 10/25/2017    ONE IN EACH KNEE    JOINT REPLACEMENT Bilateral     bilateral TKR    KNEE CARTILAGE SURGERY Right 2018    TORN MENISCUS    KNEE SURGERY Right 2018    KNEE ARTHROSCOPY    KNEE SURGERY  2019    SCOPE OF LEFT KNEE    LAMINECTOMY  1996    LUMBAR    LARYNX SURGERY  2014    MEDIALIZATION    LIPOMA RESECTION  2008    BACK    OTHER SURGICAL HISTORY  2013    LUMP REMOVAL RIGHT SIDE-lipoma waistline    OTHER SURGICAL HISTORY      LEFT FOOT TENDON TRANSFER , SUBTALLAR JOINT FUSION 2023, REVISION OF FAILED JOINT FUSION 2024    THYROIDECTOMY Right 2014    RIGHT LOBE    TOTAL ABDOMINAL HYSTERECTOMY W/ BILATERAL SALPINGOOPHORECTOMY  08/10/2017    TOTAL KNEE ARTHROPLASTY Right 2019    TUBAL LIGATION        Current Outpatient Medications   Medication Sig Dispense Refill    abatacept 750 mg in sodium chloride 0.9% 70 mL IV Infuse 750 mg into a venous catheter every 28 (twenty-eight) days. 24 last dose      albuterol (ProAir HFA) 90 mcg/actuation  inhaler Inhale 2 puffs every 4 hours if needed.      amLODIPine (Norvasc) 5 mg tablet TAKE 1 TABLET DAILY 90 tablet 3    azelastine-fluticasone 137-50 mcg/spray spray,non-aerosol Administer 1 spray into each nostril 2 times a day.      calcium carbonate-vitamin D3 500 mg-5 mcg (200 unit) tablet Take 1 tablet by mouth 2 times a day. 180 tablet 0    cholecalciferol (Vitamin D-3) 50 MCG (2000 UT) tablet Take 1 tablet (2,000 Units) by mouth once daily.      cloNIDine (Catapres) 0.1 mg tablet Take 1 tablet (0.1 mg) by mouth once daily as needed (FOR BLOOD PRESSURE GREATER 140/90).      enoxaparin (Lovenox) 40 mg/0.4 mL syringe Inject 0.4 mL (40 mg) under the skin once daily. 30 each 1    ergocalciferol (Vitamin D-2) 1.25 MG (70916 UT) capsule Take 1 capsule (1,250 mcg) by mouth 1 (one) time per week.      levocetirizine (Xyzal) 5 mg tablet Take 1 tablet (5 mg) by mouth once daily in the evening.      levothyroxine (Synthroid, Levoxyl) 75 mcg tablet Take 1 tablet (75 mcg) by mouth once daily. 90 tablet 1    montelukast (Singulair) 10 mg tablet once daily at bedtime.      MULTIVITAMIN ORAL Take 1 tablet by mouth once daily.      omeprazole (PriLOSEC) 20 mg DR capsule Take 1 capsule (20 mg) by mouth once daily. 90 capsule 3    propranolol LA (Inderal LA) 80 mg 24 hr capsule TAKE 1 CAPSULE DAILY 90 capsule 3    rizatriptan (Maxalt) 10 mg tablet Take 1 tablet (10 mg) by mouth once daily as needed for migraine. 27 tablet 1    tiZANidine (Zanaflex) 4 mg tablet Take 1 tablet (4 mg) by mouth every 8 hours if needed for muscle spasms. 90 tablet 0    traMADol (Ultram) 50 mg tablet PLEASE SEE ATTACHED FOR DETAILED DIRECTIONS      triamcinolone (Kenalog) 0.1 % cream Apply 1 Application topically if needed.      amoxicillin (Amoxil) 500 mg capsule Take 4 capsules (2,000 mg) by mouth. 1 HOUR BEFORE DENTIST APPT (Patient not taking: Reported on 1/17/2025)      folic acid (Folvite) 1 mg tablet Take 1 tablet (1 mg) by mouth once daily. In  "the morning 90 tablet 3    HYDROcodone-acetaminophen (Norco) 5-325 mg tablet Take 1 tablet by mouth every 6 hours if needed for severe pain (7 - 10). (Patient not taking: Reported on 1/17/2025) 28 tablet 0    leflunomide (Arava) 20 mg tablet Take 1 tablet (20 mg) by mouth once daily. After dinner. 30 tablet 5     No current facility-administered medications for this visit.      Allergies   Allergen Reactions    Doxycycline Rash     Severe rash or burn.    Oxycodone-Acetaminophen Nausea/vomiting    Sulfa (Sulfonamide Antibiotics) Headache    Sulfamethoxazole-Trimethoprim Rash    Balsam [place] Hives     Pt states in dental adhesives and make up    Clarithromycin Itching and Rash    Hydroxychloroquine Hives and Rash    Methotrexate Hives and Rash        Visit Vitals  BP (!) 150/94   Pulse 73   Ht 1.575 m (5' 2\")   Wt 98 kg (216 lb)   SpO2 100%   BMI 39.51 kg/m²   OB Status Hysterectomy   Smoking Status Never   BSA 2.07 m²            Rapid 3  Function Score (FN): 3  Pain Score (PN) (0-10): 4  Patient Global (PTGL) (0-10): 4  Rapid3 Score: 11  RAPID3 Weighted Score: 3.67       Workup    Component      Latest Ref Rng 12/31/2024   WBC      4.4 - 11.3 x10*3/uL 4.5    nRBC      0.0 - 0.0 /100 WBCs 0.0    RBC      4.00 - 5.20 x10*6/uL 4.62    HEMOGLOBIN      12.0 - 16.0 g/dL 14.4    HEMATOCRIT      36.0 - 46.0 % 43.1    MCV      80 - 100 fL 93    MCH      26.0 - 34.0 pg 31.2    MCHC      32.0 - 36.0 g/dL 33.4    RED CELL DISTRIBUTION WIDTH      11.5 - 14.5 % 13.2    Platelets      150 - 450 x10*3/uL 209    Neutrophils %      40.0 - 80.0 % 60.4    Immature Granulocytes %, Automated      0.0 - 0.9 % 0.2    Lymphocytes %      13.0 - 44.0 % 26.2    Monocytes %      2.0 - 10.0 % 9.5    Eosinophils %      0.0 - 6.0 % 2.6    Basophils %      0.0 - 2.0 % 1.1    Neutrophils Absolute      1.20 - 7.70 x10*3/uL 2.74    Immature Granulocytes Absolute, Automated      0.00 - 0.70 x10*3/uL 0.01    Lymphocytes Absolute      1.20 - 4.80 x10*3/uL " 1.19 (L)    Monocytes Absolute      0.10 - 1.00 x10*3/uL 0.43    Eosinophils Absolute      0.00 - 0.70 x10*3/uL 0.12    Basophils Absolute      0.00 - 0.10 x10*3/uL 0.05    GLUCOSE      74 - 99 mg/dL 95    SODIUM      136 - 145 mmol/L 141    POTASSIUM      3.5 - 5.3 mmol/L 4.1    CHLORIDE      98 - 107 mmol/L 107    Bicarbonate      21 - 32 mmol/L 30    Anion Gap      10 - 20 mmol/L 8 (L)    Blood Urea Nitrogen      6 - 23 mg/dL 12    Creatinine      0.50 - 1.05 mg/dL 0.63    EGFR      >60 mL/min/1.73m*2 >90    Calcium      8.6 - 10.3 mg/dL 8.9    Albumin      3.4 - 5.0 g/dL 4.2    Alkaline Phosphatase      33 - 136 U/L 58    Total Protein      6.4 - 8.2 g/dL 6.2 (L)    AST      9 - 39 U/L 22    Bilirubin Total      0.0 - 1.2 mg/dL 0.4    ALT      7 - 45 U/L 41    Color, Urine      Light-Yellow, Yellow, Dark-Yellow  Yellow    Appearance, Urine      Clear  Clear    Specific Gravity, Urine      1.005 - 1.035  1.023    pH, Urine      5.0, 5.5, 6.0, 6.5, 7.0, 7.5, 8.0  6.0    Protein, Urine      NEGATIVE, 10 (TRACE), 20 (TRACE) mg/dL NEGATIVE    Glucose, Urine      Normal mg/dL Normal    Blood, Urine      NEGATIVE  NEGATIVE    Ketones, Urine      NEGATIVE mg/dL NEGATIVE    Bilirubin, Urine      NEGATIVE  NEGATIVE    Urobilinogen, Urine      Normal mg/dL Normal    Nitrite, Urine      NEGATIVE  NEGATIVE    Leukocyte Esterase, Urine      NEGATIVE  NEGATIVE    C-Reactive Protein      <1.00 mg/dL <0.10    Sed Rate      0 - 30 mm/h 3    C3 Complement      87 - 200 mg/dL 138    Vitamin D, 25-Hydroxy, Total      30 - 100 ng/mL 98    Creatine Kinase      0 - 215 U/L 42    C1Q Complement      10.3 - 20.5 mg/dL 16.0    Anti-DNA (DS)      <5.0 IU/mL <1.0    C4 Complement      10 - 50 mg/dL 37    URIC ACID      2.3 - 6.7 mg/dL 3.4    Extra Tube Hold for add-ons.       Assessment/Plan  1. Seropositive rheumatoid arthritis of multiple joints (Multi)    2. Osteopenia with high risk of fracture    3. Positive ALEXANDER (antinuclear antibody)     4. Vitiligo    5. Vitamin D deficiency    6. Encounter for long-term (current) use of medications         Orders Placed This Encounter   Procedures    Albumin-Creatinine Ratio, Urine Random    Anti-DNA Antibody, Double-Stranded    C1Q Complement    C3 Complement    C4 Complement    CBC and Auto Differential    Comprehensive Metabolic Panel    C-Reactive Protein    Creatine Kinase    Creatinine, Urine Random    C-Telopeptide, Beta Cross Linked    Sedimentation Rate    Urinalysis with Reflex Culture and Microscopic    Vitamin D 25-Hydroxy,Total (for eval of Vitamin D levels)    Phosphorus    Parathyroid Hormone, Intact    Magnesium        Previously, adherent and tolerating Orencia 750 mg.  Last infusion   Tendon inflammation,  Subatlar joint fusion end of June 2023. Also failed  Bone stim- still has it.   Broke R 5th toe/   Feb 7 th surgery  including cadaver bone. Hardware taken out and new screws placed front to back.  Ingrown toe nail tx and cleared with doxycycline.   For follow up testiong next week.  L ankle still healing. Hands are very sore and stiff.   PT for both shoulders after fall. Checking when to start back on meds.   N ablation for back worked. Tramadol helpful.   Gabapentin for the sural nerve shooting pain which has eased off.       Since last appt, adherent and tolerating Orencia, some noted help but not yet like it was when she was on it. More joint pain with the cold.   Re foot  Bone used for bx was the one that came out with srew- existing bone.  No infection  Had bone plugs from hip for graft with cadaver bone and paste for graft.    1st XR sw little bit bone growth  Last one about 4 wks not as excited  Non wt bearing yet  Swelling there but not as bad  Told likely needs repeat ablation for back pain  Improving on Orencia  Cold issues with increased aches and raynaud's.  Was taking ASA 81 mg utnil she was started on Lovenox Ppx.  Denies any recent or current infection.  Not on any NSAIDs or  glucocorticoids.  ROS+ for fatigue, GERD, rashes, joint pain/swelling/stiffness, back pain, raynaud's without pitting/ulceration, weakness left leg, numbness/tingling  Rapid 3 consistent with moderate severity.  Labs reviewed  D/w pt tx options and decided on   Continue Orencia  Increase leflunomide to 20 mg daily.  Recommend osteoporosis tx to help with bone healing.  Reclast ordered. Was previously ordered but not covered. Has new insurance now.   Recommend calcium 1200 mg daily, preferably from diet, and vit D at least 1000 units daily.   Advised of possible side effects and importance of monitoring.   All questions answered.  Patient to follow up with primary care provider regarding all other medical issues not addressed today and for medical chart updating.         Cathy Vargas MD      Patient Care Team:  Florentino Thompson MD as PCP - General (Family Medicine)  Florentino Thompson MD as PCP - [facility] PCP  Cathy Vargas MD as Consulting Physician (Rheumatology)

## 2025-01-18 RX ORDER — DIPHENHYDRAMINE HYDROCHLORIDE 50 MG/ML
50 INJECTION INTRAMUSCULAR; INTRAVENOUS AS NEEDED
OUTPATIENT
Start: 2025-01-18

## 2025-01-18 RX ORDER — ZOLEDRONIC ACID 5 MG/100ML
5 INJECTION, SOLUTION INTRAVENOUS ONCE
OUTPATIENT
Start: 2025-01-18

## 2025-01-18 RX ORDER — EPINEPHRINE 0.3 MG/.3ML
0.3 INJECTION SUBCUTANEOUS EVERY 5 MIN PRN
OUTPATIENT
Start: 2025-01-18

## 2025-01-18 RX ORDER — ALBUTEROL SULFATE 0.83 MG/ML
3 SOLUTION RESPIRATORY (INHALATION) AS NEEDED
OUTPATIENT
Start: 2025-01-18

## 2025-01-18 RX ORDER — FAMOTIDINE 10 MG/ML
20 INJECTION INTRAVENOUS ONCE AS NEEDED
OUTPATIENT
Start: 2025-01-18

## 2025-01-20 PROCEDURE — RXMED WILLOW AMBULATORY MEDICATION CHARGE

## 2025-01-22 ENCOUNTER — EVALUATION (OUTPATIENT)
Dept: PHYSICAL THERAPY | Facility: CLINIC | Age: 65
End: 2025-01-22
Payer: COMMERCIAL

## 2025-01-22 DIAGNOSIS — M46.1 SACROILIITIS (CMS-HCC): ICD-10-CM

## 2025-01-22 DIAGNOSIS — M47.817 LUMBOSACRAL SPONDYLOSIS WITHOUT MYELOPATHY: Primary | ICD-10-CM

## 2025-01-22 PROCEDURE — 97530 THERAPEUTIC ACTIVITIES: CPT | Mod: GP

## 2025-01-22 PROCEDURE — 97112 NEUROMUSCULAR REEDUCATION: CPT | Mod: GP

## 2025-01-22 PROCEDURE — 97162 PT EVAL MOD COMPLEX 30 MIN: CPT | Mod: GP

## 2025-01-22 ASSESSMENT — PAIN - FUNCTIONAL ASSESSMENT: PAIN_FUNCTIONAL_ASSESSMENT: 0-10

## 2025-01-22 NOTE — PROGRESS NOTES
"Physical Therapy Evaluation and Treatment     Patient Name: Maureen Vela \"Mendy\"  MRN: [id number]  Encounter date: 1/22/2025       Visit # 1 of ***  Visits/Dates Authorized: [facility] SUREST - NO AUTH / $55 COPAY / OOP $5500 / 60V max - 0 used / [email or web address] chat, ref: [id number] / ds 1/22/25.     Current Problem:   Problem List Items Addressed This Visit             ICD-10-CM    Lumbosacral spondylosis without myelopathy M47.817    Sacroiliitis (CMS-[facility]) M46.1     Precautions:  Precautions  Post-Surgical Precautions:  (L foot NWB due to 11/19/25 fusion, ortho f/u 1/23/25)  Past Medical History Relevant to Rehab: osteopenia, skin CA, RA    Subjective Evaluation    History of Present Illness  Mechanism of injury: The patient is a 64-year-old female with low back pain.  The patient reports that her pain returned after her lumbar radiofrequency ablation at the L4-5 and the L5-S1 levels in 2022.  The patient reports least 75% relief of her arthritic low back pain for 1 year before the pain started to return.  The patient would like to undergo this procedure again.  The patient gets some relief with use of tramadol without side effects.  She is not in need of a refill today.  The patient is seeing Dr. Stafford regarding her left foot (L ankle fusion 11/19/24).   -Intervention--the patient is a candidate for radiofrequency ablation of the facet medial nerve branches bilaterally at the L4-5 and the L5-S1 levels.  I explained the risks benefits and alternatives of the procedure to the patient.  The patient wishes to proceed (scheduled Feb 13th).      Increased back pain with walking distances, currently using B crutches or R knee scooter, is between buildings to teach. Also using rolling chair.  Back pain with sitting increased on a plane after [place].    Pain  Location: B across low back, pain very bad at night R ball of foot into toes, can have nerve pain R outer thigh and then in foot, L side nerve pain hard to differentiate due " to concurrent multiple surgeries, most recently 11/19/24  Alleviating factors: Posterior pelvic tilt, stretch, hot shower when WBing allowed, cat/camel, laying on floor in 90/90.  Exacerbated by: knife into L ankle bone with sitting and legs dangling.                  Objective    Lumbar Spine                Treatments:     Therapeutic Exercise 41148:     Neuromuscular Re-Ed 67148:   Instructed and performed MET for L anterior innominate, neutral pelvis achieved        Therapeutic Activity 45995:   Instructed/trialed activities for neutral pelvis  Demonstrated Serola SI belt, instructed donning/doffing.  Sitting: place pillow or folded towel under thighs to unload sit bones, do not allow feet to dangle  Bed: lay on your back with small pillow or towel roll under knees for neutral pelvis, or to be on your side use towel roll under waist and pillow between knees  Roll to side and sit up as a unit to get up  Car/sit to stand: stand up evenly and then head the direction you need to go        Modalities:   Deferred    HEP / Access Codes:   Access Code: [email or web address]  URL: [email or web address]  Date: 01/22/2025  Prepared by: [facility]    Exercises  - Supine Posterior Pelvic Tilt  - 2-3 x daily - 3 sets - 10-30 reps - 0-5 hold  - Seated Cat Cow  - 2-3 x daily - 3 sets - 10-30 reps - 0-5 hold  - Child's Pose with Ball: Center and to Each Side  - 2-3 x daily - 10-30 reps - 0-5 hold    Assessment/Plan   Moderate complexity due to patient's clinical presentation being evolving with changing characteristics, with comorbidities/complexities to include ***, all of which may negatively impact rehab tolerance and progression.     Post-Treatment Symptoms:       Goals:   Active       PT Problem       PT Goal 1       Start:  01/22/25    Expected End:  04/22/25            Patient Stated Goal 1       Start:  01/22/25    Expected End:  04/22/25

## 2025-01-22 NOTE — PROGRESS NOTES
"Physical Therapy Evaluation and Treatment     Patient Name: Maureen Vela \"Mendy\"  MRN: [id number]  Encounter date: 1/22/2025       Visit # 1 of 10  Visits/Dates Authorized: [facility] SUREST - NO AUTH / $55 COPAY / OOP $5500 / 60V max - 0 used / [email or web address] chat, ref: [id number] / ds 1/22/25.     Current Problem:   Problem List Items Addressed This Visit             ICD-10-CM    Lumbosacral spondylosis without myelopathy - Primary M47.817    Relevant Orders    Follow Up In Physical Therapy    Sacroiliitis (CMS-[facility]) M46.1    Relevant Orders    Follow Up In Physical Therapy     Precautions:  Precautions  Post-Surgical Precautions:  (L foot NWB due to 11/19/24 fusion, ortho f/u 1/23/25)  Past Medical History Relevant to Rehab: osteopenia, skin CA, RA    Subjective Evaluation    History of Present Illness  Mechanism of injury: History of Present Illness  Mechanism of injury: The patient is a 64-year-old female with low back pain.  The patient reports that her pain returned after her lumbar radiofrequency ablation at the L4-5 and the L5-S1 levels in 2022.  The patient reports least 75% relief of her arthritic low back pain for 1 year before the pain started to return.  The patient would like to undergo this procedure again.  The patient gets some relief with use of tramadol without side effects.  She is not in need of a refill today.  The patient is seeing Dr. Stafford regarding her left foot (L ankle fusion 11/19/24).   -Intervention--the patient is a candidate for radiofrequency ablation of the facet medial nerve branches bilaterally at the L4-5 and the L5-S1 levels.  I explained the risks benefits and alternatives of the procedure to the patient.  The patient wishes to proceed (scheduled Feb 13th).        Increased back pain with walking distances, currently using B crutches or R knee scooter, is between buildings to teach. Also using rolling chair.  Back pain with sitting increased on a plane after [place].     Pain  Location: " B across low back, pain very bad at night R ball of foot into toes, can have nerve pain R outer thigh and then in foot, L side nerve pain hard to differentiate due to concurrent multiple surgeries, most recently 11/19/24  Alleviating factors: Posterior pelvic tilt, stretch, hot shower when WBing allowed, cat/camel, laying on floor in 90/90.  Exacerbated by: knife into L ankle bone with sitting and legs dangling.         Pain Assessment: 0-10  0-10 (Numeric) Pain Score:  (moderate)  Pain Location: Back    Objective     Static Posture     Comments  L anterior innominate:   L iliac crest superior standing, sitting, prone  L PSIS superior standing, sitting, prone  L ischial tuberosity superior prone  L ASIS inferior supine      Palpation     Additional Palpation Details  Diffuse lumbar hypertonicity keron LPS and quad lumborum    Active Range of Motion     Additional Active Range of Motion Details  Asymmetrical rotation and sidebending  Flexion WFL  Extension limited    Ambulation     Observational Gait     Additional Observational Gait Details  B crutches, NWB L in boot due to 11/19/24 ankle fusion       Objective     Static Posture     Comments  L anterior innominate:   L iliac crest superior standing, sitting, prone  L PSIS superior standing, sitting, prone  L ischial tuberosity superior prone  L ASIS inferior supine      Palpation     Additional Palpation Details  Diffuse lumbar hypertonicity keron LPS and quad lumborum    Active Range of Motion     Additional Active Range of Motion Details  Asymmetrical rotation and sidebending  Flexion WFL  Extension limited    Ambulation     Observational Gait     Additional Observational Gait Details  B crutches, NWB L in boot due to 11/19/24 ankle fusion                   Treatments:   Therapeutic Exercise 47460:   Instructed/reviewed HEP as below     Neuromuscular Re-Ed 62041:   Instructed and performed MET for L anterior innominate, neutral pelvis achieved          Therapeutic  Activity 03634:   Instructed/trialed activities for neutral pelvis  Demonstrated Serola SI belt, instructed donning/doffing.  Sitting: place pillow or folded towel under thighs to unload sit bones, do not allow feet to dangle  Bed: lay on your back with small pillow or towel roll under knees for neutral pelvis, or to be on your side use towel roll under waist and pillow between knees  Roll to side and sit up as a unit to get up  Car/sit to stand: stand up evenly and then head the direction you need to go        Modalities:   Deferred     HEP / Access Codes:   Access Code: [email or web address] Date: 01/22/2025  - Supine Posterior Pelvic Tilt  - 2-3 x daily - 3 sets - 10-30 reps - 0-5 hold  - Seated Cat Cow  - 2-3 x daily - 3 sets - 10-30 reps - 0-5 hold  - Child's Pose with Ball: Center and to Each Side  - 2-3 x daily - 10-30 reps - 0-5 hold     Assessment & Plan     Assessment  Impairments: abnormal gait, abnormal muscle tone, abnormal or restricted ROM, activity intolerance, impaired balance, impaired physical strength, lacks appropriate home exercise program, pain with function and weight-bearing intolerance  Assessment details:   Pt is a 63 y/o female with exac of LBP/LE pain. Pt with chronic use of L boot due to injury/surgeries, most recently 11/19/24 fusion.    Moderate complexity due to patient's clinical presentation being evolving with changing characteristics, with comorbidities/complexities to include upcoming RFA, recent L ankle fusion, RA, diffuse orthopedic impairments, upcoming repeated travel, all of which may negatively impact rehab tolerance and progression.   Prognosis: good    Plan  Therapy options: will be seen for skilled physical therapy services  Planned modality interventions: electrical stimulation/Russian stimulation, thermotherapy (hydrocollator packs) and traction  Other planned modality interventions: DN, light therapy  Planned therapy interventions: abdominal trunk stabilization, manual therapy,  neuromuscular re-education, postural training, strengthening, stretching, therapeutic activities, home exercise program, flexibility, functional ROM exercises, body mechanics training and bed mobility training  Frequency: 2x week  Duration in visits: 10  Treatment plan discussed with: patient         Post-Treatment Symptoms:  Response to Interventions: Decrease in pain (SI belt supportive)    Goals:   Active       PT Problem       PT Goal 1       Start:  01/22/25    Expected End:  04/22/25            Patient Stated Goal 1       Start:  01/22/25    Expected End:  04/22/25

## 2025-01-23 ENCOUNTER — HOSPITAL ENCOUNTER (OUTPATIENT)
Dept: RADIOLOGY | Facility: CLINIC | Age: 65
Discharge: HOME | End: 2025-01-23
Payer: COMMERCIAL

## 2025-01-23 ENCOUNTER — OFFICE VISIT (OUTPATIENT)
Dept: ORTHOPEDIC SURGERY | Facility: CLINIC | Age: 65
End: 2025-01-23
Payer: COMMERCIAL

## 2025-01-23 ENCOUNTER — PHARMACY VISIT (OUTPATIENT)
Dept: PHARMACY | Facility: CLINIC | Age: 65
End: 2025-01-23
Payer: COMMERCIAL

## 2025-01-23 DIAGNOSIS — M96.0 NONUNION OF SUBTALAR ARTHRODESIS: ICD-10-CM

## 2025-01-23 DIAGNOSIS — M25.879 IMPINGEMENT OF ANTERIOR PART OF ANKLE: ICD-10-CM

## 2025-01-23 PROCEDURE — 99211 OFF/OP EST MAY X REQ PHY/QHP: CPT | Performed by: STUDENT IN AN ORGANIZED HEALTH CARE EDUCATION/TRAINING PROGRAM

## 2025-01-23 PROCEDURE — 73630 X-RAY EXAM OF FOOT: CPT | Mod: LT

## 2025-01-23 ASSESSMENT — PAIN DESCRIPTION - DESCRIPTORS: DESCRIPTORS: ACHING;SORE;SHOOTING

## 2025-01-23 ASSESSMENT — ACTIVITIES OF DAILY LIVING (ADL): POOR_BALANCE: 1

## 2025-01-23 ASSESSMENT — PAIN - FUNCTIONAL ASSESSMENT: PAIN_FUNCTIONAL_ASSESSMENT: 0-10

## 2025-01-23 ASSESSMENT — PAIN SCALES - GENERAL: PAINLEVEL_OUTOF10: 3

## 2025-01-24 NOTE — PROGRESS NOTES
"ORTHOPAEDIC SURGERY PROGRESS NOTE    Chief Complaint:  Left foot pain    History Of Present Illness  Maureen Vela \"Mendy\" is a 64 y.o. female who presents for complex evaluation of left foot pain, self-referred.  Patient has previously undergone 3 surgeries with an [facility] podiatrist.  The first was a tendon transfer of which the details are unclear at this time, the second was a left subtalar joint arthrodesis from 06/28/2023 followed by a revision subtalar joint arthrodesis from 02/07/2024.  The patient continues with severe pain that is activity dependent.  Typically rated as 5-9 out of 10.  She has been ambulating with use of a boot and crutch.  By report, the patient's previous provider has stopped doing surgery due to a medical condition.  She was quite happy with her previous practice, however sought an orthopedic surgery perspective.  Prior to her index surgery she reported pain on the outside of her foot that radiated to the top.  This is yet unresolved.  Patient works as a  and is on her feet extensively throughout the day.  She denies any fevers, chills or night sweats but does report paresthesias on the outside of her foot.    Patient has a past medical history of rheumatoid arthritis, she has been off of her rheumatologic medications from the winter 2023.  She has been using a bone stimulator.    07/12/2024: Patient returns for follow-up of her left foot pain as above.  She continues with severe pain in her left foot and ankle.  Rated as 8 out of 10.  Pain is improved with boot wear.  She has been ambulating with use of crutches.  She is reporting a burning pain on the lateral side of her foot, this is unchanged from her previous operations.  Patient also notes discomfort when walking on the back of her heel from a prominent screw head.    09/06/2024: Patient returns s/p left foot hardware removal and bone biopsy from 08/26/2024.  Patient's intraoperative cultures were negative " but biopsy of the talus was consistent with osteomyelitis.  She has been compliant with nonweightbearing restrictions and reporting 2 out of 10 pain.  She has been following closely with her rheumatologist.    09/20/2024: Patient returns s/p left foot hardware removal and bone biopsy from 08/26/2024.  She is currently reporting 3 out of 10 pain.  She has been compliant with nonweightbearing restrictions in a walking boot and crutches.  She has follow-up with ID who did not feel that her clinical situation and pathology was consistent with osteomyelitis requiring formal IV antibiotics.  She has been restarted on her RA medications.    10/18/2024: Patient returns s/p left foot hardware removal and bone biopsy from 08/26/2024.  She is reporting 5 out of 10 pain.  She has continued to protect her weightbearing with a walking boot and crutches.  She has had a drug rash/response to doxycycline and underwent a biopsy for this.    12/06/2024: Patient returns s/p left revision bone block ICBG subtalar arthrodesis with BMAC from 11/19/2024.  Patient has been compliant with nonweightbearing restrictions.  She is reporting similar swelling to her preoperative state.  Pain continues as 5 out of 10.  She is present with her  today.    12/26/2024: Patient returns s/p left revision bone block ICBG subtalar arthrodesis with BMAC from 11/19/2024.  Patient has been compliant with nonweightbearing restrictions.  She is reporting 4 out of 10 pain today that is improving overall.  She is present with her  today.  Unfortunately her previous bone stimulator has timed out.  She has not had any fevers, chills or night sweats.    01/23/2025: Patient returns s/p left revision bone block ICBG subtalar arthrodesis with BMAC from 11/19/2024.  Patient continues with 3 out of 10 pain that is relatively unchanged.  She has had more sural neuralgia type pain.  She has continued to be compliant with nonweightbearing restrictions.  She  has put some weight down for transfers and has noticed improved pain with ankle motion.    Past Medical History  Past Medical History:   Diagnosis Date    AB (asthmatic bronchitis) ([facility])     Acquired immunocompromised state     Autoimmune RA, On Orencia every 28 days, followed by rheumatology    Actinic keratoses     Allergic rhinitis     Asthma     only used inhaler 4-5 times this entire year, triggers are seasonal allergies    Bilateral sacroiliitis ([facility])     Chronic pain     Chronic sinusitis     DDD (degenerative disc disease), cervical     C5,6 BULGE    DDD (degenerative disc disease), lumbar     L4,5 BULGE    DJD (degenerative joint disease)     right ankle and foot    ETD (eustachian tube dysfunction)     GERD (gastroesophageal reflux disease)     H/O arthrodesis     Hiatal hernia     HTN (hypertension)     Hyperlipidemia     Hypothyroidism     Joint pain     Lateral epicondylitis of elbow     Low back pain     hronic, folowed by pain management    Low back pain     Lumbosacral disc disease     Migraine     Neuropathy     left foot N/T and hands bilateral    Osteoarthritis     Osteopenia     PONV (postoperative nausea and vomiting)     PTTD (posterior tibial tendon dysfunction)     left lower extremity    Seronegative rheumatoid arthritis (Multi)     Skin cancer     Basal nd squamous removed    Spondylosis of 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     Vitamin D deficiency        Surgical History  Recent Surgeries in Orthopaedic Surgery            No cases to display             Social History  Social History     Socioeconomic History    Marital status:    Tobacco Use    Smoking status: Never    Smokeless tobacco: Never   Vaping Use    Vaping status: Never Used   Substance and Sexual Activity    Alcohol use: Not Currently     Comment: RARELY    Drug use: Never    Sexual activity: Defer     Partners: Male     Birth control/protection: Post-menopausal, Female Sterilization       Family  History  Family History   Problem Relation Name Age of Onset    Arthritis Mother Mom     Alzheimer's disease Father Dad     Breast cancer Sister Sima 42    Alzheimer's disease Sister Joselyn     Breast cancer Mother's Sister  63    Stroke Maternal Grandfather Grandpa         Allergies  Allergies   Allergen Reactions    Doxycycline Rash     Severe rash or burn.    Oxycodone-Acetaminophen Nausea/vomiting    Sulfa (Sulfonamide Antibiotics) Headache    Sulfamethoxazole-Trimethoprim Rash    Balsam [place] Hives     Pt states in dental adhesives and make up    Clarithromycin Itching and Rash    Hydroxychloroquine Hives and Rash    Methotrexate Hives and Rash       Review of Systems  REVIEW OF SYSTEMS  Constitutional: no unplanned weight loss  Psychiatric: no suicidal ideation  ENT: no vision changes, no sinus problems  Pulmonary: no shortness of breath  Lymphatic: no enlarged lymph nodes  Cardiovascular: no chest pain or shortness of breath  Gastrointestinal: no stomach problems  Genitourinary: no dysuria   Skin: no rashes  Endocrine: no thyroid problems  Neurological: no headache, no numbness  Hematological: no easy bruising  Musculoskeletal: Left foot pain    Physical Exam  PHYSICAL EXAMINATION  Constitutional Exam: well developed and well nourished  Psychiatric Exam: alert and oriented, appropriate mood and behavior  Eye Exam: EOMI  Pulmonary Exam: breathing non-labored, no apparent distress  Lymphatic exam: no appreciable lymphadenopathy in the lower extremities  Cardiovascular exam: RRR to peripheral palpation, DP pulses 2+, PT 2+, toes are pink with good capillary refill, no pitting edema  Skin exam: no open lesions, rashes, abrasions or ulcerations  Neurological exam: sensation to light touch intact in both lower extremities in peripheral and dermatomal distributions (except for any abnormalities noted in musculoskeletal exam)    Musculoskeletal exam: Left lower extremity examination.  Patient anterior ankle incision  well-healed.  Lateral sinus Tarsi incision also well-healed.  She does have some allodynia in the sural nerve distribution, relatively unchanged from prior examinations. Patient has supple and pain-free ankle range of motion without effusion, she has pain-free and appropriately limited subtalar with supple and pain-free midtarsal joint range of motion. Patient has sensation intact light touch grossly in a saphenous, sural (reporting neuritic type symptoms with positive tinel's baseline for patient from prior surgery), superficial peroneal, deep peroneal and tibial nerve distribution.  She has intact PF/DF/EHL.  She has 2+ DP/PT pulse palpated.    Last Recorded Vitals  There were no vitals taken for this visit.    Laboratory Results    No results found. However, due to the size of the patient record, not all encounters were searched. Please check Results Review for a complete set of results.    Radiology Results   X-ray imaging 3 view nonweightbearing left foot reviewed and independently evaluated by me on 01/23/2025 demonstrates maintained position as well as alignment and compression about revision subtalar arthrodesis with bone block technique.  There is continued note of arthrodesis healing versus projectional differences, particular the posterior facet of the subtalar joint.  There is no keisha-implant fracture, lucency or loosening.    Assessment/Plan:  64-year-old female who presents for complex evaluation of painful and multiply revised left subtalar arthrodesis by [facility] podiatrist with sural nerve neuritis and anterior ankle impingement s/p L HARSHAL and bone biopsy from 08/26/2024 s/p left revision bone block ICBG subtalar arthrodesis with BMAC from 11/19/2024 who presents with clinical evidence of wound healing and retained alignment by radiographic review.  I would recommend the patient begin progressive weightbearing in her left lower extremity in a walking boot, primarily for transfers and short distances.  She  should complete her formal course of Lovenox.  I discussed with the patient at length that based on current radiographic findings I would not recommend rapidly advancing her weightbearing at this point and would obtain a CT scan to more completely evaluate for arthrodesis healing as we continue to progress her weightbearing, and in particular in this multiply revised setting.  I would plan to see the patient back in approximately 1 month to follow her clinical course and review imaging.  Upon return, patient require three-view weightbearing left foot.    Marlon Stafford MD, GLENN  Department of Orthopaedic Surgery  [facility]    The diagnosis and treatment plan were reviewed with the patient. All questions were answered. The patient verbalized understanding of the treatment plan. There were no barriers to understanding identified.    Note dictated with [employer] software.  Completed without full type editing and sent to avoid delay.

## 2025-01-28 ENCOUNTER — APPOINTMENT (OUTPATIENT)
Dept: ORTHOPEDIC SURGERY | Facility: CLINIC | Age: 65
End: 2025-01-28
Payer: COMMERCIAL

## 2025-01-28 ENCOUNTER — APPOINTMENT (OUTPATIENT)
Dept: INFUSION THERAPY | Facility: CLINIC | Age: 65
End: 2025-01-28
Payer: COMMERCIAL

## 2025-01-28 VITALS
TEMPERATURE: 97.3 F | RESPIRATION RATE: 18 BRPM | DIASTOLIC BLOOD PRESSURE: 80 MMHG | WEIGHT: 216.3 LBS | HEART RATE: 62 BPM | BODY MASS INDEX: 39.56 KG/M2 | SYSTOLIC BLOOD PRESSURE: 142 MMHG | OXYGEN SATURATION: 98 %

## 2025-01-28 DIAGNOSIS — M05.79 SEROPOSITIVE RHEUMATOID ARTHRITIS OF MULTIPLE JOINTS (MULTI): ICD-10-CM

## 2025-01-28 PROCEDURE — 96365 THER/PROPH/DIAG IV INF INIT: CPT | Performed by: NURSE PRACTITIONER

## 2025-01-28 RX ORDER — EPINEPHRINE 0.3 MG/.3ML
0.3 INJECTION SUBCUTANEOUS EVERY 5 MIN PRN
OUTPATIENT
Start: 2025-02-25

## 2025-01-28 RX ORDER — DIPHENHYDRAMINE HYDROCHLORIDE 50 MG/ML
50 INJECTION INTRAMUSCULAR; INTRAVENOUS AS NEEDED
OUTPATIENT
Start: 2025-02-25

## 2025-01-28 RX ORDER — FAMOTIDINE 10 MG/ML
20 INJECTION INTRAVENOUS ONCE AS NEEDED
OUTPATIENT
Start: 2025-02-25

## 2025-01-28 RX ORDER — ALBUTEROL SULFATE 0.83 MG/ML
3 SOLUTION RESPIRATORY (INHALATION) AS NEEDED
OUTPATIENT
Start: 2025-02-25

## 2025-01-28 ASSESSMENT — ENCOUNTER SYMPTOMS
APPETITE CHANGE: 0
EYE PROBLEMS: 1
NUMBNESS: 0
DYSURIA: 0
ABDOMINAL PAIN: 0
MYALGIAS: 0
VOMITING: 0
LIGHT-HEADEDNESS: 0
PALPITATIONS: 0
LEG SWELLING: 1
SHORTNESS OF BREATH: 0
NAUSEA: 0
WOUND: 1
CONSTIPATION: 0
EXTREMITY WEAKNESS: 1
VOICE CHANGE: 0
DIZZINESS: 0
BLOOD IN STOOL: 0
HEADACHES: 0
FEVER: 0
FREQUENCY: 0
DIARRHEA: 0
HEMATURIA: 0
ARTHRALGIAS: 0
COUGH: 0
CHILLS: 0
FATIGUE: 0

## 2025-01-28 ASSESSMENT — PAIN SCALES - GENERAL: PAINLEVEL_OUTOF10: 3

## 2025-01-28 NOTE — PROGRESS NOTES
[facility]   Infusion Clinic Note   Date: 2025   Name: Maureen Vela  : 1960   MRN: [id number]          Reason for Visit: OP Infusion (Orencia 750 mg every 28 days)         Today: We administered abatacept (Orencia) 750 mg in sodium chloride 0.9% 100 mL IV.       Visit Type: INFUSION- Maintenance       Ordered By: Cathy Alexis,*        Accompanied by: Self       Diagnosis: Seropositive rheumatoid arthritis of multiple joints (Multi)        Allergies:   Allergies as of 2025 - Reviewed 2025   Allergen Reaction Noted    Doxycycline Rash 10/18/2024    Oxycodone-acetaminophen Nausea/vomiting 10/04/2023    Sulfa (sulfonamide antibiotics) Headache 10/04/2023    Sulfamethoxazole-trimethoprim Rash 2019    Balsam peru Hives 10/04/2023    Clarithromycin Itching and Rash 2019    Hydroxychloroquine Hives and Rash 10/04/2023    Methotrexate Hives and Rash 2019          Current Medications: has a current medication list which includes the following prescription(s): abatacept 750 mg in sodium chloride 0.9% 70 mL IV, albuterol, amlodipine, amoxicillin, azelastine-fluticasone, calcium carbonate-vitamin d3, cholecalciferol, clonidine, enoxaparin, ergocalciferol, folic acid, hydrocodone-acetaminophen, leflunomide, levocetirizine, levothyroxine, montelukast, multivitamin, omeprazole, propranolol la, rizatriptan, tizanidine, tramadol, and triamcinolone.       Vitals:   Vitals:    25 1404 25 1540   BP: 138/85 142/80   Pulse: 75 62   Resp: 18 18   Temp: 36.5 °C (97.7 °F) 36.3 °C (97.3 °F)   TempSrc: Temporal    SpO2: 98%    Weight: 98.1 kg (216 lb 4.8 oz)    PainSc:   3    PainLoc: Hand              Infusion Pre-procedure Checklist:   - Allergies & Medications reviewed: yes       - Previous reaction to current treatment: no      Assess patient for the concerns below. Document provider notification as appropriate.  - Active or recent infection  with/without current antibiotic use: no  - Recent or planned invasive dental work: no  - Recent or planned surgeries: Yes, scheduled for nerve block on 2/13/2025 for RLE weakness.  - Recently received or plans to receive vaccinations: no  - Has treatment related toxicities: no  - Any chance you may be pregnant:  n/a      Pain: 3   - Is the pain different from normal: no   - Is your Doctor aware:  yes       Labs:  reviewed          Fall Risk Screening: Quiroga Fall Risk  History of Falling, Immediate or Within 3 Months: No  Secondary Diagnosis: Yes  Ambulatory Aid: Crutches/cane/walker  Intravenous Therapy/Heparin Lock: Yes  Gait/Transferring: Impaired   Mental Status: Oriented to own ability  Quiroga Fall Risk Score: 70       Review Of Systems:  Review of Systems   Constitutional:  Negative for appetite change, chills, fatigue and fever.   HENT:   Negative for hearing loss, mouth sores and voice change.    Eyes:  Positive for eye problems.        Wears glasses   Respiratory:  Negative for cough and shortness of breath.    Cardiovascular:  Positive for leg swelling. Negative for chest pain and palpitations.        S/p Left ankle/foot surgery on 11/19/2025  Chronic LLE edema.   Gastrointestinal:  Negative for abdominal pain, blood in stool, constipation, diarrhea, nausea and vomiting.   Genitourinary:  Negative for dysuria, frequency and hematuria.    Musculoskeletal:  Positive for gait problem. Negative for arthralgias and myalgias.        Chronic hand stiffness- Patient reports stiffness and pain has progressed into feet.   Skin:  Positive for wound. Negative for itching and rash.        (+) LLE/ankle wound- patient reports incision healing well.   Neurological:  Positive for extremity weakness and gait problem. Negative for dizziness, headaches, light-headedness and numbness.        Chronic RLE weakness patient is scheduled for nerve ablation on 2/13/2025. Ambulates with knee scooter  LLE non weight bearing.  LLE  "orthotic boot noted.           ROS completed? Yes      Infusion Readiness:  - Assessment Concerns Related to Infusion: No  - Provider notified: no      Document Below Only If Indicated:   New Patient Education:    N/A (returning patient for continuation of therapy. Ongoing education provided as needed.)        Treatment Conditions & Drug Specific Questions:    Abatacept  (ORENCIA)    (Unless otherwise specified on patient specific therapy plan):    TREATMENT CONDITIONS  Unless otherwise specified on patient specific therapy plan hold treatment and notify provider prior to proceeding with infusion if patient with a:  o Positive T-Spot  o Positive Hepatitis B Surface Ag   o Positive Hepatitis B Core Antibody   o Positive Hepatitis C Antibody     Lab Results   Component Value Date    TBSIN Negative 04/29/2024    TBGRES Negative  Reference range: NEGATIVE   05/26/2020      Lab Results   Component Value Date    HEPBSAG NEGATIVE 05/26/2020      Lab Results   Component Value Date    HEPCAB  05/26/2020     Negative  Reference range: NEGATIVE  Performed at the [facility] unless   otherwise noted.        No results found for: \"NONUHFIRE\", \"NONUHSWGH\", \"NONUHFISH\", \"EXTHEPBSAG\"    Labs reviewed and patient meets treatment conditions? Yes    DRUG SPECIFIC QUESTIONS  Any history of COPD?  No  (If yes educate patient on possible s/s exacerbation)    If Yes any new or worsening s/s related to COPD?  Not applicable  (If patient with worsening COPD notify prescribing provider prior to proceeding with infusion)      REMINDER:  - Pregnancy Category X Drug. Obtain negative pregnancy test prior to FIRST infusion  and educate patient on risk in women of childbearing ability.   - Weight based drug.     Recommended Vitals/Observation:  Vitals: Obtain vital signs at the start; middle and end of infusion as well as at the end of observation period: 30 minutes post.   Observation: Observe patient for 30 minutes " after each infusion Observation complete.          Weight Based Drug Calculations:    WEIGHT BASED DRUGS: Abatacept  (ORENCIA)   Patient's dosing weight (kg): FLAT DOSE         Patient's weight today:   Vitals:    25 1404   Weight: 98.1 kg (216 lb 4.8 oz)         weight range for prescribed dose: Orencia:  <60 k mg  60 to 100 k mg  >100 k g    Patient weight today falls outside of 10% variance or  weight range: No    [facility] pharmacist informed of weight variance: No    Doses that are weight based have an acceptable variance rule within 10% of the prescribed   order and/or within  weight range. If patient weight on day of infusion falls   outside of the 10% variance, or weight range, infusion is administered and   pharmacy contacted regarding future dosing adjustments, per policy.       1545  Patient tolerated infusion well.  Post infusion 30 minute observation complete.  No s/s adverse reaction noted.  VSS.  RTC on 2025.        Initiated By: Amy Benavides RN

## 2025-01-28 NOTE — PATIENT INSTRUCTIONS
Today :We administered abatacept (Orencia) 750 mg in sodium chloride 0.9% 100 mL IV.     For:   1. Seropositive rheumatoid arthritis of multiple joints (Multi)         Your next appointment is due in:  2/25/2025        Please read the  Medication Guide that was given to you and reviewed during todays visit.     (Tell all doctors including dentists that you are taking this medication)     Go to the emergency room or call 911 if:  -You have signs of allergic reaction:   -Rash, hives, itching.   -Swollen, blistered, peeling skin.   -Swelling of face, lips, mouth, tongue or throat.   -Tightness of chest, trouble breathing, swallowing or talking     Call your doctor:  - If IV / injection site gets red, warm, swollen, itchy or leaks fluid or pus.     (Leave dressing on your IV site for at least 2 hours and keep area clean and dry  - If you get sick or have symptoms of infection or are not feeling well for any reason.    (Wash your hands often, stay away from people who are sick)  - If you have side effects from your medication that do not go away or are bothersome.     (Refer to the teaching your nurse gave you for side effects to call your doctor about)    - Common side effects may include:  stuffy nose, headache, feeling tired, muscle aches, upset stomach  - Before receiving any vaccines     - Call the [facility] at   If:  - You get sick, are on antibiotics, have had a recent vaccine, have surgery or dental work and your doctor wants your visit rescheduled.  - You need to cancel and reschedule your visit for any reason. Call at least 2 days before your visit if you need to cancel.   - Your insurance changes before your next visit.    (We will need to get approval from your new insurance. This can take up to two weeks.)     The [facility] is opened Monday thru Friday. We are closed on weekends and holidays.   Voice mail will take your call if the center is closed. If you leave a message  please allow 24 hours for a call back during weekdays. If you leave a message on a weekend/holiday, we will call you back the next business day.    A pharmacist is available Monday - Friday from 8:30AM to 3:30PM to help answer any questions you may have about your prescriptions(s). Please call pharmacy at:    [facility]: [phone]  [facility]: [phone]  [facility]: [phone]

## 2025-01-29 ENCOUNTER — APPOINTMENT (OUTPATIENT)
Dept: ORTHOPEDIC SURGERY | Facility: CLINIC | Age: 65
End: 2025-01-29
Payer: COMMERCIAL

## 2025-02-03 ENCOUNTER — HOSPITAL ENCOUNTER (OUTPATIENT)
Dept: RADIOLOGY | Facility: HOSPITAL | Age: 65
Discharge: HOME | End: 2025-02-03
Payer: COMMERCIAL

## 2025-02-03 ENCOUNTER — TELEPHONE (OUTPATIENT)
Dept: ORTHOPEDIC SURGERY | Facility: HOSPITAL | Age: 65
End: 2025-02-03
Payer: COMMERCIAL

## 2025-02-03 DIAGNOSIS — M25.879 IMPINGEMENT OF ANTERIOR PART OF ANKLE: ICD-10-CM

## 2025-02-03 DIAGNOSIS — M96.0 NONUNION OF SUBTALAR ARTHRODESIS: ICD-10-CM

## 2025-02-03 PROCEDURE — 73700 CT LOWER EXTREMITY W/O DYE: CPT | Mod: LT

## 2025-02-03 PROCEDURE — 73700 CT LOWER EXTREMITY W/O DYE: CPT | Mod: LEFT SIDE | Performed by: RADIOLOGY

## 2025-02-03 NOTE — TELEPHONE ENCOUNTER
I contacted the patient via the number listed in the chart as her CT scan results were made available.  I agree that there is osseous fusion evident through the ICBG allograft from talus to calcaneus although as expected, not complete consolidation at this point.  I would recommend that we continue with progressive weightbearing in the walking boot.  I would anticipate beginning to transition out of the boot over the course of the next 4 to 8 weeks, predominantly based on symptoms.  I will plan to see the patient back at her scheduled follow-up appointment.  The patient voiced understanding and reports that her pain has not been this low in 2-1/2 years.    Marlon Stafford MD, GLENN  Department of Orthopaedic Surgery  [facility]

## 2025-02-05 ENCOUNTER — TREATMENT (OUTPATIENT)
Dept: PHYSICAL THERAPY | Facility: CLINIC | Age: 65
End: 2025-02-05
Payer: COMMERCIAL

## 2025-02-05 DIAGNOSIS — M46.1 SACROILIITIS (CMS-HCC): ICD-10-CM

## 2025-02-05 DIAGNOSIS — M25.511 CHRONIC PAIN OF BOTH SHOULDERS: ICD-10-CM

## 2025-02-05 DIAGNOSIS — M25.512 CHRONIC PAIN OF BOTH SHOULDERS: ICD-10-CM

## 2025-02-05 DIAGNOSIS — M47.817 LUMBOSACRAL SPONDYLOSIS WITHOUT MYELOPATHY: ICD-10-CM

## 2025-02-05 DIAGNOSIS — G89.29 CHRONIC PAIN OF BOTH SHOULDERS: ICD-10-CM

## 2025-02-05 PROCEDURE — 97110 THERAPEUTIC EXERCISES: CPT | Mod: GP,CQ

## 2025-02-05 PROCEDURE — 97140 MANUAL THERAPY 1/> REGIONS: CPT | Mod: GP,CQ

## 2025-02-05 RX ORDER — TIZANIDINE 4 MG/1
TABLET ORAL
Qty: 90 TABLET | Refills: 0 | Status: SHIPPED | OUTPATIENT
Start: 2025-02-05

## 2025-02-05 ASSESSMENT — PAIN SCALES - GENERAL: PAINLEVEL_OUTOF10: 5 - MODERATE PAIN

## 2025-02-05 ASSESSMENT — PAIN - FUNCTIONAL ASSESSMENT: PAIN_FUNCTIONAL_ASSESSMENT: 0-10

## 2025-02-05 NOTE — PROGRESS NOTES
Physical Therapy Treatment    Patient Name: Maureen Vela  MRN: [id number]  Encounter date: 2/5/2025    Time Calculation  Start Time: 1455  Stop Time: 1545  Time Calculation (min): 50 min  PT Therapeutic Procedures Time Entry  Manual Therapy Time Entry: 15  Therapeutic Exercise Time Entry: 30  Non-Billable Time  Non-billable time: 5    Visit # 2 of 10  Visits/Dates Authorized: 2025:  [facility] SUREST - NO AUTH / $55 COPAY / OOP $5500 / 60V max - 0 used / [email or web address] chat, ref: [id number] / ds 1/22/25.     Current Problem:   Problem List Items Addressed This Visit             ICD-10-CM    Lumbosacral spondylosis without myelopathy M47.817    Sacroiliitis (CMS-HCC) M46.1       Precautions:    (L foot NWB due to 11/19/24 fusion, ortho f/u 1/23/25)   osteopenia, skin CA, RA    Subjective   General:    Patient states she has been doing HEP, feeling sore afterward. Patient is allowed PWB on L LE, since her follow up on 1/23/25. Patient reports she had CT scan on L LE, showing bone growth in ankle, first time in 2 years. Patient states she will be having a nerve ablation on Feb 13th.     Pre-Treatment Symptoms:   Pain Assessment: 0-10  0-10 (Numeric) Pain Score: 5 - Moderate pain (L>R lumbar)    Objective   Findings:   Boot donned on L LE , ambulates with crutches, PWB on L LE             Treatments:      Therapeutic Exercise 94278:   Nustep L4 6 min  PPT 1x12  5 sec holds  LTR 1x10 R/L   HL hip abduction   HL hip adduction iso 2x10  5 sec holds   Tball bridge 1x10 , put towels under R LE to counteract L LE boot  Tball abdominal isometric 2x10  3 sec  Tball DKTC x20    Neuromuscular Re-Ed 95997: Deferred    Therapeutic Activity 00725: Deferred  Instructed/trialed activities for neutral pelvis  Demonstrated Serola SI belt, instructed donning/doffing.  Sitting: place pillow or folded towel under thighs to unload sit bones, do not allow feet to dangle  Bed: lay on your back with small pillow or towel roll under knees for neutral  "pelvis, or to be on your side use towel roll under waist and pillow between knees  Roll to side and sit up as a unit to get up  Car/sit to stand: stand up evenly and then head the direction you need to go    Manual Therapy 16594:   Patient in R side-lying: STM L lumbar, around L and R PSIS     Modalities: Deferred     HEP / Access Codes:   Access Code: [email or web address]   Date: 01/22/2025  - Supine Posterior Pelvic Tilt  - 2-3 x daily - 3 sets - 10-30 reps - 0-5 hold  - Seated Cat Cow  - 2-3 x daily - 3 sets - 10-30 reps - 0-5 hold  - Child's Pose with Ball: Center and to Each Side  - 2-3 x daily - 10-30 reps - 0-5 hold    Access Code: [email or web address]   Date: 02/05/2025  - Supine Lower Trunk Rotation  - 1-3 x daily - 1 sets - 10 reps  - Hooklying Clamshell with Resistance  - 1-2 x daily - 2 sets - 10 reps - 5 sec hold  - Supine Hip Adduction Isometric with Ball  - 1-2 x daily - 2 sets - 10 reps - 5 sec hold  - Supine Bridge with Pelvic Floor Contraction on Swiss Ball  - 1-2 x daily - 1-2 sets - 10 reps  - Supine Knees to Chest with Swiss Ball  - 1-2 x daily - 2 sets - 10 reps    Assessment:    Treatment consisted of HEP review and intro to new hip and core exercises. Patient was tender to palpation along L PSIS, slightly reduced after manual STM. Patient issue additional HEP at end of session, of exercises covered today. Patient had less of \"stabbing\" pain in L lumbar at end of session.     Post-Treatment Symptoms:   Response to Interventions: Decrease in pain    Plan:    Therapy options: will be seen for skilled physical therapy services  Planned modality interventions: electrical stimulation/Russian stimulation, thermotherapy (hydrocollator packs) and traction  Other planned modality interventions: DN, light therapy  Planned therapy interventions: abdominal trunk stabilization, manual therapy, neuromuscular re-education, postural training, strengthening, stretching, therapeutic activities, home exercise program, flexibility, " functional ROM exercises, body mechanics training and bed mobility training  Frequency: 2x week  Duration in visits: 10    Goals:   Active       PT Problem       PT Goals       Start:  01/22/25    Expected End:  04/22/25       1) Indep with HEP  2) Decrease Oswestry < 10/50 from 25/50 to indicate improved function and symptoms.  3) Complete work day without exac in back pain.  4) Sleep with less pain disruption.               Patient Stated Goal       Start:  01/22/25    Expected End:  04/22/25       1) Relief of pain  2) Relief of numbness

## 2025-02-07 NOTE — PROGRESS NOTES
"Physical Therapy Treatment    Patient Name: Maureen Vela  MRN: [id number]  Encounter date: 2/10/2025    Time Calculation  Start Time: 1530  Stop Time: 1625  Time Calculation (min): 55 min  PT Therapeutic Procedures Time Entry  Manual Therapy Time Entry: 8  Therapeutic Exercise Time Entry: 47    Visit # 3 of 10  Visits/Dates Authorized: 2025:  [facility] SUREST - NO AUTH / $55 COPAY / OOP $5500 / 60V max - 0 used / Per [email or web address] chat, ref: [id number] / ds 1/22/25.     Current Problem:   Problem List Items Addressed This Visit             ICD-10-CM    Lumbosacral spondylosis without myelopathy M47.817    Sacroiliitis (CMS-HCC) M46.1         Precautions:    (L foot NWB due to 11/19/24 fusion, ortho f/u 1/23/25)   osteopenia, skin CA, RA    Subjective   General:    Pt reports good tolerance to last session, pt reports being scheduled for nerve ablation this Thursday.  Pt reports current LBP rated at a 3 of 10 intensity.      Pre-Treatment Symptoms:   Pain Assessment: 0-10  0-10 (Numeric) Pain Score: 3    Objective   Findings:   Boot donned on L LE , ambulates with crutches, PWB on L LE.  +TTP R lower lumbar psp and R QL             Treatments:      Therapeutic Exercise 85541:   Nustep L4 6.5 min  PPT 1x15  5 sec holds  Towel assist sktc stretch 3 x 20\" R and L   LTR 1x15 R/L   HL hip abduction, light blue band,  2 x 10  HL hip adduction iso ball squeeze 2 x 10  5 sec holds   PPT with alt. LE hip flexion (march) x 10 reps.    Tball abdominal isometric 2x10  3 sec  Isometric obliques 5 x 5\" each.   Tball bridge 1x10 , put towels under R LE to counteract L LE boot  Tball DKTC x10  Seated lumbar flexion stretch 3 xs 10\"     Neuromuscular Re-Ed 21198: Deferred    Therapeutic Activity 79096: Deferred  Instructed/trialed activities for neutral pelvis  Demonstrated Serola SI belt, instructed donning/doffing.  Sitting: place pillow or folded towel under thighs to unload sit bones, do not allow feet to dangle  Bed: lay on your back with " small pillow or towel roll under knees for neutral pelvis, or to be on your side use towel roll under waist and pillow between knees  Roll to side and sit up as a unit to get up  Car/sit to stand: stand up evenly and then head the direction you need to go    Manual Therapy 82772:   Patient in R side-lying: STM L lumbar paraspinals and R QL to decrease tightness and tenderness, promoting improved circulation and functional activity tolerance.     Modalities: Deferred     HEP / Access Codes:   Access Code: [email or web address]   Date: 01/22/2025  - Supine Posterior Pelvic Tilt  - 2-3 x daily - 3 sets - 10-30 reps - 0-5 hold  - Seated Cat Cow  - 2-3 x daily - 3 sets - 10-30 reps - 0-5 hold  - Child's Pose with Ball: Center and to Each Side  - 2-3 x daily - 10-30 reps - 0-5 hold    Access Code: [email or web address]   Date: 02/05/2025  - Supine Lower Trunk Rotation  - 1-3 x daily - 1 sets - 10 reps  - Hooklying Clamshell with Resistance  - 1-2 x daily - 2 sets - 10 reps - 5 sec hold  - Supine Hip Adduction Isometric with Ball  - 1-2 x daily - 2 sets - 10 reps - 5 sec hold  - Supine Bridge with Pelvic Floor Contraction on Swiss Ball  - 1-2 x daily - 1-2 sets - 10 reps  - Supine Knees to Chest with Swiss Ball  - 1-2 x daily - 2 sets - 10 reps  Access Code: [email or web address]  URL: [email or web address]  Date: 02/10/2025  Prepared by: Satish Luke    Exercises  - Supine Single Knee to Chest Stretch  - 1-2 x daily - 6 x weekly - 3 reps - 20 hold  - Supine 90/90 Alternating Toe Touch  - 1-2 x daily - 6 x weekly - 2 sets - 10 reps    Assessment:    Patient tolerated treatment well. Patient appears to be working hard in clinic and motivated to decrease pain and restore all functional deficits. Verbal and/or tactile cues given for proper form, and avoidance of substitution patterns with all exercises. All infection control policies followed during this visit. Pt provided intermittent verbal cueing for appropriate abdominal bracing (with avoidance of  "breath holding) with various DLS exercises.  Pt very tight to palpation R lower lumbar psp and R QL,. Less so after manual.     Post-Treatment Symptoms:    0 of 10, \"nothing down the leg\"    Plan:    Therapy options: will be seen for skilled physical therapy services  Planned modality interventions: electrical stimulation/Russian stimulation, thermotherapy (hydrocollator packs) and traction  Other planned modality interventions: DN, light therapy  Planned therapy interventions: abdominal trunk stabilization, manual therapy, neuromuscular re-education, postural training, strengthening, stretching, therapeutic activities, home exercise program, flexibility, functional ROM exercises, body mechanics training and bed mobility training  Frequency: 2x week  Duration in visits: 10    Goals:   Active       PT Problem       PT Goals       Start:  01/22/25    Expected End:  04/22/25       1) Indep with HEP  2) Decrease Oswestry < 10/50 from 25/50 to indicate improved function and symptoms.  3) Complete work day without exac in back pain.  4) Sleep with less pain disruption.               Patient Stated Goal       Start:  01/22/25    Expected End:  04/22/25       1) Relief of pain  2) Relief of numbness             "

## 2025-02-10 ENCOUNTER — TREATMENT (OUTPATIENT)
Dept: PHYSICAL THERAPY | Facility: CLINIC | Age: 65
End: 2025-02-10
Payer: COMMERCIAL

## 2025-02-10 DIAGNOSIS — M47.817 LUMBOSACRAL SPONDYLOSIS WITHOUT MYELOPATHY: ICD-10-CM

## 2025-02-10 DIAGNOSIS — M46.1 SACROILIITIS (CMS-HCC): ICD-10-CM

## 2025-02-10 PROCEDURE — 97140 MANUAL THERAPY 1/> REGIONS: CPT | Mod: GP,CQ

## 2025-02-10 PROCEDURE — 97110 THERAPEUTIC EXERCISES: CPT | Mod: GP,CQ

## 2025-02-10 ASSESSMENT — PAIN SCALES - GENERAL: PAINLEVEL_OUTOF10: 3

## 2025-02-10 ASSESSMENT — PAIN - FUNCTIONAL ASSESSMENT: PAIN_FUNCTIONAL_ASSESSMENT: 0-10

## 2025-02-13 ENCOUNTER — ANCILLARY PROCEDURE (OUTPATIENT)
Dept: RADIOLOGY | Facility: EXTERNAL LOCATION | Age: 65
End: 2025-02-13
Payer: COMMERCIAL

## 2025-02-13 DIAGNOSIS — M47.817 SPONDYLOSIS WITHOUT MYELOPATHY OR RADICULOPATHY, LUMBOSACRAL REGION: ICD-10-CM

## 2025-02-13 PROCEDURE — 64635 DESTROY LUMB/SAC FACET JNT: CPT | Performed by: ANESTHESIOLOGY

## 2025-02-13 PROCEDURE — 64636 DESTROY L/S FACET JNT ADDL: CPT | Performed by: ANESTHESIOLOGY

## 2025-02-13 NOTE — PROGRESS NOTES
Pre and postprocedure diagnosis--lumbosacral spondylosis    Procedure--radiofrequency ablation of the facet medial nerve branches bilaterally at the L4-5 and the L5-S1 levels    Anesthesia--local    Complications--none    Clinical note--the patient has a history of pain.  I explained the risks, benefits, and alternatives of the procedure to the patient.  The patient wishes to proceed.    Procedure Note--The patient was brought to the procedure room and placed in the prone position.  Sterile prep and drape with ChloraPrep and a fenestrated drape.  The 25-gauge spinal needle was used to inject 12 mL of 2% lidocaine for local anesthesia.  20-gauge radiofrequency ablation needles with 10 mm active tips were guided to the facet medial nerve branches on the bilaterally at the L4-5 and the L5-S1 levels under fluoroscopic guidance.  Test ablation was carried out and achieved at appropriate levels.  Half a milliliter of 2% lidocaine were injected through each of the needles.  Radiofrequency ablation was carried out for 90 seconds x 2 sessions at each of the levels.  At the conclusion of ablation 40 mg of methylprednisolone and 1 mL of 2% lidocaine were injected through the needles in divided doses.  The needles were removed and the patient was transferred to recovery.

## 2025-02-18 ENCOUNTER — TREATMENT (OUTPATIENT)
Dept: PHYSICAL THERAPY | Facility: CLINIC | Age: 65
End: 2025-02-18
Payer: COMMERCIAL

## 2025-02-18 DIAGNOSIS — M47.817 LUMBOSACRAL SPONDYLOSIS WITHOUT MYELOPATHY: ICD-10-CM

## 2025-02-18 DIAGNOSIS — M46.1 SACROILIITIS (CMS-HCC): ICD-10-CM

## 2025-02-18 PROCEDURE — 97110 THERAPEUTIC EXERCISES: CPT | Mod: GP

## 2025-02-18 ASSESSMENT — PAIN - FUNCTIONAL ASSESSMENT: PAIN_FUNCTIONAL_ASSESSMENT: 0-10

## 2025-02-18 NOTE — PROGRESS NOTES
"Physical Therapy Treatment    Patient Name: Maureen Vela  MRN: [id number]  Encounter date: 2/18/2025    Time Calculation  Start Time: 1517  Stop Time: 1605  Time Calculation (min): 48 min  PT Therapeutic Procedures Time Entry  Therapeutic Exercise Time Entry: 40    Visit # 4 of 10  Visits/Dates Authorized: 2025:  [facility] SUREST - NO AUTH / $55 COPAY / OOP $5500 / 60V max - 0 used / Per [email or web address] chat, ref: [id number] / ds 1/22/25.     Current Problem:   Problem List Items Addressed This Visit             ICD-10-CM    Lumbosacral spondylosis without myelopathy M47.817    Sacroiliitis (CMS-HCC) M46.1         Precautions:    (L foot NWB due to 11/19/24 fusion, ortho f/u 1/21/25)   osteopenia, skin CA, RA    Subjective   General:   General Comment: Lumbar ablation 2/13/25, sore today, sitting very difficult greater than 10-15 min due to contact to procedure site. Was told to expect it will be worse before improvement from procedure. Hopeful for increased WBing L foot and out of boot after 2/21/25 f/u, being uneven and crutches continues to stress her back. Was pleased she could tolerate shoe she tried on recently.    Pre-Treatment Symptoms:   Pain Assessment: 0-10  0-10 (Numeric) Pain Score:  (moderate LBP)    Objective   Findings:   Boot donned on L LE , ambulates with crutches, PWB on L LE.  Pain expression with transfers             Treatments:      Therapeutic Exercise 49219:   Nustep L4 6.5 min  Tball LTR 2x15 R/L   Tball DKTC 2x15  Tball abdominal isometric 2x15  3 sec  HL Tball rhythmic stabilization x60sec  HL bent knee fall out 2x15  HL hip abduction, light blue band,  2 x 10  HL hip adduction iso ball squeeze 2 x 10  5 sec holds   PPT with alt. LE hip flexion small ROM (march) 2x10 reps  Leg lengthener supine    Deferred:  Tball bridge 1x10 , put towels under R LE to counteract L LE boot  Seated lumbar flexion stretch 3 xs 10\"   PPT 1x15  5 sec holds  Towel assist sktc stretch 3 x 20\" R and L     Neuromuscular Re-Ed " 54986: Deferred    Therapeutic Activity 01375:  1/22/25  Instructed/trialed activities for neutral pelvis  Demonstrated Serola SI belt, instructed donning/doffing.  Sitting: place pillow or folded towel under thighs to unload sit bones, do not allow feet to dangle  Bed: lay on your back with small pillow or towel roll under knees for neutral pelvis, or to be on your side use towel roll under waist and pillow between knees  Roll to side and sit up as a unit to get up  Car/sit to stand: stand up evenly and then head the direction you need to go    Manual Therapy 67165:   Patient in R side-lying: STM L lumbar paraspinals and R QL to decrease tightness and tenderness, promoting improved circulation and functional activity tolerance.     Modalities: Deferred     HEP / Access Codes:   Access Code: [email or web address]   Date: 01/22/2025  - Supine Posterior Pelvic Tilt  - 2-3 x daily - 3 sets - 10-30 reps - 0-5 hold  - Seated Cat Cow  - 2-3 x daily - 3 sets - 10-30 reps - 0-5 hold  - Child's Pose with Ball: Center and to Each Side  - 2-3 x daily - 10-30 reps - 0-5 hold    Access Code: [email or web address]   Date: 02/05/2025  - Supine Lower Trunk Rotation  - 1-3 x daily - 1 sets - 10 reps  - Hooklying Clamshell with Resistance  - 1-2 x daily - 2 sets - 10 reps - 5 sec hold  - Supine Hip Adduction Isometric with Ball  - 1-2 x daily - 2 sets - 10 reps - 5 sec hold  - Supine Bridge with Pelvic Floor Contraction on Swiss Ball  - 1-2 x daily - 1-2 sets - 10 reps  - Supine Knees to Chest with Swiss Ball  - 1-2 x daily - 2 sets - 10 reps  Access Code: [email or web address] URL: [email or web address] Date: 02/10/2025  Prepared by: Satish Luke  - Supine Single Knee to Chest Stretch  - 1-2 x daily - 6 x weekly - 3 reps - 20 hold  - Supine 90/90 Alternating Toe Touch  - 1-2 x daily - 6 x weekly - 2 sets - 10 reps    Assessment:   PT Assessment  Assessment Comment: Adjusted session today to emphasize neutral core exercise and ROM due to recent lumbar ablation  post procedure soreness. If out of L boot and increased WBing next session, will have more options with POC.    Post-Treatment Symptoms:   Response to Interventions: No change in pain (some positions bothersome/back would feel tighter, better with change)    Plan:      Therapy options: will be seen for skilled physical therapy services  Planned modality interventions: electrical stimulation/Russian stimulation, thermotherapy (hydrocollator packs) and traction  Other planned modality interventions: DN, light therapy  Planned therapy interventions: abdominal trunk stabilization, manual therapy, neuromuscular re-education, postural training, strengthening, stretching, therapeutic activities, home exercise program, flexibility, functional ROM exercises, body mechanics training and bed mobility training  Frequency: 2x week  Duration in visits: 10    Goals:   Active       PT Problem       PT Goals       Start:  01/22/25    Expected End:  04/22/25       1) Indep with HEP  2) Decrease Oswestry < 10/50 from 25/50 to indicate improved function and symptoms.  3) Complete work day without exac in back pain.  4) Sleep with less pain disruption.               Patient Stated Goal       Start:  01/22/25    Expected End:  04/22/25       1) Relief of pain  2) Relief of numbness

## 2025-02-20 LAB
ALBUMIN SERPL-MCNC: 4.6 G/DL (ref 3.6–5.1)
ALP SERPL-CCNC: 54 U/L (ref 37–153)
ALT SERPL-CCNC: 12 U/L (ref 6–29)
ANION GAP SERPL CALCULATED.4IONS-SCNC: 8 MMOL/L (CALC) (ref 7–17)
AST SERPL-CCNC: 11 U/L (ref 10–35)
BILIRUB SERPL-MCNC: 0.3 MG/DL (ref 0.2–1.2)
BUN SERPL-MCNC: 23 MG/DL (ref 7–25)
CALCIUM SERPL-MCNC: 9.6 MG/DL (ref 8.6–10.4)
CHLORIDE SERPL-SCNC: 105 MMOL/L (ref 98–110)
CO2 SERPL-SCNC: 27 MMOL/L (ref 20–32)
CREAT SERPL-MCNC: 0.63 MG/DL (ref 0.5–1.05)
EGFRCR SERPLBLD CKD-EPI 2021: 100 ML/MIN/1.73M2
GLUCOSE SERPL-MCNC: 99 MG/DL (ref 65–99)
POTASSIUM SERPL-SCNC: 4 MMOL/L (ref 3.5–5.3)
PROT SERPL-MCNC: 6.6 G/DL (ref 6.1–8.1)
SODIUM SERPL-SCNC: 140 MMOL/L (ref 135–146)

## 2025-02-21 ENCOUNTER — OFFICE VISIT (OUTPATIENT)
Dept: ORTHOPEDIC SURGERY | Facility: CLINIC | Age: 65
End: 2025-02-21
Payer: COMMERCIAL

## 2025-02-21 ENCOUNTER — HOSPITAL ENCOUNTER (OUTPATIENT)
Dept: RADIOLOGY | Facility: CLINIC | Age: 65
Discharge: HOME | End: 2025-02-21
Payer: COMMERCIAL

## 2025-02-21 DIAGNOSIS — G89.29 CHRONIC PAIN OF LEFT ANKLE: ICD-10-CM

## 2025-02-21 DIAGNOSIS — M96.0 NONUNION OF SUBTALAR ARTHRODESIS: ICD-10-CM

## 2025-02-21 DIAGNOSIS — M25.572 CHRONIC PAIN OF LEFT ANKLE: ICD-10-CM

## 2025-02-21 PROCEDURE — 99213 OFFICE O/P EST LOW 20 MIN: CPT | Performed by: STUDENT IN AN ORGANIZED HEALTH CARE EDUCATION/TRAINING PROGRAM

## 2025-02-21 PROCEDURE — 73630 X-RAY EXAM OF FOOT: CPT | Mod: LT

## 2025-02-21 ASSESSMENT — PAIN - FUNCTIONAL ASSESSMENT: PAIN_FUNCTIONAL_ASSESSMENT: 0-10

## 2025-02-21 ASSESSMENT — PAIN SCALES - GENERAL: PAINLEVEL_OUTOF10: 1

## 2025-02-21 ASSESSMENT — PAIN DESCRIPTION - DESCRIPTORS: DESCRIPTORS: ACHING;BURNING;DULL;DISCOMFORT

## 2025-02-22 NOTE — PROGRESS NOTES
"ORTHOPAEDIC SURGERY PROGRESS NOTE    Chief Complaint:  Left foot pain    History Of Present Illness  Maureen Vela \"Mendy\" is a 64 y.o. female who presents for complex evaluation of left foot pain, self-referred.  Patient has previously undergone 3 surgeries with an [facility] podiatrist.  The first was a tendon transfer of which the details are unclear at this time, the second was a left subtalar joint arthrodesis from 06/28/2023 followed by a revision subtalar joint arthrodesis from 02/07/2024.  The patient continues with severe pain that is activity dependent.  Typically rated as 5-9 out of 10.  She has been ambulating with use of a boot and crutch.  By report, the patient's previous provider has stopped doing surgery due to a medical condition.  She was quite happy with her previous practice, however sought an orthopedic surgery perspective.  Prior to her index surgery she reported pain on the outside of her foot that radiated to the top.  This is yet unresolved.  Patient works as a  and is on her feet extensively throughout the day.  She denies any fevers, chills or night sweats but does report paresthesias on the outside of her foot.    Patient has a past medical history of rheumatoid arthritis, she has been off of her rheumatologic medications from the winter 2023.  She has been using a bone stimulator.    07/12/2024: Patient returns for follow-up of her left foot pain as above.  She continues with severe pain in her left foot and ankle.  Rated as 8 out of 10.  Pain is improved with boot wear.  She has been ambulating with use of crutches.  She is reporting a burning pain on the lateral side of her foot, this is unchanged from her previous operations.  Patient also notes discomfort when walking on the back of her heel from a prominent screw head.    09/06/2024: Patient returns s/p left foot hardware removal and bone biopsy from 08/26/2024.  Patient's intraoperative cultures were negative " but biopsy of the talus was consistent with osteomyelitis.  She has been compliant with nonweightbearing restrictions and reporting 2 out of 10 pain.  She has been following closely with her rheumatologist.    09/20/2024: Patient returns s/p left foot hardware removal and bone biopsy from 08/26/2024.  She is currently reporting 3 out of 10 pain.  She has been compliant with nonweightbearing restrictions in a walking boot and crutches.  She has follow-up with ID who did not feel that her clinical situation and pathology was consistent with osteomyelitis requiring formal IV antibiotics.  She has been restarted on her RA medications.    10/18/2024: Patient returns s/p left foot hardware removal and bone biopsy from 08/26/2024.  She is reporting 5 out of 10 pain.  She has continued to protect her weightbearing with a walking boot and crutches.  She has had a drug rash/response to doxycycline and underwent a biopsy for this.    12/06/2024: Patient returns s/p left revision bone block ICBG subtalar arthrodesis with BMAC from 11/19/2024.  Patient has been compliant with nonweightbearing restrictions.  She is reporting similar swelling to her preoperative state.  Pain continues as 5 out of 10.  She is present with her  today.    12/26/2024: Patient returns s/p left revision bone block ICBG subtalar arthrodesis with BMAC from 11/19/2024.  Patient has been compliant with nonweightbearing restrictions.  She is reporting 4 out of 10 pain today that is improving overall.  She is present with her  today.  Unfortunately her previous bone stimulator has timed out.  She has not had any fevers, chills or night sweats.    01/23/2025: Patient returns s/p left revision bone block ICBG subtalar arthrodesis with BMAC from 11/19/2024.  Patient continues with 3 out of 10 pain that is relatively unchanged.  She has had more sural neuralgia type pain.  She has continued to be compliant with nonweightbearing restrictions.  She  has put some weight down for transfers and has noticed improved pain with ankle motion.    02/21/2025: Patient returns s/p left revision bone block ICBG subtalar arthrodesis with BMAC from 11/19/2024.  Patient continues to report improved pain as compared to her preoperative state.  She has somewhat plateaued with respect to her stamina and recovery and continues to have difficulty with prolonged pain but no obvious increase in pain or swelling.  She has undergone a recent ablation procedure and has been in physical therapy for her back    Past Medical History  Past Medical History:   Diagnosis Date    AB (asthmatic bronchitis) ([facility])     Acquired immunocompromised state     Autoimmune RA, On Orencia every 28 days, followed by rheumatology    Actinic keratoses     Allergic rhinitis     Asthma     only used inhaler 4-5 times this entire year, triggers are seasonal allergies    Bilateral sacroiliitis ([facility])     Chronic pain     Chronic sinusitis     DDD (degenerative disc disease), cervical     C5,6 BULGE    DDD (degenerative disc disease), lumbar     L4,5 BULGE    DJD (degenerative joint disease)     right ankle and foot    ETD (eustachian tube dysfunction)     GERD (gastroesophageal reflux disease)     H/O arthrodesis     Hiatal hernia     HTN (hypertension)     Hyperlipidemia     Hypothyroidism     Joint pain     Lateral epicondylitis of elbow     Low back pain     hronic, folowed by pain management    Low back pain     Lumbosacral disc disease     Migraine     Neuropathy     left foot N/T and hands bilateral    Osteoarthritis     Osteopenia     PONV (postoperative nausea and vomiting)     PTTD (posterior tibial tendon dysfunction)     left lower extremity    Seronegative rheumatoid arthritis (Multi)     Skin cancer     Basal nd squamous removed    Spondylosis of 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     Vitamin D deficiency        Surgical History  Recent Surgeries in Orthopaedic Surgery             No cases to display             Social History  Social History     Socioeconomic History    Marital status:    Tobacco Use    Smoking status: Never    Smokeless tobacco: Never   Vaping Use    Vaping status: Never Used   Substance and Sexual Activity    Alcohol use: Not Currently     Comment: RARELY    Drug use: Never    Sexual activity: Defer     Partners: Male     Birth control/protection: Post-menopausal, Female Sterilization       Family History  Family History   Problem Relation Name Age of Onset    Arthritis Mother Mom     Alzheimer's disease Father Dad     Breast cancer Sister Sima 42    Alzheimer's disease Sister Joselyn     Breast cancer Mother's Sister  63    Stroke Maternal Grandfather Grandpa         Allergies  Allergies   Allergen Reactions    Doxycycline Rash     Severe rash or burn.    Oxycodone-Acetaminophen Nausea/vomiting    Sulfa (Sulfonamide Antibiotics) Headache    Sulfamethoxazole-Trimethoprim Rash    Balsam [place] Hives     Pt states in dental adhesives and make up    Clarithromycin Itching and Rash    Hydroxychloroquine Hives and Rash    Methotrexate Hives and Rash       Review of Systems  REVIEW OF SYSTEMS  Constitutional: no unplanned weight loss  Psychiatric: no suicidal ideation  ENT: no vision changes, no sinus problems  Pulmonary: no shortness of breath  Lymphatic: no enlarged lymph nodes  Cardiovascular: no chest pain or shortness of breath  Gastrointestinal: no stomach problems  Genitourinary: no dysuria   Skin: no rashes  Endocrine: no thyroid problems  Neurological: no headache, no numbness  Hematological: no easy bruising  Musculoskeletal: Left foot pain    Physical Exam  PHYSICAL EXAMINATION  Constitutional Exam: well developed and well nourished  Psychiatric Exam: alert and oriented, appropriate mood and behavior  Eye Exam: EOMI  Pulmonary Exam: breathing non-labored, no apparent distress  Lymphatic exam: no appreciable lymphadenopathy in the lower  extremities  Cardiovascular exam: RRR to peripheral palpation, DP pulses 2+, PT 2+, toes are pink with good capillary refill, no pitting edema  Skin exam: no open lesions, rashes, abrasions or ulcerations  Neurological exam: sensation to light touch intact in both lower extremities in peripheral and dermatomal distributions (except for any abnormalities noted in musculoskeletal exam)    Musculoskeletal exam: Left lower extremity examination.  Patient anterior ankle incision continues to be well-healed.  Lateral sinus Tarsi incision also well-healed.  Continued allodynia in the sural nerve distribution, unchanged.  Patient with tenderness to palpation at the plantar surface of the heel about prior screw tract.  Patient has pain-free and supple ankle range of motion without crepitus or effusion.  Patient has pain-free and appropriately limited subtalar joint range of motion with pain-free and supple midtarsal joint range of motion. Patient has sensation intact light touch grossly in a saphenous, sural (reporting neuritic type symptoms with positive tinel's baseline for patient from prior surgery), superficial peroneal, deep peroneal and tibial nerve distribution.  She has intact PF/DF/EHL.  She has 2+ DP/PT pulse palpated.    Last Recorded Vitals  There were no vitals taken for this visit.    Laboratory Results    No results found. However, due to the size of the patient record, not all encounters were searched. Please check Results Review for a complete set of results.    Radiology Results   X-ray imaging 3 view weightbearing right foot reviewed and independently evaluated by me on 02/21/2025 demonstrates continued interval healing of subtalar arthrodesis with distraction bone block technique and cannulated screw fixation.  There is no keisha-implant fracture, lucency or loosening.    Assessment/Plan:  64-year-old female who presents for complex evaluation of painful and multiply revised left subtalar arthrodesis by  [facility] podiatrist with sural nerve neuritis and anterior ankle impingement s/p L HARSHAL and bone biopsy from 08/26/2024 s/p left revision bone block ICBG subtalar arthrodesis with BMAC from 11/19/2024 who presents with continued clinical and radiographic evidence of healing including CT demonstrating incorporation.  I would recommend the patient continue weightbearing to her tolerance in her left lower extremity.  She may steadily transition out of the walking boot into a regular shoe as she feels comfortable.  Given the time course, I would expect this may take an additional 1 to 2 months.  She may certainly continue with physical therapy and remove her boot as she feels comfortable to participate in therapy.  She would likely to continue to benefit from assisted ambulation with knee scooter for longer distances as she continues to rebuild her stamina.  I would plan to see the patient back in 6 weeks for close continued follow-up and repeat clinical and radiographic evaluation.  I have encouraged the patient to contact the office if she develops any new pain, worsening symptoms or any change in the appearance of her foot or ankle.  Upon return, patient three-view weightbearing left ankle.    Marlon Stafford MD, GLENN  Department of Orthopaedic Surgery  [facility]    The diagnosis and treatment plan were reviewed with the patient. All questions were answered. The patient verbalized understanding of the treatment plan. There were no barriers to understanding identified.    Note dictated with [employer] software.  Completed without full type editing and sent to avoid delay.

## 2025-02-24 ENCOUNTER — TREATMENT (OUTPATIENT)
Dept: PHYSICAL THERAPY | Facility: CLINIC | Age: 65
End: 2025-02-24
Payer: COMMERCIAL

## 2025-02-24 DIAGNOSIS — M47.817 LUMBOSACRAL SPONDYLOSIS WITHOUT MYELOPATHY: ICD-10-CM

## 2025-02-24 DIAGNOSIS — M46.1 SACROILIITIS (CMS-HCC): ICD-10-CM

## 2025-02-24 PROCEDURE — 97110 THERAPEUTIC EXERCISES: CPT | Mod: GP

## 2025-02-24 ASSESSMENT — PAIN - FUNCTIONAL ASSESSMENT: PAIN_FUNCTIONAL_ASSESSMENT: 0-10

## 2025-02-24 NOTE — PROGRESS NOTES
Physical Therapy Treatment    Patient Name: Maureen Vela  MRN: [id number]  Encounter date: 2/24/2025    Time Calculation  Start Time: 1517  Stop Time: 1602  Time Calculation (min): 45 min  PT Therapeutic Procedures Time Entry  Therapeutic Exercise Time Entry: 40    Visit # 5 of 10  Visits/Dates Authorized: 2025:  [facility] SUREST - NO AUTH / $55 COPAY / OOP $5500 / 60V max - 0 used / Per [email or web address] chat, ref: [id number] / ds 1/22/25.     Current Problem:   Problem List Items Addressed This Visit             ICD-10-CM    Lumbosacral spondylosis without myelopathy M47.817    Sacroiliitis (CMS-HCC) M46.1         Precautions:    (L foot WBAT, progress from boot to shoe as tolerated as of 2/21/25)   osteopenia, skin CA, RA    Subjective   General:   General Comment: Had ortho surgeon f/u, allowed out of boot as tolerated. Managed with cane part of day, and with knee scooter, increased L foot pain throughout day. Brought shoe for PT but now feeling she can't tolerate being out of boot. Tenderness left low back from RFA 2/13/25 improving but certain seats/Rast[employer] pews still evoke pain with contact.    Pre-Treatment Symptoms:   Pain Assessment: 0-10  0-10 (Numeric) Pain Score:  (mild low back pain without contact to procedure site)    Objective   Findings:   Boot donned on L LE , ambulates with cane,WBAT L LE  Pain expression with transfers             Treatments:      Therapeutic Exercise 18169:   Nustep L4 5min  Tball LTR 2x15 R/L   Tball DKTC 2x15  Tball bridge small ROM  Tball abdominal isometric 2x15  3 sec  HL Tball rhythmic stabilization x60sec  HL bent knee fall out 2x15  HL hip abduction, purple Tband,  2 x 10  HL hip adduction iso ball squeeze 2 x 10  5 sec holds   PPT with alt. LE hip flexion small ROM (march) 2x10 reps  Leg lengthener supine  Prone hip IR/ER  Prone hip ext   Sidely A-P hip taps  Seated lumbar flexion stretch Tball FW roll      Neuromuscular Re-Ed 06717: Deferred    Therapeutic Activity  82664:  1/22/25  Instructed/trialed activities for neutral pelvis  Demonstrated Serola SI belt, instructed donning/doffing.  Sitting: place pillow or folded towel under thighs to unload sit bones, do not allow feet to dangle  Bed: lay on your back with small pillow or towel roll under knees for neutral pelvis, or to be on your side use towel roll under waist and pillow between knees  Roll to side and sit up as a unit to get up  Car/sit to stand: stand up evenly and then head the direction you need to go    Manual Therapy 23697:   Deferred: Patient in R side-lying: STM L lumbar paraspinals and R QL to decrease tightness and tenderness, promoting improved circulation and functional activity tolerance.     Modalities: Deferred     HEP / Access Codes:   Access Code: [email or web address]   Date: 01/22/2025  - Supine Posterior Pelvic Tilt  - 2-3 x daily - 3 sets - 10-30 reps - 0-5 hold  - Seated Cat Cow  - 2-3 x daily - 3 sets - 10-30 reps - 0-5 hold  - Child's Pose with Ball: Center and to Each Side  - 2-3 x daily - 10-30 reps - 0-5 hold    Access Code: [email or web address]   Date: 02/05/2025  - Supine Lower Trunk Rotation  - 1-3 x daily - 1 sets - 10 reps  - Hooklying Clamshell with Resistance  - 1-2 x daily - 2 sets - 10 reps - 5 sec hold  - Supine Hip Adduction Isometric with Ball  - 1-2 x daily - 2 sets - 10 reps - 5 sec hold  - Supine Bridge with Pelvic Floor Contraction on Swiss Ball  - 1-2 x daily - 1-2 sets - 10 reps  - Supine Knees to Chest with Swiss Ball  - 1-2 x daily - 2 sets - 10 reps  Access Code: [email or web address] URL: [email or web address] Date: 02/10/2025  Prepared by: Satish Luke  - Supine Single Knee to Chest Stretch  - 1-2 x daily - 6 x weekly - 3 reps - 20 hold  - Supine 90/90 Alternating Toe Touch  - 1-2 x daily - 6 x weekly - 2 sets - 10 reps    Assessment:   PT Assessment  Assessment Comment: Adjusted session today to emphasize neutral core exercise and ROM due to recent lumbar ablation post procedure soreness. If out  of L boot and increased WBing next session, will have more options with POC. By appt time late in day did not feel able to be out of boot although weaning is now allowed by surgeon    Post-Treatment Symptoms:   Response to Interventions: No change in pain    Plan:      Therapy options: will be seen for skilled physical therapy services  Planned modality interventions: electrical stimulation/Russian stimulation, thermotherapy (hydrocollator packs) and traction  Other planned modality interventions: DN, light therapy  Planned therapy interventions: abdominal trunk stabilization, manual therapy, neuromuscular re-education, postural training, strengthening, stretching, therapeutic activities, home exercise program, flexibility, functional ROM exercises, body mechanics training and bed mobility training  Frequency: 2x week  Duration in visits: 10    Goals:   Active       PT Problem       PT Goals       Start:  01/22/25    Expected End:  04/22/25       1) Indep with HEP  2) Decrease Oswestry < 10/50 from 25/50 to indicate improved function and symptoms.  3) Complete work day without exac in back pain.  4) Sleep with less pain disruption.               Patient Stated Goal       Start:  01/22/25    Expected End:  04/22/25       1) Relief of pain  2) Relief of numbness

## 2025-02-25 ENCOUNTER — APPOINTMENT (OUTPATIENT)
Dept: INFUSION THERAPY | Facility: CLINIC | Age: 65
End: 2025-02-25
Payer: COMMERCIAL

## 2025-02-25 ENCOUNTER — PATIENT MESSAGE (OUTPATIENT)
Dept: PRIMARY CARE | Facility: CLINIC | Age: 65
End: 2025-02-25

## 2025-02-25 VITALS
WEIGHT: 215.7 LBS | BODY MASS INDEX: 39.45 KG/M2 | DIASTOLIC BLOOD PRESSURE: 77 MMHG | RESPIRATION RATE: 16 BRPM | SYSTOLIC BLOOD PRESSURE: 134 MMHG | OXYGEN SATURATION: 99 % | TEMPERATURE: 97.7 F | HEART RATE: 59 BPM

## 2025-02-25 DIAGNOSIS — M05.79 SEROPOSITIVE RHEUMATOID ARTHRITIS OF MULTIPLE JOINTS (MULTI): ICD-10-CM

## 2025-02-25 DIAGNOSIS — E78.2 MIXED HYPERLIPIDEMIA: Primary | ICD-10-CM

## 2025-02-25 DIAGNOSIS — E03.9 ACQUIRED HYPOTHYROIDISM: ICD-10-CM

## 2025-02-25 DIAGNOSIS — M85.80 OSTEOPENIA WITH HIGH RISK OF FRACTURE: ICD-10-CM

## 2025-02-25 PROCEDURE — 96365 THER/PROPH/DIAG IV INF INIT: CPT | Performed by: NURSE PRACTITIONER

## 2025-02-25 PROCEDURE — 96375 TX/PRO/DX INJ NEW DRUG ADDON: CPT | Performed by: NURSE PRACTITIONER

## 2025-02-25 RX ORDER — ZOLEDRONIC ACID 5 MG/100ML
5 INJECTION, SOLUTION INTRAVENOUS ONCE
Status: COMPLETED | OUTPATIENT
Start: 2025-02-25 | End: 2025-02-25

## 2025-02-25 RX ORDER — ZOLEDRONIC ACID 5 MG/100ML
5 INJECTION, SOLUTION INTRAVENOUS ONCE
Status: CANCELLED | OUTPATIENT
Start: 2025-02-25

## 2025-02-25 RX ORDER — DIPHENHYDRAMINE HYDROCHLORIDE 50 MG/ML
50 INJECTION INTRAMUSCULAR; INTRAVENOUS AS NEEDED
Status: DISCONTINUED | OUTPATIENT
Start: 2025-02-25 | End: 2025-02-25 | Stop reason: HOSPADM

## 2025-02-25 RX ORDER — EPINEPHRINE 0.3 MG/.3ML
0.3 INJECTION SUBCUTANEOUS EVERY 5 MIN PRN
OUTPATIENT
Start: 2025-03-25

## 2025-02-25 RX ORDER — EPINEPHRINE 0.3 MG/.3ML
0.3 INJECTION SUBCUTANEOUS EVERY 5 MIN PRN
OUTPATIENT
Start: 2025-02-25

## 2025-02-25 RX ORDER — FAMOTIDINE 10 MG/ML
20 INJECTION, SOLUTION INTRAVENOUS ONCE AS NEEDED
OUTPATIENT
Start: 2025-03-25

## 2025-02-25 RX ORDER — DIPHENHYDRAMINE HYDROCHLORIDE 50 MG/ML
50 INJECTION INTRAMUSCULAR; INTRAVENOUS AS NEEDED
OUTPATIENT
Start: 2025-02-25

## 2025-02-25 RX ORDER — DIPHENHYDRAMINE HYDROCHLORIDE 50 MG/ML
50 INJECTION INTRAMUSCULAR; INTRAVENOUS AS NEEDED
OUTPATIENT
Start: 2025-03-25

## 2025-02-25 RX ORDER — ALBUTEROL SULFATE 0.83 MG/ML
3 SOLUTION RESPIRATORY (INHALATION) AS NEEDED
OUTPATIENT
Start: 2025-03-25

## 2025-02-25 RX ORDER — FAMOTIDINE 10 MG/ML
20 INJECTION, SOLUTION INTRAVENOUS ONCE AS NEEDED
Status: DISCONTINUED | OUTPATIENT
Start: 2025-02-25 | End: 2025-02-25 | Stop reason: HOSPADM

## 2025-02-25 RX ORDER — ALBUTEROL SULFATE 0.83 MG/ML
3 SOLUTION RESPIRATORY (INHALATION) AS NEEDED
OUTPATIENT
Start: 2025-02-25

## 2025-02-25 RX ORDER — EPINEPHRINE 0.3 MG/.3ML
0.3 INJECTION SUBCUTANEOUS EVERY 5 MIN PRN
Status: DISCONTINUED | OUTPATIENT
Start: 2025-02-25 | End: 2025-02-25 | Stop reason: HOSPADM

## 2025-02-25 RX ORDER — ALBUTEROL SULFATE 0.83 MG/ML
3 SOLUTION RESPIRATORY (INHALATION) AS NEEDED
Status: DISCONTINUED | OUTPATIENT
Start: 2025-02-25 | End: 2025-02-25 | Stop reason: HOSPADM

## 2025-02-25 RX ORDER — FAMOTIDINE 10 MG/ML
20 INJECTION, SOLUTION INTRAVENOUS ONCE AS NEEDED
OUTPATIENT
Start: 2025-02-25

## 2025-02-25 RX ADMIN — ZOLEDRONIC ACID 5 MG: 5 INJECTION, SOLUTION INTRAVENOUS at 14:10

## 2025-02-25 ASSESSMENT — ENCOUNTER SYMPTOMS
FATIGUE: 0
EXTREMITY WEAKNESS: 0
VOICE CHANGE: 0
BLOOD IN STOOL: 0
NAUSEA: 0
HEADACHES: 0
UNEXPECTED WEIGHT CHANGE: 0
NUMBNESS: 0
WOUND: 0
SORE THROAT: 0
SHORTNESS OF BREATH: 0
WHEEZING: 0
MYALGIAS: 0
DIARRHEA: 0
ARTHRALGIAS: 0
DIZZINESS: 0
FEVER: 0
FREQUENCY: 0
EYE PROBLEMS: 0
PALPITATIONS: 0
BACK PAIN: 1
DYSURIA: 0
LEG SWELLING: 0
LIGHT-HEADEDNESS: 0
CHILLS: 0
TROUBLE SWALLOWING: 0
COUGH: 0
VOMITING: 0
CONSTIPATION: 0
ABDOMINAL PAIN: 0
HEMATURIA: 0

## 2025-02-25 ASSESSMENT — PAIN SCALES - GENERAL: PAINLEVEL_OUTOF10: 5

## 2025-02-25 NOTE — PROGRESS NOTES
[facility]   Infusion Clinic Note   Date: 2025   Name: Maureen Vela  : 1960   MRN: [id number]         Reason for Visit: OP Infusion (Orencia 750 mg every 28 days and Reclast New Start 5 mg every 365 days)         Today: We administered abatacept (Orencia) 750 mg in sodium chloride 0.9% 100 mL IV and zoledronic acid.       Ordered By: Cathy Alexis,*       For a Diagnosis of: Seropositive rheumatoid arthritis of multiple joints (Multi)    Osteopenia with high risk of fracture       At today's visit patient accompanied by: Self      Vitals:   Vitals:    25 1305 25 1400 25 1426 25 1454   BP: 152/86 130/75 129/82 134/77   Pulse: 71 62 64 59   Resp: 18 16 16 16   Temp: 36.4 °C (97.6 °F) 36.4 °C (97.5 °F) 36.4 °C (97.5 °F) 36.5 °C (97.7 °F)   TempSrc: Temporal      SpO2: 98%   99%   Weight: 97.8 kg (215 lb 11.2 oz)      PainSc:   5      PainLoc: Back                Pre - Treatment Checklist:      - Previous reaction to current treatment: no      Assess patient for the concerns below. Document provider notification as appropriate.  - Active or recent infection with/without current antibiotic use: no  - Recent or planned invasive dental work: no  - Recent or planned surgeries: yes, 2025 s/p spinal ablation  - Recently received or plans to receive vaccinations: no  - Has treatment related toxicities: no  - Any chance may be pregnant:  n/a      Pain: 5   - Is the pain different from normal: no   - Is prescribing Doctor aware:  yes      Labs: Reviewed       Fall Risk Screening: Junaid Fall Risk  History of Falling, Immediate or Within 3 Months: No  Secondary Diagnosis: Yes  Ambulatory Aid: Walks without aid/bedrest/nurse assist  Intravenous Therapy/Heparin Lock: No  Gait/Transferring: Normal/bedrest/immobile  Mental Status: Oriented to own ability  Quiroga Fall Risk Score: 15       Review Of Systems:  Review of Systems   Constitutional:  Negative  for chills, fatigue, fever and unexpected weight change.   HENT:   Negative for hearing loss, mouth sores, sore throat, tinnitus, trouble swallowing and voice change.    Eyes:  Negative for eye problems.   Respiratory:  Negative for cough, shortness of breath and wheezing.    Cardiovascular:  Negative for chest pain, leg swelling and palpitations.   Gastrointestinal:  Negative for abdominal pain, blood in stool, constipation, diarrhea, nausea and vomiting.   Genitourinary:  Negative for dysuria, frequency and hematuria.    Musculoskeletal:  Positive for back pain and gait problem. Negative for arthralgias and myalgias.   Skin:  Negative for itching, rash and wound.   Neurological:  Positive for gait problem. Negative for dizziness, extremity weakness, headaches, light-headedness and numbness.        Patient ambulating with orthotic boot. (+) RLE weight bearing.  No longer required to use knee scooter.   Chronic LBP pain  S/P spinal ablation on 2/13/2025 - moderate LBP relief after ablation.         Infusion Readiness:  - Assessment Concerns Related to Infusion: No  - Provider notified: no      New Patient Education:    NEW PATIENT MEDICATION EDUCATION PT PROVIDED WITH WRITTEN (so[employer]utor PT EDUCATION SHEET) AND VERBAL EDUCATION REGARDING MEDICATION GIVEN. VERIFIED MEDICATION NAME WITH PATIENT AND DISCUSSED REASON FOR USE. BRIEFLY DISCUSSED HOW MEDICATION WORKS AND EDUCATED ON GOAL OF TREATMENT, FREQUENCY OF TREATMENT, ADVERSE RXN'S AND COMMON SIDE EFFECTS TO MONITOR FOR. INSTRUCTED PT TO ASSURE THAT ALL PROVIDERS INCLUDING DENTISTS ARE AWARE OF MEDICATION RECEIVED. DISCUSSED FLOW OF VISIT AND ORIENTED TO INFUSION CENTER. PT VERBALIZES UNDERSTANDING. CALL LIGHT PROVIDED AND PT AWARE TO ALERT STAFF OF ANY CONCERNS DURING TREATMENT.        Treatment Conditions & Drug Specific Questions:    Abatacept  (ORENCIA)    (Unless otherwise specified on patient specific therapy plan):    TREATMENT CONDITIONS  Unless otherwise  "specified on patient specific therapy plan hold treatment and notify provider prior to proceeding with infusion if patient with a:  o Positive T-Spot  o Positive Hepatitis B Surface Ag   o Positive Hepatitis B Core Antibody   o Positive Hepatitis C Antibody     Lab Results   Component Value Date    TBSIN Negative 04/29/2024    TBGRES Negative  Reference range: NEGATIVE   05/26/2020      Lab Results   Component Value Date    HEPBSAG NEGATIVE 05/26/2020      Lab Results   Component Value Date    HEPCAB  05/26/2020     Negative  Reference range: NEGATIVE  Performed at the [facility] unless   otherwise noted.        No results found for: \"NONUHFIRE\", \"NONUHSWGH\", \"NONUHFISH\", \"EXTHEPBSAG\"    Patient meets treatment conditions? Yes    DRUG SPECIFIC QUESTIONS  Any history of COPD?  No  (If yes educate patient on possible s/s exacerbation)    If Yes any new or worsening s/s related to COPD?  No  (If patient with worsening COPD notify prescribing provider prior to proceeding with infusion)      REMINDER:  - Pregnancy Category X Drug. Obtain negative pregnancy test prior to FIRST infusion  and educate patient on risk in women of childbearing ability.   - Weight based drug.     Recommended Vitals/Observation:  Vitals: Obtain vital signs at the start; middle and end of infusion as well as at the end of observation period: 30 minutes post.   Observation: Observe patient for 30 minutes after each infusion Observation complete.   and Zoledronic Acid  (RECLAST)    (Unless otherwise specified on patient specific therapy plan):     TREATMENT CONDITIONS:  Unless otherwise specified on patient specific therapy plan HOLD and notify provider prior to proceeding with treatment if:   o Creatinine clearance LESS THAN 35 mL/Minute  o Corrected serum calcium LESS THAN 8.6 mg/dL   OR   Ionized calcium LESS THAN 1.1 mmol  o Recent (within 4 weeks) or planned invasive dental procedure.  -           Positive Pregnancy " "    Lab Results   Component Value Date    CREATININE 0.63 2025      Lab Results   Component Value Date    CALCIUM 9.6 2025      No results found for: \"CAION\"    CrCl: 141.796  calcium: 9.6    Patient meets treatment conditions? Yes    DRUG SPECIFIC QUESTIONS:  Is the patient taking a Calcium and Vitamin D supplement?  Yes  (Recommended)    Is the patient receiving Zometa or do they have an allergy to Zometa?  No    Is the patient aware of the adverse effects which may include bone fractures, hypocalcemia, influenza-like illness, musculoskeletal pain, ocular inflammation, and osteonecrosis of the jaw? Yes      REMINDERS:  PREGNANCY CATEGORY X DRUG. OBTAIN NEGATIVE PREGNANCY TEST PRIOR TO FIRST INFUSION FOR WOMEN OF CHILDBEARING ABILITY     Recommended Vitals/Observation:  Monitor vital signs before infusion, at the end of the infusion and as needed        Weight Based Drug Calculations:    WEIGHT BASED DRUGS: Abatacept  (ORENCIA)   Patient's dosing weight (kg):  60- 100 kg        Patient's weight today:   Vitals:    25 1305   Weight: 97.8 kg (215 lb 11.2 oz)         weight range for prescribed dose: Orencia:  <60 k mg  60 to 100 k mg  >100 k g    Patient weight today falls outside of 10% variance or  weight range: No     Home Care pharmacist informed of weight variance: No    Doses that are weight based have an acceptable variance rule within 10% of the prescribed   order and/or within  weight range. If patient weight on day of infusion falls   outside of the 10% variance, or weight range, infusion is administered and   pharmacy contacted regarding future dosing adjustments, per policy.           Post Treatment:   1500   Patient tolerated treatment without issue and was discharged in no apparent distress.  RTC on 3/25/2025.      Note Authored / Patient Cared for By: Amy Benavides RN "   _________________________________________________________________________    Note authored and patient cared for by: Amy Benavides RN  Note/Encounter reviewed by: Yisel MCKEON NP. This provider on site at time of patient infusion. Infusion staff to notify this provider of any questions, concerns, abnormals or issues during infusion.    Final check of medication completed by this LINDA / or on-site pharmacist with administering nurse using positive identification prior to administration. Final appearance of product checked for accuracy and conformity to the formula of the prepared product. Assured use of correct ingredients, accurate calculations and precise measurements under appropriate conditions and procedures.    No issues reported during today's encounter. Pt. tolerated infusion without difficulty. Pt. not independently evaluated by this provider during today's encounter.  (Yisel MCKEON NP)

## 2025-03-02 LAB
CHOLEST SERPL-MCNC: 179 MG/DL
CHOLEST/HDLC SERPL: 3.4 (CALC)
HDLC SERPL-MCNC: 52 MG/DL
LDLC SERPL CALC-MCNC: 110 MG/DL (CALC)
NONHDLC SERPL-MCNC: 127 MG/DL (CALC)
T4 FREE SERPL-MCNC: 1.3 NG/DL (ref 0.8–1.8)
TRIGL SERPL-MCNC: 84 MG/DL
TSH SERPL-ACNC: 1.52 MIU/L (ref 0.4–4.5)

## 2025-03-06 ENCOUNTER — APPOINTMENT (OUTPATIENT)
Dept: PRIMARY CARE | Facility: CLINIC | Age: 65
End: 2025-03-06
Payer: COMMERCIAL

## 2025-03-06 ASSESSMENT — PROMIS GLOBAL HEALTH SCALE
RATE_AVERAGE_PAIN: 6
RATE_PHYSICAL_HEALTH: FAIR
RATE_AVERAGE_FATIGUE: MODERATE
RATE_MENTAL_HEALTH: VERY GOOD
RATE_AVERAGE_FATIGUE: MODERATE
CARRYOUT_PHYSICAL_ACTIVITIES: MODERATELY
RATE_AVERAGE_PAIN: 6
RATE_PHYSICAL_HEALTH: FAIR
RATE_SOCIAL_SATISFACTION: VERY GOOD
EMOTIONAL_PROBLEMS: RARELY
RATE_GENERAL_HEALTH: GOOD
CARRYOUT_SOCIAL_ACTIVITIES: GOOD
RATE_SOCIAL_SATISFACTION: VERY GOOD
RATE_QUALITY_OF_LIFE: GOOD
CARRYOUT_PHYSICAL_ACTIVITIES: MODERATELY
CARRYOUT_SOCIAL_ACTIVITIES: GOOD
EMOTIONAL_PROBLEMS: RARELY
RATE_MENTAL_HEALTH: VERY GOOD
RATE_GENERAL_HEALTH: GOOD
RATE_QUALITY_OF_LIFE: GOOD

## 2025-03-07 ENCOUNTER — TREATMENT (OUTPATIENT)
Dept: PHYSICAL THERAPY | Facility: CLINIC | Age: 65
End: 2025-03-07
Payer: COMMERCIAL

## 2025-03-07 DIAGNOSIS — M46.1 SACROILIITIS (CMS-HCC): ICD-10-CM

## 2025-03-07 DIAGNOSIS — M47.817 LUMBOSACRAL SPONDYLOSIS WITHOUT MYELOPATHY: ICD-10-CM

## 2025-03-07 PROCEDURE — 97110 THERAPEUTIC EXERCISES: CPT | Mod: GP,CQ

## 2025-03-07 PROCEDURE — 97140 MANUAL THERAPY 1/> REGIONS: CPT | Mod: GP,CQ

## 2025-03-07 ASSESSMENT — PAIN - FUNCTIONAL ASSESSMENT: PAIN_FUNCTIONAL_ASSESSMENT: 0-10

## 2025-03-07 ASSESSMENT — PAIN SCALES - GENERAL: PAINLEVEL_OUTOF10: 7

## 2025-03-07 NOTE — PROGRESS NOTES
Physical Therapy Treatment    Patient Name: Maureen Vela  MRN: [id number]  Encounter date: 3/7/2025    Time Calculation  Start Time: 1500  Stop Time: 1546  Time Calculation (min): 46 min  PT Therapeutic Procedures Time Entry  Manual Therapy Time Entry: 18  Therapeutic Exercise Time Entry: 28    Visit # 6 of 10  Visits/Dates Authorized: 2025:  [facility] SUREST - NO AUTH / $55 COPAY / OOP $5500 / 60V max - 0 used / Per [email or web address] chat, ref: [id number] / ds 1/22/25.     Current Problem:   Problem List Items Addressed This Visit             ICD-10-CM    Lumbosacral spondylosis without myelopathy M47.817    Sacroiliitis (CMS-HCC) M46.1           Precautions:    (L foot WBAT, progress from boot to shoe as tolerated as of 2/21/25)   osteopenia, skin CA, RA    Subjective   General:    Patient states she had a long day today, L ankle is feeling pretty painful. Patient reports her LBP is doing better, 5/10 L>R side.     Pre-Treatment Symptoms:   Pain Assessment: 0-10  0-10 (Numeric) Pain Score: 7    Objective   Findings:   Boot donned on L LE , ambulates with cane,WBAT L LE  Pain expression with transfers             Treatments:      Therapeutic Exercise 60685:   Scifit L1 8 min   Ambulate around clinic with sp cane 80 ft x 2  Weight shifting - lateral and AP staggered stances   HL bent knee fall out 1x15  Tball LTR 1x15 R/L     Deffered:  Nustep L4 5min  Tball DKTC 2x15  Tball bridge small ROM  Tball abdominal isometric 2x15  3 sec  HL Tball rhythmic stabilization x60sec  HL hip abduction, purple Tband,  2 x 10  HL hip adduction iso ball squeeze 2 x 10  5 sec holds   PPT with alt. LE hip flexion small ROM (march) 2x10 reps  Leg lengthener supine  Prone hip IR/ER  Prone hip ext   Sidely A-P hip taps  Seated lumbar flexion stretch Tball FW roll      Neuromuscular Re-Ed 68538: Deferred    Therapeutic Activity 75860:  1/22/25  Instructed/trialed activities for neutral pelvis  Demonstrated Serola SI belt, instructed  donning/doffing.  Sitting: place pillow or folded towel under thighs to unload sit bones, do not allow feet to dangle  Bed: lay on your back with small pillow or towel roll under knees for neutral pelvis, or to be on your side use towel roll under waist and pillow between knees  Roll to side and sit up as a unit to get up  Car/sit to stand: stand up evenly and then head the direction you need to go    Manual Therapy 66977:   Effleurage massage for inflammation in L foot/ankle     Patient in R side-lying: STM L lumbar paraspinals and R QL to decrease tightness and tenderness, promoting improved circulation and functional activity tolerance.     Modalities: Deferred     HEP / Access Codes:   Access Code: [email or web address]   Date: 01/22/2025  - Supine Posterior Pelvic Tilt  - 2-3 x daily - 3 sets - 10-30 reps - 0-5 hold  - Seated Cat Cow  - 2-3 x daily - 3 sets - 10-30 reps - 0-5 hold  - Child's Pose with Ball: Center and to Each Side  - 2-3 x daily - 10-30 reps - 0-5 hold    Access Code: [email or web address]   Date: 02/05/2025  - Supine Lower Trunk Rotation  - 1-3 x daily - 1 sets - 10 reps  - Hooklying Clamshell with Resistance  - 1-2 x daily - 2 sets - 10 reps - 5 sec hold  - Supine Hip Adduction Isometric with Ball  - 1-2 x daily - 2 sets - 10 reps - 5 sec hold  - Supine Bridge with Pelvic Floor Contraction on Swiss Ball  - 1-2 x daily - 1-2 sets - 10 reps  - Supine Knees to Chest with Swiss Ball  - 1-2 x daily - 2 sets - 10 reps  Access Code: [email or web address] URL: [email or web address] Date: 02/10/2025  Prepared by: Satish Luke  - Supine Single Knee to Chest Stretch  - 1-2 x daily - 6 x weekly - 3 reps - 20 hold  - Supine 90/90 Alternating Toe Touch  - 1-2 x daily - 6 x weekly - 2 sets - 10 reps    Assessment:    Patient was able to ambulate around clinic with tennis shoe donned on L LE, painful but tolerable. Patient had favorable response to manual massage for reduction of inflammation in L ankle, and STM to L lumbar for pain  relief.     Post-Treatment Symptoms:   Response to Interventions: No change in pain (L ankle same , L lumbar reduced)    Plan:      Therapy options: will be seen for skilled physical therapy services  Planned modality interventions: electrical stimulation/Russian stimulation, thermotherapy (hydrocollator packs) and traction  Other planned modality interventions: DN, light therapy  Planned therapy interventions: abdominal trunk stabilization, manual therapy, neuromuscular re-education, postural training, strengthening, stretching, therapeutic activities, home exercise program, flexibility, functional ROM exercises, body mechanics training and bed mobility training  Frequency: 2x week  Duration in visits: 10    Goals:   Active       PT Problem       PT Goals       Start:  01/22/25    Expected End:  04/22/25       1) Indep with HEP  2) Decrease Oswestry < 10/50 from 25/50 to indicate improved function and symptoms.  3) Complete work day without exac in back pain.  4) Sleep with less pain disruption.               Patient Stated Goal       Start:  01/22/25    Expected End:  04/22/25       1) Relief of pain  2) Relief of numbness

## 2025-03-13 ENCOUNTER — TREATMENT (OUTPATIENT)
Dept: PHYSICAL THERAPY | Facility: CLINIC | Age: 65
End: 2025-03-13
Payer: COMMERCIAL

## 2025-03-13 DIAGNOSIS — M46.1 SACROILIITIS (CMS-HCC): ICD-10-CM

## 2025-03-13 DIAGNOSIS — M47.817 LUMBOSACRAL SPONDYLOSIS WITHOUT MYELOPATHY: ICD-10-CM

## 2025-03-13 PROCEDURE — 97110 THERAPEUTIC EXERCISES: CPT | Mod: GP,CQ

## 2025-03-13 PROCEDURE — 97140 MANUAL THERAPY 1/> REGIONS: CPT | Mod: GP,CQ

## 2025-03-13 ASSESSMENT — PAIN SCALES - GENERAL: PAINLEVEL_OUTOF10: 3

## 2025-03-13 ASSESSMENT — PAIN - FUNCTIONAL ASSESSMENT: PAIN_FUNCTIONAL_ASSESSMENT: 0-10

## 2025-03-13 NOTE — PROGRESS NOTES
Physical Therapy Treatment    Patient Name: Maureen Vela  MRN: [id number]  Encounter date: 3/13/2025    Time Calculation  Start Time: 1459  Stop Time: 1545  Time Calculation (min): 46 min  PT Therapeutic Procedures Time Entry  Manual Therapy Time Entry: 25  Therapeutic Exercise Time Entry: 21    Visit # 7 of 10  Visits/Dates Authorized: 2025:  [facility] SUREST - NO AUTH / $55 COPAY / OOP $5500 / 60V max - 0 used / Per [email or web address] chat, ref: [id number] / ds 1/22/25.     Current Problem:   Problem List Items Addressed This Visit             ICD-10-CM    Lumbosacral spondylosis without myelopathy M47.817    Sacroiliitis (CMS-HCC) M46.1             Precautions:    (L foot WBAT, progress from boot to shoe as tolerated as of 2/21/25)   osteopenia, skin CA, RA    Subjective   General:    Patient states she had pupils dilated this morning, vision is little off.     Pre-Treatment Symptoms:   Pain Assessment: 0-10  0-10 (Numeric) Pain Score: 3    Objective   Findings:   Boot donned on L LE , ambulates with cane,WBAT L LE  Pain expression with transfers             Treatments:      Therapeutic Exercise 68273:   Scifit L2 8 min , seat 8   Ambulate around clinic with sp cane , shoe donned L LE,  80 ft x 2  Standing BAPS - AP/ML/circles   Tball LTR 1x15 R/L   Tball abdominal isometric 1x15  5 sec    Deffered:  Weight shifting - lateral and AP staggered stances   HL bent knee fall out 1x15  Nustep L4 5min  Tball DKTC 2x15  Tball bridge small ROM  HL Tball rhythmic stabilization x60sec  HL hip abduction, purple Tband,  2 x 10  HL hip adduction iso ball squeeze 2 x 10  5 sec holds   PPT with alt. LE hip flexion small ROM (march) 2x10 reps  Leg lengthener supine  Prone hip IR/ER  Prone hip ext   Sidely A-P hip taps  Seated lumbar flexion stretch Tball FW roll    Neuromuscular Re-Ed 95456: Deferred    Therapeutic Activity 14954: deferred   1/22/25  Instructed/trialed activities for neutral pelvis  Demonstrated Serola SI belt, instructed  donning/doffing.  Sitting: place pillow or folded towel under thighs to unload sit bones, do not allow feet to dangle  Bed: lay on your back with small pillow or towel roll under knees for neutral pelvis, or to be on your side use towel roll under waist and pillow between knees  Roll to side and sit up as a unit to get up  Car/sit to stand: stand up evenly and then head the direction you need to go    Manual Therapy 75242:   Manual lumbar traction , static , with sway side to side    Patient in R side-lying: STM and Dynamic cupping  L lumbar paraspinals and R QL to decrease tightness and tenderness, promoting improved circulation and functional activity tolerance.     Deferred:  Effleurage massage for inflammation in L foot/ankle     Modalities: Deferred     HEP / Access Codes:   Access Code: [email or web address]   Date: 01/22/2025  - Supine Posterior Pelvic Tilt  - 2-3 x daily - 3 sets - 10-30 reps - 0-5 hold  - Seated Cat Cow  - 2-3 x daily - 3 sets - 10-30 reps - 0-5 hold  - Child's Pose with Ball: Center and to Each Side  - 2-3 x daily - 10-30 reps - 0-5 hold    Access Code: [email or web address]   Date: 02/05/2025  - Supine Lower Trunk Rotation  - 1-3 x daily - 1 sets - 10 reps  - Hooklying Clamshell with Resistance  - 1-2 x daily - 2 sets - 10 reps - 5 sec hold  - Supine Hip Adduction Isometric with Ball  - 1-2 x daily - 2 sets - 10 reps - 5 sec hold  - Supine Bridge with Pelvic Floor Contraction on Swiss Ball  - 1-2 x daily - 1-2 sets - 10 reps  - Supine Knees to Chest with Swiss Ball  - 1-2 x daily - 2 sets - 10 reps  Access Code: [email or web address] URL: [email or web address] Date: 02/10/2025  Prepared by: Satish Luke  - Supine Single Knee to Chest Stretch  - 1-2 x daily - 6 x weekly - 3 reps - 20 hold  - Supine 90/90 Alternating Toe Touch  - 1-2 x daily - 6 x weekly - 2 sets - 10 reps    Assessment:    Patient treatment focused on ankle and lumbar mobility, ambulation with shoe donned on L LE, and manual therapies for relief.  Patient had favorable response to manual STM and cupping, reducing pain in L lumbar.     Post-Treatment Symptoms:   Response to Interventions: Relief, Decrease in pain    Plan:      Therapy options: will be seen for skilled physical therapy services  Planned modality interventions: electrical stimulation/Russian stimulation, thermotherapy (hydrocollator packs) and traction  Other planned modality interventions: DN, light therapy  Planned therapy interventions: abdominal trunk stabilization, manual therapy, neuromuscular re-education, postural training, strengthening, stretching, therapeutic activities, home exercise program, flexibility, functional ROM exercises, body mechanics training and bed mobility training  Frequency: 2x week  Duration in visits: 10    Goals:   Active       PT Problem       PT Goals       Start:  01/22/25    Expected End:  04/22/25       1) Indep with HEP  2) Decrease Oswestry < 10/50 from 25/50 to indicate improved function and symptoms.  3) Complete work day without exac in back pain.  4) Sleep with less pain disruption.               Patient Stated Goal       Start:  01/22/25    Expected End:  04/22/25       1) Relief of pain  2) Relief of numbness

## 2025-03-19 ENCOUNTER — CLINICAL SUPPORT (OUTPATIENT)
Dept: PHYSICAL THERAPY | Facility: CLINIC | Age: 65
End: 2025-03-19
Payer: COMMERCIAL

## 2025-03-19 DIAGNOSIS — M46.1 SACROILIITIS (CMS-HCC): ICD-10-CM

## 2025-03-19 DIAGNOSIS — M47.817 LUMBOSACRAL SPONDYLOSIS WITHOUT MYELOPATHY: ICD-10-CM

## 2025-03-19 PROCEDURE — 97140 MANUAL THERAPY 1/> REGIONS: CPT | Mod: GP

## 2025-03-19 PROCEDURE — 97110 THERAPEUTIC EXERCISES: CPT | Mod: GP

## 2025-03-19 PROCEDURE — 97014 ELECTRIC STIMULATION THERAPY: CPT | Mod: GP

## 2025-03-19 ASSESSMENT — PAIN SCALES - GENERAL: PAINLEVEL_OUTOF10: 5 - MODERATE PAIN

## 2025-03-19 ASSESSMENT — PAIN - FUNCTIONAL ASSESSMENT: PAIN_FUNCTIONAL_ASSESSMENT: 0-10

## 2025-03-19 NOTE — PROGRESS NOTES
Physical Therapy Treatment    Patient Name: Maureen Vela  MRN: [id number]  Encounter date: 3/19/2025    Time Calculation  Start Time: 1515  Stop Time: 1615  Time Calculation (min): 60 min  PT Modalities Time Entry  E-Stim (Unattended) Time Entry: 10  PT Therapeutic Procedures Time Entry  Manual Therapy Time Entry: 7  Neuromuscular Re-Education Time Entry: 5  Therapeutic Exercise Time Entry: 20    Visit # 8 of 10  Visits/Dates Authorized: 2025:  [facility] SUREST - NO AUTH / $55 COPAY / OOP $5500 / 60V max - 0 used / Per [email or web address] chat, ref: [id number] / ds 1/22/25.     Current Problem:   Problem List Items Addressed This Visit             ICD-10-CM    Lumbosacral spondylosis without myelopathy M47.817    Sacroiliitis (CMS-HCC) M46.1             Precautions:    (L foot WBAT, progress from boot to shoe as tolerated as of 2/21/25)   osteopenia, skin CA, RA    Subjective   General:    Patient states she just returned from a trip. Back was sore after sitting on the plane home. L ankle and back is sore.     Pre-Treatment Symptoms:   Pain Assessment: 0-10  0-10 (Numeric) Pain Score: 5 - Moderate pain  Pain Location: Back  Pain Orientation: Lower    Objective   Findings:   Shoe donned L LE , ambulates with cane, WBAT L LE             Treatments:      Therapeutic Exercise 60862:   Nustep lv 4 x 5 minutes  Tball lumbar flexion 2x10  Tball bridge 2x10  LTR x 10  Ankle AROM DF/PF x 20  Sciatic n. Floss x 15  Sciatic n. Glide x 15  Hooklying hip abd light x15, flexion x 10 L/R  Sit to stand 2x10  Seated Tball lumbar flexion 2x10      Deffered:  Weight shifting - lateral and AP staggered stances   HL bent knee fall out 1x15  Nustep L4 5min  Tball DKTC 2x15  Tball bridge small ROM  HL Tball rhythmic stabilization x60sec  HL hip abduction, purple Tband,  2 x 10  HL hip adduction iso ball squeeze 2 x 10  5 sec holds   PPT with alt. LE hip flexion small ROM (march) 2x10 reps  Leg lengthener supine  Prone hip IR/ER  Prone hip ext   Sidely A-P  hip taps  Seated lumbar flexion stretch Tball FW roll    Neuromuscular Re-Ed 73358:   DLS on foam w/ banded row red x 15, L/R arm row red x 12 each  AP rockerboard EO, EO w/ arm raise x 10    Manual Therapy 06934:   IASTM and scar massage to scar and dynamic cupping along scar.    Electrical Nerve Stimulation via dry needling following informed consent; 4Hz, mA to tolerance, applied to L lumbar paraspinals and L gluteus medius, for 15 minutes. Pt R sidelying.     HEP / Access Codes:   Access Code: [email or web address]   Date: 01/22/2025  - Supine Posterior Pelvic Tilt  - 2-3 x daily - 3 sets - 10-30 reps - 0-5 hold  - Seated Cat Cow  - 2-3 x daily - 3 sets - 10-30 reps - 0-5 hold  - Child's Pose with Ball: Center and to Each Side  - 2-3 x daily - 10-30 reps - 0-5 hold    Access Code: [email or web address]   Date: 02/05/2025  - Supine Lower Trunk Rotation  - 1-3 x daily - 1 sets - 10 reps  - Hooklying Clamshell with Resistance  - 1-2 x daily - 2 sets - 10 reps - 5 sec hold  - Supine Hip Adduction Isometric with Ball  - 1-2 x daily - 2 sets - 10 reps - 5 sec hold  - Supine Bridge with Pelvic Floor Contraction on Swiss Ball  - 1-2 x daily - 1-2 sets - 10 reps  - Supine Knees to Chest with Swiss Ball  - 1-2 x daily - 2 sets - 10 reps  Access Code: [email or web address] URL: [email or web address] Date: 02/10/2025  Prepared by: Satish Luke  - Supine Single Knee to Chest Stretch  - 1-2 x daily - 6 x weekly - 3 reps - 20 hold  - Supine 90/90 Alternating Toe Touch  - 1-2 x daily - 6 x weekly - 2 sets - 10 reps    Assessment:    Pt reported some pain relief after dry needling. Pt still limited with her gait and walking due to lingering ankle pain and swelling. Pt using her walking boot and a cane at school for longer walks.     Post-Treatment Symptoms:   Response to Interventions: Decrease in pain    Plan:      Therapy options: will be seen for skilled physical therapy services  Planned modality interventions: electrical stimulation/[place]  stimulation, thermotherapy (hydrocollator packs) and traction  Other planned modality interventions: DN, light therapy  Planned therapy interventions: abdominal trunk stabilization, manual therapy, neuromuscular re-education, postural training, strengthening, stretching, therapeutic activities, home exercise program, flexibility, functional ROM exercises, body mechanics training and bed mobility training  Frequency: 2x week  Duration in visits: 10    Goals:   Active       PT Problem       PT Goals       Start:  01/22/25    Expected End:  04/22/25       1) Indep with HEP  2) Decrease Oswestry < 10/50 from 25/50 to indicate improved function and symptoms.  3) Complete work day without exac in back pain.  4) Sleep with less pain disruption.               Patient Stated Goal       Start:  01/22/25    Expected End:  04/22/25       1) Relief of pain  2) Relief of numbness

## 2025-03-25 ENCOUNTER — APPOINTMENT (OUTPATIENT)
Dept: INFUSION THERAPY | Facility: CLINIC | Age: 65
End: 2025-03-25
Payer: COMMERCIAL

## 2025-03-25 VITALS
BODY MASS INDEX: 39.63 KG/M2 | DIASTOLIC BLOOD PRESSURE: 79 MMHG | OXYGEN SATURATION: 97 % | WEIGHT: 216.7 LBS | RESPIRATION RATE: 18 BRPM | TEMPERATURE: 98.1 F | SYSTOLIC BLOOD PRESSURE: 114 MMHG | HEART RATE: 68 BPM

## 2025-03-25 DIAGNOSIS — M05.79 SEROPOSITIVE RHEUMATOID ARTHRITIS OF MULTIPLE JOINTS (MULTI): ICD-10-CM

## 2025-03-25 PROCEDURE — 96365 THER/PROPH/DIAG IV INF INIT: CPT | Performed by: NURSE PRACTITIONER

## 2025-03-25 RX ORDER — EPINEPHRINE 0.3 MG/.3ML
0.3 INJECTION SUBCUTANEOUS EVERY 5 MIN PRN
OUTPATIENT
Start: 2025-04-22

## 2025-03-25 RX ORDER — DIPHENHYDRAMINE HYDROCHLORIDE 50 MG/ML
50 INJECTION, SOLUTION INTRAMUSCULAR; INTRAVENOUS AS NEEDED
OUTPATIENT
Start: 2025-04-22

## 2025-03-25 RX ORDER — ALBUTEROL SULFATE 0.83 MG/ML
3 SOLUTION RESPIRATORY (INHALATION) AS NEEDED
OUTPATIENT
Start: 2025-04-22

## 2025-03-25 RX ORDER — FAMOTIDINE 10 MG/ML
20 INJECTION, SOLUTION INTRAVENOUS ONCE AS NEEDED
OUTPATIENT
Start: 2025-04-22

## 2025-03-25 ASSESSMENT — ENCOUNTER SYMPTOMS
DYSURIA: 0
UNEXPECTED WEIGHT CHANGE: 0
SHORTNESS OF BREATH: 0
DIZZINESS: 0
COUGH: 0
LIGHT-HEADEDNESS: 0
HEMATURIA: 0
HEADACHES: 0
WHEEZING: 0
TROUBLE SWALLOWING: 0
EYE PROBLEMS: 0
BLOOD IN STOOL: 0
ARTHRALGIAS: 1
ABDOMINAL PAIN: 0
PALPITATIONS: 0
SORE THROAT: 0
LEG SWELLING: 0
MYALGIAS: 1
WOUND: 0
FEVER: 0
FATIGUE: 0
EXTREMITY WEAKNESS: 0
VOMITING: 0
CONSTIPATION: 0
APPETITE CHANGE: 0
NAUSEA: 0
FREQUENCY: 0
DIARRHEA: 0
VOICE CHANGE: 0
NUMBNESS: 0
CHILLS: 0

## 2025-03-25 ASSESSMENT — PAIN SCALES - GENERAL: PAINLEVEL_OUTOF10: 3

## 2025-03-25 NOTE — PATIENT INSTRUCTIONS
Today :We administered abatacept (Orencia) 750 mg in sodium chloride 0.9% 100 mL IV.     For:   1. Seropositive rheumatoid arthritis of multiple joints (Multi)         Your next appointment is due in:  28 days      Please read the  Medication Guide that was given to you and reviewed during todays visit.     (Tell all doctors including dentists that you are taking this medication)     Go to the emergency room or call 911 if:  -You have signs of allergic reaction:   -Rash, hives, itching.   -Swollen, blistered, peeling skin.   -Swelling of face, lips, mouth, tongue or throat.   -Tightness of chest, trouble breathing, swallowing or talking     Call your doctor:  - If IV / injection site gets red, warm, swollen, itchy or leaks fluid or pus.     (Leave dressing on your IV site for at least 2 hours and keep area clean and dry  - If you get sick or have symptoms of infection or are not feeling well for any reason.    (Wash your hands often, stay away from people who are sick)  - If you have side effects from your medication that do not go away or are bothersome.     (Refer to the teaching your nurse gave you for side effects to call your doctor about)    - Common side effects may include:  stuffy nose, headache, feeling tired, muscle aches, upset stomach  - Before receiving any vaccines     - Call the [facility] at   If:  - You get sick, are on antibiotics, have had a recent vaccine, have surgery or dental work and your doctor wants your visit rescheduled.  - You need to cancel and reschedule your visit for any reason. Call at least 2 days before your visit if you need to cancel.   - Your insurance changes before your next visit.    (We will need to get approval from your new insurance. This can take up to two weeks.)     The [facility] is opened Monday thru Friday. We are closed on weekends and holidays.   Voice mail will take your call if the center is closed. If you leave a message  please allow 24 hours for a call back during weekdays. If you leave a message on a weekend/holiday, we will call you back the next business day.    A pharmacist is available Monday - Friday from 8:30AM to 3:30PM to help answer any questions you may have about your prescriptions(s). Please call pharmacy at:    [facility]: [phone]  [facility]: [phone]  [facility]: [phone]

## 2025-03-25 NOTE — PROGRESS NOTES
[facility]   Infusion Clinic Note   Date: 2025   Name: Maureen Vela  : 1960   MRN: [id number]         Reason for Visit: OP Infusion (Orencia 750 mg every 28 days)         Today: We administered abatacept (Orencia) 750 mg in sodium chloride 0.9% 100 mL IV.       Ordered By: Cathy Alexis,*       For a Diagnosis of: Seropositive rheumatoid arthritis of multiple joints (Multi)       At today's visit patient accompanied by: Self      Today's Vitals:   Vitals:    25 1410 25 1513   BP: 129/85 114/79   Pulse: 70 68   Resp: 18 18   Temp: 36.2 °C (97.2 °F) 36.7 °C (98.1 °F)   SpO2: 97%    Weight: 98.3 kg (216 lb 11.2 oz)    PainSc:   3    PainLoc: Back              Pre - Treatment Checklist:      - Previous reaction to current treatment: no      (Assess patient for the concerns below. Document provider notification as appropriate).  - Active or recent infection with/without current antibiotic use: no  - Recent or planned invasive dental work: no  - Recent or planned surgeries: no  - Recently received or plans to receive vaccinations: no  - Has treatment related toxicities: no  - Any chance may be pregnant:  n/a      Pain: 3   - Is the pain different from normal: no   - Is prescribing Doctor aware:  n/a      Labs: Reviewed       Fall Risk Screening: Quiroga Fall Risk  History of Falling, Immediate or Within 3 Months: No  Secondary Diagnosis: Yes  Ambulatory Aid: Crutches/cane/walker  Intravenous Therapy/Heparin Lock: Yes  Gait/Transferring: Normal/bedrest/immobile  Mental Status: Oriented to own ability  Quiroga Fall Risk Score: 50       Review Of Systems:  Review of Systems   Constitutional:  Negative for appetite change, chills, fatigue, fever and unexpected weight change.   HENT:   Negative for hearing loss, mouth sores, sore throat, tinnitus, trouble swallowing and voice change.    Eyes:  Negative for eye problems.   Respiratory:  Negative for cough, shortness of  "breath and wheezing.    Cardiovascular:  Negative for chest pain, leg swelling and palpitations.   Gastrointestinal:  Negative for abdominal pain, blood in stool, constipation, diarrhea, nausea and vomiting.   Genitourinary:  Negative for dysuria, frequency and hematuria.    Musculoskeletal:  Positive for arthralgias and myalgias.   Skin:  Negative for itching, rash and wound.   Neurological:  Negative for dizziness, extremity weakness, headaches, light-headedness and numbness.         Infusion Readiness:  - Assessment Concerns Related to Infusion: No  - Provider notified: no      New Patient Education:    N/A (returning patient for continuation of therapy. Ongoing education provided as needed.)        Treatment Conditions & Drug Specific Questions:    Abatacept  (ORENCIA)    (Unless otherwise specified on patient specific therapy plan):    TREATMENT CONDITIONS  Unless otherwise specified on patient specific therapy plan hold treatment and notify provider prior to proceeding with infusion if patient with a:  o Positive T-Spot  o Positive Hepatitis B Surface Ag   o Positive Hepatitis B Core Antibody   o Positive Hepatitis C Antibody     Lab Results   Component Value Date    TBSIN Negative 04/29/2024    TBGRES Negative  Reference range: NEGATIVE   05/26/2020      Lab Results   Component Value Date    HEPBSAG NEGATIVE 05/26/2020      Lab Results   Component Value Date    HEPCAB  05/26/2020     Negative  Reference range: NEGATIVE  Performed at the [facility] unless   otherwise noted.        No results found for: \"NONUHFIRE\", \"NONUHSWGH\", \"NONUHFISH\", \"EXTHEPBSAG\"    Patient meets treatment conditions? Yes    DRUG SPECIFIC QUESTIONS  Any history of COPD?  No  (If yes educate patient on possible s/s exacerbation)    If Yes any new or worsening s/s related to COPD?  No  (If patient with worsening COPD notify prescribing provider prior to proceeding with infusion)      REMINDER:  - Pregnancy " Category X Drug. Obtain negative pregnancy test prior to FIRST infusion  and educate patient on risk in women of childbearing ability.   - Weight based drug.     Recommended Vitals/Observation:  Vitals: Obtain vital signs at the start; middle and end of infusion as well as at the end of observation period: 30 minutes post.   Observation: Observe patient for 30 minutes after each infusion Observation complete.          Weight Based Drug Calculations:    WEIGHT BASED DRUGS: Abatacept  (ORENCIA)       Patient's weight today:   Vitals:    25 1410   Weight: 98.3 kg (216 lb 11.2 oz)         weight range for prescribed dose: Orencia:  <60 k mg  60 to 100 k mg  >100 k g    Patient weight today falls outside of 10% variance or  weight range: No     Home Care pharmacist informed of weight variance: Not applicable    Doses that are weight based have an acceptable variance rule within 10% of the prescribed   order and/or within  weight range. If patient weight on day of infusion falls   outside of the 10% variance, or weight range, infusion is administered and   pharmacy contacted regarding future dosing adjustments, per policy.           Post Treatment: Patient tolerated treatment without issue and was discharged in no apparent distress.      Note Authored / Patient Cared for By: Kristel Tellez RN  _________________________________________________________________________    Note authored and patient cared for by: Kristel Tellez RN  Note/Encounter reviewed by: Yisel MCKEON NP. This provider on site at time of patient infusion. Infusion staff to notify this provider of any questions, concerns, abnormals or issues during infusion.    Final check of medication completed by this LINDA / or on-site pharmacist with administering nurse using positive identification prior to administration. Final appearance of product checked for accuracy and conformity to the formula  of the prepared product. Assured use of correct ingredients, accurate calculations and precise measurements under appropriate conditions and procedures.    No issues reported during today's encounter. Pt. tolerated infusion without difficulty. Pt. not independently evaluated by this provider during today's encounter.  (Yisel MCKEON NP)

## 2025-03-26 ENCOUNTER — TREATMENT (OUTPATIENT)
Dept: PHYSICAL THERAPY | Facility: CLINIC | Age: 65
End: 2025-03-26
Payer: COMMERCIAL

## 2025-03-26 DIAGNOSIS — M47.817 LUMBOSACRAL SPONDYLOSIS WITHOUT MYELOPATHY: ICD-10-CM

## 2025-03-26 DIAGNOSIS — M46.1 SACROILIITIS (CMS-HCC): ICD-10-CM

## 2025-03-26 PROCEDURE — 97140 MANUAL THERAPY 1/> REGIONS: CPT | Mod: GP

## 2025-03-26 PROCEDURE — 97110 THERAPEUTIC EXERCISES: CPT | Mod: GP

## 2025-03-26 ASSESSMENT — PAIN SCALES - GENERAL: PAINLEVEL_OUTOF10: 5 - MODERATE PAIN

## 2025-03-26 ASSESSMENT — PAIN - FUNCTIONAL ASSESSMENT: PAIN_FUNCTIONAL_ASSESSMENT: 0-10

## 2025-03-26 NOTE — PROGRESS NOTES
Physical Therapy Treatment    Patient Name: Maureen Vela  MRN: [id number]  Encounter date: 3/26/2025    Time Calculation  Start Time: 1515  Stop Time: 1600  Time Calculation (min): 45 min  PT Therapeutic Procedures Time Entry  Manual Therapy Time Entry: 30  Therapeutic Exercise Time Entry: 10    Visit # 9 of 10  Visits/Dates Authorized: 2025:  [facility] SUREST - NO AUTH / $55 COPAY / OOP $5500 / 60V max - 0 used / Per [email or web address] chat, ref: [id number] / ds 1/22/25.     Current Problem:   Problem List Items Addressed This Visit             ICD-10-CM    Lumbosacral spondylosis without myelopathy M47.817    Sacroiliitis (CMS-HCC) M46.1       Precautions:    (L foot WBAT, progress from boot to shoe as tolerated as of 2/21/25)   osteopenia, skin CA, RA    Subjective   General:   General Comment: Very sore after last session, DN used. In past had been ok. Had grandchildren as well and had to maneuver in back of car to install car seat. Did use TENS unit for back yesterday. Being careful with moving. Has been using ball and stretching. Still requires boot at times. Ankle bones and incision can be sensitive in different ways so she is hesitant to try lace up brace instead of boot. Arrives in shoe today but brought boot due to pain. Will have pain management and surgeon f/u's in coming weeks.    Pre-Treatment Symptoms:   Pain Assessment: 0-10  0-10 (Numeric) Pain Score: 5 - Moderate pain    Objective   Findings:   Shoe donned L LE , ambulates with cane, WBAT L LE, asymmetrical gait, decreased L stance             Treatments:      Therapeutic Exercise 02998:   Nustep L5 x 7 minutes  Tball lumbar flexion 2x10  Tball bridge 2x10  Tball LTR x 10    Deferred:  Ankle AROM DF/PF x 20  Sciatic n. Floss x 15  Sciatic n. Glide x 15  Hooklying hip abd light x15, flexion x 10 L/R  Sit to stand 2x10  Seated Tball lumbar flexion 2x10  Weight shifting - lateral and AP staggered stances   HL bent knee fall out 1x15  Nustep L4 5min  Tball DKTC  2x15  Tball bridge small ROM  HL Tball rhythmic stabilization x60sec  HL hip abduction, purple Tband,  2 x 10  HL hip adduction iso ball squeeze 2 x 10  5 sec holds   PPT with alt. LE hip flexion small ROM (march) 2x10 reps  Leg lengthener supine  Prone hip IR/ER  Prone hip ext   Sidely A-P hip taps  Seated lumbar flexion stretch Tball FW roll    Neuromuscular Re-Ed 79797:   Deferred:  DLS on foam w/ banded row red x 15, L/R arm row red x 12 each  AP rockerboard EO, EO w/ arm raise x 10    Manual Therapy 79274:   IASTM LPS R sidely  Deferred: scar massage to scar and dynamic cupping along scar.    Deferred:  Electrical Nerve Stimulation via dry needling following informed consent; 4Hz, mA to tolerance, applied to L lumbar paraspinals and L gluteus medius, for 15 minutes. Pt R sidelying.     HEP / Access Codes:   Access Code: [email or web address]   Date: 01/22/2025  - Supine Posterior Pelvic Tilt  - 2-3 x daily - 3 sets - 10-30 reps - 0-5 hold  - Seated Cat Cow  - 2-3 x daily - 3 sets - 10-30 reps - 0-5 hold  - Child's Pose with Ball: Center and to Each Side  - 2-3 x daily - 10-30 reps - 0-5 hold    Access Code: [email or web address]   Date: 02/05/2025  - Supine Lower Trunk Rotation  - 1-3 x daily - 1 sets - 10 reps  - Hooklying Clamshell with Resistance  - 1-2 x daily - 2 sets - 10 reps - 5 sec hold  - Supine Hip Adduction Isometric with Ball  - 1-2 x daily - 2 sets - 10 reps - 5 sec hold  - Supine Bridge with Pelvic Floor Contraction on Swiss Ball  - 1-2 x daily - 1-2 sets - 10 reps  - Supine Knees to Chest with Swiss Ball  - 1-2 x daily - 2 sets - 10 reps  Access Code: [email or web address] URL: [email or web address] Date: 02/10/2025  Prepared by: Satish Luke  - Supine Single Knee to Chest Stretch  - 1-2 x daily - 6 x weekly - 3 reps - 20 hold  - Supine 90/90 Alternating Toe Touch  - 1-2 x daily - 6 x weekly - 2 sets - 10 reps    Assessment:   PT Assessment  Assessment Comment: Addressed pain today, increases with days at work and recent  travel. Anticipate need for additonal PT to optimize function and address symptoms.      Post-Treatment Symptoms:   Response to Interventions: Decrease in pain    Plan:      Therapy options: will be seen for skilled physical therapy services  Planned modality interventions: electrical stimulation/Russian stimulation, thermotherapy (hydrocollator packs) and traction  Other planned modality interventions: DN, light therapy  Planned therapy interventions: abdominal trunk stabilization, manual therapy, neuromuscular re-education, postural training, strengthening, stretching, therapeutic activities, home exercise program, flexibility, functional ROM exercises, body mechanics training and bed mobility training  Frequency: 2x week  Duration in visits: 10    Goals:   Active       PT Problem       PT Goals       Start:  01/22/25    Expected End:  04/22/25       1) Indep with HEP  2) Decrease Oswestry < 10/50 from 25/50 to indicate improved function and symptoms.  3) Complete work day without exac in back pain.  4) Sleep with less pain disruption.               Patient Stated Goal       Start:  01/22/25    Expected End:  04/22/25       1) Relief of pain  2) Relief of numbness

## 2025-03-27 ENCOUNTER — OFFICE VISIT (OUTPATIENT)
Dept: PRIMARY CARE | Facility: CLINIC | Age: 65
End: 2025-03-27
Payer: COMMERCIAL

## 2025-03-27 VITALS
SYSTOLIC BLOOD PRESSURE: 130 MMHG | HEART RATE: 71 BPM | OXYGEN SATURATION: 99 % | BODY MASS INDEX: 39.14 KG/M2 | WEIGHT: 214 LBS | DIASTOLIC BLOOD PRESSURE: 78 MMHG

## 2025-03-27 DIAGNOSIS — Z12.31 SCREENING MAMMOGRAM FOR BREAST CANCER: ICD-10-CM

## 2025-03-27 DIAGNOSIS — M06.9 RHEUMATOID ARTHRITIS, INVOLVING UNSPECIFIED SITE, UNSPECIFIED WHETHER RHEUMATOID FACTOR PRESENT (MULTI): ICD-10-CM

## 2025-03-27 DIAGNOSIS — J45.20 MILD INTERMITTENT ASTHMA WITHOUT COMPLICATION (HHS-HCC): ICD-10-CM

## 2025-03-27 DIAGNOSIS — Z00.00 ROUTINE GENERAL MEDICAL EXAMINATION AT A HEALTH CARE FACILITY: Primary | ICD-10-CM

## 2025-03-27 DIAGNOSIS — Z12.11 ENCOUNTER FOR SCREENING FOR MALIGNANT NEOPLASM OF COLON: ICD-10-CM

## 2025-03-27 DIAGNOSIS — Z90.710 H/O: HYSTERECTOMY: ICD-10-CM

## 2025-03-27 DIAGNOSIS — J30.1 ALLERGIC RHINITIS DUE TO POLLEN, UNSPECIFIED SEASONALITY: ICD-10-CM

## 2025-03-27 PROBLEM — K52.9 CHRONIC DIARRHEA: Status: RESOLVED | Noted: 2023-10-04 | Resolved: 2025-03-27

## 2025-03-27 PROBLEM — E78.5 HYPERLIPIDEMIA: Status: RESOLVED | Noted: 2023-10-04 | Resolved: 2025-03-27

## 2025-03-27 PROCEDURE — 99396 PREV VISIT EST AGE 40-64: CPT | Performed by: FAMILY MEDICINE

## 2025-03-27 PROCEDURE — 3078F DIAST BP <80 MM HG: CPT | Performed by: FAMILY MEDICINE

## 2025-03-27 PROCEDURE — 3075F SYST BP GE 130 - 139MM HG: CPT | Performed by: FAMILY MEDICINE

## 2025-03-27 RX ORDER — LEVOCETIRIZINE DIHYDROCHLORIDE 5 MG/1
5 TABLET, FILM COATED ORAL EVERY EVENING
Qty: 90 TABLET | Refills: 3 | Status: SHIPPED | OUTPATIENT
Start: 2025-03-27 | End: 2026-03-27

## 2025-03-27 RX ORDER — AZELASTINE HYDROCHLORIDE, FLUTICASONE PROPIONATE 137; 50 UG/1; UG/1
1 SPRAY, METERED NASAL 2 TIMES DAILY
Qty: 3 EACH | Refills: 3 | Status: SHIPPED | OUTPATIENT
Start: 2025-03-27

## 2025-03-27 ASSESSMENT — ENCOUNTER SYMPTOMS
CHILLS: 0
PALPITATIONS: 0
DIZZINESS: 0
WHEEZING: 0
FATIGUE: 1
VOMITING: 0
NAUSEA: 0
MYALGIAS: 1
ARTHRALGIAS: 1
FREQUENCY: 0
FEVER: 0
COUGH: 0
CONFUSION: 0
WEAKNESS: 0
SHORTNESS OF BREATH: 0
CONSTIPATION: 0
NERVOUS/ANXIOUS: 0

## 2025-03-27 ASSESSMENT — PATIENT HEALTH QUESTIONNAIRE - PHQ9
1. LITTLE INTEREST OR PLEASURE IN DOING THINGS: NOT AT ALL
SUM OF ALL RESPONSES TO PHQ9 QUESTIONS 1 AND 2: 0
2. FEELING DOWN, DEPRESSED OR HOPELESS: NOT AT ALL

## 2025-03-27 ASSESSMENT — PAIN SCALES - GENERAL: PAINLEVEL_OUTOF10: 3

## 2025-03-27 NOTE — PROGRESS NOTES
Subjective   Patient ID: Mendy Vela is a 64 y.o. female who presents for Annual Exam (Pt wants to speak about weight / dp).      Ankle/Foot:          Foot left foot surgery with Dr markel Treviño 3 weeks ago - at [facility], she is recovering for the surgery, he says it is still healing, she is non weight bearing , using a scooter and a bone stimulator     Anxiety:          Anxiety F/U at patient's baseline.          Medications none- she feels she has anxiety over her physical symptoms- she does not want to take meds for anxiety.          Stressors worsening- starting back at school - concerns with covid.          Supports significant, support from spouse, support from family, support from friends.          Mood anxious, worried about everything.          Energy change decreased energy.          Concentration decreased.          Suicidal ideations none.      Hypertension:          Patient here for F/U. at goal.          Med compliance yes , yes.          Med side effects none , none.          Chest pain none.          Dizziness lightheaded.          Palpitations asymptomatic.          Syncope none.      Hypothyroidism:          Follow Up taking hormone supplement routinely, due for a tsh.          Hypothyroidism tolerating meds with no side effects.      Medications reviewed:          Current medications Use reviewed and recorded.          Vitamin/Mineral supplements Use reviewed and recorded.      Review family history:          Reviewed See family history.      Review surgical history:          Reviewed See surgical history.      Review past history:          Reviewed See past history.      Headaches/Migraines:          Headaches/Migraines very rare migraines - uses maxalt maybe 1-2 a month.   UTD on her pap, mammogram and colonoscopy         Review of Systems   Constitutional:  Positive for fatigue. Negative for chills and fever.   HENT:  Negative for ear pain, postnasal drip and tinnitus.    Respiratory:  Negative  for cough, shortness of breath and wheezing.    Cardiovascular:  Negative for chest pain, palpitations and leg swelling.   Gastrointestinal:  Negative for constipation, nausea and vomiting.   Endocrine: Negative for cold intolerance.   Genitourinary:  Negative for frequency and hematuria.   Musculoskeletal:  Positive for arthralgias and myalgias.   Skin:  Negative for rash.   Neurological:  Negative for dizziness and weakness.   Psychiatric/Behavioral:  Negative for confusion. The patient is not nervous/anxious.        Objective   /78   Pulse 71   Wt 97.1 kg (214 lb)   SpO2 99%   BMI 39.14 kg/m²     Physical Exam  Constitutional:       General: She is not in acute distress.     Appearance: Normal appearance. She is not ill-appearing.   HENT:      Head: Normocephalic.      Right Ear: Tympanic membrane and external ear normal.      Left Ear: Tympanic membrane and external ear normal.      Nose: Nose normal. No congestion.      Mouth/Throat:      Mouth: Mucous membranes are moist.      Pharynx: No posterior oropharyngeal erythema.   Eyes:      Extraocular Movements: Extraocular movements intact.      Conjunctiva/sclera: Conjunctivae normal.      Pupils: Pupils are equal, round, and reactive to light.   Cardiovascular:      Rate and Rhythm: Normal rate and regular rhythm.      Pulses: Normal pulses.      Heart sounds: Normal heart sounds. No murmur heard.  Pulmonary:      Effort: Pulmonary effort is normal. No respiratory distress.      Breath sounds: Normal breath sounds. No wheezing.   Abdominal:      General: Bowel sounds are normal.      Palpations: Abdomen is soft. There is no mass.      Tenderness: There is no abdominal tenderness.   Genitourinary:     Comments: Sees GYN  Musculoskeletal:         General: Normal range of motion.      Cervical back: Neck supple. No tenderness.      Right lower leg: No edema.      Left lower leg: No edema.   Lymphadenopathy:      Cervical: No cervical adenopathy.   Skin:      General: Skin is warm.      Findings: No rash.   Neurological:      General: No focal deficit present.      Mental Status: She is alert and oriented to person, place, and time.      Gait: Gait normal.   Psychiatric:         Mood and Affect: Mood normal.         Behavior: Behavior normal.         Assessment/Plan   Problem List Items Addressed This Visit             ICD-10-CM    Asthma J45.909    Allergic rhinitis due to pollen J30.1    Relevant Medications    azelastine-fluticasone (Dymista) 137-50 mcg/spray nasal spray    levocetirizine (Xyzal) 5 mg tablet     Other Visit Diagnoses         Codes    Routine general medical examination at a health care facility    -  Primary Z00.00    Rheumatoid arthritis, involving unspecified site, unspecified whether rheumatoid factor present (Multi)     M06.9    continue with rheumatology     Encounter for screening for malignant neoplasm of colon     Z12.11    colonoscopy 2018 - repeat in 10 years     Screening mammogram for breast cancer     Z12.31    UTD     H/O: hysterectomy     Z90.710    due to heavy menses - ovaries remain     BMI 39.0-39.9,adult     Z68.39    recommend diet, exercise and weight loss     Relevant Medications    tirzepatide, weight loss, (Zepbound) 2.5 mg/0.5 mL injection

## 2025-03-29 ASSESSMENT — ENCOUNTER SYMPTOMS: HEMATURIA: 0

## 2025-04-02 ENCOUNTER — TREATMENT (OUTPATIENT)
Dept: PHYSICAL THERAPY | Facility: CLINIC | Age: 65
End: 2025-04-02
Payer: COMMERCIAL

## 2025-04-02 DIAGNOSIS — M46.1 SACROILIITIS (CMS-HCC): ICD-10-CM

## 2025-04-02 DIAGNOSIS — M47.817 LUMBOSACRAL SPONDYLOSIS WITHOUT MYELOPATHY: ICD-10-CM

## 2025-04-02 PROCEDURE — 97530 THERAPEUTIC ACTIVITIES: CPT | Mod: GP

## 2025-04-02 PROCEDURE — 97140 MANUAL THERAPY 1/> REGIONS: CPT | Mod: GP

## 2025-04-02 ASSESSMENT — PAIN - FUNCTIONAL ASSESSMENT: PAIN_FUNCTIONAL_ASSESSMENT: 0-10

## 2025-04-02 ASSESSMENT — PAIN SCALES - GENERAL: PAINLEVEL_OUTOF10: 5 - MODERATE PAIN

## 2025-04-02 NOTE — PROGRESS NOTES
Physical Therapy Treatment    Patient Name: Maureen Vela  MRN: [id number]  Encounter date: 4/2/2025 PT Received On:  (PROGRESS REPORT)  Time Calculation  Start Time: 1530  Stop Time: 1615  Time Calculation (min): 45 min  PT Therapeutic Procedures Time Entry  Manual Therapy Time Entry: 25  Neuromuscular Re-Education Time Entry: 5  Therapeutic Exercise Time Entry: 5  Therapeutic Activity Time Entry: 10    Visit # 10 of 20  Visits/Dates Authorized: 2025:  [facility] SUREST - NO AUTH / $55 COPAY / OOP $5500 / 60V max - 0 used / Per [email or web address] chat, ref: [id number] / ds 1/22/25.     Current Problem:   Problem List Items Addressed This Visit             ICD-10-CM    Lumbosacral spondylosis without myelopathy M47.817    Sacroiliitis (CMS-HCC) M46.1       Precautions:    (L foot WBAT, progress from boot to shoe as tolerated as of 2/21/25)   osteopenia, skin CA, RA    Subjective   General:   General Comment: Back pain worse with sitting, hard to tolerate more than 15min. Can get stab of pain L low back (points to L SIjt). Struggling with distances walking at school, still has to use L CAM boot at school. Can not tolerate lace up brace due to nerve sensitvity L ankle. Uses cane also due to concern about balance. Standing limited to approx 10min, sleeping well but still requires medication.    Pre-Treatment Symptoms:   Pain Assessment: 0-10  0-10 (Numeric) Pain Score: 5 - Moderate pain (1/10 tingle > pain in low back; 6/10 at worst can be stabbing in her back and radiate into thigh)    Objective   Findings:   Shoe donned L LE , ambulates with cane, WBAT L LE, asymmetrical gait, decreased L stance  4/2/25 Recurrent L anterior innominate: L anterior innominate: L iliac crest superior standing, sitting; L PSIS superior standing, sitting; L ischial tuberosity superior prone; L ASIS inferior supine, L pubic tubercle inferior supine          Other Measures  Oswestry Disablity Index (LAUREN): 18/50 (was 25/50 at [facility])    Treatments:   Therapeutic  "Exercise 51091:   Nustep L5 x 7 minutes  Reviewed HEP      Deferred:  Tball lumbar flexion 2x10  Tball bridge 2x10  Tball LTR x 10  Ankle AROM DF/PF x 20  Sciatic n. Floss x 15  Sciatic n. Glide x 15  Hooklying hip abd light x15, flexion x 10 L/R  Sit to stand 2x10  Seated Tball lumbar flexion 2x10  Weight shifting - lateral and AP staggered stances   HL bent knee fall out 1x15  Nustep L4 5min  Tball DKTC 2x15  Tball bridge small ROM  HL Tball rhythmic stabilization x60sec  HL hip abduction, purple Tband,  2 x 10  HL hip adduction iso ball squeeze 2 x 10  5 sec holds   PPT with alt. LE hip flexion small ROM (march) 2x10 reps  Leg lengthener supine  Prone hip IR/ER  Prone hip ext   Sidely A-P hip taps  Seated lumbar flexion stretch Tball FW roll    Neuromuscular Re-Ed 18622:   MET L anterior innominate, neutral pelvis achieved    Deferred:  DLS on foam w/ banded row red x 15, L/R arm row red x 12 each  AP rockerboard EO, EO w/ arm raise x 10    Manual Therapy 60882:   IASTM LPS R sidely  Deferred: scar massage to scar and dynamic cupping along scar.    Ther-Act  Instructed and applied kinesiology taping L ankle M-L \"U\" strip after trial of test patch on forearm. Instructed wear/removal. Instructed use of milk of magnesia prn for skin tolerance  Pt declined reviewing Serola SI belt, does not feel she would tolerate it     HEP / Access Codes:   Access Code: [email or web address]   Date: 01/22/2025  - Supine Posterior Pelvic Tilt  - 2-3 x daily - 3 sets - 10-30 reps - 0-5 hold  - Seated Cat Cow  - 2-3 x daily - 3 sets - 10-30 reps - 0-5 hold  - Child's Pose with Ball: Center and to Each Side  - 2-3 x daily - 10-30 reps - 0-5 hold    Access Code: [email or web address]   Date: 02/05/2025  - Supine Lower Trunk Rotation  - 1-3 x daily - 1 sets - 10 reps  - Hooklying Clamshell with Resistance  - 1-2 x daily - 2 sets - 10 reps - 5 sec hold  - Supine Hip Adduction Isometric with Ball  - 1-2 x daily - 2 sets - 10 reps - 5 sec hold  - Supine Bridge with " Pelvic Floor Contraction on Swiss Ball  - 1-2 x daily - 1-2 sets - 10 reps  - Supine Knees to Chest with Swiss Ball  - 1-2 x daily - 2 sets - 10 reps  Access Code: [email or web address] URL: [email or web address] Date: 02/10/2025  Prepared by: Satish Luke  - Supine Single Knee to Chest Stretch  - 1-2 x daily - 6 x weekly - 3 reps - 20 hold  - Supine 90/90 Alternating Toe Touch  - 1-2 x daily - 6 x weekly - 2 sets - 10 reps    Assessment:   PT Assessment  Assessment Comment: Pt needs to cont PT POC. Slower progress attributed to asymmetry with knee walker, then crutches and limited L WBing in CAM boot, now intermittent shoe/CAM boot and cane. Has not sustained correction of L anterior innominate. Pt expected to have further benefit with additional visits as gait is further normalized with healing and surgeon progression. Will need to determine benefit of kinesiology taping trial today for support to deter boot use, to date has been unable to tolerate distances at work without boot and does not tolerate contact from lace up brace in shoe.      Post-Treatment Symptoms:   Response to Interventions: Decrease in pain    Plan:    Advance to ability  Therapy options: will be seen for skilled physical therapy services  Planned modality interventions: electrical stimulation/Russian stimulation, thermotherapy (hydrocollator packs) and traction  Other planned modality interventions: DN, light therapy  Planned therapy interventions: abdominal trunk stabilization, manual therapy, neuromuscular re-education, postural training, strengthening, stretching, therapeutic activities, home exercise program, flexibility, functional ROM exercises, body mechanics training and bed mobility training  Frequency: 1-2x week  Duration in visits: 20    Goals:   Active       PT Problem       PT Goals (Progressing)       Start:  01/22/25    Expected End:  07/01/25       Partly met 1) Indep with HEP  Partly met 2) Decrease Oswestry < 10/50 from 25/50 to  indicate improved function and symptoms.  Partly met 3) Complete work day without exac in back pain.  Partly met 4) Sleep with less pain disruption.               Patient Stated Goal (Progressing)       Start:  01/22/25    Expected End:  07/01/25       1) Relief of pain  2) Relief of numbness

## 2025-04-04 ENCOUNTER — OFFICE VISIT (OUTPATIENT)
Dept: ORTHOPEDIC SURGERY | Facility: CLINIC | Age: 65
End: 2025-04-04
Payer: COMMERCIAL

## 2025-04-04 ENCOUNTER — HOSPITAL ENCOUNTER (OUTPATIENT)
Dept: RADIOLOGY | Facility: CLINIC | Age: 65
Discharge: HOME | End: 2025-04-04
Payer: COMMERCIAL

## 2025-04-04 VITALS — HEIGHT: 62 IN | WEIGHT: 214 LBS | BODY MASS INDEX: 39.38 KG/M2

## 2025-04-04 DIAGNOSIS — G89.29 CHRONIC PAIN OF LEFT ANKLE: ICD-10-CM

## 2025-04-04 DIAGNOSIS — G57.82 SURAL NEURITIS, LEFT: ICD-10-CM

## 2025-04-04 DIAGNOSIS — M96.0 NONUNION OF SUBTALAR ARTHRODESIS: ICD-10-CM

## 2025-04-04 DIAGNOSIS — M25.572 CHRONIC PAIN OF LEFT ANKLE: ICD-10-CM

## 2025-04-04 DIAGNOSIS — M25.572 CHRONIC PAIN OF LEFT ANKLE: Primary | ICD-10-CM

## 2025-04-04 DIAGNOSIS — G89.29 CHRONIC PAIN OF LEFT ANKLE: Primary | ICD-10-CM

## 2025-04-04 PROCEDURE — 99213 OFFICE O/P EST LOW 20 MIN: CPT | Performed by: STUDENT IN AN ORGANIZED HEALTH CARE EDUCATION/TRAINING PROGRAM

## 2025-04-04 PROCEDURE — 3008F BODY MASS INDEX DOCD: CPT | Performed by: STUDENT IN AN ORGANIZED HEALTH CARE EDUCATION/TRAINING PROGRAM

## 2025-04-04 PROCEDURE — 73610 X-RAY EXAM OF ANKLE: CPT | Mod: LT

## 2025-04-04 PROCEDURE — 1036F TOBACCO NON-USER: CPT | Performed by: STUDENT IN AN ORGANIZED HEALTH CARE EDUCATION/TRAINING PROGRAM

## 2025-04-04 ASSESSMENT — PAIN SCALES - GENERAL: PAINLEVEL_OUTOF10: 7

## 2025-04-04 ASSESSMENT — PAIN - FUNCTIONAL ASSESSMENT: PAIN_FUNCTIONAL_ASSESSMENT: 0-10

## 2025-04-04 NOTE — PROGRESS NOTES
"ORTHOPAEDIC SURGERY PROGRESS NOTE    Chief Complaint:  Left foot pain    History Of Present Illness  Maureen Vela \"Mendy\" is a 64 y.o. female who presents for complex evaluation of left foot pain, self-referred.  Patient has previously undergone 3 surgeries with an [facility] podiatrist.  The first was a tendon transfer of which the details are unclear at this time, the second was a left subtalar joint arthrodesis from 06/28/2023 followed by a revision subtalar joint arthrodesis from 02/07/2024.  The patient continues with severe pain that is activity dependent.  Typically rated as 5-9 out of 10.  She has been ambulating with use of a boot and crutch.  By report, the patient's previous provider has stopped doing surgery due to a medical condition.  She was quite happy with her previous practice, however sought an orthopedic surgery perspective.  Prior to her index surgery she reported pain on the outside of her foot that radiated to the top.  This is yet unresolved.  Patient works as a  and is on her feet extensively throughout the day.  She denies any fevers, chills or night sweats but does report paresthesias on the outside of her foot.    Patient has a past medical history of rheumatoid arthritis, she has been off of her rheumatologic medications from the winter 2023.  She has been using a bone stimulator.    07/12/2024: Patient returns for follow-up of her left foot pain as above.  She continues with severe pain in her left foot and ankle.  Rated as 8 out of 10.  Pain is improved with boot wear.  She has been ambulating with use of crutches.  She is reporting a burning pain on the lateral side of her foot, this is unchanged from her previous operations.  Patient also notes discomfort when walking on the back of her heel from a prominent screw head.    09/06/2024: Patient returns s/p left foot hardware removal and bone biopsy from 08/26/2024.  Patient's intraoperative cultures were negative " but biopsy of the talus was consistent with osteomyelitis.  She has been compliant with nonweightbearing restrictions and reporting 2 out of 10 pain.  She has been following closely with her rheumatologist.    09/20/2024: Patient returns s/p left foot hardware removal and bone biopsy from 08/26/2024.  She is currently reporting 3 out of 10 pain.  She has been compliant with nonweightbearing restrictions in a walking boot and crutches.  She has follow-up with ID who did not feel that her clinical situation and pathology was consistent with osteomyelitis requiring formal IV antibiotics.  She has been restarted on her RA medications.    10/18/2024: Patient returns s/p left foot hardware removal and bone biopsy from 08/26/2024.  She is reporting 5 out of 10 pain.  She has continued to protect her weightbearing with a walking boot and crutches.  She has had a drug rash/response to doxycycline and underwent a biopsy for this.    12/06/2024: Patient returns s/p left revision bone block ICBG subtalar arthrodesis with BMAC from 11/19/2024.  Patient has been compliant with nonweightbearing restrictions.  She is reporting similar swelling to her preoperative state.  Pain continues as 5 out of 10.  She is present with her  today.    12/26/2024: Patient returns s/p left revision bone block ICBG subtalar arthrodesis with BMAC from 11/19/2024.  Patient has been compliant with nonweightbearing restrictions.  She is reporting 4 out of 10 pain today that is improving overall.  She is present with her  today.  Unfortunately her previous bone stimulator has timed out.  She has not had any fevers, chills or night sweats.    01/23/2025: Patient returns s/p left revision bone block ICBG subtalar arthrodesis with BMAC from 11/19/2024.  Patient continues with 3 out of 10 pain that is relatively unchanged.  She has had more sural neuralgia type pain.  She has continued to be compliant with nonweightbearing restrictions.  She  has put some weight down for transfers and has noticed improved pain with ankle motion.    02/21/2025: Patient returns s/p left revision bone block ICBG subtalar arthrodesis with BMAC from 11/19/2024.  Patient continues to report improved pain as compared to her preoperative state.  She has somewhat plateaued with respect to her stamina and recovery and continues to have difficulty with prolonged pain but no obvious increase in pain or swelling.  She has undergone a recent ablation procedure and has been in physical therapy for her back.    04/04/2025:  Patient returns s/p left revision bone block ICBG subtalar arthrodesis with BMAC from 11/19/2024.  Patient has been reporting worsening pain overall since initiating weightbearing.  Typical pain is now reported to 7 out of 10.  She has been ambulating with use of walking boot and scooter, particularly while at work.  She reports that she does not trust her ankle.    Past Medical History  Past Medical History:   Diagnosis Date    AB (asthmatic bronchitis) ([facility])     Acquired immunocompromised state     Autoimmune RA, On Orencia every 28 days, followed by rheumatology    Actinic keratoses     Allergic rhinitis     Asthma     only used inhaler 4-5 times this entire year, triggers are seasonal allergies    Bilateral sacroiliitis     Chronic pain     Chronic sinusitis     DDD (degenerative disc disease), cervical     C5,6 BULGE    DDD (degenerative disc disease), lumbar     L4,5 BULGE    DJD (degenerative joint disease)     right ankle and foot    ETD (eustachian tube dysfunction)     GERD (gastroesophageal reflux disease)     H/O arthrodesis     Hiatal hernia     HTN (hypertension)     Hyperlipidemia     Hypothyroidism     Joint pain     Lateral epicondylitis of elbow     Low back pain     hronic, folowed by pain management    Low back pain     Lumbosacral disc disease     Migraine     Neuropathy     left foot N/T and hands bilateral    Osteoarthritis     Osteopenia      PONV (postoperative nausea and vomiting)     PTTD (posterior tibial tendon dysfunction)     left lower extremity    Seronegative rheumatoid arthritis (Multi)     Skin cancer     Basal nd squamous removed    Spondylosis of 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     Vitamin D deficiency        Surgical History  Recent Surgeries in Orthopaedic Surgery            No cases to display             Social History  Social History     Socioeconomic History    Marital status:    Tobacco Use    Smoking status: Never    Smokeless tobacco: Never   Vaping Use    Vaping status: Never Used   Substance and Sexual Activity    Alcohol use: Not Currently     Comment: RARELY    Drug use: Never    Sexual activity: Defer     Partners: Male     Birth control/protection: Post-menopausal, Female Sterilization       Family History  Family History   Problem Relation Name Age of Onset    Arthritis Mother Mom     Alzheimer's disease Father Dad     Breast cancer Sister Sima 42    Alzheimer's disease Sister Joselyn     Breast cancer Mother's Sister  63    Stroke Maternal Grandfather Grandpa         Allergies  Allergies   Allergen Reactions    Doxycycline Rash     Severe rash or burn.    Oxycodone-Acetaminophen Nausea/vomiting    Sulfa (Sulfonamide Antibiotics) Headache    Sulfamethoxazole-Trimethoprim Rash    Balsam [place] Hives     Pt states in dental adhesives and make up    Clarithromycin Itching and Rash    Hydroxychloroquine Hives and Rash    Methotrexate Hives and Rash       Review of Systems  REVIEW OF SYSTEMS  Constitutional: no unplanned weight loss  Psychiatric: no suicidal ideation  ENT: no vision changes, no sinus problems  Pulmonary: no shortness of breath  Lymphatic: no enlarged lymph nodes  Cardiovascular: no chest pain or shortness of breath  Gastrointestinal: no stomach problems  Genitourinary: no dysuria   Skin: no rashes  Endocrine: no thyroid problems  Neurological: no headache, no numbness  Hematological: no  "easy bruising  Musculoskeletal: Left foot pain    Physical Exam  PHYSICAL EXAMINATION  Constitutional Exam: well developed and well nourished  Psychiatric Exam: alert and oriented, appropriate mood and behavior  Eye Exam: EOMI  Pulmonary Exam: breathing non-labored, no apparent distress  Lymphatic exam: no appreciable lymphadenopathy in the lower extremities  Cardiovascular exam: RRR to peripheral palpation, DP pulses 2+, PT 2+, toes are pink with good capillary refill, no pitting edema  Skin exam: no open lesions, rashes, abrasions or ulcerations  Neurological exam: sensation to light touch intact in both lower extremities in peripheral and dermatomal distributions (except for any abnormalities noted in musculoskeletal exam)    Musculoskeletal exam: Left lower extremity examination.  Patient surgical incisions continue to be well-healed.  Patient continues with hypersensitivity to light touch in a sural nerve distribution, unchanged from prior to surgery.  Mild tenderness to palpation at the Achilles tendon.  Patient has pain-free and supple ankle range of motion without crepitus or effusion.  Patient has pain-free and restricted subtalar joint range of motion. Patient has sensation intact light touch grossly in a saphenous, sural (reporting neuritic type symptoms with positive tinel's baseline for patient from prior surgery), superficial peroneal, deep peroneal and tibial nerve distribution.  She has intact PF/DF/EHL.  She has 2+ DP/PT pulse palpated.    Last Recorded Vitals  Height 1.575 m (5' 2\"), weight 97.1 kg (214 lb).    Laboratory Results    No results found. However, due to the size of the patient record, not all encounters were searched. Please check Results Review for a complete set of results.    Radiology Results   X-ray imaging 3 view weightbearing right ankle reviewed and independently evaluated by me on 0/0/2025 demonstrates maintained alignment following subtalar arthrodesis with distraction bone " block and cannulated screw fixation.  Ankle mortise remains well aligned.  Continued evidence of arthrodesis consolidation.  There appears to be continued posterior soft tissue swelling about the Achilles, relatively unchanged from prior examination.    Assessment/Plan:  64-year-old female who presents for complex evaluation of painful and multiply revised left subtalar arthrodesis by [facility] podiatrist with sural nerve neuritis and anterior ankle impingement s/p L HARSHAL and bone biopsy from 08/26/2024 s/p left revision bone block ICBG subtalar arthrodesis with BMAC from 11/19/2024 who presents with continued clinical and radiographic evidence of healing including CT demonstrating incorporation.  I would recommend the patient continue weightbearing to her tolerance in her left lower extremity.  I will provide her with a referral for a tayco brace as she has been unable to tolerate other bracing.  I would plan to see the patient back in 2 months for repeat clinical and radiographic evaluation.  Discussed that her progress would be anticipated to be slow given her complicated clinical course over the past two years but her progress to date is meaningful. Upon return patient would require 3 view weightbearing left ankle.    Marlon Stafford MD, GLENN  Department of Orthopaedic Surgery  [facility]    The diagnosis and treatment plan were reviewed with the patient. All questions were answered. The patient verbalized understanding of the treatment plan. There were no barriers to understanding identified.    Note dictated with [employer] software.  Completed without full type editing and sent to avoid delay.

## 2025-04-07 ENCOUNTER — PREP FOR PROCEDURE (OUTPATIENT)
Dept: PAIN MEDICINE | Facility: CLINIC | Age: 65
End: 2025-04-07

## 2025-04-07 ENCOUNTER — OFFICE VISIT (OUTPATIENT)
Dept: PAIN MEDICINE | Facility: CLINIC | Age: 65
End: 2025-04-07
Payer: COMMERCIAL

## 2025-04-07 VITALS
RESPIRATION RATE: 24 BRPM | OXYGEN SATURATION: 97 % | WEIGHT: 214 LBS | DIASTOLIC BLOOD PRESSURE: 92 MMHG | HEIGHT: 62 IN | HEART RATE: 91 BPM | BODY MASS INDEX: 39.38 KG/M2 | SYSTOLIC BLOOD PRESSURE: 144 MMHG

## 2025-04-07 DIAGNOSIS — Z79.891 ENCOUNTER FOR LONG-TERM OPIATE ANALGESIC USE: Primary | ICD-10-CM

## 2025-04-07 DIAGNOSIS — M47.817 LUMBOSACRAL SPONDYLOSIS WITHOUT MYELOPATHY: ICD-10-CM

## 2025-04-07 DIAGNOSIS — M46.1 SACROILIITIS (CMS-HCC): Primary | ICD-10-CM

## 2025-04-07 PROCEDURE — 99214 OFFICE O/P EST MOD 30 MIN: CPT | Performed by: ANESTHESIOLOGY

## 2025-04-07 PROCEDURE — 1036F TOBACCO NON-USER: CPT | Performed by: ANESTHESIOLOGY

## 2025-04-07 PROCEDURE — 3080F DIAST BP >= 90 MM HG: CPT | Performed by: ANESTHESIOLOGY

## 2025-04-07 PROCEDURE — G2211 COMPLEX E/M VISIT ADD ON: HCPCS | Performed by: ANESTHESIOLOGY

## 2025-04-07 PROCEDURE — 3077F SYST BP >= 140 MM HG: CPT | Performed by: ANESTHESIOLOGY

## 2025-04-07 PROCEDURE — 3008F BODY MASS INDEX DOCD: CPT | Performed by: ANESTHESIOLOGY

## 2025-04-07 RX ORDER — TRAMADOL HYDROCHLORIDE 50 MG/1
50 TABLET ORAL EVERY 12 HOURS PRN
Qty: 60 TABLET | Refills: 2 | Status: SHIPPED | OUTPATIENT
Start: 2025-04-07

## 2025-04-07 ASSESSMENT — PAIN SCALES - GENERAL
PAINLEVEL_OUTOF10: 8
PAINLEVEL_OUTOF10: 8

## 2025-04-07 ASSESSMENT — ENCOUNTER SYMPTOMS
ARTHRALGIAS: 1
BACK PAIN: 1
ACTIVITY CHANGE: 1

## 2025-04-07 ASSESSMENT — PATIENT HEALTH QUESTIONNAIRE - PHQ9
1. LITTLE INTEREST OR PLEASURE IN DOING THINGS: NOT AT ALL
2. FEELING DOWN, DEPRESSED OR HOPELESS: NOT AT ALL
SUM OF ALL RESPONSES TO PHQ9 QUESTIONS 1 & 2: 0

## 2025-04-07 ASSESSMENT — PAIN - FUNCTIONAL ASSESSMENT: PAIN_FUNCTIONAL_ASSESSMENT: 0-10

## 2025-04-07 ASSESSMENT — PAIN DESCRIPTION - DESCRIPTORS: DESCRIPTORS: ACHING;SHARP

## 2025-04-07 NOTE — PROGRESS NOTES
The patient is a 64-year-old female with low back pain.  The patient reports that her pain is improved after her radiofrequency ablation of the facet medial nerve branches bilaterally at the L4-5 and the L5-S1 levels but she is still experiencing significant residual pain.  The patient reports that the pain seems to be below the level of her pain that was treated earlier this year.  The pain is constant but worse with bending lifting and twisting.  The pain is also worse when rising from the seated position and getting out of bed in the morning.  Incidentally, the patient still wearing a boot after her foot and ankle surgery.    Review of Systems   Constitutional:  Positive for activity change.   Musculoskeletal:  Positive for arthralgias, back pain and gait problem.   All other systems reviewed and are negative.    GENERAL: alert and appropriate, in no distress, well-hydrated, well-nourished, interactive  SKIN: no rash noted  RESPIRATORY: breathing non-labored and no grunting/flaring/retractions  CHEST: equal chest rise with normal respiratory effort  ABDOMEN: soft and non-tender  BACK: back normal in appearance, spine with reduced ROM, LELAND reproduced pain bilaterally, the SI compression test reproduced pain bilaterally, Gaenslen's reproduced pain bilateral leg  EXTREMITIES: strength intact  NEUROLOGIC: gait antalgic, SLR negative, sensation grossly intact.    Assessment and Plan    -Chronicity--chronic spinal pain    -Diagnostics--no new imaging ordered    -Pharmacologic--refill tramadol.  The [facility] and recent toxicology screen results were reviewed and were appropriate.  A new opioid agreement was signed with the patient.  A sample of saliva was collected for toxicology screening.    -Psychologic--no need for psychologic intervention from my standpoint.  There are no mental health issues of which I am aware that are contributing to the patient's pain.  There are no substance abuse or alcohol abuse issues of  which I am aware that are contributing to the patient's pain.    -Physical--we discussed the importance of physical therapy and exercise.  We discussed avoidance and modification techniques.    -Intervention--patient is a candidate for a bilateral sacroiliac joint injection.  I explained the risks benefits and alternatives of the procedure to the patient.  The patient wishes to proceed.    I spent time educating the patient on the condition including the treatment and the prognosis.  I invited the patient to call at anytime with any questions.

## 2025-04-09 ENCOUNTER — TREATMENT (OUTPATIENT)
Dept: PHYSICAL THERAPY | Facility: CLINIC | Age: 65
End: 2025-04-09
Payer: COMMERCIAL

## 2025-04-09 DIAGNOSIS — M46.1 SACROILIITIS (CMS-HCC): ICD-10-CM

## 2025-04-09 DIAGNOSIS — M47.817 LUMBOSACRAL SPONDYLOSIS WITHOUT MYELOPATHY: ICD-10-CM

## 2025-04-09 PROCEDURE — 97110 THERAPEUTIC EXERCISES: CPT | Mod: GP,CQ

## 2025-04-09 ASSESSMENT — PAIN - FUNCTIONAL ASSESSMENT: PAIN_FUNCTIONAL_ASSESSMENT: 0-10

## 2025-04-09 ASSESSMENT — PAIN SCALES - GENERAL: PAINLEVEL_OUTOF10: 3

## 2025-04-09 NOTE — PROGRESS NOTES
Physical Therapy Treatment    Patient Name: Maureen Vela  MRN: [id number]  Encounter date: 4/9/2025 PT Received On: 04/09/25  Time Calculation  Start Time: 0300  Stop Time: 0340  Time Calculation (min): 40 min  PT Therapeutic Procedures Time Entry  Therapeutic Exercise Time Entry: 35  Therapeutic Activity Time Entry: 5    Visit # 11 of 20  Visits/Dates Authorized: 2025:  [facility] SUREST - NO AUTH / $55 COPAY / OOP $5500 / 60V max - 0 used / Per Gabriel@[facility] chat, ref: [id number] / ds 1/22/25.     Current Problem:   Problem List Items Addressed This Visit             ICD-10-CM    Lumbosacral spondylosis without myelopathy M47.817    Sacroiliitis (CMS-HCC) M46.1         Precautions:    (L foot WBAT, progress from boot to shoe as tolerated as of 2/21/25)   osteopenia, skin CA, RA    Subjective Back is actually not feeling to bad today only like 3/10 on one side foot is more like a 6 really sore and achey.  Only using boot at school during day. Otherwise in shoe. Finally getting some bone growth and is healing  MD pleased  General:        Pre-Treatment Symptoms:   Pain Assessment: 0-10  0-10 (Numeric) Pain Score: 3    Objective   Findings:   Shoe donned L LE , ambulates with cane, WBAT L LE, asymmetrical gait, decreased L stance  4/2/25 Recurrent L anterior innominate: L anterior innominate: L iliac crest superior standing, sitting; L PSIS superior standing, sitting; L ischial tuberosity superior prone; L ASIS inferior supine, L pubic tubercle inferior supine               Treatments:   Therapeutic Exercise 16603:   Nustep L5 x 7 minutes  Tball lumbar flexion 2x10  Tball bridge 2x10  Tball LTR x 10  Ankle AROM DF/PF x 20  Sciatic n. Floss x 15  Hooklying hip abd light x15, flexion x 10 L/R  Sit to stand 2x10  Seated Tball lumbar flexion 2x10  Weight shifting - lateral and AP staggered stances   HL bent knee fall out 1x15  HL hip abduction, purple Tband,  2 x 10  HL hip adduction iso ball squeeze 2 x 10  5 sec holds   PPT with alt.  "LE hip flexion small ROM (march) 2x10 reps  Leg lengthener supine  Seated lumbar flexion stretch Tball FW roll  RB seated for motion only    dnp  Prone hip IR/ER  Prone hip ext   Sidely A-P hip taps      Neuromuscular Re-Ed 06764: n/a  pelvis level upon inspection today  MET L anterior innominate, neutral pelvis achieved    Deferred:  DLS on foam w/ banded row red x 15, L/R arm row red x 12 each  AP rockerboard EO, EO w/ arm raise x 10    Manual Therapy 45220: DNP  IASTM LPS R sidely  Deferred: scar massage to scar and dynamic cupping along scar.    Ther-Act   Instructed and applied kinesiology taping L ankle M-L \"U\" strip after trial of test patch on forearm. Instructed wear/removal. Instructed use of milk of magnesia prn for skin tolerance  Pt declined reviewing Serola SI belt, does not feel she would tolerate it     HEP / Access Codes:   Access Code: [email or web address]   Date: 01/22/2025  - Supine Posterior Pelvic Tilt  - 2-3 x daily - 3 sets - 10-30 reps - 0-5 hold  - Seated Cat Cow  - 2-3 x daily - 3 sets - 10-30 reps - 0-5 hold  - Child's Pose with Ball: Center and to Each Side  - 2-3 x daily - 10-30 reps - 0-5 hold    Access Code: [email or web address]   Date: 02/05/2025  - Supine Lower Trunk Rotation  - 1-3 x daily - 1 sets - 10 reps  - Hooklying Clamshell with Resistance  - 1-2 x daily - 2 sets - 10 reps - 5 sec hold  - Supine Hip Adduction Isometric with Ball  - 1-2 x daily - 2 sets - 10 reps - 5 sec hold  - Supine Bridge with Pelvic Floor Contraction on Swiss Ball  - 1-2 x daily - 1-2 sets - 10 reps  - Supine Knees to Chest with Swiss Ball  - 1-2 x daily - 2 sets - 10 reps  Access Code: [email or web address] URL: [email or web address] Date: 02/10/2025  Prepared by: Satish Luke  - Supine Single Knee to Chest Stretch  - 1-2 x daily - 6 x weekly - 3 reps - 20 hold  - Supine 90/90 Alternating Toe Touch  - 1-2 x daily - 6 x weekly - 2 sets - 10 reps    Assessment:    Good effort with session, patient doing HEP. Less pain after " session.  Taping seems to help Patient is following proper doffing procedures.  Tball fwd flexion is most helpful for back discomfort.    Post-Treatment Symptoms:   Response to Interventions: Decrease in pain    Plan:    Advance to ability  Therapy options: will be seen for skilled physical therapy services  Planned modality interventions: electrical stimulation/Russian stimulation, thermotherapy (hydrocollator packs) and traction  Other planned modality interventions: DN, light therapy  Planned therapy interventions: abdominal trunk stabilization, manual therapy, neuromuscular re-education, postural training, strengthening, stretching, therapeutic activities, home exercise program, flexibility, functional ROM exercises, body mechanics training and bed mobility training  Frequency: 1-2x week  Duration in visits: 20    Goals:   Active       PT Problem       PT Goals (Progressing)       Start:  01/22/25    Expected End:  07/01/25       Partly met 1) Indep with HEP  Partly met 2) Decrease Oswestry < 10/50 from 25/50 to indicate improved function and symptoms.  Partly met 3) Complete work day without exac in back pain.  Partly met 4) Sleep with less pain disruption.               Patient Stated Goal (Progressing)       Start:  01/22/25    Expected End:  07/01/25       1) Relief of pain  2) Relief of numbness

## 2025-04-10 ENCOUNTER — TELEPHONE (OUTPATIENT)
Dept: PAIN MEDICINE | Facility: CLINIC | Age: 65
End: 2025-04-10
Payer: COMMERCIAL

## 2025-04-10 LAB — QUEST FLEXITEST1 RESULTS:: NORMAL

## 2025-04-10 NOTE — TELEPHONE ENCOUNTER
SPOKE WITH PATIENT REGARDING SCHEDULING A PROCEDURE SHE WILL CALL BACK ONCE SHE HAS HER CALENDAR IN FRONT OF HER

## 2025-04-16 ENCOUNTER — TREATMENT (OUTPATIENT)
Dept: PHYSICAL THERAPY | Facility: CLINIC | Age: 65
End: 2025-04-16
Payer: COMMERCIAL

## 2025-04-16 DIAGNOSIS — M47.817 LUMBOSACRAL SPONDYLOSIS WITHOUT MYELOPATHY: ICD-10-CM

## 2025-04-16 DIAGNOSIS — M46.1 SACROILIITIS (CMS-HCC): ICD-10-CM

## 2025-04-16 PROCEDURE — 97140 MANUAL THERAPY 1/> REGIONS: CPT | Mod: GP,CQ

## 2025-04-16 PROCEDURE — 97110 THERAPEUTIC EXERCISES: CPT | Mod: GP,CQ

## 2025-04-16 ASSESSMENT — PAIN SCALES - GENERAL: PAINLEVEL_OUTOF10: 3

## 2025-04-16 ASSESSMENT — PAIN - FUNCTIONAL ASSESSMENT: PAIN_FUNCTIONAL_ASSESSMENT: 0-10

## 2025-04-16 NOTE — PROGRESS NOTES
Physical Therapy Treatment    Patient Name: Maureen Vela  MRN: [id number]  Encounter date: 4/16/2025    Time Calculation  Start Time: 1500  Stop Time: 1545  Time Calculation (min): 45 min  PT Therapeutic Procedures Time Entry  Manual Therapy Time Entry: 18  Therapeutic Exercise Time Entry: 25    Visit # 12 of 20  Visits/Dates Authorized: 2025:  [facility] SUREST - NO AUTH / $55 COPAY / OOP $5500 / 60V max - 0 used / Per [email or web address]@[facility] chat, ref: [id number] / ds 1/22/25.     Current Problem:   Problem List Items Addressed This Visit           ICD-10-CM    Lumbosacral spondylosis without myelopathy M47.817    Sacroiliitis (CMS-HCC) M46.1           Precautions:    (L foot WBAT, progress from boot to shoe as tolerated as of 2/21/25)   osteopenia, skin CA, RA    Subjective   General:    Patient states L foot is at a 6/10 pain today, low back is minimal. Patient reports she used crutches yesterday because L foot pain was so bad.     Pre-Treatment Symptoms:   Pain Assessment: 0-10  0-10 (Numeric) Pain Score: 3 (LBP)    Objective   Findings:   Shoe donned L LE , ambulates with cane, WBAT L LE, asymmetrical gait, decreased L stance  4/2/25 Recurrent L anterior innominate: L anterior innominate: L iliac crest superior standing, sitting; L PSIS superior standing, sitting; L ischial tuberosity superior prone; L ASIS inferior supine, L pubic tubercle inferior supine               Treatments:   Therapeutic Exercise 28702:   Nustep L5 x 7 minutes  HL bent knee fall out 1x15  PPT with alt. LE hip flexion small ROM (march) 2x10 reps  Tball bridge 2x10  Tball LTR x 12  Ankle AROM DF/PF x 20    DNP:  Sciatic n. Floss x 15  Hooklying hip abd light x15, flexion x 10 L/R  Sit to stand 2x10  Seated Tball lumbar flexion 2x10  Weight shifting - lateral and AP staggered stances   HL hip abduction, purple Tband,  2 x 10  HL hip adduction iso ball squeeze 2 x 10  5 sec holds   Leg lengthener supine  Seated lumbar flexion stretch Tball FW roll  RB seated for  "motion only  Prone hip IR/ER  Prone hip ext   Sidely A-P hip taps      Neuromuscular Re-Ed 77249: DNP  MET L anterior innominate, neutral pelvis achieved  DLS on foam w/ banded row red x 15, L/R arm row red x 12 each  AP rockerboard EO, EO w/ arm raise x 10    Manual Therapy 30567:  Supine effleurage massage to L ankle/foot for swelling  Applied KT tape L ankle M-L \"U\" strip , and 3 finger strip along lateral ankle for swelling    DNP:  IASTM LPS R sidely  scar massage to scar and dynamic cupping along scar.    Ther-Act  DNP  Instructed and applied kinesiology taping L ankle M-L \"U\" strip after trial of test patch on forearm. Instructed wear/removal. Instructed use of milk of magnesia prn for skin tolerance  Pt declined reviewing Serola SI belt, does not feel she would tolerate it     HEP / Access Codes:   Access Code: [email or web address]   Date: 01/22/2025  - Supine Posterior Pelvic Tilt  - 2-3 x daily - 3 sets - 10-30 reps - 0-5 hold  - Seated Cat Cow  - 2-3 x daily - 3 sets - 10-30 reps - 0-5 hold  - Child's Pose with Ball: Center and to Each Side  - 2-3 x daily - 10-30 reps - 0-5 hold    Access Code: [email or web address]   Date: 02/05/2025  - Supine Lower Trunk Rotation  - 1-3 x daily - 1 sets - 10 reps  - Hooklying Clamshell with Resistance  - 1-2 x daily - 2 sets - 10 reps - 5 sec hold  - Supine Hip Adduction Isometric with Ball  - 1-2 x daily - 2 sets - 10 reps - 5 sec hold  - Supine Bridge with Pelvic Floor Contraction on Swiss Ball  - 1-2 x daily - 1-2 sets - 10 reps  - Supine Knees to Chest with Swiss Ball  - 1-2 x daily - 2 sets - 10 reps  Access Code: [email or web address] URL: [email or web address] Date: 02/10/2025  Prepared by: Satish Luke  - Supine Single Knee to Chest Stretch  - 1-2 x daily - 6 x weekly - 3 reps - 20 hold  - Supine 90/90 Alternating Toe Touch  - 1-2 x daily - 6 x weekly - 2 sets - 10 reps    Assessment:    Patient had no complaints of increased pain with exercises today. Patient was very swollen in L ankle, " benefited from manual STM and KT taping, reduced swelling in ankle at end of session.     Post-Treatment Symptoms:   Response to Interventions: Decrease in pain    Plan:    Advance to ability  Therapy options: will be seen for skilled physical therapy services  Planned modality interventions: electrical stimulation/Russian stimulation, thermotherapy (hydrocollator packs) and traction  Other planned modality interventions: DN, light therapy  Planned therapy interventions: abdominal trunk stabilization, manual therapy, neuromuscular re-education, postural training, strengthening, stretching, therapeutic activities, home exercise program, flexibility, functional ROM exercises, body mechanics training and bed mobility training  Frequency: 1-2x week  Duration in visits: 20    Goals:   Active       PT Problem       PT Goals (Progressing)       Start:  01/22/25    Expected End:  07/01/25       Partly met 1) Indep with HEP  Partly met 2) Decrease Oswestry < 10/50 from 25/50 to indicate improved function and symptoms.  Partly met 3) Complete work day without exac in back pain.  Partly met 4) Sleep with less pain disruption.               Patient Stated Goal (Progressing)       Start:  01/22/25    Expected End:  07/01/25       1) Relief of pain  2) Relief of numbness

## 2025-04-17 ENCOUNTER — APPOINTMENT (OUTPATIENT)
Dept: ORTHOPEDIC SURGERY | Facility: CLINIC | Age: 65
End: 2025-04-17
Payer: COMMERCIAL

## 2025-04-18 ENCOUNTER — TREATMENT (OUTPATIENT)
Dept: PHYSICAL THERAPY | Facility: CLINIC | Age: 65
End: 2025-04-18
Payer: COMMERCIAL

## 2025-04-18 DIAGNOSIS — M46.1 SACROILIITIS (CMS-HCC): ICD-10-CM

## 2025-04-18 DIAGNOSIS — M47.817 LUMBOSACRAL SPONDYLOSIS WITHOUT MYELOPATHY: ICD-10-CM

## 2025-04-18 PROCEDURE — 97110 THERAPEUTIC EXERCISES: CPT | Mod: GP,CQ

## 2025-04-18 PROCEDURE — 97140 MANUAL THERAPY 1/> REGIONS: CPT | Mod: GP,CQ

## 2025-04-18 ASSESSMENT — PAIN - FUNCTIONAL ASSESSMENT: PAIN_FUNCTIONAL_ASSESSMENT: 0-10

## 2025-04-18 ASSESSMENT — PAIN SCALES - GENERAL: PAINLEVEL_OUTOF10: 7

## 2025-04-18 NOTE — PROGRESS NOTES
Physical Therapy Treatment    Patient Name: Maureen Vela  MRN: [id number]  Encounter date: 4/18/2025    Time Calculation  Start Time: 1028  Stop Time: 1115  Time Calculation (min): 47 min  PT Therapeutic Procedures Time Entry  Manual Therapy Time Entry: 8  Neuromuscular Re-Education Time Entry: 5  Therapeutic Exercise Time Entry: 23  Therapeutic Activity Time Entry: 7    Visit # 13 of 20  Visits/Dates Authorized: 2025:  [facility] SUREST - NO AUTH / $55 COPAY / OOP $5500 / 60V max - 0 used / Per Gabriel@[facility] chat, ref: [id number] / ds 1/22/25.     Current Problem:   Problem List Items Addressed This Visit           ICD-10-CM    Lumbosacral spondylosis without myelopathy M47.817    Sacroiliitis (CMS-HCC) M46.1         Precautions:    (L foot WBAT, progress from boot to shoe as tolerated as of 2/21/25)   osteopenia, skin CA, RA    Subjective   General:    Patient states her LBP isn't bad this morning, only when getting in/out of car, twinge/stinging pain. Patient reports her L ankle is pretty painful today, didn't do a lot of walking yesterday, didn't have to work. Patient states she purchased some KT tape, hoping to learn how to apply to her L ankle in future visits.     Pre-Treatment Symptoms:   Pain Assessment: 0-10  0-10 (Numeric) Pain Score: 7 (L ankle)    Objective   Findings:   Shoe donned L LE , ambulates with cane, WBAT L LE, asymmetrical gait, decreased L stance  4/2/25 Recurrent L anterior innominate: L anterior innominate: L iliac crest superior standing, sitting; L PSIS superior standing, sitting; L ischial tuberosity superior prone; L ASIS inferior supine, L pubic tubercle inferior supine           Treatments:   Therapeutic Exercise 40212:   Nustep L5 x 7 minutes  Standing hip abduction 1x10 , lime green 1x10 R/L   Standing hip extension 1x10 , lime green 1x10  R/L   Ankle AROM DF/PF x 20 , Ever/Inver x20  4 way ankle orange x20 ea    DNP:  HL bent knee fall out 1x15  PPT with alt. LE hip flexion small ROM (march)  "2x10 reps  Tball bridge 2x10  Tball LTR x 12  Sciatic n. Floss x 15  Hooklying hip abd light x15, flexion x 10 L/R  Sit to stand 2x10  Seated Tball lumbar flexion 2x10  Weight shifting - lateral and AP staggered stances   HL hip abduction, purple Tband,  2 x 10  HL hip adduction iso ball squeeze 2 x 10  5 sec holds   Leg lengthener supine  Seated lumbar flexion stretch Tball FW roll  RB seated for motion only  Prone hip IR/ER  Prone hip ext   Sidely A-P hip taps      Neuromuscular Re-Ed 35885:   Rocker board AP/Lateral rocks and balance 1 min     DNP:  MET L anterior innominate, neutral pelvis achieved  DLS on foam w/ banded row red x 15, L/R arm row red x 12 each    Manual Therapy 49434:  Applied KT tape L ankle M-L \"U\" strip , and 3 finger strip along lateral and medial ankle for swelling    DNP:  Supine effleurage massage to L ankle/foot for swelling  IASTM LPS R sidely  scar massage to scar and dynamic cupping along scar.    Ther-Act:  Instructed and applied kinesiology taping L ankle M-L \"U\" strip , \"3 finger\" strip applied to R/L ankle to foot for edema control    DNP:  Pt declined reviewing Serola SI belt, does not feel she would tolerate it     HEP / Access Codes:   Access Code: [email or web address]   Date: 01/22/2025  - Supine Posterior Pelvic Tilt  - 2-3 x daily - 3 sets - 10-30 reps - 0-5 hold  - Seated Cat Cow  - 2-3 x daily - 3 sets - 10-30 reps - 0-5 hold  - Child's Pose with Ball: Center and to Each Side  - 2-3 x daily - 10-30 reps - 0-5 hold    Access Code: [email or web address]   Date: 02/05/2025  - Supine Lower Trunk Rotation  - 1-3 x daily - 1 sets - 10 reps  - Hooklying Clamshell with Resistance  - 1-2 x daily - 2 sets - 10 reps - 5 sec hold  - Supine Hip Adduction Isometric with Ball  - 1-2 x daily - 2 sets - 10 reps - 5 sec hold  - Supine Bridge with Pelvic Floor Contraction on Swiss Ball  - 1-2 x daily - 1-2 sets - 10 reps  - Supine Knees to Chest with Swiss Ball  - 1-2 x daily - 2 sets - 10 reps  Access Code: " [email or web address] URL: [email or web address] Date: 02/10/2025  Prepared by: Satish Luke  - Supine Single Knee to Chest Stretch  - 1-2 x daily - 6 x weekly - 3 reps - 20 hold  - Supine 90/90 Alternating Toe Touch  - 1-2 x daily - 6 x weekly - 2 sets - 10 reps    Assessment:    Patient tolerated more standing exercises this visit, focused on hip strengthening and stabilization/balance. Patient was instructed on application of KT tape to L ankle, education on removal as well. Patient was feeling looser in L ankle at end of session, LBP remained minimal.     Post-Treatment Symptoms:   Response to Interventions: Relief, Decrease in pain    Plan:    Advance to ability  Therapy options: will be seen for skilled physical therapy services  Planned modality interventions: electrical stimulation/Russian stimulation, thermotherapy (hydrocollator packs) and traction  Other planned modality interventions: DN, light therapy  Planned therapy interventions: abdominal trunk stabilization, manual therapy, neuromuscular re-education, postural training, strengthening, stretching, therapeutic activities, home exercise program, flexibility, functional ROM exercises, body mechanics training and bed mobility training  Frequency: 1-2x week  Duration in visits: 20    Goals:   Active       PT Problem       PT Goals (Progressing)       Start:  01/22/25    Expected End:  07/01/25       Partly met 1) Indep with HEP  Partly met 2) Decrease Oswestry < 10/50 from 25/50 to indicate improved function and symptoms.  Partly met 3) Complete work day without exac in back pain.  Partly met 4) Sleep with less pain disruption.               Patient Stated Goal (Progressing)       Start:  01/22/25    Expected End:  07/01/25       1) Relief of pain  2) Relief of numbness

## 2025-04-21 ENCOUNTER — TREATMENT (OUTPATIENT)
Dept: PHYSICAL THERAPY | Facility: CLINIC | Age: 65
End: 2025-04-21
Payer: COMMERCIAL

## 2025-04-21 DIAGNOSIS — M46.1 SACROILIITIS (CMS-HCC): ICD-10-CM

## 2025-04-21 DIAGNOSIS — M47.817 LUMBOSACRAL SPONDYLOSIS WITHOUT MYELOPATHY: ICD-10-CM

## 2025-04-21 PROCEDURE — 97140 MANUAL THERAPY 1/> REGIONS: CPT | Mod: GP,CQ

## 2025-04-21 PROCEDURE — 97110 THERAPEUTIC EXERCISES: CPT | Mod: GP,CQ

## 2025-04-21 ASSESSMENT — PAIN SCALES - GENERAL: PAINLEVEL_OUTOF10: 6

## 2025-04-21 ASSESSMENT — PAIN - FUNCTIONAL ASSESSMENT: PAIN_FUNCTIONAL_ASSESSMENT: 0-10

## 2025-04-21 NOTE — PROGRESS NOTES
Physical Therapy Treatment    Patient Name: Maureen Vela  MRN: [id number]  Encounter date: 4/21/2025    Time Calculation  Start Time: 1203  Stop Time: 1245  Time Calculation (min): 42 min  PT Therapeutic Procedures Time Entry  Manual Therapy Time Entry: 8  Therapeutic Exercise Time Entry: 28  Therapeutic Activity Time Entry: 5    Visit # 14 of 20  Visits/Dates Authorized: 2025:  [facility] SUREST - NO AUTH / $55 COPAY / OOP $5500 / 60V max - 0 used / Per Gabriel@[facility] chat, ref: [id number] / ds 1/22/25.     Current Problem:   Problem List Items Addressed This Visit           ICD-10-CM    Lumbosacral spondylosis without myelopathy M47.817    Sacroiliitis (CMS-HCC) M46.1           Precautions:    (L foot WBAT, progress from boot to shoe as tolerated as of 2/21/25)   osteopenia, skin CA, RA    Subjective   General:    Patient states she had a pop in her mid back last night while going to bed, feeling sore in R thoracic. Patient reports her L LBP is minimal today and L ankle is doing ok, painful with movement, no pain at rest.     Pre-Treatment Symptoms:   Pain Assessment: 0-10  0-10 (Numeric) Pain Score: 6    Objective   Findings:   Shoe donned L LE , ambulates with cane, WBAT L LE, asymmetrical gait, decreased L stance  4/2/25 Recurrent L anterior innominate: L anterior innominate: L iliac crest superior standing, sitting; L PSIS superior standing, sitting; L ischial tuberosity superior prone; L ASIS inferior supine, L pubic tubercle inferior supine           Treatments:   Therapeutic Exercise 74324:   Nustep L5 x 7 minutes  Shuttle leg press DL 62-87# 2x12 , SL L 37# 2x12 , R 50# 2x12  4 way ankle orange x15 ea , light green x15 ea    DNP:  Standing hip abduction 1x10 , lime green 1x10 R/L   Standing hip extension 1x10 , lime green 1x10  R/L   Ankle AROM DF/PF x 20 , Ever/Inver x20  HL bent knee fall out 1x15  PPT with alt. LE hip flexion small ROM (march) 2x10 reps  Tball bridge 2x10  Tball LTR x 12  Sciatic n. Floss x  "15  Hooklying hip abd light x15, flexion x 10 L/R  Sit to stand 2x10  Seated Tball lumbar flexion 2x10  Weight shifting - lateral and AP staggered stances   HL hip abduction, purple Tband,  2 x 10  HL hip adduction iso ball squeeze 2 x 10  5 sec holds   Leg lengthener supine  Seated lumbar flexion stretch Tball FW roll  RB seated for motion only  Prone hip IR/ER  Prone hip ext   Sidely A-P hip taps      Neuromuscular Re-Ed 83447: DNP  Rocker board AP/Lateral rocks and balance 1 min   MET L anterior innominate, neutral pelvis achieved  DLS on foam w/ banded row red x 15, L/R arm row red x 12 each    Manual Therapy 08535:  Applied KT tape L ankle M-L \"U\" strip , and 3 finger strip along lateral and medial ankle for swelling    DNP:  Supine effleurage massage to L ankle/foot for swelling  IASTM LPS R sidely  scar massage to scar and dynamic cupping along scar.    Ther-Act:  Instructed and applied kinesiology taping L ankle M-L \"U\" strip , \"3 finger\" strip applied to R/L ankle to foot for edema control    DNP:  Pt declined reviewing Serola SI belt, does not feel she would tolerate it     HEP / Access Codes:   Access Code: [email or web address]   Date: 01/22/2025  - Supine Posterior Pelvic Tilt  - 2-3 x daily - 3 sets - 10-30 reps - 0-5 hold  - Seated Cat Cow  - 2-3 x daily - 3 sets - 10-30 reps - 0-5 hold  - Child's Pose with Ball: Center and to Each Side  - 2-3 x daily - 10-30 reps - 0-5 hold    Access Code: [email or web address]   Date: 02/05/2025  - Supine Lower Trunk Rotation  - 1-3 x daily - 1 sets - 10 reps  - Hooklying Clamshell with Resistance  - 1-2 x daily - 2 sets - 10 reps - 5 sec hold  - Supine Hip Adduction Isometric with Ball  - 1-2 x daily - 2 sets - 10 reps - 5 sec hold  - Supine Bridge with Pelvic Floor Contraction on Swiss Ball  - 1-2 x daily - 1-2 sets - 10 reps  - Supine Knees to Chest with Swiss Ball  - 1-2 x daily - 2 sets - 10 reps  Access Code: [email or web address] URL: [email or web address] Date: 02/10/2025  Prepared by: " Satish Marly  - Supine Single Knee to Chest Stretch  - 1-2 x daily - 6 x weekly - 3 reps - 20 hold  - Supine 90/90 Alternating Toe Touch  - 1-2 x daily - 6 x weekly - 2 sets - 10 reps    Assessment:    Patient had no significant pain increase with exercises today. Reeducated on application of KT tape to L ankle/foot, patient will attempt application of tape at home in future.     Post-Treatment Symptoms:   Response to Interventions: Decrease in pain    Plan:    Advance to ability  Therapy options: will be seen for skilled physical therapy services  Planned modality interventions: electrical stimulation/Russian stimulation, thermotherapy (hydrocollator packs) and traction  Other planned modality interventions: DN, light therapy  Planned therapy interventions: abdominal trunk stabilization, manual therapy, neuromuscular re-education, postural training, strengthening, stretching, therapeutic activities, home exercise program, flexibility, functional ROM exercises, body mechanics training and bed mobility training  Frequency: 1-2x week  Duration in visits: 20    Goals:   Active       PT Problem       PT Goals (Progressing)       Start:  01/22/25    Expected End:  07/01/25       Partly met 1) Indep with HEP  Partly met 2) Decrease Oswestry < 10/50 from 25/50 to indicate improved function and symptoms.  Partly met 3) Complete work day without exac in back pain.  Partly met 4) Sleep with less pain disruption.               Patient Stated Goal (Progressing)       Start:  01/22/25    Expected End:  07/01/25       1) Relief of pain  2) Relief of numbness

## 2025-04-22 ENCOUNTER — APPOINTMENT (OUTPATIENT)
Dept: INFUSION THERAPY | Facility: CLINIC | Age: 65
End: 2025-04-22
Payer: COMMERCIAL

## 2025-04-22 VITALS
RESPIRATION RATE: 16 BRPM | WEIGHT: 214 LBS | OXYGEN SATURATION: 100 % | SYSTOLIC BLOOD PRESSURE: 128 MMHG | HEART RATE: 70 BPM | DIASTOLIC BLOOD PRESSURE: 74 MMHG | BODY MASS INDEX: 39.14 KG/M2 | TEMPERATURE: 97.7 F

## 2025-04-22 DIAGNOSIS — M05.79 SEROPOSITIVE RHEUMATOID ARTHRITIS OF MULTIPLE JOINTS (MULTI): ICD-10-CM

## 2025-04-22 PROCEDURE — 96365 THER/PROPH/DIAG IV INF INIT: CPT | Performed by: NURSE PRACTITIONER

## 2025-04-22 RX ORDER — DIPHENHYDRAMINE HYDROCHLORIDE 50 MG/ML
50 INJECTION, SOLUTION INTRAMUSCULAR; INTRAVENOUS AS NEEDED
OUTPATIENT
Start: 2025-05-20

## 2025-04-22 RX ORDER — ALBUTEROL SULFATE 0.83 MG/ML
3 SOLUTION RESPIRATORY (INHALATION) AS NEEDED
OUTPATIENT
Start: 2025-05-20

## 2025-04-22 RX ORDER — FAMOTIDINE 10 MG/ML
20 INJECTION, SOLUTION INTRAVENOUS ONCE AS NEEDED
OUTPATIENT
Start: 2025-05-20

## 2025-04-22 RX ORDER — EPINEPHRINE 0.3 MG/.3ML
0.3 INJECTION SUBCUTANEOUS EVERY 5 MIN PRN
OUTPATIENT
Start: 2025-05-20

## 2025-04-22 ASSESSMENT — ENCOUNTER SYMPTOMS
NAUSEA: 0
COUGH: 0
LIGHT-HEADEDNESS: 0
BRUISES/BLEEDS EASILY: 0
BLOOD IN STOOL: 0
DIZZINESS: 0
FEVER: 0
APPETITE CHANGE: 0
EYE PROBLEMS: 0
HEMATURIA: 0
TROUBLE SWALLOWING: 0
SORE THROAT: 0
UNEXPECTED WEIGHT CHANGE: 0
PALPITATIONS: 0
DIARRHEA: 0
WHEEZING: 0
SHORTNESS OF BREATH: 0
ABDOMINAL PAIN: 0
FREQUENCY: 0
LEG SWELLING: 0
FATIGUE: 0
WOUND: 0
NUMBNESS: 0
VOMITING: 0
MYALGIAS: 0
HEADACHES: 0
CONSTIPATION: 0
ARTHRALGIAS: 0
DYSURIA: 0
CHILLS: 0
EXTREMITY WEAKNESS: 0
VOICE CHANGE: 0

## 2025-04-22 ASSESSMENT — PAIN SCALES - GENERAL: PAINLEVEL_OUTOF10: 5

## 2025-04-22 NOTE — PROGRESS NOTES
[facility]   Infusion Clinic Note   Date: 2025   Name: Maureen Vela  : 1960   MRN: [id number]         Reason for Visit: OP Infusion (Orencia 750mg every 28 days)         Today: We administered abatacept (Orencia) 750 mg in sodium chloride 0.9% 100 mL IV.       Ordered By: Cathy Alexis,*       For a Diagnosis of: Seropositive rheumatoid arthritis of multiple joints (Multi)       At today's visit patient accompanied by: Self      Today's Vitals:   Vitals:    25 1410 25 1535   BP: 136/75 128/74   Pulse: 73 70   Resp: 16 16   Temp: 36.2 °C (97.2 °F) 36.5 °C (97.7 °F)   SpO2: 99% 100%   Weight: 97.1 kg (214 lb)    PainSc:   5    PainLoc: Hand  Comment: bilateral              Pre - Treatment Checklist:      - Previous reaction to current treatment: no      (Assess patient for the concerns below. Document provider notification as appropriate).  - Active or recent infection with/without current antibiotic use: no  - Recent or planned invasive dental work: no  - Recent or planned surgeries: no  - Recently received or plans to receive vaccinations: no  - Has treatment related toxicities: no  - Any chance may be pregnant:  n/a      Pain: 5   - Is the pain different from normal: no   - Is prescribing Doctor aware:  yes      Labs: N/A      Fall Risk Screening: Quiroga Fall Risk  History of Falling, Immediate or Within 3 Months: No  Secondary Diagnosis: Yes  Ambulatory Aid: Crutches/cane/walker  Intravenous Therapy/Heparin Lock: Yes  Gait/Transferring: Normal/bedrest/immobile  Mental Status: Oriented to own ability  Quiroga Fall Risk Score: 50       Review Of Systems:  Review of Systems   Constitutional:  Negative for appetite change, chills, fatigue, fever and unexpected weight change.   HENT:   Negative for hearing loss, mouth sores, sore throat, tinnitus, trouble swallowing and voice change.    Eyes:  Negative for eye problems.   Respiratory:  Negative for cough,  "shortness of breath and wheezing.    Cardiovascular:  Negative for chest pain, leg swelling and palpitations.   Gastrointestinal:  Negative for abdominal pain, blood in stool, constipation, diarrhea, nausea and vomiting.   Genitourinary:  Negative for dysuria, frequency and hematuria.    Musculoskeletal:  Negative for arthralgias and myalgias.   Skin:  Negative for itching, rash and wound.   Neurological:  Negative for dizziness, extremity weakness, headaches, light-headedness and numbness.   Hematological:  Does not bruise/bleed easily.         Infusion Readiness:  - Assessment Concerns Related to Infusion: No  - Provider notified: n/a      New Patient Education:    N/A (returning patient for continuation of therapy. Ongoing education provided as needed.)        Treatment Conditions & Drug Specific Questions:    Abatacept  (ORENCIA)    (Unless otherwise specified on patient specific therapy plan):    TREATMENT CONDITIONS  Unless otherwise specified on patient specific therapy plan hold treatment and notify provider prior to proceeding with infusion if patient with a:  o Positive T-Spot  o Positive Hepatitis B Surface Ag   o Positive Hepatitis B Core Antibody   o Positive Hepatitis C Antibody     Lab Results   Component Value Date    TBSIN Negative 04/29/2024    TBGRES Negative  Reference range: NEGATIVE   05/26/2020      Lab Results   Component Value Date    HEPBSAG NEGATIVE 05/26/2020      Lab Results   Component Value Date    HEPCAB  05/26/2020     Negative  Reference range: NEGATIVE  Performed at the [facility] unless   otherwise noted.        No results found for: \"NONUHFIRE\", \"NONUHSWGH\", \"NONUHFISH\", \"EXTHEPBSAG\"    Patient meets treatment conditions? Yes    DRUG SPECIFIC QUESTIONS  Any history of COPD?  No  (If yes educate patient on possible s/s exacerbation)    If Yes any new or worsening s/s related to COPD?  No  (If patient with worsening COPD notify prescribing provider prior " to proceeding with infusion)      REMINDER:  - Pregnancy Category X Drug. Obtain negative pregnancy test prior to FIRST infusion  and educate patient on risk in women of childbearing ability.   - Weight based drug.     Recommended Vitals/Observation:  Vitals: Obtain vital signs at the start; middle and end of infusion as well as at the end of observation period: 30 minutes post.   Observation: Observe patient for 30 minutes after each infusion Observation complete.          Weight Based Drug Calculations:    WEIGHT BASED DRUGS: Abatacept  (ORENCIA)   Patient's dosing weight (kg): 95.7     10% weight variance for prescribed treatment: NA kg to NA kg     Patient's weight today:   Vitals:    25 1410   Weight: 97.1 kg (214 lb)         weight range for prescribed dose: Orencia:  <60 k mg  60 to 100 k mg  >100 k g    Patient weight today falls outside of 10% variance or  weight range: No     Home Care pharmacist informed of weight variance: Not applicable    Doses that are weight based have an acceptable variance rule within 10% of the prescribed   order and/or within  weight range. If patient weight on day of infusion falls   outside of the 10% variance, or weight range, infusion is administered and   pharmacy contacted regarding future dosing adjustments, per policy.           NA      Note Authored / Patient Cared for By: Luisa Rodrigues RN   _________________________________________________________________________    Note authored and patient cared for by: Luisa Rodrigues RN    Note/Encounter reviewed by: Yisel MCKEON NP. This provider on site at time of patient infusion. Infusion staff to notify this provider of any questions, concerns, abnormals or issues during infusion.    Final check of medication completed by this LINDA / or on-site pharmacist with administering nurse using positive identification prior to administration. Final appearance of product  checked for accuracy and conformity to the formula of the prepared product. Assured use of correct ingredients, accurate calculations and precise measurements under appropriate conditions and procedures.    No issues reported during today's encounter. Pt. tolerated infusion without difficulty. Pt. not independently evaluated by this provider during today's encounter.  (Yisel MCKEON NP)

## 2025-04-22 NOTE — PATIENT INSTRUCTIONS
Today :We administered abatacept (Orencia) 750 mg in sodium chloride 0.9% 100 mL IV.     For:   1. Seropositive rheumatoid arthritis of multiple joints (Multi)         Your next appointment is due in:  28 days        Please read the  Medication Guide that was given to you and reviewed during todays visit.     (Tell all doctors including dentists that you are taking this medication)     Go to the emergency room or call 911 if:  -You have signs of allergic reaction:   -Rash, hives, itching.   -Swollen, blistered, peeling skin.   -Swelling of face, lips, mouth, tongue or throat.   -Tightness of chest, trouble breathing, swallowing or talking     Call your doctor:  - If IV / injection site gets red, warm, swollen, itchy or leaks fluid or pus.     (Leave dressing on your IV site for at least 2 hours and keep area clean and dry  - If you get sick or have symptoms of infection or are not feeling well for any reason.    (Wash your hands often, stay away from people who are sick)  - If you have side effects from your medication that do not go away or are bothersome.     (Refer to the teaching your nurse gave you for side effects to call your doctor about)    - Common side effects may include:  stuffy nose, headache, feeling tired, muscle aches, upset stomach  - Before receiving any vaccines     - Call the [facility] at   If:  - You get sick, are on antibiotics, have had a recent vaccine, have surgery or dental work and your doctor wants your visit rescheduled.  - You need to cancel and reschedule your visit for any reason. Call at least 2 days before your visit if you need to cancel.   - Your insurance changes before your next visit.    (We will need to get approval from your new insurance. This can take up to two weeks.)     The [facility] is opened Monday thru Friday. We are closed on weekends and holidays.   Voice mail will take your call if the center is closed. If you leave a message  please allow 24 hours for a call back during weekdays. If you leave a message on a weekend/holiday, we will call you back the next business day.    A pharmacist is available Monday - Friday from 8:30AM to 3:30PM to help answer any questions you may have about your prescriptions(s). Please call pharmacy at:    [facility]: [phone]  [facility]: [phone]  [facility]: [phone]

## 2025-04-23 ENCOUNTER — APPOINTMENT (OUTPATIENT)
Dept: ORTHOPEDIC SURGERY | Facility: CLINIC | Age: 65
End: 2025-04-23
Payer: COMMERCIAL

## 2025-04-23 ENCOUNTER — TREATMENT (OUTPATIENT)
Dept: PHYSICAL THERAPY | Facility: CLINIC | Age: 65
End: 2025-04-23
Payer: COMMERCIAL

## 2025-04-23 DIAGNOSIS — M47.817 LUMBOSACRAL SPONDYLOSIS WITHOUT MYELOPATHY: ICD-10-CM

## 2025-04-23 DIAGNOSIS — M46.1 SACROILIITIS (CMS-HCC): ICD-10-CM

## 2025-04-23 PROCEDURE — 97014 ELECTRIC STIMULATION THERAPY: CPT | Mod: GP,CQ

## 2025-04-23 PROCEDURE — 97110 THERAPEUTIC EXERCISES: CPT | Mod: GP,CQ

## 2025-04-23 PROCEDURE — 97112 NEUROMUSCULAR REEDUCATION: CPT | Mod: GP,CQ

## 2025-04-23 ASSESSMENT — PAIN SCALES - GENERAL: PAINLEVEL_OUTOF10: 4

## 2025-04-23 ASSESSMENT — PAIN - FUNCTIONAL ASSESSMENT: PAIN_FUNCTIONAL_ASSESSMENT: 0-10

## 2025-04-23 NOTE — PROGRESS NOTES
Physical Therapy Treatment    Patient Name: Maureen Vela  MRN: [id number]  Encounter date: 4/23/2025    Time Calculation  Start Time: 1629  Stop Time: 1715  Time Calculation (min): 46 min  PT Modalities Time Entry  E-Stim (Unattended) Time Entry: 12  PT Therapeutic Procedures Time Entry  Manual Therapy Time Entry: 5  Neuromuscular Re-Education Time Entry: 15  Therapeutic Exercise Time Entry: 8    Visit # 15 of 20  Visits/Dates Authorized: 2025:  [facility] SUREST - NO AUTH / $55 COPAY / OOP $5500 / 60V max - 0 used / Per [email or web address] chat, ref: [id number] / ds 1/22/25.     Current Problem:   Problem List Items Addressed This Visit           ICD-10-CM    Lumbosacral spondylosis without myelopathy M47.817    Sacroiliitis (CMS-HCC) M46.1       Precautions:    (L foot WBAT, progress from boot to shoe as tolerated as of 2/21/25)   osteopenia, skin CA, RA    Subjective   General:    Patient states she just got her new hinged brace to don over her shoe, more comfortable than her boot. Patient reports LBP is minimal today, L ankle is feeling painful after being on it at school all day.     Pre-Treatment Symptoms:   Pain Assessment: 0-10  0-10 (Numeric) Pain Score: 4 (L ankle)    Objective   Findings:   Shoe donned L LE with TayCo over shoe hinge brace , ambulates with cane, WBAT L LE, asymmetrical gait, decreased L stance             Treatments:   Therapeutic Exercise 31608:   Nustep L6 x 7 minutes  Ankle AROM DF/PF x 20    DNP:  Shuttle leg press DL 62-87# 2x12 , SL L 37# 2x12 , R 50# 2x12  4 way ankle orange x15 ea , light green x15 ea  Standing hip abduction 1x10 , lime green 1x10 R/L   Standing hip extension 1x10 , lime green 1x10  R/L   Ankle AROM Ever/Inver x20  HL bent knee fall out 1x15  PPT with alt. LE hip flexion small ROM (march) 2x10 reps  Tball bridge 2x10  Tball LTR x 12  Sciatic n. Floss x 15  Hooklying hip abd light x15, flexion x 10 L/R  Sit to stand 2x10  Seated Tball lumbar flexion 2x10  Weight shifting - lateral and  "AP staggered stances   HL hip abduction, purple Tband,  2 x 10  HL hip adduction iso ball squeeze 2 x 10  5 sec holds   Leg lengthener supine  Seated lumbar flexion stretch Tball FW roll  RB seated for motion only  Prone hip IR/ER  Prone hip ext   Sidely A-P hip taps      Neuromuscular Re-Ed 40910:   Rocker board AP/Lateral rocks and balance 1 min (only made it 40 sec with lateral balance , pain)   SLS airex with finger tip support 30 sec x 1 R only  SLS firm with finger tip support 30 sec x 1 L only     DNP:  MET L anterior innominate, neutral pelvis achieved  DLS on foam w/ banded row red x 15, L/R arm row red x 12 each    Manual Therapy 94916:  Applied KT tape L ankle M-L \"U\" strip , and 3 finger strip along lateral and medial ankle for swelling    DNP:  Supine effleurage massage to L ankle/foot for swelling  IASTM LPS R sidely  scar massage to scar and dynamic cupping along scar.    Ther-Act: DNP  Pt declined reviewing Serola SI belt, does not feel she would tolerate it     Modalities:   Unattended e-stim: IFC: applied to L ankle quad polar, Intensity to tolerance, for 12 minutes, in Supine , with wedge bilateral feet elevated      HEP / Access Codes:   Access Code: [email or web address]   Date: 01/22/2025  - Supine Posterior Pelvic Tilt  - 2-3 x daily - 3 sets - 10-30 reps - 0-5 hold  - Seated Cat Cow  - 2-3 x daily - 3 sets - 10-30 reps - 0-5 hold  - Child's Pose with Ball: Center and to Each Side  - 2-3 x daily - 10-30 reps - 0-5 hold    Access Code: [email or web address]   Date: 02/05/2025  - Supine Lower Trunk Rotation  - 1-3 x daily - 1 sets - 10 reps  - Hooklying Clamshell with Resistance  - 1-2 x daily - 2 sets - 10 reps - 5 sec hold  - Supine Hip Adduction Isometric with Ball  - 1-2 x daily - 2 sets - 10 reps - 5 sec hold  - Supine Bridge with Pelvic Floor Contraction on Swiss Ball  - 1-2 x daily - 1-2 sets - 10 reps  - Supine Knees to Chest with Swiss Ball  - 1-2 x daily - 2 sets - 10 reps  Access Code: [email or web address] URL: " [email or web address] Date: 02/10/2025  Prepared by: Satish Luke  - Supine Single Knee to Chest Stretch  - 1-2 x daily - 6 x weekly - 3 reps - 20 hold  - Supine 90/90 Alternating Toe Touch  - 1-2 x daily - 6 x weekly - 2 sets - 10 reps    Assessment:    Patient tolerated balance activities with some discomfort noted in L ankle during lateral movements on rocker board. Patient trialed IFC treatment to help reduce swelling in L ankle prior to KT tape application, also helped reduce pain in ankle.     Post-Treatment Symptoms:   Response to Interventions: Relief, Decrease in pain    Plan:    Advance to ability  Therapy options: will be seen for skilled physical therapy services  Planned modality interventions: electrical stimulation/Russian stimulation, thermotherapy (hydrocollator packs) and traction  Other planned modality interventions: DN, light therapy  Planned therapy interventions: abdominal trunk stabilization, manual therapy, neuromuscular re-education, postural training, strengthening, stretching, therapeutic activities, home exercise program, flexibility, functional ROM exercises, body mechanics training and bed mobility training  Frequency: 1-2x week  Duration in visits: 20    Goals:   Active       PT Problem       PT Goals (Progressing)       Start:  01/22/25    Expected End:  07/01/25       Partly met 1) Indep with HEP  Partly met 2) Decrease Oswestry < 10/50 from 25/50 to indicate improved function and symptoms.  Partly met 3) Complete work day without exac in back pain.  Partly met 4) Sleep with less pain disruption.               Patient Stated Goal (Progressing)       Start:  01/22/25    Expected End:  07/01/25       1) Relief of pain  2) Relief of numbness

## 2025-04-30 ENCOUNTER — APPOINTMENT (OUTPATIENT)
Dept: PHYSICAL THERAPY | Facility: CLINIC | Age: 65
End: 2025-04-30
Payer: COMMERCIAL

## 2025-05-01 DIAGNOSIS — M05.79 SEROPOSITIVE RHEUMATOID ARTHRITIS OF MULTIPLE JOINTS (MULTI): Primary | ICD-10-CM

## 2025-05-02 ENCOUNTER — TREATMENT (OUTPATIENT)
Dept: PHYSICAL THERAPY | Facility: CLINIC | Age: 65
End: 2025-05-02
Payer: COMMERCIAL

## 2025-05-02 DIAGNOSIS — M46.1 SACROILIITIS (CMS-HCC): ICD-10-CM

## 2025-05-02 DIAGNOSIS — M47.817 LUMBOSACRAL SPONDYLOSIS WITHOUT MYELOPATHY: ICD-10-CM

## 2025-05-02 PROCEDURE — 97110 THERAPEUTIC EXERCISES: CPT | Mod: GP,CQ

## 2025-05-02 ASSESSMENT — PAIN - FUNCTIONAL ASSESSMENT: PAIN_FUNCTIONAL_ASSESSMENT: 0-10

## 2025-05-02 ASSESSMENT — PAIN SCALES - GENERAL: PAINLEVEL_OUTOF10: 4

## 2025-05-02 NOTE — PROGRESS NOTES
Physical Therapy Treatment    Patient Name: Maureen Vela  MRN: [id number]  Encounter date: 5/2/2025 PT Received On: 05/02/25  Time Calculation  Start Time: 0255  Stop Time: 0339  Time Calculation (min): 44 min  PT Therapeutic Procedures Time Entry  Therapeutic Exercise Time Entry: 45    Visit # 16 of 20  Visits/Dates Authorized: 2025:  [facility] SUREST - NO AUTH / $55 COPAY / OOP $5500 / 60V max - 0 used / Per [email or web address] chat, ref: [id number] / ds 1/22/25.     Current Problem:   Problem List Items Addressed This Visit           ICD-10-CM    Lumbosacral spondylosis without myelopathy M47.817    Sacroiliitis (CMS-HCC) M46.1         Precautions:    (L foot WBAT, progress from boot to shoe as tolerated as of 2/21/25)   osteopenia, skin CA, RA    Subjective   General:    Scheduled for shots on 8th with iván for back pain. Just sore from sitting in metal chairs today  Pre-Treatment Symptoms:   Pain Assessment: 0-10  0-10 (Numeric) Pain Score: 4    Objective   Findings:   Shoe donned L LE with TayCo over shoe hinge brace , ambulates with cane, WBAT L LE, asymmetrical gait, decreased L stance             Treatments:   Therapeutic Exercise 31921:   Nustep L6 x 7 minutes  Ankle AROM DF/PF x 20  Shuttle leg press DL 62-87# 2x12 , SL L 37# 2x12 , R 50# 2x12  Sit to stand 10x trial of staggered 5x each  Side step no band  Leg curl 40# 2x10  Tball bridge 2x10  Tball LTR x 12  Fall out 10x each side  Habd blue 20x  Bridge with foot flat and toes up DF  5x each    DNP  4 way ankle orange x15 ea , light green x15 ea  Standing hip abduction 1x10 , lime green 1x10 R/L   Standing hip extension 1x10 , lime green 1x10  R/L   Ankle AROM Ever/Inver x20  PPT with alt. LE hip flexion small ROM (march) 2x10 reps  Sciatic n. Floss x 15  Leg lengthener supine  RB seated for motion only  Prone hip IR/ER  Prone hip ext   Sidely A-P hip taps      Neuromuscular Re-Ed 83752:   Rocker board AP/Lateral rocks and balance 1 min (only made it 40 sec with  "lateral balance , pain)   SLS airex with finger tip support 30 sec x 1 R only  SLS firm with finger tip support 30 sec x 1 L only   Weight shifting - lateral and AP staggered stances foam  Tandem walk        Manual Therapy 91335: not able to do due scab from rubbing on brace excessive walking  Applied KT tape L ankle M-L \"U\" strip , and 3 finger strip along lateral and medial ankle for swelling  DNP:  MET L anterior innominate, neutral pelvis achieved  DLS on foam w/ banded row red x 15, L/R arm row red x 12 each    DNP:  Supine effleurage massage to L ankle/foot for swelling  IASTM LPS R sidely  scar massage to scar and dynamic cupping along scar.  Ther-Act: DNP  Pt declined reviewing Serola SI belt, does not feel she would tolerate it     Modalities:  DNP  Unattended e-stim: IFC: applied to L ankle quad polar, Intensity to tolerance, for 12 minutes, in Supine , with wedge bilateral feet elevated      HEP / Access Codes:   Access Code: [email or web address]   Date: 01/22/2025  - Supine Posterior Pelvic Tilt  - 2-3 x daily - 3 sets - 10-30 reps - 0-5 hold  - Seated Cat Cow  - 2-3 x daily - 3 sets - 10-30 reps - 0-5 hold  - Child's Pose with Ball: Center and to Each Side  - 2-3 x daily - 10-30 reps - 0-5 hold  Access Code: [email or web address]   Date: 02/05/2025  - Supine Lower Trunk Rotation  - 1-3 x daily - 1 sets - 10 reps  - Hooklying Clamshell with Resistance  - 1-2 x daily - 2 sets - 10 reps - 5 sec hold  - Supine Hip Adduction Isometric with Ball  - 1-2 x daily - 2 sets - 10 reps - 5 sec hold  - Supine Bridge with Pelvic Floor Contraction on Swiss Ball  - 1-2 x daily - 1-2 sets - 10 reps  - Supine Knees to Chest with Swiss Ball  - 1-2 x daily - 2 sets - 10 reps  Access Code: [email or web address] URL: [email or web address] Date: 02/10/2025  Prepared by: Satish Luke  - Supine Single Knee to Chest Stretch  - 1-2 x daily - 6 x weekly - 3 reps - 20 hold  - Supine 90/90 Alternating Toe Touch  - 1-2 x daily - 6 x weekly - 2 sets - 10 " reps    Assessment:    Patient gave good effort and incorporated back and ankle work today. Patient fatigued after session but a long day at work.  Challenged with program    Post-Treatment Symptoms:   Response to Interventions: No change in pain    Plan:    Advance to ability  Therapy options: will be seen for skilled physical therapy services  Planned modality interventions: electrical stimulation/Russian stimulation, thermotherapy (hydrocollator packs) and traction  Other planned modality interventions: DN, light therapy  Planned therapy interventions: abdominal trunk stabilization, manual therapy, neuromuscular re-education, postural training, strengthening, stretching, therapeutic activities, home exercise program, flexibility, functional ROM exercises, body mechanics training and bed mobility training  Frequency: 1-2x week  Duration in visits: 20    Goals:   Active       PT Problem       PT Goals (Progressing)       Start:  01/22/25    Expected End:  07/01/25       Partly met 1) Indep with HEP  Partly met 2) Decrease Oswestry < 10/50 from 25/50 to indicate improved function and symptoms.  Partly met 3) Complete work day without exac in back pain.  Partly met 4) Sleep with less pain disruption.               Patient Stated Goal (Progressing)       Start:  01/22/25    Expected End:  07/01/25       1) Relief of pain  2) Relief of numbness

## 2025-05-07 ENCOUNTER — TREATMENT (OUTPATIENT)
Dept: PHYSICAL THERAPY | Facility: CLINIC | Age: 65
End: 2025-05-07
Payer: COMMERCIAL

## 2025-05-07 DIAGNOSIS — M47.817 LUMBOSACRAL SPONDYLOSIS WITHOUT MYELOPATHY: ICD-10-CM

## 2025-05-07 DIAGNOSIS — M46.1 SACROILIITIS (CMS-HCC): ICD-10-CM

## 2025-05-07 PROCEDURE — 97112 NEUROMUSCULAR REEDUCATION: CPT | Mod: GP

## 2025-05-07 PROCEDURE — 97110 THERAPEUTIC EXERCISES: CPT | Mod: GP

## 2025-05-07 ASSESSMENT — PAIN SCALES - GENERAL: PAINLEVEL_OUTOF10: 4

## 2025-05-07 ASSESSMENT — PAIN - FUNCTIONAL ASSESSMENT: PAIN_FUNCTIONAL_ASSESSMENT: 0-10

## 2025-05-07 NOTE — PROGRESS NOTES
Physical Therapy Treatment    Patient Name: Maureen Vela  MRN: [id number]  Encounter date: 5/7/2025    Time Calculation  Start Time: 1530  Stop Time: 1617  Time Calculation (min): 47 min  PT Therapeutic Procedures Time Entry  Neuromuscular Re-Education Time Entry: 15  Therapeutic Exercise Time Entry: 25    Visit # 17 of 20  Visits/Dates Authorized: 2025:  [facility] SUREST - NO AUTH / $55 COPAY / OOP $5500 / 60V max - 0 used / Per [email or web address] chat, ref: [id number] / ds 1/22/25.     Current Problem:   Problem List Items Addressed This Visit           ICD-10-CM    Lumbosacral spondylosis without myelopathy M47.817    Sacroiliitis (CMS-HCC) M46.1       Precautions:    (L foot WBAT, progress from boot to shoe as tolerated as of 2/21/25)   osteopenia, skin CA, RA    Subjective   General:   General Comment: Still struggling with swelling L ankle, takes an hour after laying down at end of day for foot to calm down. Balance still noteably different R/L. Injections with pain management for back pain tomorrow.    Pre-Treatment Symptoms:   Pain Assessment: 0-10  0-10 (Numeric) Pain Score: 4  Pain Location: Back (and L ankle)    Objective   Findings:   Shoe donned L LE with TayCo over shoe hinge brace, ambulates with cane, WBAT L LE, asymmetrical gait, decreased L stance           Treatments:   Therapeutic Exercise 30494:   Nustep L6 x 7 minutes  Leg curl 40# 2x12  Shuttle leg press DL 62-87# 2x12, SL L 37# 2x12 , R 50# 2x12  FT resisted gait Side step 7.5# x2 ea    Deferred:  Tball bridge 2x10  Tball LTR x 12  Fall out 10x each side  Habd blue 20x  Bridge with foot flat and toes up DF  5x each  Sit to stand 10x trial of staggered 5x each  Ankle AROM DF/PF x 20  4 way ankle orange x15 ea , light green x15 ea  Standing hip abduction 1x10 , lime green 1x10 R/L   Standing hip extension 1x10 , lime green 1x10  R/L   Ankle AROM Ever/Inver x20  PPT with alt. LE hip flexion small ROM (march) 2x10 reps  Sciatic n. Floss x 15  Leg lengthener  "supine  Prone hip IR/ER  Prone hip ext   Sidely A-P hip taps      Neuromuscular Re-Ed 96694:   Rocker board AP/Lateral rocks and balance 1 min (only made it 40 sec with lateral balance , pain)   Modified SLS airex with finger tip support R/L  Step tap foam to 6in  Foam Weight shifting - lateral and AP     Deferred:  staggered stances foam  Tandem walk  DLS on foam w/ banded row red x 15, L/R arm row red x 12 each      Manual Therapy 43679:  Deferred:  Applied KT tape L ankle M-L \"U\" strip , and 3 finger strip along lateral and medial ankle for swelling  MET L anterior innominate, neutral pelvis achieved  Supine effleurage massage to L ankle/foot for swelling  IASTM LPS R sidely  scar massage to scar and dynamic cupping along scar.    Ther-Act:   Instructed use of compression sock to manage edema    Deferred:  Pt declined reviewing Serola SI belt, does not feel she would tolerate it     Modalities:  Deferred:  Unattended e-stim: IFC: applied to L ankle quad polar, Intensity to tolerance, for 12 minutes, in Supine , with wedge bilateral feet elevated      HEP / Access Codes:   Access Code: [email or web address]   Date: 01/22/2025  - Supine Posterior Pelvic Tilt  - 2-3 x daily - 3 sets - 10-30 reps - 0-5 hold  - Seated Cat Cow  - 2-3 x daily - 3 sets - 10-30 reps - 0-5 hold  - Child's Pose with Ball: Center and to Each Side  - 2-3 x daily - 10-30 reps - 0-5 hold  Access Code: [email or web address]   Date: 02/05/2025  - Supine Lower Trunk Rotation  - 1-3 x daily - 1 sets - 10 reps  - Hooklying Clamshell with Resistance  - 1-2 x daily - 2 sets - 10 reps - 5 sec hold  - Supine Hip Adduction Isometric with Ball  - 1-2 x daily - 2 sets - 10 reps - 5 sec hold  - Supine Bridge with Pelvic Floor Contraction on Swiss Ball  - 1-2 x daily - 1-2 sets - 10 reps  - Supine Knees to Chest with Swiss Ball  - 1-2 x daily - 2 sets - 10 reps  Access Code: [email or web address] URL: [email or web address] Date: 02/10/2025  Prepared by: Satish Luke  - Supine Single " Knee to Chest Stretch  - 1-2 x daily - 6 x weekly - 3 reps - 20 hold  - Supine 90/90 Alternating Toe Touch  - 1-2 x daily - 6 x weekly - 2 sets - 10 reps    Assessment:   PT Assessment  Assessment Comment: Needs gradual progress in strength and balancePatient gave good effort and incorporated back and ankle work today. Patient fatigued after session but a long day at work.  Challenged with program    Post-Treatment Symptoms:   Response to Interventions: No change in pain    Plan:    Advance to ability  Therapy options: will be seen for skilled physical therapy services  Planned modality interventions: electrical stimulation/Russian stimulation, thermotherapy (hydrocollator packs) and traction  Other planned modality interventions: DN, light therapy  Planned therapy interventions: abdominal trunk stabilization, manual therapy, neuromuscular re-education, postural training, strengthening, stretching, therapeutic activities, home exercise program, flexibility, functional ROM exercises, body mechanics training and bed mobility training  Frequency: 1-2x week  Duration in visits: 20    Goals:   Active       PT Problem       PT Goals (Progressing)       Start:  01/22/25    Expected End:  07/01/25       Partly met 1) Indep with HEP  Partly met 2) Decrease Oswestry < 10/50 from 25/50 to indicate improved function and symptoms.  Partly met 3) Complete work day without exac in back pain.  Partly met 4) Sleep with less pain disruption.               Patient Stated Goal (Progressing)       Start:  01/22/25    Expected End:  07/01/25       1) Relief of pain  2) Relief of numbness

## 2025-05-08 ENCOUNTER — ANCILLARY PROCEDURE (OUTPATIENT)
Dept: RADIOLOGY | Facility: EXTERNAL LOCATION | Age: 65
End: 2025-05-08
Payer: COMMERCIAL

## 2025-05-08 DIAGNOSIS — M46.1 SACROILIITIS, NOT ELSEWHERE CLASSIFIED: ICD-10-CM

## 2025-05-08 PROCEDURE — 27096 INJECT SACROILIAC JOINT: CPT | Performed by: ANESTHESIOLOGY

## 2025-05-08 NOTE — PROGRESS NOTES
Pre and postprocedure diagnosis--sacroiliitis    Procedure--bilateral sacroiliac joint injection    Anesthesia--local    Complications--none    Clinical note--the patient has a history of pain.  I explained the risks, benefits, and alternatives of the procedure to the patient.  The patient wishes to proceed.    Procedure Note--The patient was brought to the procedure room and placed in the prone position.  Sterile prep and drape with ChloraPrep and sterile towels.  8 mL of half percent lidocaine were injected through a 25-gauge needle for local anesthesia.  22-gauge spinals needle were guided to the intra-articular aspect of the bilateral sacroiliac joints.  After negative aspiration, 1 mL of contrast was injected through the needle to ensure proper needle placement.  Then 4 mL of half percent lidocaine and 60 mg of methylprednisolone were injected through the needles in divided doses.  The needles were removed and the patient was transferred to recovery.

## 2025-05-09 DIAGNOSIS — I10 PRIMARY HYPERTENSION: ICD-10-CM

## 2025-05-09 RX ORDER — PROPRANOLOL HYDROCHLORIDE 80 MG/1
80 CAPSULE, EXTENDED RELEASE ORAL DAILY
Qty: 90 CAPSULE | Refills: 3 | Status: SHIPPED | OUTPATIENT
Start: 2025-05-09

## 2025-05-11 LAB
25(OH)D3+25(OH)D2 SERPL-MCNC: 78 NG/ML (ref 30–100)
APPEARANCE UR: CLEAR
BACTERIA #/AREA URNS HPF: ABNORMAL /HPF
BACTERIA UR CULT: ABNORMAL
BASOPHILS # BLD AUTO: 22 CELLS/UL (ref 0–200)
BASOPHILS NFR BLD AUTO: 0.4 %
BILIRUB UR QL STRIP: NEGATIVE
C1Q SERPL-MCNC: NORMAL UG/ML
C3 SERPL-MCNC: NORMAL MG/DL
C4 SERPL-MCNC: NORMAL MG/DL
CK SERPL-CCNC: 42 U/L (ref 20–243)
COLLAGEN CTX SERPL-MCNC: NORMAL PG/ML
COLOR UR: YELLOW
CRP SERPL-MCNC: NORMAL MG/L
DSDNA AB SER-ACNC: NORMAL [IU]/ML
EOSINOPHIL # BLD AUTO: 0 CELLS/UL (ref 15–500)
EOSINOPHIL NFR BLD AUTO: 0 %
ERYTHROCYTE [DISTWIDTH] IN BLOOD BY AUTOMATED COUNT: 13 % (ref 11–15)
ERYTHROCYTE [SEDIMENTATION RATE] IN BLOOD BY WESTERGREN METHOD: 2 MM/H
GLUCOSE UR QL STRIP: NEGATIVE
HCT VFR BLD AUTO: 43.2 % (ref 35–45)
HGB BLD-MCNC: 13.9 G/DL (ref 11.7–15.5)
HGB UR QL STRIP: NEGATIVE
HYALINE CASTS #/AREA URNS LPF: ABNORMAL /LPF
KETONES UR QL STRIP: ABNORMAL
LEUKOCYTE ESTERASE UR QL STRIP: ABNORMAL
LYMPHOCYTES # BLD AUTO: 792 CELLS/UL (ref 850–3900)
LYMPHOCYTES NFR BLD AUTO: 14.4 %
MAGNESIUM SERPL-MCNC: 2.1 MG/DL (ref 1.5–2.5)
MCH RBC QN AUTO: 31.2 PG (ref 27–33)
MCHC RBC AUTO-ENTMCNC: 32.2 G/DL (ref 32–36)
MCV RBC AUTO: 96.9 FL (ref 80–100)
MONOCYTES # BLD AUTO: 204 CELLS/UL (ref 200–950)
MONOCYTES NFR BLD AUTO: 3.7 %
NEUTROPHILS # BLD AUTO: 4483 CELLS/UL (ref 1500–7800)
NEUTROPHILS NFR BLD AUTO: 81.5 %
NITRITE UR QL STRIP: NEGATIVE
PH UR STRIP: 5.5 [PH] (ref 5–8)
PHOSPHATE SERPL-MCNC: 3.1 MG/DL (ref 2.5–4.5)
PLATELET # BLD AUTO: 194 THOUSAND/UL (ref 140–400)
PMV BLD REES-ECKER: 11.5 FL (ref 7.5–12.5)
PROT UR QL STRIP: NEGATIVE
PTH-INTACT SERPL-MCNC: 28 PG/ML (ref 16–77)
RBC # BLD AUTO: 4.46 MILLION/UL (ref 3.8–5.1)
RBC #/AREA URNS HPF: ABNORMAL /HPF
SERVICE CMNT-IMP: ABNORMAL
SP GR UR STRIP: 1.02 (ref 1–1.03)
SQUAMOUS #/AREA URNS HPF: ABNORMAL /HPF
WBC # BLD AUTO: 5.5 THOUSAND/UL (ref 3.8–10.8)
WBC #/AREA URNS HPF: ABNORMAL /HPF

## 2025-05-13 DIAGNOSIS — M05.79 SEROPOSITIVE RHEUMATOID ARTHRITIS OF MULTIPLE JOINTS (MULTI): ICD-10-CM

## 2025-05-13 LAB
25(OH)D3+25(OH)D2 SERPL-MCNC: 78 NG/ML (ref 30–100)
APPEARANCE UR: CLEAR
BACTERIA #/AREA URNS HPF: ABNORMAL /HPF
BACTERIA UR CULT: ABNORMAL
BASOPHILS # BLD AUTO: 22 CELLS/UL (ref 0–200)
BASOPHILS NFR BLD AUTO: 0.4 %
BILIRUB UR QL STRIP: NEGATIVE
C1Q SERPL-MCNC: NORMAL UG/ML
C3 SERPL-MCNC: 136 MG/DL (ref 83–193)
C4 SERPL-MCNC: 27 MG/DL (ref 15–57)
CK SERPL-CCNC: 42 U/L (ref 20–243)
COLLAGEN CTX SERPL-MCNC: 59 PG/ML
COLOR UR: YELLOW
CRP SERPL-MCNC: <3 MG/L
DSDNA AB SER-ACNC: <1 IU/ML
EOSINOPHIL # BLD AUTO: 0 CELLS/UL (ref 15–500)
EOSINOPHIL NFR BLD AUTO: 0 %
ERYTHROCYTE [DISTWIDTH] IN BLOOD BY AUTOMATED COUNT: 13 % (ref 11–15)
ERYTHROCYTE [SEDIMENTATION RATE] IN BLOOD BY WESTERGREN METHOD: 2 MM/H
GLUCOSE UR QL STRIP: NEGATIVE
HCT VFR BLD AUTO: 43.2 % (ref 35–45)
HGB BLD-MCNC: 13.9 G/DL (ref 11.7–15.5)
HGB UR QL STRIP: NEGATIVE
HYALINE CASTS #/AREA URNS LPF: ABNORMAL /LPF
KETONES UR QL STRIP: ABNORMAL
LEUKOCYTE ESTERASE UR QL STRIP: ABNORMAL
LYMPHOCYTES # BLD AUTO: 792 CELLS/UL (ref 850–3900)
LYMPHOCYTES NFR BLD AUTO: 14.4 %
MAGNESIUM SERPL-MCNC: 2.1 MG/DL (ref 1.5–2.5)
MCH RBC QN AUTO: 31.2 PG (ref 27–33)
MCHC RBC AUTO-ENTMCNC: 32.2 G/DL (ref 32–36)
MCV RBC AUTO: 96.9 FL (ref 80–100)
MONOCYTES # BLD AUTO: 204 CELLS/UL (ref 200–950)
MONOCYTES NFR BLD AUTO: 3.7 %
NEUTROPHILS # BLD AUTO: 4483 CELLS/UL (ref 1500–7800)
NEUTROPHILS NFR BLD AUTO: 81.5 %
NITRITE UR QL STRIP: NEGATIVE
PH UR STRIP: 5.5 [PH] (ref 5–8)
PHOSPHATE SERPL-MCNC: 3.1 MG/DL (ref 2.5–4.5)
PLATELET # BLD AUTO: 194 THOUSAND/UL (ref 140–400)
PMV BLD REES-ECKER: 11.5 FL (ref 7.5–12.5)
PROT UR QL STRIP: NEGATIVE
PTH-INTACT SERPL-MCNC: 28 PG/ML (ref 16–77)
RBC # BLD AUTO: 4.46 MILLION/UL (ref 3.8–5.1)
RBC #/AREA URNS HPF: ABNORMAL /HPF
SERVICE CMNT-IMP: ABNORMAL
SP GR UR STRIP: 1.02 (ref 1–1.03)
SQUAMOUS #/AREA URNS HPF: ABNORMAL /HPF
WBC # BLD AUTO: 5.5 THOUSAND/UL (ref 3.8–10.8)
WBC #/AREA URNS HPF: ABNORMAL /HPF

## 2025-05-14 ENCOUNTER — TREATMENT (OUTPATIENT)
Dept: PHYSICAL THERAPY | Facility: CLINIC | Age: 65
End: 2025-05-14
Payer: COMMERCIAL

## 2025-05-14 DIAGNOSIS — M47.817 LUMBOSACRAL SPONDYLOSIS WITHOUT MYELOPATHY: Primary | ICD-10-CM

## 2025-05-14 DIAGNOSIS — M46.1 SACROILIITIS: ICD-10-CM

## 2025-05-14 PROCEDURE — 97112 NEUROMUSCULAR REEDUCATION: CPT | Mod: GP

## 2025-05-14 PROCEDURE — 97110 THERAPEUTIC EXERCISES: CPT | Mod: GP

## 2025-05-14 RX ORDER — LEFLUNOMIDE 20 MG/1
20 TABLET ORAL DAILY
Qty: 90 TABLET | Refills: 3 | Status: SHIPPED | OUTPATIENT
Start: 2025-05-14 | End: 2025-11-10

## 2025-05-14 ASSESSMENT — PAIN - FUNCTIONAL ASSESSMENT: PAIN_FUNCTIONAL_ASSESSMENT: 0-10

## 2025-05-14 ASSESSMENT — PAIN SCALES - GENERAL: PAINLEVEL_OUTOF10: 2

## 2025-05-14 NOTE — PROGRESS NOTES
[place] Progress Note    Gender: female BW: 8 lb 7.8 oz (3851 g)   Age: 36 hours OB:    Gestational Age at Birth: Gestational Age: 40w0d Pediatrician: Primary Provider: Aubree     Maternal Information:     Mother's Name: Ena Linda    Age: 20 y.o.         Maternal Prenatal Labs -- transcribed from office records:   ABO Type   Date Value Ref Range Status   2018 A  Final     RH type   Date Value Ref Range Status   2018 Positive  Final     Antibody Screen   Date Value Ref Range Status   2018 Negative  Final     External RPR   Date Value Ref Range Status   10/12/2017 Negative  Final     Rubella Antibodies, IgG   Date Value Ref Range Status   10/12/2017 immune  Final     External Hepatitis B Surface Ag   Date Value Ref Range Status   10/12/2017 Negative  Final     HIV Screen 4th Gen w/RFX (Reference)   Date Value Ref Range Status   10/12/2017 neg  Final     Hep C Virus Ab   Date Value Ref Range Status   10/12/2017 neg  Final     Strep Gp B TAMMY   Date Value Ref Range Status   2018 Negative Negative Final     Comment:     Centers for Disease Control and Prevention (CDC) and American Congress  of Obstetricians and Gynecologists (ACOG) guidelines for prevention of   group B streptococcal (GBS) disease specify co-collection of  a vaginal and rectal swab specimen to maximize sensitivity of GBS  detection. Per the CDC and ACOG, swabbing both the lower vagina and  rectum substantially increases the yield of detection compared with  sampling the vagina alone.  Penicillin G, ampicillin, or cefazolin are indicated for intrapartum  prophylaxis of  GBS colonization. Reflex susceptibility  testing should be performed prior to use of clindamycin only on GBS  isolates from penicillin-allergic women who are considered a high risk  for anaphylaxis. Treatment with vancomycin without additional testing  is warranted if resistance to clindamycin is noted.       No results found for: AMPHETSCREEN,  Physical Therapy Treatment    Patient Name: Maureen Vela  MRN: [id number]  Encounter date: 5/14/2025    Time Calculation  Start Time: 1515  Stop Time: 1600  Time Calculation (min): 45 min  PT Therapeutic Procedures Time Entry  Neuromuscular Re-Education Time Entry: 20  Therapeutic Exercise Time Entry: 20    Visit # 18 of 20  Visits/Dates Authorized: 2025:  [facility] SUREST - NO AUTH / $55 COPAY / OOP $5500 / 60V max - 0 used / Per [email or web address] chat, ref: [id number] / ds 1/22/25.     Current Problem:   Problem List Items Addressed This Visit           ICD-10-CM    Lumbosacral spondylosis without myelopathy - Primary M47.817    Sacroiliitis (CMS-HCC) M46.1     Precautions:    (L foot WBAT, progress from boot to shoe as tolerated as of 2/21/25)   osteopenia, skin CA, RA    Subjective   General:   General Comment: Back injections last week. R sided pain is resolved, L side some twinge L side but improved. Feels she is managing well with exercise intensity during sessions.    Pre-Treatment Symptoms:   Pain Assessment: 0-10  0-10 (Numeric) Pain Score: 2  Pain Location: Back  Pain Orientation: Left    Objective   Findings:   Shoe donned L LE with TayCo over shoe hinge brace, ambulates with cane, WBAT L LE, asymmetrical gait, decreased L stance           Treatments:   Therapeutic Exercise 32486:   Nustep L6 x 7 minutes  Leg curl 40# 2x12  Shuttle leg press DL 75-87# 2x12, SL L 37# 2x12 , R 50# 2x12  FT resisted gait Side step 7.5# x2 ea    Deferred:  Tball bridge 2x10  Tball LTR x 12  Fall out 10x each side  Habd blue 20x  Bridge with foot flat and toes up DF  5x each  Sit to stand 10x trial of staggered 5x each  Ankle AROM DF/PF x 20  4 way ankle orange x15 ea , light green x15 ea  Standing hip abduction 1x10 , lime green 1x10 R/L   Standing hip extension 1x10 , lime green 1x10  R/L   Ankle AROM Ever/Inver x20  PPT with alt. LE hip flexion small ROM (march) 2x10 reps  Sciatic n. Floss x 15  Leg lengthener supine  Prone hip  "IR/ER  Prone hip ext   Sidely A-P hip taps      Neuromuscular Re-Ed 27243:   Rocker board AP static and shift 1 min  RB M/L balance 1 min  Step tap solid (deferred foam) to 6in  Cross over walk R/L  Tandem walk    Deferred:  Modified SLS airex with finger tip support R/L  Foam Weight shifting - lateral and AP   staggered stances foam  DLS on foam w/ banded row red x 15, L/R arm row red x 12 each      Manual Therapy 02488:  Deferred:  MET L anterior innominate, neutral pelvis achieved  Supine effleurage massage to L ankle/foot for swelling  IASTM LPS R sidely  scar massage to scar and dynamic cupping along scar.    Ther-Act:   Deferred due to skin healing  KT tape L ankle M-L \"U\" strip , and 3 finger strip along lateral and medial ankle for swelling    Modalities:  Deferred:  Unattended e-stim: IFC: applied to L ankle quad polar, Intensity to tolerance, for 12 minutes, in Supine , with wedge bilateral feet elevated      HEP / Access Codes:   Access Code: [email or web address]   Date: 01/22/2025  - Supine Posterior Pelvic Tilt  - 2-3 x daily - 3 sets - 10-30 reps - 0-5 hold  - Seated Cat Cow  - 2-3 x daily - 3 sets - 10-30 reps - 0-5 hold  - Child's Pose with Ball: Center and to Each Side  - 2-3 x daily - 10-30 reps - 0-5 hold  Access Code: [email or web address]   Date: 02/05/2025  - Supine Lower Trunk Rotation  - 1-3 x daily - 1 sets - 10 reps  - Hooklying Clamshell with Resistance  - 1-2 x daily - 2 sets - 10 reps - 5 sec hold  - Supine Hip Adduction Isometric with Ball  - 1-2 x daily - 2 sets - 10 reps - 5 sec hold  - Supine Bridge with Pelvic Floor Contraction on Swiss Ball  - 1-2 x daily - 1-2 sets - 10 reps  - Supine Knees to Chest with Swiss Ball  - 1-2 x daily - 2 sets - 10 reps  Access Code: [email or web address] URL: [email or web address] Date: 02/10/2025  Prepared by: Satish Luke  - Supine Single Knee to Chest Stretch  - 1-2 x daily - 6 x weekly - 3 reps - 20 hold  - Supine 90/90 Alternating Toe Touch  - 1-2 x daily - 6 x weekly - 2 " BARBITSCNUR, LABBENZSCN, LABMETHSCN, PCPUR, LABOPIASCN, THCURSCR, COCSCRUR, PROPOXSCN, BUPRENORSCNU, OXYCODONESCN, TRICYCLICSCN, UDS       Information for the patient's mother:  Ena Linda [[id number]]     Patient Active Problem List   Diagnosis   • Pregnancy   • Diet controlled gestational diabetes mellitus (GDM) in third trimester   • Postpartum care and examination immediately after delivery        Mother's Past Medical and Social History:      Maternal /Para:    Maternal PMH:    Past Medical History:   Diagnosis Date   • Gestational diabetes      Maternal Social History:    Social History     Social History   • Marital status: Single     Spouse name: N/A   • Number of children: N/A   • Years of education: N/A     Occupational History   • Not on file.     Social History Main Topics   • Smoking status: Never Smoker   • Smokeless tobacco: Never Used   • Alcohol use No   • Drug use: No   • Sexual activity: Yes     Partners: Male     Other Topics Concern   • Not on file     Social History Narrative   • No narrative on file       Mother's Current Medications     Information for the patient's mother:  Ena Linda [[id number]]   prenatal (CLASSIC) vitamin 1 tablet Oral Daily       Labor Information:      Labor Events      labor: No Induction:  Oxytocin;Amniotomy    Steroids?    Reason for Induction:  Elective   Rupture date:  2018 Complications:    Labor complications:  None  Additional complications:     Rupture time:  11:48 AM    Rupture type:  artificial rupture of membranes    Fluid Color:  Clear    Antibiotics during Labor?  No           Anesthesia     Method: Epidural     Analgesics:          Delivery Information for Johana Linda     YOB: 2016 Delivery Clinician:     Time of birth:  1:28 AM Delivery type:  Vaginal, Spontaneous Delivery   Forceps:     Vacuum:     Breech:      Presentation/position:          Observed Anomalies:  scale #2  "Delivery Complications:          APGAR SCORES             APGARS  One minute Five minutes Ten minutes Fifteen minutes Twenty minutes   Skin color: 0   1             Heart rate: 2   2             Grimace: 2   2              Muscle tone: 2   2              Breathin   2              Totals: 8   9                Resuscitation     Suction: bulb syringe   Catheter size:     Suction below cords:     Intensive:       Objective      Information     Vital Signs Temp:  [98 °F (36.7 °C)-98.6 °F (37 °C)] 98.4 °F (36.9 °C)  Heart Rate:  [122-136] 136  Resp:  [40-52] 44  BP: (64-65)/(38) 65/38   Admission Vital Signs: Vitals  Temp: (!) 101.3 °F (38.5 °C)  Temp src: Axillary  Heart Rate: 150  Heart Rate Source: Apical  Resp: (!) 70  Resp Rate Source: Stethoscope  BP: 64/38  Noninvasive MAP (mmHg): 47  BP Location: Right leg  BP Method: Automatic  Patient Position: Lying   Birth Weight: 3851 g (8 lb 7.8 oz)   Birth Length: 19   Birth Head circumference: Head Circumference: 13.78\" (35 cm)   Current Weight: Weight: 3793 g (8 lb 5.8 oz)   Change in weight since birth: -2%         Physical Exam     General appearance Normal Term female   Skin  No rashes.  No jaundice   Head AFSF.  No caput. No cephalohematoma. No nuchal folds   Eyes  + RR bilaterally   Ears, Nose, Throat  Normal ears.  No ear pits. No ear tags.  Palate intact.   Thorax  Normal   Lungs BSBE - CTA. No distress.   Heart  Normal rate and rhythm.  No murmur, gallops. Peripheral pulses strong and equal in all 4 extremities.   Abdomen + BS.  Soft. NT. ND.  No mass/HSM   Genitalia  normal female exam   Anus Anus patent   Trunk and Spine Spine intact.  No sacral dimples.   Extremities  Clavicles intact.  No hip clicks/clunks.   Neuro + Swisshome, grasp, suck.  Normal Tone       Intake and Output     Feeding: bottle feed    Urine: x2  Stool: x3      Labs and Radiology     Prenatal labs:  reviewed    Baby's Blood type: No results found for: ABO, LABABO, RH, LABRH     Labs: " sets - 10 reps    Assessment:   PT Assessment  Assessment Comment: Needs gradual progress in strength and balance, should be able to resume kinesiology taping for ankle next visit due to skin healing from a blister.    Post-Treatment Symptoms:   Response to Interventions: Increase in pain (ankle 5/10 after exercise)    Plan:    Advance to ability  Therapy options: will be seen for skilled physical therapy services  Planned modality interventions: electrical stimulation/Russian stimulation, thermotherapy (hydrocollator packs) and traction  Other planned modality interventions: DN, light therapy  Planned therapy interventions: abdominal trunk stabilization, manual therapy, neuromuscular re-education, postural training, strengthening, stretching, therapeutic activities, home exercise program, flexibility, functional ROM exercises, body mechanics training and bed mobility training  Frequency: 1-2x week  Duration in visits: 20    Goals:   Active       PT Problem       PT Goals (Progressing)       Start:  01/22/25    Expected End:  07/01/25       Partly met 1) Indep with HEP  Partly met 2) Decrease Oswestry < 10/50 from 25/50 to indicate improved function and symptoms.  Partly met 3) Complete work day without exac in back pain.  Partly met 4) Sleep with less pain disruption.               Patient Stated Goal (Progressing)       Start:  01/22/25    Expected End:  07/01/25       1) Relief of pain  2) Relief of numbness                Recent Results (from the past 96 hour(s))   Blood Bank Cord Hold Tube    Collection Time: 18  1:30 AM   Result Value Ref Range    Extra Tube Hold for add-ons.    POC Glucose Once    Collection Time: 18  4:36 AM   Result Value Ref Range    Glucose 63 (L) 75 - 110 mg/dL   POC Glucose Once    Collection Time: 18  7:45 PM   Result Value Ref Range    Glucose 65 (L) 75 - 110 mg/dL       TCI: Risk assessment of Hyperbilirubinemia  TcB Point of Care testin.4  Calculation Age in Hours: 28  Risk Assessment of Patient is: Low risk zone     Xrays:  No orders to display         Assessment/Plan     Discharge planning     Congenital Heart Disease Screen:  Blood Pressure/O2 Saturation/Weights   Vitals (last 7 days)     Date/Time   BP   BP Location   SpO2   Weight    18 0145  65/38  Right arm  99 %  --    18 0140  64/38  Right leg  100 %  --    18 2230  --  --  --  3793 g (8 lb 5.8 oz)    18 0128  --  --  --  3851 g (8 lb 7.8 oz)    Weight: Filed from Delivery Summary at 18 0128               [place] Testing  CCHD     Car Seat Challenge Test     Hearing Screen       Screen Metabolic Screen Results:  (completed) (18 0200)       Immunization History   Administered Date(s) Administered   • Hep B, Adolescent or Pediatric 2018       Assessment and Plan       Term  delivered vaginally, current hospitalization  Assessment: Term, , AGA(BW 89% on WHO chart), GBS neg, ROM x 14 hours, MBT A pos, admission temp 101.3 but has normalized since, bottle fed, voided and stooled  Plan: Normal NB care      IDM (infant of diabetic mother)  Assessment: Maternal diet controlled GM. Baby's accucheck 63 and 65  Plan: Monitor accuchecks per unit protocol      Wendi Lucero MD  2018  12:58 PM

## 2025-05-20 ENCOUNTER — APPOINTMENT (OUTPATIENT)
Dept: INFUSION THERAPY | Facility: CLINIC | Age: 65
End: 2025-05-20
Payer: COMMERCIAL

## 2025-05-20 VITALS
SYSTOLIC BLOOD PRESSURE: 115 MMHG | BODY MASS INDEX: 38.79 KG/M2 | WEIGHT: 212.1 LBS | OXYGEN SATURATION: 98 % | TEMPERATURE: 97.7 F | RESPIRATION RATE: 18 BRPM | HEART RATE: 58 BPM | DIASTOLIC BLOOD PRESSURE: 78 MMHG

## 2025-05-20 DIAGNOSIS — M05.79 SEROPOSITIVE RHEUMATOID ARTHRITIS OF MULTIPLE JOINTS (MULTI): ICD-10-CM

## 2025-05-20 PROCEDURE — 96365 THER/PROPH/DIAG IV INF INIT: CPT | Performed by: NURSE PRACTITIONER

## 2025-05-20 RX ORDER — EPINEPHRINE 0.3 MG/.3ML
0.3 INJECTION SUBCUTANEOUS EVERY 5 MIN PRN
Status: DISCONTINUED | OUTPATIENT
Start: 2025-05-20 | End: 2025-05-20 | Stop reason: HOSPADM

## 2025-05-20 RX ORDER — EPINEPHRINE 0.3 MG/.3ML
0.3 INJECTION SUBCUTANEOUS EVERY 5 MIN PRN
OUTPATIENT
Start: 2025-06-17

## 2025-05-20 RX ORDER — DIPHENHYDRAMINE HYDROCHLORIDE 50 MG/ML
50 INJECTION, SOLUTION INTRAMUSCULAR; INTRAVENOUS AS NEEDED
Status: DISCONTINUED | OUTPATIENT
Start: 2025-05-20 | End: 2025-05-20 | Stop reason: HOSPADM

## 2025-05-20 RX ORDER — DIPHENHYDRAMINE HYDROCHLORIDE 50 MG/ML
50 INJECTION, SOLUTION INTRAMUSCULAR; INTRAVENOUS AS NEEDED
OUTPATIENT
Start: 2025-06-17

## 2025-05-20 RX ORDER — FAMOTIDINE 10 MG/ML
20 INJECTION, SOLUTION INTRAVENOUS ONCE AS NEEDED
OUTPATIENT
Start: 2025-06-17

## 2025-05-20 RX ORDER — ALBUTEROL SULFATE 0.83 MG/ML
3 SOLUTION RESPIRATORY (INHALATION) AS NEEDED
OUTPATIENT
Start: 2025-06-17

## 2025-05-20 RX ORDER — FAMOTIDINE 10 MG/ML
20 INJECTION, SOLUTION INTRAVENOUS ONCE AS NEEDED
Status: DISCONTINUED | OUTPATIENT
Start: 2025-05-20 | End: 2025-05-20 | Stop reason: HOSPADM

## 2025-05-20 RX ORDER — ALBUTEROL SULFATE 0.83 MG/ML
3 SOLUTION RESPIRATORY (INHALATION) AS NEEDED
Status: DISCONTINUED | OUTPATIENT
Start: 2025-05-20 | End: 2025-05-20 | Stop reason: HOSPADM

## 2025-05-20 ASSESSMENT — ENCOUNTER SYMPTOMS
VOMITING: 0
LIGHT-HEADEDNESS: 0
APPETITE CHANGE: 0
TROUBLE SWALLOWING: 0
FREQUENCY: 0
HEADACHES: 0
VOICE CHANGE: 0
NAUSEA: 0
MYALGIAS: 0
CHILLS: 0
DYSURIA: 0
NUMBNESS: 0
CONSTIPATION: 0
HEMATURIA: 0
EYE PROBLEMS: 0
FATIGUE: 0
SORE THROAT: 0
EXTREMITY WEAKNESS: 0
COUGH: 0
FEVER: 0
SHORTNESS OF BREATH: 0
ABDOMINAL PAIN: 0
WOUND: 0
ARTHRALGIAS: 0
DIZZINESS: 0
DIARRHEA: 0
BRUISES/BLEEDS EASILY: 0
LEG SWELLING: 0

## 2025-05-20 ASSESSMENT — PAIN SCALES - GENERAL: PAINLEVEL_OUTOF10: 4

## 2025-05-20 NOTE — PROGRESS NOTES
[facility]   Infusion Clinic Note   Date: May 20, 2025   Name: Maureen Vela  : 1960   MRN: [id number]         Reason for Visit: OP Infusion (Orencia 750 mg every 28 days)         Today: We administered abatacept (Orencia) 750 mg in sodium chloride 0.9% 100 mL IV.       Ordered By: Cathy Alexis,*       For a Diagnosis of: Seropositive rheumatoid arthritis of multiple joints (Multi)       At today's visit patient accompanied by: Self      Today's Vitals:   Vitals:    25 1400 25 1520   BP: 113/77 115/78   Pulse: 67 58   Resp: 18 18   Temp: 36.3 °C (97.3 °F) 36.5 °C (97.7 °F)   SpO2: 98% 98%   Weight: 96.2 kg (212 lb 1.6 oz)    PainSc:   4    PainLoc: Ankle              Pre - Treatment Checklist:      - Previous reaction to current treatment: no      (Assess patient for the concerns below. Document provider notification as appropriate).  - Active or recent infection with/without current antibiotic use: no  - Recent or planned invasive dental work: no  - Recent or planned surgeries: no  - Recently received or plans to receive vaccinations: no  - Has treatment related toxicities: no  - Any chance may be pregnant:  no      Pain: 4   - Is the pain different from normal: no   - Is prescribing Doctor aware:  yes      Labs: Reviewed       Fall Risk Screening: Quiroga Fall Risk  History of Falling, Immediate or Within 3 Months: No  Secondary Diagnosis: Yes  Ambulatory Aid: Walks without aid/bedrest/nurse assist  Intravenous Therapy/Heparin Lock: Yes  Gait/Transferring: Impaired   Mental Status: Oriented to own ability  Quiroga Fall Risk Score: 55       Review Of Systems:  Review of Systems   Constitutional:  Negative for appetite change, chills, fatigue and fever.   HENT:   Negative for hearing loss, mouth sores, sore throat, tinnitus, trouble swallowing and voice change.    Eyes:  Negative for eye problems.   Respiratory:  Negative for cough and shortness of breath.   "  Cardiovascular:  Negative for chest pain and leg swelling.   Gastrointestinal:  Negative for abdominal pain, constipation, diarrhea, nausea and vomiting.   Genitourinary:  Negative for dysuria, frequency and hematuria.    Musculoskeletal:  Positive for gait problem. Negative for arthralgias and myalgias.   Skin:  Negative for itching, rash and wound.   Neurological:  Positive for gait problem. Negative for dizziness, extremity weakness, headaches, light-headedness and numbness.        (+) Left ankle weight bearing hinged brace noted.  (+) left leg weight bearing.   Hematological:  Does not bruise/bleed easily.         Infusion Readiness:  - Assessment Concerns Related to Infusion: No  - Provider notified: no      New Patient Education:    N/A (returning patient for continuation of therapy. Ongoing education provided as needed.)        Treatment Conditions & Drug Specific Questions:    Abatacept  (ORENCIA)    (Unless otherwise specified on patient specific therapy plan):    TREATMENT CONDITIONS  Unless otherwise specified on patient specific therapy plan hold treatment and notify provider prior to proceeding with infusion if patient with a:  o Positive T-Spot  o Positive Hepatitis B Surface Ag   o Positive Hepatitis B Core Antibody   o Positive Hepatitis C Antibody     Lab Results   Component Value Date    TBSIN Negative 04/29/2024    TBGRES Negative  Reference range: NEGATIVE   05/26/2020      Lab Results   Component Value Date    HEPBSAG NEGATIVE 05/26/2020      Lab Results   Component Value Date    HEPCAB  05/26/2020     Negative  Reference range: NEGATIVE  Performed at the [facility] unless   otherwise noted.        No results found for: \"NONUHFIRE\", \"NONUHSWGH\", \"NONUHFISH\", \"EXTHEPBSAG\"    Patient meets treatment conditions? No    DRUG SPECIFIC QUESTIONS  Any history of COPD?  No  (If yes educate patient on possible s/s exacerbation)    If Yes any new or worsening s/s related to " COPD?  Not applicable  (If patient with worsening COPD notify prescribing provider prior to proceeding with infusion)      REMINDER:  - Pregnancy Category X Drug. Obtain negative pregnancy test prior to FIRST infusion  and educate patient on risk in women of childbearing ability.   - Weight based drug.     Recommended Vitals/Observation:  Vitals: Obtain vital signs at the start; middle and end of infusion as well as at the end of observation period: 30 minutes post.   Observation: Observe patient for 30 minutes after each infusion Observation complete.          Weight Based Drug Calculations:    WEIGHT BASED DRUGS: Abatacept  (ORENCIA)   Patient's dosing weight (kg): ranger 60 -100 kg= 750 mg         Patient's weight today:   Vitals:    25 1400   Weight: 96.2 kg (212 lb 1.6 oz)         weight range for prescribed dose: Orencia:  <60 k mg  60 to 100 k mg  >100 k g    Patient weight today falls outside of 10% variance or  weight range: No    [facility] pharmacist informed of weight variance: No    Doses that are weight based have an acceptable variance rule within 10% of the prescribed   order and/or within  weight range. If patient weight on day of infusion falls   outside of the 10% variance, or weight range, infusion is administered and   pharmacy contacted regarding future dosing adjustments, per policy.           Post Treatment: Patient tolerated treatment without issue and was discharged in no apparent distress.  RTC on 2025.      Note Authored / Patient Cared for By: Amy Benavides RN   _________________________________________________________________________    Note authored and patient cared for by: Amy Benavides RN   Note/Encounter reviewed by: Yisel MCKEON NP. This provider on site at time of patient infusion. Infusion staff to notify this provider of any questions, concerns, abnormals or issues during infusion.    Final check  of medication completed by this LINDA / or on-site pharmacist with administering nurse using positive identification prior to administration. Final appearance of product checked for accuracy and conformity to the formula of the prepared product. Assured use of correct ingredients, accurate calculations and precise measurements under appropriate conditions and procedures.    No issues reported during today's encounter. Pt. tolerated infusion without difficulty. Pt. not independently evaluated by this provider during today's encounter.  (Yisel MCKEON NP)

## 2025-05-20 NOTE — PATIENT INSTRUCTIONS
"Today :Maureen Lose \"Mendy\" had no medications administered during this visit.     For:   1. Seropositive rheumatoid arthritis of multiple joints (Multi)         Your next appointment is due in:  6/17/2025        Please read the  Medication Guide that was given to you and reviewed during todays visit.     (Tell all doctors including dentists that you are taking this medication)     Go to the emergency room or call 911 if:  -You have signs of allergic reaction:   -Rash, hives, itching.   -Swollen, blistered, peeling skin.   -Swelling of face, lips, mouth, tongue or throat.   -Tightness of chest, trouble breathing, swallowing or talking     Call your doctor:  - If IV / injection site gets red, warm, swollen, itchy or leaks fluid or pus.     (Leave dressing on your IV site for at least 2 hours and keep area clean and dry  - If you get sick or have symptoms of infection or are not feeling well for any reason.    (Wash your hands often, stay away from people who are sick)  - If you have side effects from your medication that do not go away or are bothersome.     (Refer to the teaching your nurse gave you for side effects to call your doctor about)    - Common side effects may include:  stuffy nose, headache, feeling tired, muscle aches, upset stomach  - Before receiving any vaccines     - Call [facility] at   If:  - You get sick, are on antibiotics, have had a recent vaccine, have surgery or dental work and your doctor wants your visit rescheduled.  - You need to cancel and reschedule your visit for any reason. Call at least 2 days before your visit if you need to cancel.   - Your insurance changes before your next visit.    (We will need to get approval from your new insurance. This can take up to two weeks.)     The [facility] is opened Monday thru Friday. We are closed on weekends and holidays.   Voice mail will take your call if the center is closed. If you leave a message please " allow 24 hours for a call back during weekdays. If you leave a message on a weekend/holiday, we will call you back the next business day.    A pharmacist is available Monday - Friday from 8:30AM to 3:30PM to help answer any questions you may have about your prescriptions(s). Please call pharmacy at:    [facility]: [phone]  [facility]: [phone]  [facility]: [phone]

## 2025-05-21 LAB
25(OH)D3+25(OH)D2 SERPL-MCNC: 78 NG/ML (ref 30–100)
APPEARANCE UR: CLEAR
BACTERIA #/AREA URNS HPF: ABNORMAL /HPF
BACTERIA UR CULT: ABNORMAL
BASOPHILS # BLD AUTO: 22 CELLS/UL (ref 0–200)
BASOPHILS NFR BLD AUTO: 0.4 %
BILIRUB UR QL STRIP: NEGATIVE
C1Q SERPL-MCNC: 7.7 MG/DL (ref 5–8.6)
C3 SERPL-MCNC: 136 MG/DL (ref 83–193)
C4 SERPL-MCNC: 27 MG/DL (ref 15–57)
CK SERPL-CCNC: 42 U/L (ref 20–243)
COLLAGEN CTX SERPL-MCNC: 59 PG/ML
COLOR UR: YELLOW
CRP SERPL-MCNC: <3 MG/L
DSDNA AB SER-ACNC: <1 IU/ML
EOSINOPHIL # BLD AUTO: 0 CELLS/UL (ref 15–500)
EOSINOPHIL NFR BLD AUTO: 0 %
ERYTHROCYTE [DISTWIDTH] IN BLOOD BY AUTOMATED COUNT: 13 % (ref 11–15)
ERYTHROCYTE [SEDIMENTATION RATE] IN BLOOD BY WESTERGREN METHOD: 2 MM/H
GLUCOSE UR QL STRIP: NEGATIVE
HCT VFR BLD AUTO: 43.2 % (ref 35–45)
HGB BLD-MCNC: 13.9 G/DL (ref 11.7–15.5)
HGB UR QL STRIP: NEGATIVE
HYALINE CASTS #/AREA URNS LPF: ABNORMAL /LPF
KETONES UR QL STRIP: ABNORMAL
LEUKOCYTE ESTERASE UR QL STRIP: ABNORMAL
LYMPHOCYTES # BLD AUTO: 792 CELLS/UL (ref 850–3900)
LYMPHOCYTES NFR BLD AUTO: 14.4 %
MAGNESIUM SERPL-MCNC: 2.1 MG/DL (ref 1.5–2.5)
MCH RBC QN AUTO: 31.2 PG (ref 27–33)
MCHC RBC AUTO-ENTMCNC: 32.2 G/DL (ref 32–36)
MCV RBC AUTO: 96.9 FL (ref 80–100)
MONOCYTES # BLD AUTO: 204 CELLS/UL (ref 200–950)
MONOCYTES NFR BLD AUTO: 3.7 %
NEUTROPHILS # BLD AUTO: 4483 CELLS/UL (ref 1500–7800)
NEUTROPHILS NFR BLD AUTO: 81.5 %
NITRITE UR QL STRIP: NEGATIVE
PH UR STRIP: 5.5 [PH] (ref 5–8)
PHOSPHATE SERPL-MCNC: 3.1 MG/DL (ref 2.5–4.5)
PLATELET # BLD AUTO: 194 THOUSAND/UL (ref 140–400)
PMV BLD REES-ECKER: 11.5 FL (ref 7.5–12.5)
PROT UR QL STRIP: NEGATIVE
PTH-INTACT SERPL-MCNC: 28 PG/ML (ref 16–77)
RBC # BLD AUTO: 4.46 MILLION/UL (ref 3.8–5.1)
RBC #/AREA URNS HPF: ABNORMAL /HPF
SERVICE CMNT-IMP: ABNORMAL
SP GR UR STRIP: 1.02 (ref 1–1.03)
SQUAMOUS #/AREA URNS HPF: ABNORMAL /HPF
WBC # BLD AUTO: 5.5 THOUSAND/UL (ref 3.8–10.8)
WBC #/AREA URNS HPF: ABNORMAL /HPF

## 2025-05-23 ENCOUNTER — OFFICE VISIT (OUTPATIENT)
Dept: RHEUMATOLOGY | Facility: CLINIC | Age: 65
End: 2025-05-23
Payer: COMMERCIAL

## 2025-05-23 VITALS
BODY MASS INDEX: 38.83 KG/M2 | OXYGEN SATURATION: 98 % | TEMPERATURE: 96.9 F | WEIGHT: 211 LBS | HEART RATE: 76 BPM | SYSTOLIC BLOOD PRESSURE: 154 MMHG | HEIGHT: 62 IN | DIASTOLIC BLOOD PRESSURE: 92 MMHG

## 2025-05-23 DIAGNOSIS — E55.9 VITAMIN D DEFICIENCY: ICD-10-CM

## 2025-05-23 DIAGNOSIS — M05.79 SEROPOSITIVE RHEUMATOID ARTHRITIS OF MULTIPLE JOINTS (MULTI): Primary | ICD-10-CM

## 2025-05-23 DIAGNOSIS — M85.80 OSTEOPENIA WITH HIGH RISK OF FRACTURE: ICD-10-CM

## 2025-05-23 DIAGNOSIS — R76.8 POSITIVE ANA (ANTINUCLEAR ANTIBODY): ICD-10-CM

## 2025-05-23 DIAGNOSIS — Z79.899 ENCOUNTER FOR LONG-TERM (CURRENT) USE OF MEDICATIONS: ICD-10-CM

## 2025-05-23 DIAGNOSIS — L80 VITILIGO: ICD-10-CM

## 2025-05-23 PROCEDURE — 99214 OFFICE O/P EST MOD 30 MIN: CPT | Performed by: INTERNAL MEDICINE

## 2025-05-23 PROCEDURE — 3080F DIAST BP >= 90 MM HG: CPT | Performed by: INTERNAL MEDICINE

## 2025-05-23 PROCEDURE — 3077F SYST BP >= 140 MM HG: CPT | Performed by: INTERNAL MEDICINE

## 2025-05-23 PROCEDURE — 3008F BODY MASS INDEX DOCD: CPT | Performed by: INTERNAL MEDICINE

## 2025-05-23 ASSESSMENT — COLUMBIA-SUICIDE SEVERITY RATING SCALE - C-SSRS
6. HAVE YOU EVER DONE ANYTHING, STARTED TO DO ANYTHING, OR PREPARED TO DO ANYTHING TO END YOUR LIFE?: NO
1. IN THE PAST MONTH, HAVE YOU WISHED YOU WERE DEAD OR WISHED YOU COULD GO TO SLEEP AND NOT WAKE UP?: NO
2. HAVE YOU ACTUALLY HAD ANY THOUGHTS OF KILLING YOURSELF?: NO

## 2025-05-23 ASSESSMENT — ROUTINE ASSESSMENT OF PATIENT INDEX DATA (RAPID3)
WASH_DRY_BODY: WITHOUT ANY DIFFICULTY
ON A SCALE OF ONE TO TEN, CONSIDERING ALL THE WAYS IN WHICH ILLNESS AND HEALTH CONDITIONS MAY AFFECT YOU AT THIS TIME, PLEASE INDICATE BELOW HOW YOU ARE DOING:: 4.5
ON A SCALE OF ONE TO TEN, HOW MUCH PAIN HAVE YOU HAD BECAUSE OF YOUR CONDITION OVER THE PAST WEEK?: 4.5
PICK_CLOTHES_OFF_FLOOR: WITHOUT ANY DIFFICULTY
FEELINGS_ANXIETY_NERVOUS: WITHOUT ANY DIFFICULTY
FN_SCORE: 2
ON A SCALE OF ONE TO TEN, CONSIDERING ALL THE WAYS IN WHICH ILLNESS AND HEALTH CONDITIONS MAY AFFECT YOU AT THIS TIME, PLEASE INDICATE BELOW HOW YOU ARE DOING:: 4.5
FEELINGS_DEPRESSION: WITHOUT ANY DIFFICULTY
DRESS_YOURSELF: WITH SOME DIFFICULTY
SEVERITY_SCORE: 0
IN_OUT_BED: WITHOUT ANY DIFFICULTY
WALK_KILOMETERS: WITH MUCH DIFFICULTY
WALK_FLAT_GROUND: WITH SOME DIFFICULTY
IN_OUT_TRANSPORT: WITH SOME DIFFICULTY
PARTIPATE_RECREATIONAL_ACTIVITIES: WITH SOME DIFFICULTY
SEVERITY_SCORE: MODERATE SEVERITY (MS)
TOTAL RAPID3 SCORE: 11
LIFT_CUP_TO_MOUTH: WITHOUT ANY DIFFICULTY
TURN_FAUCETS_OFF: WITHOUT ANY DIFFICULTY
GOOD_NIGHTS_SLEEP: WITH SOME DIFFICULTY
ON A SCALE OF ONE TO TEN, HOW MUCH PAIN HAVE YOU HAD BECAUSE OF YOUR CONDITION OVER THE PAST WEEK?: 4.5
WEIGHTED_TOTAL_SCORE: 3.67
SUM OF QUESTIONS A TO J: 6

## 2025-05-23 ASSESSMENT — PAIN SCALES - GENERAL: PAINLEVEL_OUTOF10: 4

## 2025-05-23 NOTE — PROGRESS NOTES
"          [facility]/  Rheumatology  [address], [address]  [place] [address]  Phone: [phone]  Fax: [phone]    Rheumatology Progress Note 05/23/2025      Maureen Vela \"Mendy\" is a 64 y.o. female here for follow up.      Last Visit: 9/6/24    Rheum Hx  Initial HPI:         Referred by Dr Thompson for eval and treat myalgia, +ALEXANDER         CC: stiff, sore joints, hands elbows, knees (both replaced), toes         Since 4 yrs or so         Slow onset         Progressive course         No precipating factor         Associated with tiredness, aches         Worse with cold or heat         3/4 hrs, sometimes all day stiffness         Currently joint pain hands, wrists, elbows, toes, ACs         Swelling of R hand, R knee         Seen previously by Dr Colby- roxanna juan seroneg RA and also with strongly +ALEXANDER         Hx of renee knee meniscal repair then bone on bone - s/p renee knee replacements; due workup by Ortho for continued issues with L knee, sometimes leeroy         Hx of lumbar spine surgery >20 yrs ago         Previous Tx:         naproxen         ibuprofen         meloxicam         acetaminophen- no help         steroids- very helpful but weight gain and mood swings         MTX- 7/2019 0% helpful d/c due to allergy to medication- severe rash and hives reaction         leflunomide for 3 yrs- about 50% helpful, currently on         No other DMARDs         ROS +         fatigue sometimes         LE swelling by end of day  callused- told by Derm to see Rheum         asthma/allergies         frequent sinus and pneumonia         GERD         diarrhea         rashes/allergies         sun sens- tiny blisters         joint pain, swelling, stiffness         back pain from bulging disc         myalgias         knees, hands, elbows weakness         numbness hands, toes, feet- intermittent, L med ankle numbness         history of low thyroid, 1/2 removed         menopause s/p uterine ablation, " hysterectomy 8/17, LMP 2/12         thyroid nodule hx w/p resection         FHx: mat aunt- RA         mother- PBC.  Component      Latest Ref Rng 5/26/2020   C-ANCA (PR3) BY EIA (Note)…    C-ANCA FLOURESCENCE Negative…    P-ANCA (MPO) BY EIA (Note)…    P-ANCA FLOURESCENCE Negative…    ANCP INTERPRETATION (Note)…    Staff Review (Note)…    IgG Cardiolipin Ab 9…    IgM Cardiolipin Ab 10…    IgA Cardiolipin Ab <9…    ALEXANDER Positive…    ALEXANDER Titer 1:160…    ALEXANDER Pattern Atypical speckled…    Beta-2 Glyco 1 IgG <9…    Beta 2 Glyco 1 IgM <9…    SSA ANTIBODIES 0.2…    SSB ANTIBODIES <0.2…    Thyroid Peroxidase (TPO) Antibody <1.0…    Beta-2 Glyco 1 IgA 3…    Citrulline Antibody, IgG 27… (H)    Centromere Ab Negative…    DAVID-1 ANTIBODY <0.2…    Rheumatoid Factor      0 - 20 IU/ML 10    RNP Antibody <0.2…    SCL-70 ABS EIA <0.2…    SM Antibody <0.2…    Anti-Thyroglobulin AB 1.5…    SUZY, Broad Spectrum Fátima Serum NEGATIVE Performed at [address] [address]      Component      Latest Ref Rng 1/2/2023   Total Protein, Spe 6.2… (L)    Albumin, SPE 3.9…    Alpha 1 Globulin 0.3…    Alpha 2 Globulin 0.7…    Beta Glob SPE 0.7…    Gamma Glob,Spe 0.7…    Protein Electrophoresis Comment NORMAL    UMPA Interpretation See Note…    UMPA Interpretation N/A    UMPA Review See Note…     Previous Tx  Sulfa allergy  Methotrexate- did not tolerate  HCQ- did not tolerate  Leflunomide- discontinued due to lack of efficacy  Enbrel- discontinued due to lack of efficacy    Health Maintenance  DXA T-1.3 (hip; 3/2024); FRAX 7.1%/0.5%  Malignancy Hx- none  Immunization History   Administered Date(s) Administered    Flu vaccine (IIV4), preservative free *Check age/dose* 12/12/2015, 09/22/2016, 11/01/2022, 10/11/2023    Flu vaccine, quadrivalent, high-dose, preservative free, age 65y+ (FLUZONE) 10/19/2020, 11/04/2021    Flu vaccine, trivalent, preservative free, age 6 months and greater (Fluarix/Fluzone/Flulaval) 10/27/2024     Influenza, injectable, quadrivalent 10/31/2017, 10/01/2018, 01/23/2020    Influenza, seasonal, injectable 10/07/2009, 10/29/2010, 10/04/2012, 08/23/2013, 09/25/2014, 11/28/2015    Moderna COVID-19 vaccine, 12 years and older (50mcg/0.5mL)(Spikevax) 12/17/2023, 10/27/2024    Moderna COVID-19 vaccine, bivalent, blue cap/gray label *Check age/dose* 11/01/2022    Moderna SARS-CoV-2 Vaccination 03/01/2021, 03/27/2021, 11/04/2021, 06/15/2022    Novel influenza-H1N1-09, preservative-free 11/05/2009    Pneumococcal polysaccharide vaccine, 23-valent, age 2 years and older (PNEUMOVAX 23) 10/31/2017    Td vaccine, age 7 years and older (TDVAX) 12/31/2008, 01/26/2018    Tdap vaccine, age 7 year and older (BOOSTRIX, ADACEL) 11/04/2021    Zoster vaccine, recombinant, adult (SHINGRIX) 01/21/2019, 05/16/2019          Past Medical History:   Diagnosis Date    AB (asthmatic bronchitis) ([facility])     Acquired immunocompromised state     Autoimmune RA, On Orencia every 28 days, followed by rheumatology    Actinic keratoses     Allergic rhinitis     Asthma     only used inhaler 4-5 times this entire year, triggers are seasonal allergies    Bilateral sacroiliitis     Chronic pain     Chronic sinusitis     DDD (degenerative disc disease), cervical     C5,6 BULGE    DDD (degenerative disc disease), lumbar     L4,5 BULGE    DJD (degenerative joint disease)     right ankle and foot    ETD (eustachian tube dysfunction)     GERD (gastroesophageal reflux disease)     H/O arthrodesis     Hiatal hernia     HTN (hypertension)     Hyperlipidemia     Hypothyroidism     Joint pain     Lateral epicondylitis of elbow     Low back pain     hronic, folowed by pain management    Low back pain     Lumbosacral disc disease     Migraine     Neuropathy     left foot N/T and hands bilateral    Osteoarthritis     Osteopenia     PONV (postoperative nausea and vomiting)     PTTD (posterior tibial tendon dysfunction)     left lower extremity    Seronegative  rheumatoid arthritis (Multi)     Skin cancer     Basal nd squamous removed    Spondylosis of 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     Vitamin D deficiency       Past Surgical History:   Procedure Laterality Date    ANKLE SURGERY Left 2024    removed bolts & bx     SECTION, LOW TRANSVERSE      x3    CHOLECYSTECTOMY  2017    LAPAROSCOPIC    COLONOSCOPY  10/16/2018    CT ANGIO CORONARY ARTERIES-W/QUANT EVAL CORONARY CALCIUM  2023    Score=0    CYST REMOVAL Left     LEFT LEG    CYST REMOVAL Left 2015    LEFT EYE (CONJUVIAL RETENTION CYSTS)    ENDOMETRIAL ABLATION  2012    EPIDURAL BLOCK INJECTION      EYE SURGERY Left 2016    LEFT EYELID    FINGER SURGERY Left 2015    PIN IN THUMB    PAT SUBTALAR ARTHRODESIS      HAND ARTHROPLASTY Left 2015    LEFT HAND [facility] LRTI    HYSTERECTOMY      INJECTION  2014    CORTISONE- LEFT THUMB    INJECTION Bilateral 10/25/2017    ONE IN EACH KNEE    JOINT REPLACEMENT Bilateral     bilateral TKR    KNEE CARTILAGE SURGERY Right 2018    TORN MENISCUS    KNEE SURGERY Right 2018    KNEE ARTHROSCOPY    KNEE SURGERY  2019    SCOPE OF LEFT KNEE    LAMINECTOMY  1996    LUMBAR    LARYNX SURGERY  2014    MEDIALIZATION    LIPOMA RESECTION  2008    BACK    OTHER SURGICAL HISTORY  2013    LUMP REMOVAL RIGHT SIDE-lipoma waistline    OTHER SURGICAL HISTORY      LEFT FOOT TENDON TRANSFER , SUBTALLAR JOINT FUSION 2023, REVISION OF FAILED JOINT FUSION 2024    THYROIDECTOMY Right 2014    RIGHT LOBE    TOTAL ABDOMINAL HYSTERECTOMY W/ BILATERAL SALPINGOOPHORECTOMY  08/10/2017    TOTAL KNEE ARTHROPLASTY Right 2019    TUBAL LIGATION        Current Outpatient Medications   Medication Sig Dispense Refill    abatacept 750 mg in sodium chloride 0.9% 70 mL IV Infuse 750 mg into a venous catheter every 28 (twenty-eight) days. 24 last dose      albuterol (ProAir HFA) 90 mcg/actuation inhaler Inhale 2  puffs every 4 hours if needed.      amLODIPine (Norvasc) 5 mg tablet TAKE 1 TABLET DAILY 90 tablet 3    amoxicillin (Amoxil) 500 mg capsule Take 4 capsules (2,000 mg) by mouth. 1 HOUR BEFORE DENTIST APPT      azelastine-fluticasone (Dymista) 137-50 mcg/spray nasal spray Administer 1 spray into each nostril 2 times a day. 3 each 3    cholecalciferol (Vitamin D-3) 50 MCG (2000 UT) tablet Take 1 tablet (2,000 Units) by mouth once daily.      cloNIDine (Catapres) 0.1 mg tablet Take 1 tablet (0.1 mg) by mouth once daily as needed (FOR BLOOD PRESSURE GREATER 140/90).      ergocalciferol (Vitamin D-2) 1.25 MG (20253 UT) capsule Take 1 capsule (1,250 mcg) by mouth 1 (one) time per week.      folic acid (Folvite) 1 mg tablet Take 1 tablet (1 mg) by mouth once daily. In the morning 90 tablet 3    leflunomide (Arava) 20 mg tablet TAKE 1 TABLET (20 MG) BY MOUTH ONCE DAILY. AFTER DINNER. 90 tablet 3    levocetirizine (Xyzal) 5 mg tablet Take 1 tablet (5 mg) by mouth once daily in the evening. 90 tablet 3    levothyroxine (Synthroid, Levoxyl) 75 mcg tablet Take 1 tablet (75 mcg) by mouth once daily. 90 tablet 1    MULTIVITAMIN ORAL Take 1 tablet by mouth once daily.      omeprazole (PriLOSEC) 20 mg DR capsule Take 1 capsule (20 mg) by mouth once daily. 90 capsule 3    propranolol LA (Inderal LA) 80 mg 24 hr capsule Take 1 capsule (80 mg) by mouth once daily. 90 capsule 3    rizatriptan (Maxalt) 10 mg tablet Take 1 tablet (10 mg) by mouth once daily as needed for migraine. 27 tablet 1    tirzepatide, weight loss, (Zepbound) 2.5 mg/0.5 mL injection Inject 2.5 mg under the skin every 7 days. 4 each 0    tiZANidine (Zanaflex) 4 mg tablet TAKE 1 TABLET (4 MG) BY MOUTH EVERY 8 HOURS AS NEEDED FOR MUSCLE SPASMS 90 tablet 0    traMADol (Ultram) 50 mg tablet Take 1 tablet (50 mg) by mouth every 12 hours if needed for severe pain (7 - 10). 60 tablet 2    triamcinolone (Kenalog) 0.1 % cream Apply 1 Application topically if needed.       No  "current facility-administered medications for this visit.      Allergies   Allergen Reactions    Doxycycline Rash     Severe rash or burn.    Oxycodone-Acetaminophen Nausea/vomiting    Sulfa (Sulfonamide Antibiotics) Headache    Sulfamethoxazole-Trimethoprim Rash    Balsam [place] Hives     Pt states in dental adhesives and make up    Clarithromycin Itching and Rash    Hydroxychloroquine Hives and Rash    Methotrexate Hives and Rash        Visit Vitals  BP (!) 154/92 (BP Location: Left arm, Patient Position: Sitting, BP Cuff Size: Adult long)   Pulse 76   Temp 36.1 °C (96.9 °F) (Temporal)   Ht 1.575 m (5' 2\")   Wt 95.7 kg (211 lb)   SpO2 98%   BMI 38.59 kg/m²   OB Status Hysterectomy   Smoking Status Never   BSA 2.05 m²            Rapid 3  Function Score (FN): 2  Pain Score (PN) (0-10): 4.5  Patient Global (PTGL) (0-10): 4.5  Rapid3 Score: 11  RAPID3 Weighted Score: 3.67       Workup    Component      Latest Ref [facility] 12/31/2024   WBC      4.4 - 11.3 x10*3/uL 4.5    nRBC      0.0 - 0.0 /100 WBCs 0.0    RBC      4.00 - 5.20 x10*6/uL 4.62    HEMOGLOBIN      12.0 - 16.0 g/dL 14.4    HEMATOCRIT      36.0 - 46.0 % 43.1    MCV      80 - 100 fL 93    MCH      26.0 - 34.0 pg 31.2    MCHC      32.0 - 36.0 g/dL 33.4    RED CELL DISTRIBUTION WIDTH      11.5 - 14.5 % 13.2    Platelets      150 - 450 x10*3/uL 209    Neutrophils %      40.0 - 80.0 % 60.4    Immature Granulocytes %, Automated      0.0 - 0.9 % 0.2    Lymphocytes %      13.0 - 44.0 % 26.2    Monocytes %      2.0 - 10.0 % 9.5    Eosinophils %      0.0 - 6.0 % 2.6    Basophils %      0.0 - 2.0 % 1.1    Neutrophils Absolute      1.20 - 7.70 x10*3/uL 2.74    Immature Granulocytes Absolute, Automated      0.00 - 0.70 x10*3/uL 0.01    Lymphocytes Absolute      1.20 - 4.80 x10*3/uL 1.19 (L)    Monocytes Absolute      0.10 - 1.00 x10*3/uL 0.43    Eosinophils Absolute      0.00 - 0.70 x10*3/uL 0.12    Basophils Absolute      0.00 - 0.10 x10*3/uL 0.05    GLUCOSE      74 - 99 mg/dL " 95    SODIUM      136 - 145 mmol/L 141    POTASSIUM      3.5 - 5.3 mmol/L 4.1    CHLORIDE      98 - 107 mmol/L 107    Bicarbonate      21 - 32 mmol/L 30    Anion Gap      10 - 20 mmol/L 8 (L)    Blood Urea Nitrogen      6 - 23 mg/dL 12    Creatinine      0.50 - 1.05 mg/dL 0.63    EGFR      >60 mL/min/1.73m*2 >90    Calcium      8.6 - 10.3 mg/dL 8.9    Albumin      3.4 - 5.0 g/dL 4.2    Alkaline Phosphatase      33 - 136 U/L 58    Total Protein      6.4 - 8.2 g/dL 6.2 (L)    AST      9 - 39 U/L 22    Bilirubin Total      0.0 - 1.2 mg/dL 0.4    ALT      7 - 45 U/L 41    Color, Urine      Light-Yellow, Yellow, Dark-Yellow  Yellow    Appearance, Urine      Clear  Clear    Specific Gravity, Urine      1.005 - 1.035  1.023    pH, Urine      5.0, 5.5, 6.0, 6.5, 7.0, 7.5, 8.0  6.0    Protein, Urine      NEGATIVE, 10 (TRACE), 20 (TRACE) mg/dL NEGATIVE    Glucose, Urine      Normal mg/dL Normal    Blood, Urine      NEGATIVE  NEGATIVE    Ketones, Urine      NEGATIVE mg/dL NEGATIVE    Bilirubin, Urine      NEGATIVE  NEGATIVE    Urobilinogen, Urine      Normal mg/dL Normal    Nitrite, Urine      NEGATIVE  NEGATIVE    Leukocyte Esterase, Urine      NEGATIVE  NEGATIVE    C-Reactive Protein      <1.00 mg/dL <0.10    Sed Rate      0 - 30 mm/h 3    C3 Complement      87 - 200 mg/dL 138    Vitamin D, 25-Hydroxy, Total      30 - 100 ng/mL 98    Creatine Kinase      0 - 215 U/L 42    C1Q Complement      10.3 - 20.5 mg/dL 16.0    Anti-DNA (DS)      <5.0 IU/mL <1.0    C4 Complement      10 - 50 mg/dL 37    URIC ACID      2.3 - 6.7 mg/dL 3.4    Extra Tube Hold for add-ons.       Assessment/Plan  No diagnosis found.       No orders of the defined types were placed in this encounter.       Previously, adherent and tolerating Orencia 750 mg.  Last infusion   Tendon inflammation,  Subatlar joint fusion end of June 2023. Also failed  Bone stim- still has it.   Broke R 5th toe/   Feb 7 th surgery  including cadaver bone. Hardware taken out and  new screws placed front to back.  Ingrown toe nail tx and cleared with doxycycline.   For follow up testiong next week.  L ankle still healing. Hands are very sore and stiff.   PT for both shoulders after fall. Checking when to start back on meds.   N ablation for back worked. Tramadol helpful.   Gabapentin for the sural nerve shooting pain which has eased off.       Since last appt, adherent and tolerating Orencia, some noted help but not yet like it was when she was on it. More joint pain with the cold.   Re foot  Bone used for bx was the one that came out with srew- existing bone.  No infection  Had bone plugs from hip for graft with cadaver bone and paste for graft.    1st XR sw little bit bone growth  Last one about 4 wks not as excited  Non wt bearing yet  Swelling there but not as bad  Told likely needs repeat ablation for back pain  Improving on Orencia  Cold issues with increased aches and raynaud's.  Was taking ASA 81 mg utnil she was started on Lovenox Ppx.  Denies any recent or current infection.  Not on any NSAIDs or glucocorticoids.  ROS+ for fatigue, GERD, rashes, joint pain/swelling/stiffness, back pain, raynaud's without pitting/ulceration, weakness left leg, numbness/tingling  Rapid 3 consistent with moderate severity.  Labs reviewed  D/w pt tx options and decided on   Continue Orencia  Increase leflunomide to 20 mg daily.  Recommend osteoporosis tx to help with bone healing.  Reclast ordered. Was previously ordered but not covered. Has new insurance now.   Recommend calcium 1200 mg daily, preferably from diet, and vit D at least 1000 units daily.   Advised of possible side effects and importance of monitoring.   All questions answered.  Patient to follow up with primary care provider regarding all other medical issues not addressed today and for medical chart updating.       Hips and back arthritis issues.  Hands with cold weather hurts  Just had infusion this week. Feels a marked differente at  end of infusion cycle  L 2nd digit pop and now has a bump with sterin wheel for past month  Taking ASA 81 mg   Compob melisa prevertn pna to treat allergyes was working well and now insuranc is not covering them.      Cathy Vargas MD      Patient Care Team:  Florentino Thompson MD as PCP - General (Family Medicine)  Cathy Vargas MD as Consulting Physician (Rheumatology)

## 2025-06-02 ENCOUNTER — APPOINTMENT (OUTPATIENT)
Dept: ORTHOPEDIC SURGERY | Facility: CLINIC | Age: 65
End: 2025-06-02
Payer: COMMERCIAL

## 2025-06-06 ENCOUNTER — HOSPITAL ENCOUNTER (OUTPATIENT)
Dept: RADIOLOGY | Facility: CLINIC | Age: 65
Discharge: HOME | End: 2025-06-06
Payer: COMMERCIAL

## 2025-06-06 ENCOUNTER — OFFICE VISIT (OUTPATIENT)
Dept: ORTHOPEDIC SURGERY | Facility: CLINIC | Age: 65
End: 2025-06-06
Payer: COMMERCIAL

## 2025-06-06 DIAGNOSIS — M25.572 CHRONIC PAIN OF LEFT ANKLE: ICD-10-CM

## 2025-06-06 DIAGNOSIS — M96.0 NONUNION OF SUBTALAR ARTHRODESIS: ICD-10-CM

## 2025-06-06 DIAGNOSIS — G89.29 CHRONIC PAIN OF LEFT ANKLE: ICD-10-CM

## 2025-06-06 PROCEDURE — 73610 X-RAY EXAM OF ANKLE: CPT | Mod: LT

## 2025-06-06 PROCEDURE — 99212 OFFICE O/P EST SF 10 MIN: CPT | Mod: 25 | Performed by: STUDENT IN AN ORGANIZED HEALTH CARE EDUCATION/TRAINING PROGRAM

## 2025-06-06 PROCEDURE — 20605 DRAIN/INJ JOINT/BURSA W/O US: CPT | Performed by: STUDENT IN AN ORGANIZED HEALTH CARE EDUCATION/TRAINING PROGRAM

## 2025-06-06 PROCEDURE — 20605 DRAIN/INJ JOINT/BURSA W/O US: CPT | Mod: LT | Performed by: STUDENT IN AN ORGANIZED HEALTH CARE EDUCATION/TRAINING PROGRAM

## 2025-06-06 PROCEDURE — 99214 OFFICE O/P EST MOD 30 MIN: CPT | Performed by: STUDENT IN AN ORGANIZED HEALTH CARE EDUCATION/TRAINING PROGRAM

## 2025-06-06 PROCEDURE — 73610 X-RAY EXAM OF ANKLE: CPT | Mod: LEFT SIDE | Performed by: RADIOLOGY

## 2025-06-06 PROCEDURE — 2500000004 HC RX 250 GENERAL PHARMACY W/ HCPCS (ALT 636 FOR OP/ED): Performed by: STUDENT IN AN ORGANIZED HEALTH CARE EDUCATION/TRAINING PROGRAM

## 2025-06-06 RX ORDER — TRIAMCINOLONE ACETONIDE 40 MG/ML
40 INJECTION, SUSPENSION INTRA-ARTICULAR; INTRAMUSCULAR
Status: COMPLETED | OUTPATIENT
Start: 2025-06-06 | End: 2025-06-06

## 2025-06-06 RX ORDER — LIDOCAINE HYDROCHLORIDE 10 MG/ML
1 INJECTION, SOLUTION INFILTRATION; PERINEURAL
Status: COMPLETED | OUTPATIENT
Start: 2025-06-06 | End: 2025-06-06

## 2025-06-06 RX ADMIN — TRIAMCINOLONE ACETONIDE 40 MG: 200 INJECTION, SUSPENSION INTRA-ARTICULAR; INTRAMUSCULAR at 13:01

## 2025-06-06 RX ADMIN — LIDOCAINE HYDROCHLORIDE 1 ML: 10 INJECTION, SOLUTION INFILTRATION; PERINEURAL at 13:01

## 2025-06-06 ASSESSMENT — PAIN DESCRIPTION - DESCRIPTORS: DESCRIPTORS: ACHING;DISCOMFORT;SORE

## 2025-06-06 ASSESSMENT — PAIN SCALES - GENERAL: PAINLEVEL_OUTOF10: 4

## 2025-06-06 ASSESSMENT — PAIN - FUNCTIONAL ASSESSMENT: PAIN_FUNCTIONAL_ASSESSMENT: 0-10

## 2025-06-06 NOTE — PROGRESS NOTES
"ORTHOPAEDIC SURGERY PROGRESS NOTE    Chief Complaint:  Left foot pain    History Of Present Illness  Maureen Vela \"Mendy\" is a 64 y.o. female who presents for complex evaluation of left foot pain, self-referred.  Patient has previously undergone 3 surgeries with an [facility] podiatrist.  The first was a tendon transfer of which the details are unclear at this time, the second was a left subtalar joint arthrodesis from 06/28/2023 followed by a revision subtalar joint arthrodesis from 02/07/2024.  The patient continues with severe pain that is activity dependent.  Typically rated as 5-9 out of 10.  She has been ambulating with use of a boot and crutch.  By report, the patient's previous provider has stopped doing surgery due to a medical condition.  She was quite happy with her previous practice, however sought an orthopedic surgery perspective.  Prior to her index surgery she reported pain on the outside of her foot that radiated to the top.  This is yet unresolved.  Patient works as a  and is on her feet extensively throughout the day.  She denies any fevers, chills or night sweats but does report paresthesias on the outside of her foot.    Patient has a past medical history of rheumatoid arthritis, she has been off of her rheumatologic medications from the winter 2023.  She has been using a bone stimulator.    07/12/2024: Patient returns for follow-up of her left foot pain as above.  She continues with severe pain in her left foot and ankle.  Rated as 8 out of 10.  Pain is improved with boot wear.  She has been ambulating with use of crutches.  She is reporting a burning pain on the lateral side of her foot, this is unchanged from her previous operations.  Patient also notes discomfort when walking on the back of her heel from a prominent screw head.    09/06/2024: Patient returns s/p left foot hardware removal and bone biopsy from 08/26/2024.  Patient's intraoperative cultures were negative " but biopsy of the talus was consistent with osteomyelitis.  She has been compliant with nonweightbearing restrictions and reporting 2 out of 10 pain.  She has been following closely with her rheumatologist.    09/20/2024: Patient returns s/p left foot hardware removal and bone biopsy from 08/26/2024.  She is currently reporting 3 out of 10 pain.  She has been compliant with nonweightbearing restrictions in a walking boot and crutches.  She has follow-up with ID who did not feel that her clinical situation and pathology was consistent with osteomyelitis requiring formal IV antibiotics.  She has been restarted on her RA medications.    10/18/2024: Patient returns s/p left foot hardware removal and bone biopsy from 08/26/2024.  She is reporting 5 out of 10 pain.  She has continued to protect her weightbearing with a walking boot and crutches.  She has had a drug rash/response to doxycycline and underwent a biopsy for this.    12/06/2024: Patient returns s/p left revision bone block ICBG subtalar arthrodesis with BMAC from 11/19/2024.  Patient has been compliant with nonweightbearing restrictions.  She is reporting similar swelling to her preoperative state.  Pain continues as 5 out of 10.  She is present with her  today.    12/26/2024: Patient returns s/p left revision bone block ICBG subtalar arthrodesis with BMAC from 11/19/2024.  Patient has been compliant with nonweightbearing restrictions.  She is reporting 4 out of 10 pain today that is improving overall.  She is present with her  today.  Unfortunately her previous bone stimulator has timed out.  She has not had any fevers, chills or night sweats.    01/23/2025: Patient returns s/p left revision bone block ICBG subtalar arthrodesis with BMAC from 11/19/2024.  Patient continues with 3 out of 10 pain that is relatively unchanged.  She has had more sural neuralgia type pain.  She has continued to be compliant with nonweightbearing restrictions.  She  has put some weight down for transfers and has noticed improved pain with ankle motion.    02/21/2025: Patient returns s/p left revision bone block ICBG subtalar arthrodesis with BMAC from 11/19/2024.  Patient continues to report improved pain as compared to her preoperative state.  She has somewhat plateaued with respect to her stamina and recovery and continues to have difficulty with prolonged pain but no obvious increase in pain or swelling.  She has undergone a recent ablation procedure and has been in physical therapy for her back.    04/04/2025:  Patient returns s/p left revision bone block ICBG subtalar arthrodesis with BMAC from 11/19/2024.  Patient has been reporting worsening pain overall since initiating weightbearing.  Typical pain is now reported to 7 out of 10.  She has been ambulating with use of walking boot and scooter, particularly while at work.  She reports that she does not trust her ankle.    06/06/2025:  Patient returns s/p left revision bone block ICBG subtalar arthrodesis with BMAC from 11/19/2024.  Patient is reporting 4 out of 10 pain.  This is vastly improved as compared to before surgery.  She does notice worsening pain with standing as well as persistent swelling.  She has continued in physical therapy and follows with pain management.  She has been ambulating with use of a TwitJump brace.  This has been helpful.    Past Medical History  Past Medical History:   Diagnosis Date    AB (asthmatic bronchitis) ([facility])     Acquired immunocompromised state     Autoimmune RA, On Orencia every 28 days, followed by rheumatology    Actinic keratoses     Allergic rhinitis     Asthma     only used inhaler 4-5 times this entire year, triggers are seasonal allergies    Bilateral sacroiliitis     Chronic pain     Chronic sinusitis     DDD (degenerative disc disease), cervical     C5,6 BULGE    DDD (degenerative disc disease), lumbar     L4,5 BULGE    DJD (degenerative joint disease)     right ankle and  foot    ETD (eustachian tube dysfunction)     GERD (gastroesophageal reflux disease)     H/O arthrodesis     Hiatal hernia     HTN (hypertension)     Hyperlipidemia     Hypothyroidism     Joint pain     Lateral epicondylitis of elbow     Low back pain     hronic, folowed by pain management    Low back pain     Lumbosacral disc disease     Migraine     Neuropathy     left foot N/T and hands bilateral    Osteoarthritis     Osteopenia     PONV (postoperative nausea and vomiting)     PTTD (posterior tibial tendon dysfunction)     left lower extremity    Seronegative rheumatoid arthritis (Multi)     Skin cancer     Basal nd squamous removed    Spondylosis of 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     Vitamin D deficiency        Surgical History  Recent Surgeries in Orthopaedic Surgery            No cases to display             Social History  Social History     Socioeconomic History    Marital status:    Tobacco Use    Smoking status: Never    Smokeless tobacco: Never   Vaping Use    Vaping status: Never Used   Substance and Sexual Activity    Alcohol use: Not Currently     Comment: RARELY    Drug use: Never    Sexual activity: Defer     Partners: Male     Birth control/protection: Post-menopausal, Female Sterilization       Family History  Family History   Problem Relation Name Age of Onset    Arthritis Mother Mom     Alzheimer's disease Father Dad     Breast cancer Sister Sima 42    Alzheimer's disease Sister Joselyn     Breast cancer Mother's Sister  63    Stroke Maternal Grandfather Grandpa         Allergies  Allergies   Allergen Reactions    Doxycycline Rash     Severe rash or burn.    Oxycodone-Acetaminophen Nausea/vomiting    Sulfa (Sulfonamide Antibiotics) Headache    Sulfamethoxazole-Trimethoprim Rash    Balsam [place] Hives     Pt states in dental adhesives and make up    Clarithromycin Itching and Rash    Hydroxychloroquine Hives and Rash    Methotrexate Hives and Rash       Review of  Systems  REVIEW OF SYSTEMS  Constitutional: no unplanned weight loss  Psychiatric: no suicidal ideation  ENT: no vision changes, no sinus problems  Pulmonary: no shortness of breath  Lymphatic: no enlarged lymph nodes  Cardiovascular: no chest pain or shortness of breath  Gastrointestinal: no stomach problems  Genitourinary: no dysuria   Skin: no rashes  Endocrine: no thyroid problems  Neurological: no headache, no numbness  Hematological: no easy bruising  Musculoskeletal: Left foot pain    Physical Exam  PHYSICAL EXAMINATION  Constitutional Exam: well developed and well nourished  Psychiatric Exam: alert and oriented, appropriate mood and behavior  Eye Exam: EOMI  Pulmonary Exam: breathing non-labored, no apparent distress  Lymphatic exam: no appreciable lymphadenopathy in the lower extremities  Cardiovascular exam: RRR to peripheral palpation, DP pulses 2+, PT 2+, toes are pink with good capillary refill, no pitting edema  Skin exam: no open lesions, rashes, abrasions or ulcerations  Neurological exam: sensation to light touch intact in both lower extremities in peripheral and dermatomal distributions (except for any abnormalities noted in musculoskeletal exam)    Musculoskeletal exam: Left lower extremity examination.  Patient surgical incisions continue to be well-healed.  Patient continues with hypersensitivity to light touch in a sural nerve distribution, unchanged from prior to surgery.  Patient pain localized to the anterolateral ankle as well as the tibiotalar joint with suspected effusion.  Patient has supple ankle range of motion with pain, no obvious crepitus. Patient has pain-free and restricted subtalar joint range of motion. Patient has sensation intact light touch grossly in a saphenous, sural (reporting neuritic type symptoms with positive tinel's baseline for patient from prior surgery), superficial peroneal, deep peroneal and tibial nerve distribution.  She has intact PF/DF/EHL.  She has 2+ DP/PT  "pulse palpated.    Last Recorded Vitals  There were no vitals taken for this visit.    Laboratory Results    No results found. However, due to the size of the patient record, not all encounters were searched. Please check Results Review for a complete set of results.    Radiology Results   X-ray imaging 3 view weightbearing right ankle reviewed and independently evaluated by me on 06/06/2025 demonstrates maintained alignment following subtalar arthrodesis with distraction bone block and cannulated screw fixation, continued note of interval consolidation, the proximal and distal junctions of the intercalary allograft are notably less visible by comparison the prior imaging.  There are at least mild degenerative changes of the tibiotalar joint, in particular narrowing at the lateral gutter.    Patient ID: Maureen Vela \"Mendy\" is a 64 y.o. female.    M Inj/Asp on 6/6/2025 1:01 PM  Medications: 1 mL lidocaine 10 mg/mL (1 %); 40 mg triamcinolone acetonide 40 mg/mL    I reviewed the risks and benefits of left tibiotalar corticosteroid injection with the patient.  Risks including pain, bleeding, infection, hypopigmentation of the skin, loss of subcutaneous fat, metabolic complications and possibility that further injections may not be effective.  The patient gave informed consent for the procedure.       A time-out was called and confirmed identifying the left tibiotalar joint as the site of injection.  Sterile skin preparation was made over the left tibiotalar joint just medial to the palpable TA tendon and the soft spot.  Then, using sterile technique, the left tibiotalar joint to was injected with 2 cc of 1% lidocaine and 1cc of kenalog 40 mg with no complications.   The patient was given post-procedure instructions and precautions.  I personally performed the procedure.              Assessment/Plan:  64-year-old female who presents for complex evaluation of painful and multiply revised left subtalar arthrodesis by [facility] " podiatrist with sural nerve neuritis and anterior ankle impingement s/p L HARSHAL and bone biopsy from 08/26/2024 s/p left revision bone block ICBG subtalar arthrodesis with BMAC from 11/19/2024 who presents with continued clinical and radiographic evidence of healing including prior CT demonstrating incorporation who presents with suspected ankle arthritis related pain.  I would recommend the patient continue weightbearing to her tolerance in her left lower extremity.  She may continue with her brace wear.  We discussed the diagnostic and therapeutic benefits of a left tibiotalar corticosteroid injection, please see my separate procedure note.  The patient did report some pain relief with the procedure and was able to ambulate more comfortably in the examination room.  I have asked the patient to keep careful account of her symptoms following injection.  I would plan to see the patient back in 3 months to follow clinical course.  I reviewed that if she were to fail an exhaustive and appropriate course of nonoperative treatment that she may consider left ankle arthroscopy with debridement.  Upon return patient will require further imaging unless symptoms would require, would anticipate obtaining new imaging at 1 year follow-up.    Marlon Stafford MD, GLENN  Department of Orthopaedic Surgery  [facility]    The diagnosis and treatment plan were reviewed with the patient. All questions were answered. The patient verbalized understanding of the treatment plan. There were no barriers to understanding identified.    Note dictated with St[employer] software.  Completed without full type editing and sent to avoid delay.

## 2025-06-12 ENCOUNTER — TREATMENT (OUTPATIENT)
Facility: CLINIC | Age: 65
End: 2025-06-12
Payer: COMMERCIAL

## 2025-06-12 DIAGNOSIS — M47.817 LUMBOSACRAL SPONDYLOSIS WITHOUT MYELOPATHY: Primary | ICD-10-CM

## 2025-06-12 DIAGNOSIS — M46.1 SACROILIITIS: ICD-10-CM

## 2025-06-12 PROCEDURE — 97112 NEUROMUSCULAR REEDUCATION: CPT | Mod: GP,CQ

## 2025-06-12 PROCEDURE — 97110 THERAPEUTIC EXERCISES: CPT | Mod: GP,CQ

## 2025-06-12 ASSESSMENT — PAIN - FUNCTIONAL ASSESSMENT: PAIN_FUNCTIONAL_ASSESSMENT: 0-10

## 2025-06-12 ASSESSMENT — PAIN SCALES - GENERAL: PAINLEVEL_OUTOF10: 2

## 2025-06-12 NOTE — PROGRESS NOTES
Physical Therapy Treatment    Patient Name: Maureen Vela  MRN: [id number]  Encounter date: 6/12/2025 PT Received On: 06/12/25  Time Calculation  Start Time: 1212  Stop Time: 1253  Time Calculation (min): 41 min  PT Therapeutic Procedures Time Entry  Therapeutic Exercise Time Entry: 30  Neuromuscular Re-Education Time Entry: 11    Visit # 19 of 20  Visits/Dates Authorized: 2025:  [facility] SUREST - NO AUTH / $55 COPAY / OOP $5500 / 60V max - 0 used / Per [email or web address] chat, ref: [id number] / ds 1/22/25.     Current Problem:   Problem List Items Addressed This Visit           ICD-10-CM    Lumbosacral spondylosis without myelopathy - Primary M47.817    Sacroiliitis M46.1     Precautions:    (L foot WBAT, progress from boot to shoe as tolerated as of 2/21/25)   osteopenia, skin CA, RA    Subjective   General: more bone growth in one part of foot but the other spot is not doing well  Doing cortisone injections for now.   Back is doing ok see pain management next week       Pre-Treatment Symptoms:   Pain Assessment: 0-10  0-10 (Numeric) Pain Score: 2  Pain Location: Foot    Objective   Findings:   Shoe donned L LE with TayCo over shoe hinge brace, ambulates with cane, WBAT L LE, asymmetrical gait, decreased L stance           Treatments:   Therapeutic Exercise 86633:   Nustep L6 x 7 minutes  Leg curl 40# 2x12  Shuttle leg press DL 75-87# 3x10 plus 10, SL L 37# right and left 20 each  FT resisted gait  7.5# x2 ea fwd only  Standing hip abduction    Deferred:  Tball bridge 2x10  Tball LTR x 12  Fall out 10x each side  Habd blue 20x  Bridge with foot flat and toes up DF  5x each  Sit to stand 10x trial of staggered 5x each  Ankle AROM DF/PF x 20  4 way ankle orange x15 ea , light green x15 ea  Standing hip abduction 1x10 , lime green 1x10 R/L   Standing hip extension 1x10 , lime green 1x10  R/L   Ankle AROM Ever/Inver x20  PPT with alt. LE hip flexion small ROM (march) 2x10 reps  Sciatic n. Floss x 15  Leg lengthener supine  Prone hip  "IR/ER  Prone hip ext   Sidely A-P hip taps      Neuromuscular Re-Ed 67445:   Rocker board AP static and shift 1 min  RB M/L balance 1 min DNP keep eversion at minimum  Step tap solid (deferred foam) to 6in  Tandem walk  Step tap step    Deferred:  Modified SLS airex with finger tip support R/L  Foam Weight shifting - lateral and AP   staggered stances foam  DLS on foam w/ banded row red x 15, L/R arm row red x 12 each      Manual Therapy 53203:  Deferred:  MET L anterior innominate, neutral pelvis achieved  Supine effleurage massage to L ankle/foot for swelling  IASTM LPS R sidely  scar massage to scar and dynamic cupping along scar.    Ther-Act:   Deferred due to skin healing  KT tape L ankle M-L \"U\" strip , and 3 finger strip along lateral and medial ankle for swelling    Modalities:  Deferred:  Unattended e-stim: IFC: applied to L ankle quad polar, Intensity to tolerance, for 12 minutes, in Supine , with wedge bilateral feet elevated      HEP / Access Codes:   Access Code: [email or web address]   Date: 01/22/2025  - Supine Posterior Pelvic Tilt  - 2-3 x daily - 3 sets - 10-30 reps - 0-5 hold  - Seated Cat Cow  - 2-3 x daily - 3 sets - 10-30 reps - 0-5 hold  - Child's Pose with Ball: Center and to Each Side  - 2-3 x daily - 10-30 reps - 0-5 hold  Access Code: [email or web address]   Date: 02/05/2025  - Supine Lower Trunk Rotation  - 1-3 x daily - 1 sets - 10 reps  - Hooklying Clamshell with Resistance  - 1-2 x daily - 2 sets - 10 reps - 5 sec hold  - Supine Hip Adduction Isometric with Ball  - 1-2 x daily - 2 sets - 10 reps - 5 sec hold  - Supine Bridge with Pelvic Floor Contraction on Swiss Ball  - 1-2 x daily - 1-2 sets - 10 reps  - Supine Knees to Chest with Swiss Ball  - 1-2 x daily - 2 sets - 10 reps  Access Code: [email or web address] URL: [email or web address] Date: 02/10/2025  Prepared by: Satish Luke  - Supine Single Knee to Chest Stretch  - 1-2 x daily - 6 x weekly - 3 reps - 20 hold  - Supine 90/90 Alternating Toe Touch  - 1-2 x " daily - 6 x weekly - 2 sets - 10 reps    Assessment: Has reassessment next session.  Patient has some traveling coming up and would like to be able to get rid of cane for good. Wgt shifting can still be uncomfortable. Back has been doing better       Post-Treatment Symptoms:        Plan:    Advance to ability  Therapy options: will be seen for skilled physical therapy services  Planned modality interventions: electrical stimulation/Russian stimulation, thermotherapy (hydrocollator packs) and traction  Other planned modality interventions: DN, light therapy  Planned therapy interventions: abdominal trunk stabilization, manual therapy, neuromuscular re-education, postural training, strengthening, stretching, therapeutic activities, home exercise program, flexibility, functional ROM exercises, body mechanics training and bed mobility training  Frequency: 1-2x week  Duration in visits: 20    Goals:   Active       PT Problem       PT Goals (Progressing)       Start:  01/22/25    Expected End:  07/01/25       Partly met 1) Indep with HEP  Partly met 2) Decrease Oswestry < 10/50 from 25/50 to indicate improved function and symptoms.  Partly met 3) Complete work day without exac in back pain.  Partly met 4) Sleep with less pain disruption.               Patient Stated Goal (Progressing)       Start:  01/22/25    Expected End:  07/01/25       1) Relief of pain  2) Relief of numbness

## 2025-06-17 ENCOUNTER — APPOINTMENT (OUTPATIENT)
Dept: INFUSION THERAPY | Facility: CLINIC | Age: 65
End: 2025-06-17
Payer: COMMERCIAL

## 2025-06-17 VITALS
OXYGEN SATURATION: 97 % | HEART RATE: 71 BPM | DIASTOLIC BLOOD PRESSURE: 77 MMHG | BODY MASS INDEX: 39.01 KG/M2 | WEIGHT: 213.3 LBS | RESPIRATION RATE: 18 BRPM | SYSTOLIC BLOOD PRESSURE: 123 MMHG | TEMPERATURE: 97.3 F

## 2025-06-17 DIAGNOSIS — M05.79 SEROPOSITIVE RHEUMATOID ARTHRITIS OF MULTIPLE JOINTS (MULTI): ICD-10-CM

## 2025-06-17 PROCEDURE — 96365 THER/PROPH/DIAG IV INF INIT: CPT | Performed by: NURSE PRACTITIONER

## 2025-06-17 RX ORDER — FAMOTIDINE 10 MG/ML
20 INJECTION, SOLUTION INTRAVENOUS ONCE AS NEEDED
OUTPATIENT
Start: 2025-07-15

## 2025-06-17 RX ORDER — ALBUTEROL SULFATE 0.83 MG/ML
3 SOLUTION RESPIRATORY (INHALATION) AS NEEDED
OUTPATIENT
Start: 2025-07-15

## 2025-06-17 RX ORDER — DIPHENHYDRAMINE HYDROCHLORIDE 50 MG/ML
50 INJECTION, SOLUTION INTRAMUSCULAR; INTRAVENOUS AS NEEDED
OUTPATIENT
Start: 2025-07-15

## 2025-06-17 RX ORDER — FAMOTIDINE 10 MG/ML
20 INJECTION, SOLUTION INTRAVENOUS ONCE AS NEEDED
Status: DISCONTINUED | OUTPATIENT
Start: 2025-06-17 | End: 2025-06-17 | Stop reason: HOSPADM

## 2025-06-17 RX ORDER — EPINEPHRINE 0.3 MG/.3ML
0.3 INJECTION SUBCUTANEOUS EVERY 5 MIN PRN
OUTPATIENT
Start: 2025-07-15

## 2025-06-17 RX ORDER — ALBUTEROL SULFATE 0.83 MG/ML
3 SOLUTION RESPIRATORY (INHALATION) AS NEEDED
Status: DISCONTINUED | OUTPATIENT
Start: 2025-06-17 | End: 2025-06-17 | Stop reason: HOSPADM

## 2025-06-17 RX ORDER — DIPHENHYDRAMINE HYDROCHLORIDE 50 MG/ML
50 INJECTION, SOLUTION INTRAMUSCULAR; INTRAVENOUS AS NEEDED
Status: DISCONTINUED | OUTPATIENT
Start: 2025-06-17 | End: 2025-06-17 | Stop reason: HOSPADM

## 2025-06-17 RX ORDER — EPINEPHRINE 0.3 MG/.3ML
0.3 INJECTION SUBCUTANEOUS EVERY 5 MIN PRN
Status: DISCONTINUED | OUTPATIENT
Start: 2025-06-17 | End: 2025-06-17 | Stop reason: HOSPADM

## 2025-06-17 ASSESSMENT — ENCOUNTER SYMPTOMS
NUMBNESS: 0
MYALGIAS: 0
ARTHRALGIAS: 0
DIARRHEA: 0
COUGH: 0
HEADACHES: 0
FATIGUE: 0
LEG SWELLING: 0
DIZZINESS: 0
CHILLS: 0
FEVER: 0
EYE PROBLEMS: 0
LIGHT-HEADEDNESS: 0
VOMITING: 0
NAUSEA: 0
CONSTIPATION: 0
PALPITATIONS: 0
ABDOMINAL PAIN: 0
DYSURIA: 0
WHEEZING: 0
SHORTNESS OF BREATH: 0
FREQUENCY: 0
EXTREMITY WEAKNESS: 0
BRUISES/BLEEDS EASILY: 0
APPETITE CHANGE: 0
HEMATURIA: 0
BLOOD IN STOOL: 0
WOUND: 0

## 2025-06-17 ASSESSMENT — PAIN SCALES - GENERAL: PAINLEVEL_OUTOF10: 4

## 2025-06-17 NOTE — PROGRESS NOTES
[facility]   Infusion Clinic Note   Date: 2025   Name: Maureen Vela  : 1960   MRN: [id number]         Reason for Visit: OP Infusion (Orencia 750 mg every 28 days)         Today: We administered abatacept (Orencia) 750 mg in sodium chloride 0.9% 100 mL IV.       Ordered By: Cathy Alexis,*       For a Diagnosis of: Seropositive rheumatoid arthritis of multiple joints (Multi)       At today's visit patient accompanied by: Self      Today's Vitals:   Vitals:    25 1423 25 1507 25 1540   BP: 157/89 123/68 123/77   Pulse: 82 76 71   Resp: 18 18 18   Temp: 36.2 °C (97.2 °F) 36.3 °C (97.4 °F) 36.3 °C (97.3 °F)   SpO2: 98% 97% 97%   Weight: 96.8 kg (213 lb 4.8 oz)     PainSc:   4     PainLoc: Ankle               Pre - Treatment Checklist:      - Previous reaction to current treatment: no      (Assess patient for the concerns below. Document provider notification as appropriate).  - Active or recent infection with/without current antibiotic use: no  - Recent or planned invasive dental work: no  - Recent or planned surgeries: no  - Recently received or plans to receive vaccinations: no  - Has treatment related toxicities: no  - Any chance may be pregnant:  n/a      Pain: 4   - Is the pain different from normal: no   - Is prescribing Doctor aware:  yes      Labs: Reviewed       Fall Risk Screening: Quiroga Fall Risk  History of Falling, Immediate or Within 3 Months: No  Secondary Diagnosis: Yes  Ambulatory Aid: Walks without aid/bedrest/nurse assist  Intravenous Therapy/Heparin Lock: Yes  Gait/Transferring: Normal/bedrest/immobile  Mental Status: Oriented to own ability  Quiroga Fall Risk Score: 35       Review Of Systems:  Review of Systems   Constitutional:  Negative for appetite change, chills, fatigue and fever.   HENT:   Negative for hearing loss.    Eyes:  Negative for eye problems.   Respiratory:  Negative for cough, shortness of breath and wheezing.   "  Cardiovascular:  Negative for chest pain, leg swelling and palpitations.   Gastrointestinal:  Negative for abdominal pain, blood in stool, constipation, diarrhea, nausea and vomiting.   Genitourinary:  Negative for dysuria, frequency and hematuria.    Musculoskeletal:  Negative for arthralgias and myalgias.   Skin:  Negative for itching, rash and wound.   Neurological:  Negative for dizziness, extremity weakness, headaches, light-headedness and numbness.        (+) Right ankle walking brace noted.  Ambulates with cane- steady gait noted.   Hematological:  Does not bruise/bleed easily.         Infusion Readiness:  - Assessment Concerns Related to Infusion: No  - Provider notified: no      New Patient Education:    N/A (returning patient for continuation of therapy. Ongoing education provided as needed.)        Treatment Conditions & Drug Specific Questions:    Abatacept  (ORENCIA)    (Unless otherwise specified on patient specific therapy plan):    TREATMENT CONDITIONS  Unless otherwise specified on patient specific therapy plan hold treatment and notify provider prior to proceeding with infusion if patient with a:  o Positive T-Spot  o Positive Hepatitis B Surface Ag   o Positive Hepatitis B Core Antibody   o Positive Hepatitis C Antibody     Lab Results   Component Value Date    TBSIN Negative 04/29/2024    TBGRES Negative  Reference range: NEGATIVE   05/26/2020      Lab Results   Component Value Date    HEPBSAG NEGATIVE 05/26/2020      Lab Results   Component Value Date    HEPCAB  05/26/2020     Negative  Reference range: NEGATIVE  Performed at the [facility] unless   otherwise noted.        No results found for: \"NONUHFIRE\", \"NONUHSWGH\", \"NONUHFISH\"    Patient meets treatment conditions? Yes    DRUG SPECIFIC QUESTIONS  Any history of COPD?  No  (If yes educate patient on possible s/s exacerbation)    If Yes any new or worsening s/s related to COPD?  Not applicable  (If patient with " worsening COPD notify prescribing provider prior to proceeding with infusion)      REMINDER:  - Pregnancy Category X Drug. Obtain negative pregnancy test prior to FIRST infusion  and educate patient on risk in women of childbearing ability.   - Weight based drug.     Recommended Vitals/Observation:  Vitals: Obtain vital signs at the start; middle and end of infusion as well as at the end of observation period: 30 minutes post.   Observation: Observe patient for 30 minutes after each infusion Observation complete.          Weight Based Drug Calculations:    WEIGHT BASED DRUGS: Abatacept  (ORENCIA)   Patient's dosing weight (kg):  KG =750 mg       Patient's weight today:   Vitals:    25 1423   Weight: 96.8 kg (213 lb 4.8 oz)         weight range for prescribed dose: Orencia:  <60 k mg   k mg  >100 k g    Patient weight today falls outside of 10% variance or  weight range: No    [facility] pharmacist informed of weight variance: No    Doses that are weight based have an acceptable variance rule within 10% of the prescribed   order and/or within  weight range. If patient weight on day of infusion falls   outside of the 10% variance, or weight range, infusion is administered and   pharmacy contacted regarding future dosing adjustments, per policy.           Post Treatment: Patient tolerated treatment without issue and was discharged in no apparent distress.  RTC in 7/15/2025.      Note Authored / Patient Cared for By: Amy Benavides RN   _________________________________________________________________________    Note authored and patient cared for by: Amy Benavides RN   Note/Encounter reviewed by: Yisel MCKEON NP. This provider on site at time of patient infusion. Infusion staff to notify this provider of any questions, concerns, abnormals or issues during infusion.    Final check of medication completed by this LINDA / or on-site  pharmacist with administering nurse using positive identification prior to administration. Final appearance of product checked for accuracy and conformity to the formula of the prepared product. Assured use of correct ingredients, accurate calculations and precise measurements under appropriate conditions and procedures.    No issues reported during today's encounter. Pt. tolerated infusion without difficulty. Pt. not independently evaluated by this provider during today's encounter.  (Yisel MCKEON NP)

## 2025-06-17 NOTE — PATIENT INSTRUCTIONS
"Today :Maureen Lose \"Mendy\" had no medications administered during this visit.     For:   1. Seropositive rheumatoid arthritis of multiple joints (Multi)         Your next appointment is due in:  7/15/2025        Please read the  Medication Guide that was given to you and reviewed during todays visit.     (Tell all doctors including dentists that you are taking this medication)     Go to the emergency room or call 911 if:  -You have signs of allergic reaction:   -Rash, hives, itching.   -Swollen, blistered, peeling skin.   -Swelling of face, lips, mouth, tongue or throat.   -Tightness of chest, trouble breathing, swallowing or talking     Call your doctor:  - If IV / injection site gets red, warm, swollen, itchy or leaks fluid or pus.     (Leave dressing on your IV site for at least 2 hours and keep area clean and dry  - If you get sick or have symptoms of infection or are not feeling well for any reason.    (Wash your hands often, stay away from people who are sick)  - If you have side effects from your medication that do not go away or are bothersome.     (Refer to the teaching your nurse gave you for side effects to call your doctor about)    - Common side effects may include:  stuffy nose, headache, feeling tired, muscle aches, upset stomach  - Before receiving any vaccines     - Call the [facility] at   If:  - You get sick, are on antibiotics, have had a recent vaccine, have surgery or dental work and your doctor wants your visit rescheduled.  - You need to cancel and reschedule your visit for any reason. Call at least 2 days before your visit if you need to cancel.   - Your insurance changes before your next visit.    (We will need to get approval from your new insurance. This can take up to two weeks.)     The [facility] is opened Monday thru Friday. We are closed on weekends and holidays.   Voice mail will take your call if the center is closed. If you leave a message please " allow 24 hours for a call back during weekdays. If you leave a message on a weekend/holiday, we will call you back the next business day.    A pharmacist is available Monday - Friday from 8:30AM to 3:30PM to help answer any questions you may have about your prescriptions(s). Please call pharmacy at:    [facility]: [phone]  [facility]: [phone]  [facility]: [phone]

## 2025-06-18 ENCOUNTER — OFFICE VISIT (OUTPATIENT)
Dept: PAIN MEDICINE | Facility: CLINIC | Age: 65
End: 2025-06-18
Payer: COMMERCIAL

## 2025-06-18 VITALS
HEART RATE: 75 BPM | OXYGEN SATURATION: 99 % | RESPIRATION RATE: 24 BRPM | SYSTOLIC BLOOD PRESSURE: 140 MMHG | HEIGHT: 62 IN | WEIGHT: 213 LBS | BODY MASS INDEX: 39.2 KG/M2 | DIASTOLIC BLOOD PRESSURE: 90 MMHG

## 2025-06-18 DIAGNOSIS — M54.16 LUMBAR RADICULOPATHY: ICD-10-CM

## 2025-06-18 DIAGNOSIS — M46.1 SACROILIITIS: Primary | ICD-10-CM

## 2025-06-18 PROCEDURE — 99214 OFFICE O/P EST MOD 30 MIN: CPT | Performed by: ANESTHESIOLOGY

## 2025-06-18 PROCEDURE — 3080F DIAST BP >= 90 MM HG: CPT | Performed by: ANESTHESIOLOGY

## 2025-06-18 PROCEDURE — 3077F SYST BP >= 140 MM HG: CPT | Performed by: ANESTHESIOLOGY

## 2025-06-18 PROCEDURE — 3008F BODY MASS INDEX DOCD: CPT | Performed by: ANESTHESIOLOGY

## 2025-06-18 PROCEDURE — 1036F TOBACCO NON-USER: CPT | Performed by: ANESTHESIOLOGY

## 2025-06-18 RX ORDER — TOPIRAMATE 25 MG/1
25 TABLET, FILM COATED ORAL 2 TIMES DAILY
Qty: 60 TABLET | Refills: 2 | Status: SHIPPED | OUTPATIENT
Start: 2025-06-18 | End: 2026-06-18

## 2025-06-18 ASSESSMENT — PAIN SCALES - GENERAL
PAINLEVEL_OUTOF10: 3
PAINLEVEL_OUTOF10: 3

## 2025-06-18 ASSESSMENT — PAIN - FUNCTIONAL ASSESSMENT: PAIN_FUNCTIONAL_ASSESSMENT: 0-10

## 2025-06-18 ASSESSMENT — ENCOUNTER SYMPTOMS
BACK PAIN: 1
ACTIVITY CHANGE: 1

## 2025-06-18 ASSESSMENT — PAIN DESCRIPTION - DESCRIPTORS: DESCRIPTORS: ACHING

## 2025-06-18 NOTE — PROGRESS NOTES
The patient is a 64-year-old female with low back pain.  The patient underwent a bilateral sacroiliac joint injection recently.  The patient reports that the pain on the right side resolved with the injection.  Though the left side is better, the patient is still experiencing some pain on the left side.  She is recovering from ankle fusion on the left.  She attributes some of her back pain to her altered gait.  She is experiencing some pain along the lateral aspect of the left lower leg.  The patient has tried gabapentin in the past without benefit.  She is participating in a formal physical therapy program.  She uses a TENS unit daily.    Review of Systems   Constitutional:  Positive for activity change.   Musculoskeletal:  Positive for back pain and gait problem.   All other systems reviewed and are negative.    GENERAL: alert and appropriate, in no distress, well-hydrated, well-nourished and interactive  SKIN: no rash noted, surgical scars well healed  RESPIRATORY: breathing non-labored and no grunting/flaring/retractions  CHEST: equal chest rise with normal respiratory effort  ABDOMEN: soft and non-tender  BACK: back normal in appearance, spine with reduced ROM  EXTREMITIES: strength intact  NEUROLOGIC: gait antalgic, SLR negative, sensation grossly intact.    Assessment and Plan    -Chronicity--chronic spinal pain    -Diagnostics--no new imaging ordered    -Pharmacologic--the patient may benefit from topiramate.  We discussed the mechanism of action of this medication as well as the side effect profile.    -Psychologic--no need for psychologic intervention from my standpoint.  There are no mental health issues of which I am aware that are contributing to the patient's pain.  There are no substance abuse or alcohol abuse issues of which I am aware that are contributing to the patient's pain.    -Physical--we discussed the importance of physical therapy and exercise.  We discussed avoidance and modification  techniques.    -Intervention--no intervention planned but we will consider repeating the left sacroiliac joint injection as well as a transforaminal epidural steroid injection on the left at the L4 and L5 levels.    I spent time educating the patient on the condition including the treatment and the prognosis.  I invited the patient to call at anytime with any questions.

## 2025-06-20 ENCOUNTER — TREATMENT (OUTPATIENT)
Facility: CLINIC | Age: 65
End: 2025-06-20
Payer: COMMERCIAL

## 2025-06-20 DIAGNOSIS — M46.1 SACROILIITIS: ICD-10-CM

## 2025-06-20 DIAGNOSIS — M47.817 LUMBOSACRAL SPONDYLOSIS WITHOUT MYELOPATHY: Primary | ICD-10-CM

## 2025-06-20 PROCEDURE — 97110 THERAPEUTIC EXERCISES: CPT | Mod: GP

## 2025-06-20 PROCEDURE — 97112 NEUROMUSCULAR REEDUCATION: CPT | Mod: GP

## 2025-06-20 ASSESSMENT — PAIN SCALES - GENERAL: PAINLEVEL_OUTOF10: 3

## 2025-06-20 ASSESSMENT — PAIN - FUNCTIONAL ASSESSMENT: PAIN_FUNCTIONAL_ASSESSMENT: 0-10

## 2025-06-20 NOTE — PROGRESS NOTES
Physical Therapy Treatment    Patient Name: Maureen Vela  MRN: [id number]  Encounter date: 6/20/2025 PT Received On:  (DISCHARGE SUMMARY)  Time Calculation  Start Time: 0915  Stop Time: 1000  Time Calculation (min): 45 min  PT Therapeutic Procedures Time Entry  Therapeutic Exercise Time Entry: 25  Neuromuscular Re-Education Time Entry: 15    Visit # 20 of 20  Visits/Dates Authorized: 2025:  [facility] SUREST - NO AUTH / $55 COPAY / OOP $5500 / 60V max - 0 used / Per Gabriel@[facility] chat, ref: [id number] / ds 1/22/25.     Current Problem:   Problem List Items Addressed This Visit           ICD-10-CM    Lumbosacral spondylosis without myelopathy - Primary M47.817    Sacroiliitis M46.1     Precautions:    (L foot WBAT, progress from boot to shoe as tolerated as of 2/21/25)   osteopenia, skin CA, RA    Subjective   General:   General Comment: Back pain remains better, exceptions with tasks that are exertional/out of daily routine. Hopeful left tibiotalar corticosteroid  injection helpful, cannot entertain idea of scope/additional procedure for L foot.    Pre-Treatment Symptoms:   Pain Assessment: 0-10  0-10 (Numeric) Pain Score: 3    Objective   Findings:   Shoe donned L LE with TayCo over shoe hinge brace, ambulates with cane, WBAT L LE, asymmetrical gait, decreased L stance        Other Measures  Oswestry Disablity Index (LAUREN): 20/50 (was 25/50 at [facility])    Treatments:   Therapeutic Exercise 16139:   Nustep L6 x 7 minutes  Leg curl 40# 2x12  Shuttle leg press DL 75-87# 3x10 plus 10, SL L/R 37# 20 each  FT resisted gait  7.5# x3 ea FW/BW  Reviewed HEP, pt has access to home gym equipment.    Deferred:  Standing hip abduction  Tball bridge 2x10  Tball LTR x 12  Fall out 10x each side  Habd blue 20x  Bridge with foot flat and toes up DF  5x each  Sit to stand 10x trial of staggered 5x each  Ankle AROM DF/PF x 20  4 way ankle orange x15 ea , light green x15 ea  Standing hip abduction 1x10 , lime green 1x10 R/L   Standing hip extension  "1x10 , lime green 1x10  R/L   Ankle AROM Ever/Inver x20  PPT with alt. LE hip flexion small ROM (march) 2x10 reps  Sciatic n. Floss x 15  Leg lengthener supine  Prone hip IR/ER  Prone hip ext   Sidely A-P hip taps      Neuromuscular Re-Ed 13069:   Rocker board AP static and shift  RB M/L static  WB AP in neutral PWB with 3in block R  Step tap solid (deferred foam) to 6in  Tandem walk  Step tap step    Deferred:  Modified SLS airex with finger tip support R/L  Foam Weight shifting - lateral and AP   staggered stances foam  DLS on foam w/ banded row red x 15, L/R arm row red x 12 each      Manual Therapy 43678:  Deferred:  MET L anterior innominate, neutral pelvis achieved  Supine effleurage massage to L ankle/foot for swelling  IASTM LPS R sidely  scar massage to scar and dynamic cupping along scar.    Ther-Act:   Deferred due to skin healing  KT tape L ankle M-L \"U\" strip , and 3 finger strip along lateral and medial ankle for swelling    Modalities:  Deferred:  Unattended e-stim: IFC: applied to L ankle quad polar, Intensity to tolerance, for 12 minutes, in Supine , with wedge bilateral feet elevated      HEP / Access Codes:   Access Code: [email or web address]   Date: 01/22/2025  - Supine Posterior Pelvic Tilt  - 2-3 x daily - 3 sets - 10-30 reps - 0-5 hold  - Seated Cat Cow  - 2-3 x daily - 3 sets - 10-30 reps - 0-5 hold  - Child's Pose with Ball: Center and to Each Side  - 2-3 x daily - 10-30 reps - 0-5 hold  Access Code: [email or web address]   Date: 02/05/2025  - Supine Lower Trunk Rotation  - 1-3 x daily - 1 sets - 10 reps  - Hooklying Clamshell with Resistance  - 1-2 x daily - 2 sets - 10 reps - 5 sec hold  - Supine Hip Adduction Isometric with Ball  - 1-2 x daily - 2 sets - 10 reps - 5 sec hold  - Supine Bridge with Pelvic Floor Contraction on Swiss Ball  - 1-2 x daily - 1-2 sets - 10 reps  - Supine Knees to Chest with Swiss Ball  - 1-2 x daily - 2 sets - 10 reps  Access Code: [email or web address] URL: [email or web address] Date: " 02/10/2025  Prepared by: Satish Luke  - Supine Single Knee to Chest Stretch  - 1-2 x daily - 6 x weekly - 3 reps - 20 hold  - Supine 90/90 Alternating Toe Touch  - 1-2 x daily - 6 x weekly - 2 sets - 10 reps    Assessment:  PT Assessment  Assessment Comment: Pt understands HEP to cont self management. Pt expected to have further progress with change in routine while school is out for summer.    Post-Treatment Symptoms:   Response to Interventions: No change in pain    Plan:    Advance to ability  Therapy options: will be seen for skilled physical therapy services  Planned modality interventions: electrical stimulation/Russian stimulation, thermotherapy (hydrocollator packs) and traction  Other planned modality interventions: DN, light therapy  Planned therapy interventions: abdominal trunk stabilization, manual therapy, neuromuscular re-education, postural training, strengthening, stretching, therapeutic activities, home exercise program, flexibility, functional ROM exercises, body mechanics training and bed mobility training  Frequency: 1-2x week  Duration in visits: 20    Goals:   Active       PT Problem       PT Goals (Progressing)       Start:  01/22/25    Expected End:  07/01/25       Partly met 1) Indep with HEP  Partly met 2) Decrease Oswestry < 10/50 from 25/50 to indicate improved function and symptoms.  Partly met 3) Complete work day without exac in back pain.  Partly met 4) Sleep with less pain disruption.               Patient Stated Goal (Progressing)       Start:  01/22/25    Expected End:  07/01/25       1) Relief of pain  2) Relief of numbness                   Patient Stated Goal (Adequate for Discharge)       Start:  01/22/25    Expected End:  07/01/25    Resolved:  06/23/25    1) Relief of pain  2) Relief of numbness

## 2025-07-07 ENCOUNTER — APPOINTMENT (OUTPATIENT)
Dept: PAIN MEDICINE | Facility: CLINIC | Age: 65
End: 2025-07-07
Payer: COMMERCIAL

## 2025-07-08 DIAGNOSIS — E03.9 ACQUIRED HYPOTHYROIDISM: ICD-10-CM

## 2025-07-08 RX ORDER — LEVOTHYROXINE SODIUM 75 UG/1
75 TABLET ORAL DAILY
Qty: 90 TABLET | Refills: 2 | Status: SHIPPED | OUTPATIENT
Start: 2025-07-08

## 2025-07-11 ENCOUNTER — OFFICE VISIT (OUTPATIENT)
Dept: ORTHOPEDIC SURGERY | Facility: CLINIC | Age: 65
End: 2025-07-11
Payer: COMMERCIAL

## 2025-07-11 ENCOUNTER — HOSPITAL ENCOUNTER (OUTPATIENT)
Dept: RADIOLOGY | Facility: CLINIC | Age: 65
Discharge: HOME | End: 2025-07-11
Payer: COMMERCIAL

## 2025-07-11 DIAGNOSIS — M96.0 NONUNION OF SUBTALAR ARTHRODESIS: ICD-10-CM

## 2025-07-11 DIAGNOSIS — M25.572 CHRONIC PAIN OF LEFT ANKLE: ICD-10-CM

## 2025-07-11 DIAGNOSIS — G89.29 CHRONIC PAIN OF LEFT ANKLE: ICD-10-CM

## 2025-07-11 PROCEDURE — 73610 X-RAY EXAM OF ANKLE: CPT | Mod: LEFT SIDE | Performed by: RADIOLOGY

## 2025-07-11 PROCEDURE — 99214 OFFICE O/P EST MOD 30 MIN: CPT | Performed by: STUDENT IN AN ORGANIZED HEALTH CARE EDUCATION/TRAINING PROGRAM

## 2025-07-11 PROCEDURE — 73610 X-RAY EXAM OF ANKLE: CPT | Mod: LT

## 2025-07-11 ASSESSMENT — PAIN - FUNCTIONAL ASSESSMENT: PAIN_FUNCTIONAL_ASSESSMENT: 0-10

## 2025-07-11 ASSESSMENT — PAIN SCALES - GENERAL: PAINLEVEL_OUTOF10: 7

## 2025-07-12 NOTE — PROGRESS NOTES
"ORTHOPAEDIC SURGERY PROGRESS NOTE    Chief Complaint:  Left foot pain    History Of Present Illness  Maureen Vela \"Mendy\" is a 65 y.o. female who presents for complex evaluation of left foot pain, self-referred.  Patient has previously undergone 3 surgeries with an [facility] podiatrist.  The first was a tendon transfer of which the details are unclear at this time, the second was a left subtalar joint arthrodesis from 06/28/2023 followed by a revision subtalar joint arthrodesis from 02/07/2024.  The patient continues with severe pain that is activity dependent.  Typically rated as 5-9 out of 10.  She has been ambulating with use of a boot and crutch.  By report, the patient's previous provider has stopped doing surgery due to a medical condition.  She was quite happy with her previous practice, however sought an orthopedic surgery perspective.  Prior to her index surgery she reported pain on the outside of her foot that radiated to the top.  This is yet unresolved.  Patient works as a  and is on her feet extensively throughout the day.  She denies any fevers, chills or night sweats but does report paresthesias on the outside of her foot.    Patient has a past medical history of rheumatoid arthritis, she has been off of her rheumatologic medications from the winter 2023.  She has been using a bone stimulator.    07/12/2024: Patient returns for follow-up of her left foot pain as above.  She continues with severe pain in her left foot and ankle.  Rated as 8 out of 10.  Pain is improved with boot wear.  She has been ambulating with use of crutches.  She is reporting a burning pain on the lateral side of her foot, this is unchanged from her previous operations.  Patient also notes discomfort when walking on the back of her heel from a prominent screw head.    09/06/2024: Patient returns s/p left foot hardware removal and bone biopsy from 08/26/2024.  Patient's intraoperative cultures were negative " but biopsy of the talus was consistent with osteomyelitis.  She has been compliant with nonweightbearing restrictions and reporting 2 out of 10 pain.  She has been following closely with her rheumatologist.    09/20/2024: Patient returns s/p left foot hardware removal and bone biopsy from 08/26/2024.  She is currently reporting 3 out of 10 pain.  She has been compliant with nonweightbearing restrictions in a walking boot and crutches.  She has follow-up with ID who did not feel that her clinical situation and pathology was consistent with osteomyelitis requiring formal IV antibiotics.  She has been restarted on her RA medications.    10/18/2024: Patient returns s/p left foot hardware removal and bone biopsy from 08/26/2024.  She is reporting 5 out of 10 pain.  She has continued to protect her weightbearing with a walking boot and crutches.  She has had a drug rash/response to doxycycline and underwent a biopsy for this.    12/06/2024: Patient returns s/p left revision bone block ICBG subtalar arthrodesis with BMAC from 11/19/2024.  Patient has been compliant with nonweightbearing restrictions.  She is reporting similar swelling to her preoperative state.  Pain continues as 5 out of 10.  She is present with her  today.    12/26/2024: Patient returns s/p left revision bone block ICBG subtalar arthrodesis with BMAC from 11/19/2024.  Patient has been compliant with nonweightbearing restrictions.  She is reporting 4 out of 10 pain today that is improving overall.  She is present with her  today.  Unfortunately her previous bone stimulator has timed out.  She has not had any fevers, chills or night sweats.    01/23/2025: Patient returns s/p left revision bone block ICBG subtalar arthrodesis with BMAC from 11/19/2024.  Patient continues with 3 out of 10 pain that is relatively unchanged.  She has had more sural neuralgia type pain.  She has continued to be compliant with nonweightbearing restrictions.  She  has put some weight down for transfers and has noticed improved pain with ankle motion.    02/21/2025: Patient returns s/p left revision bone block ICBG subtalar arthrodesis with BMAC from 11/19/2024.  Patient continues to report improved pain as compared to her preoperative state.  She has somewhat plateaued with respect to her stamina and recovery and continues to have difficulty with prolonged pain but no obvious increase in pain or swelling.  She has undergone a recent ablation procedure and has been in physical therapy for her back.    04/04/2025:  Patient returns s/p left revision bone block ICBG subtalar arthrodesis with BMAC from 11/19/2024.  Patient has been reporting worsening pain overall since initiating weightbearing.  Typical pain is now reported to 7 out of 10.  She has been ambulating with use of walking boot and scooter, particularly while at work.  She reports that she does not trust her ankle.    06/06/2025:  Patient returns s/p left revision bone block ICBG subtalar arthrodesis with BMAC from 11/19/2024.  Patient is reporting 4 out of 10 pain.  This is vastly improved as compared to before surgery.  She does notice worsening pain with standing as well as persistent swelling.  She has continued in physical therapy and follows with pain management.  She has been ambulating with use of a [employer] brace.  This has been helpful.    07/11/2025: Patient returns s/p left revision bone block ICBG subtalar arthrodesis with BMAC from 11/19/2024.  As per MyChart encounter, patient has had persistent pain.  This has been worsening as she has transitioned to more frequent ambulation.  Typical pain is now reported as 7 out of 10.  This is different than her previous surgery pain.  For review, she did have temporary relief from a corticosteroid injection into the ankle, unfortunately this only lasted a day.  She is ambulating with the use of a cane and is concerned that she will not be able to make it through her  school year at this rate.    Past Medical History  Past Medical History:   Diagnosis Date    AB (asthmatic bronchitis) ([facility])     Acquired immunocompromised state     Autoimmune RA, On Orencia every 28 days, followed by rheumatology    Actinic keratoses     Allergic rhinitis     Asthma     only used inhaler 4-5 times this entire year, triggers are seasonal allergies    Bilateral sacroiliitis     Chronic pain     Chronic sinusitis     DDD (degenerative disc disease), cervical     C5,6 BULGE    DDD (degenerative disc disease), lumbar     L4,5 BULGE    DJD (degenerative joint disease)     right ankle and foot    ETD (eustachian tube dysfunction)     GERD (gastroesophageal reflux disease)     H/O arthrodesis     Hiatal hernia     HTN (hypertension)     Hyperlipidemia     Hypothyroidism     Joint pain     Lateral epicondylitis of elbow     Low back pain     hronic, folowed by pain management    Low back pain     Lumbosacral disc disease     Migraine     Neuropathy     left foot N/T and hands bilateral    Osteoarthritis     Osteopenia     PONV (postoperative nausea and vomiting)     PTTD (posterior tibial tendon dysfunction)     left lower extremity    Seronegative rheumatoid arthritis (Multi)     Skin cancer     Basal nd squamous removed    Spondylosis of 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     Vitamin D deficiency        Surgical History  Recent Surgeries in Orthopaedic Surgery            No cases to display             Social History  Social History     Socioeconomic History    Marital status:    Tobacco Use    Smoking status: Never    Smokeless tobacco: Never   Vaping Use    Vaping status: Never Used   Substance and Sexual Activity    Alcohol use: Not Currently     Comment: RARELY    Drug use: Never    Sexual activity: Defer     Partners: Male     Birth control/protection: Post-menopausal, Female Sterilization       Family History  Family History   Problem Relation Name Age of Onset     Arthritis Mother Mom     Alzheimer's disease Father Dad     Breast cancer Sister Sima 42    Alzheimer's disease Sister Joselyn     Breast cancer Mother's Sister  63    Stroke Maternal Grandfather Grandpa         Allergies  Allergies   Allergen Reactions    Doxycycline Rash     Severe rash or burn.    Oxycodone-Acetaminophen Nausea/vomiting    Sulfa (Sulfonamide Antibiotics) Headache    Sulfamethoxazole-Trimethoprim Rash    Balsam [place] Hives     Pt states in dental adhesives and make up    Clarithromycin Itching and Rash    Hydroxychloroquine Hives and Rash    Methotrexate Hives and Rash       Review of Systems  REVIEW OF SYSTEMS  Constitutional: no unplanned weight loss  Psychiatric: no suicidal ideation  ENT: no vision changes, no sinus problems  Pulmonary: no shortness of breath  Lymphatic: no enlarged lymph nodes  Cardiovascular: no chest pain or shortness of breath  Gastrointestinal: no stomach problems  Genitourinary: no dysuria   Skin: no rashes  Endocrine: no thyroid problems  Neurological: no headache, no numbness  Hematological: no easy bruising  Musculoskeletal: Left foot pain    Physical Exam  PHYSICAL EXAMINATION  Constitutional Exam: well developed and well nourished  Psychiatric Exam: alert and oriented, appropriate mood and behavior  Eye Exam: EOMI  Pulmonary Exam: breathing non-labored, no apparent distress  Lymphatic exam: no appreciable lymphadenopathy in the lower extremities  Cardiovascular exam: RRR to peripheral palpation, DP pulses 2+, PT 2+, toes are pink with good capillary refill, no pitting edema  Skin exam: no open lesions, rashes, abrasions or ulcerations  Neurological exam: sensation to light touch intact in both lower extremities in peripheral and dermatomal distributions (except for any abnormalities noted in musculoskeletal exam)    Musculoskeletal exam: Left lower extremity examination.  Patient surgical incisions well-healed.  She continues with significant hypersensitivity to light  touch in the sural nerve distribution.  Pain tends to localize to the anterolateral ankle joint and lateral gutter.  Minimal tenderness to palpation at the sinus Tarsi on examination today.  She has supple ankle range of motion with pain, pain-free but obviously restricted subtalar joint range of motion. Patient has sensation intact light touch grossly in a saphenous, sural (reporting neuritic type symptoms with positive tinel's baseline for patient from prior surgery), superficial peroneal, deep peroneal and tibial nerve distribution.  She has intact PF/DF/EHL.  She has 2+ DP/PT pulse palpated.    Last Recorded Vitals  There were no vitals taken for this visit.    Laboratory Results    No results found. However, due to the size of the patient record, not all encounters were searched. Please check Results Review for a complete set of results.    Radiology Results   X-ray imaging 3 view weightbearing right ankle reviewed and independently evaluated by me from 7/11/2025 demonstrates suggestion of arthrodesis remodeling and incorporation of interpositional allograft particular the posterior aspect of the posterior facet of the subtalar joint.  There is no interval keisha-implant fracture, loosening or lucency.  There is suggestion of heterotopic ossification within the subfibular region which appears relatively unchanged by comparison to previous imaging.    X-ray imaging 3 view weightbearing right ankle reviewed and independently evaluated by me on 06/06/2025 demonstrates maintained alignment following subtalar arthrodesis with distraction bone block and cannulated screw fixation, continued note of interval consolidation, the proximal and distal junctions of the intercalary allograft are notably less visible by comparison the prior imaging.  There are at least mild degenerative changes of the tibiotalar joint, in particular narrowing at the lateral gutter.      Assessment/Plan:  65-year-old female who presents for  complex evaluation of painful and multiply revised left subtalar arthrodesis by [facility] podiatrist with sural nerve neuritis and anterior ankle impingement s/p L HARSHAL and bone biopsy from 08/26/2024 s/p left revision bone block ICBG subtalar arthrodesis with BMAC from 11/19/2024 who presents with worsening pain although improved ambulatory status overall.  I would recommend the patient continue weightbearing to her tolerance in her left lower extremity utilizing her brace and cane assistance.  Due to the persistence of her pain and the time course from surgery, this was a third time attempt for revision of her subtalar arthrodesis I would recommend obtaining a repeat CT scan of the left ankle to more completely evaluate for arthrodesis incorporation and evaluate for impinging osteophytes of the ankle.  Pending CT review, consideration could be made for left ankle arthroscopy with excision of impinging osteophytes or if incomplete consolidation of the subtalar arthrodesis, may require conversion to tibiotalar arthrodesis with TTC IMN.  The patient voiced understanding was in agreement.  I would plan to see the patient back following completion of imaging or discuss by telephone.  Upon return patient would not require further imaging.    Marlon Stafford MD, GLENN  Department of Orthopaedic Surgery  [facility]    The diagnosis and treatment plan were reviewed with the patient. All questions were answered. The patient verbalized understanding of the treatment plan. There were no barriers to understanding identified.    Note dictated with [employer] software.  Completed without full type editing and sent to avoid delay.

## 2025-07-15 ENCOUNTER — APPOINTMENT (OUTPATIENT)
Dept: INFUSION THERAPY | Facility: CLINIC | Age: 65
End: 2025-07-15
Payer: COMMERCIAL

## 2025-07-15 VITALS
WEIGHT: 208 LBS | BODY MASS INDEX: 38.04 KG/M2 | DIASTOLIC BLOOD PRESSURE: 80 MMHG | SYSTOLIC BLOOD PRESSURE: 121 MMHG | TEMPERATURE: 97.4 F | HEART RATE: 68 BPM | OXYGEN SATURATION: 98 % | RESPIRATION RATE: 16 BRPM

## 2025-07-15 DIAGNOSIS — I10 PRIMARY HYPERTENSION: ICD-10-CM

## 2025-07-15 DIAGNOSIS — M05.79 SEROPOSITIVE RHEUMATOID ARTHRITIS OF MULTIPLE JOINTS (MULTI): ICD-10-CM

## 2025-07-15 PROCEDURE — 96365 THER/PROPH/DIAG IV INF INIT: CPT | Performed by: REGISTERED NURSE

## 2025-07-15 RX ORDER — AMLODIPINE BESYLATE 5 MG/1
5 TABLET ORAL DAILY
Qty: 90 TABLET | Refills: 3 | Status: SHIPPED | OUTPATIENT
Start: 2025-07-15

## 2025-07-15 RX ORDER — EPINEPHRINE 0.3 MG/.3ML
0.3 INJECTION SUBCUTANEOUS EVERY 5 MIN PRN
OUTPATIENT
Start: 2025-08-12

## 2025-07-15 RX ORDER — DIPHENHYDRAMINE HYDROCHLORIDE 50 MG/ML
50 INJECTION, SOLUTION INTRAMUSCULAR; INTRAVENOUS AS NEEDED
OUTPATIENT
Start: 2025-08-12

## 2025-07-15 RX ORDER — ALBUTEROL SULFATE 0.83 MG/ML
3 SOLUTION RESPIRATORY (INHALATION) AS NEEDED
OUTPATIENT
Start: 2025-08-12

## 2025-07-15 RX ORDER — FAMOTIDINE 10 MG/ML
20 INJECTION, SOLUTION INTRAVENOUS ONCE AS NEEDED
OUTPATIENT
Start: 2025-08-12

## 2025-07-15 ASSESSMENT — PAIN SCALES - GENERAL: PAINLEVEL_OUTOF10: 5

## 2025-07-15 NOTE — PATIENT INSTRUCTIONS
Today :We administered abatacept (Orencia) 750 mg in sodium chloride 0.9% 100 mL IV.     For:   1. Seropositive rheumatoid arthritis of multiple joints (Multi)         Your next appointment is due in:  8/25/2025        Please read the  Medication Guide that was given to you and reviewed during todays visit.     (Tell all doctors including dentists that you are taking this medication)     Go to the emergency room or call 911 if:  -You have signs of allergic reaction:   -Rash, hives, itching.   -Swollen, blistered, peeling skin.   -Swelling of face, lips, mouth, tongue or throat.   -Tightness of chest, trouble breathing, swallowing or talking     Call your doctor:  - If IV / injection site gets red, warm, swollen, itchy or leaks fluid or pus.     (Leave dressing on your IV site for at least 2 hours and keep area clean and dry  - If you get sick or have symptoms of infection or are not feeling well for any reason.    (Wash your hands often, stay away from people who are sick)  - If you have side effects from your medication that do not go away or are bothersome.     (Refer to the teaching your nurse gave you for side effects to call your doctor about)    - Common side effects may include:  stuffy nose, headache, feeling tired, muscle aches, upset stomach  - Before receiving any vaccines     - Call the [facility] at   If:  - You get sick, are on antibiotics, have had a recent vaccine, have surgery or dental work and your doctor wants your visit rescheduled.  - You need to cancel and reschedule your visit for any reason. Call at least 2 days before your visit if you need to cancel.   - Your insurance changes before your next visit.    (We will need to get approval from your new insurance. This can take up to two weeks.)     The [facility] is opened Monday thru Friday. We are closed on weekends and holidays.   Voice mail will take your call if the center is closed. If you leave a message  please allow 24 hours for a call back during weekdays. If you leave a message on a weekend/holiday, we will call you back the next business day.    A pharmacist is available Monday - Friday from 8:30AM to 3:30PM to help answer any questions you may have about your prescriptions(s). Please call pharmacy at:    [facility]: [phone]  [facility]: [phone]  [facility]: [phone]

## 2025-07-15 NOTE — PROGRESS NOTES
[facility]   Infusion Clinic Note   Date: July 15, 2025   Name: Maureen Vela  : 1960   MRN: [id number]         Reason for Visit: OP Infusion (Orencia 750 mg every 28 days)         Today: We administered abatacept (Orencia) 750 mg in sodium chloride 0.9% 100 mL IV.       Ordered By: Cathy Alexis,*       For a Diagnosis of: Seropositive rheumatoid arthritis of multiple joints (Multi)       At today's visit patient accompanied by: Self      Today's Vitals:   Vitals:    07/15/25 1413 07/15/25 1505 07/15/25 1524   BP: 130/82 116/70 121/80   Pulse: 88 67 68   Resp: 18 18 16   Temp: 36.3 °C (97.4 °F) 36.4 °C (97.6 °F) 36.3 °C (97.4 °F)   SpO2: 98% 98%    Weight: 94.3 kg (208 lb)     PainSc:   5     PainLoc: Generalized               Pre - Treatment Checklist:      - Previous reaction to current treatment: no      (Assess patient for the concerns below. Document provider notification as appropriate).  - Active or recent infection with/without current antibiotic use: no  - Recent or planned invasive dental work: no  - Recent or planned surgeries: no  - Recently received or plans to receive vaccinations: no  - Has treatment related toxicities: no        Pain: 6   - Is the pain different from normal: no   - Is prescribing Doctor aware:  yes      Labs: Reviewed       Fall Risk Screening: Junaid Fall Risk  History of Falling, Immediate or Within 3 Months: No  Secondary Diagnosis: Yes  Ambulatory Aid: Walks without aid/bedrest/nurse assist  Intravenous Therapy/Heparin Lock: Yes  Gait/Transferring: Impaired   Mental Status: Oriented to own ability  Quiroga Fall Risk Score: 55            Review Of Systems:  Review of Systems - Oncology      Infusion Readiness:  - Assessment Concerns Related to Infusion: No  - Provider notified: no      New Patient Education:    N/A (returning patient for continuation of therapy. Ongoing education provided as needed.)        Treatment Conditions & Drug Specific  "Questions:    Abatacept  (ORENCIA)    (Unless otherwise specified on patient specific therapy plan):    TREATMENT CONDITIONS  Unless otherwise specified on patient specific therapy plan hold treatment and notify provider prior to proceeding with infusion if patient with a:  o Positive T-Spot  o Positive Hepatitis B Surface Ag   o Positive Hepatitis B Core Antibody   o Positive Hepatitis C Antibody     Lab Results   Component Value Date    TBSIN Negative 2024    TBGRES Negative  Reference range: NEGATIVE   2020      Lab Results   Component Value Date    HEPBSAG NEGATIVE 2020      Lab Results   Component Value Date    HEPCAB  2020     Negative  Reference range: NEGATIVE  Performed at the [facility] unless   otherwise noted.        No results found for: \"NONUHFIRE\", \"NONUHSWGH\", \"NONUHFISH\"    Patient meets treatment conditions? Yes    DRUG SPECIFIC QUESTIONS  Any history of COPD?  No  (If yes educate patient on possible s/s exacerbation)    If Yes any new or worsening s/s related to COPD?  No  (If patient with worsening COPD notify prescribing provider prior to proceeding with infusion)      REMINDER:  - Pregnancy Category X Drug. Obtain negative pregnancy test prior to FIRST infusion  and educate patient on risk in women of childbearing ability.   - Weight based drug.     Recommended Vitals/Observation:  Vitals: Obtain vital signs at the start; middle and end of infusion as well as at the end of observation period: 30 minutes post.   Observation: Observe patient for 30 minutes after each infusion Observation complete.          Weight Based Drug Calculations:    WEIGHT BASED DRUGS: Abatacept  (ORENCIA)   Patient's dosing weight (kg):  kg= 750 mg       Patient's weight today:   Vitals:    07/15/25 1413   Weight: 94.3 kg (208 lb)         weight range for prescribed dose: Orencia:  <60 k mg  60 to 100 k mg  >100 k g    Patient weight " today falls outside of 10% variance or  weight range: No     Home Care pharmacist informed of weight variance: No    Doses that are weight based have an acceptable variance rule within 10% of the prescribed   order and/or within  weight range. If patient weight on day of infusion falls   outside of the 10% variance, or weight range, infusion is administered and   pharmacy contacted regarding future dosing adjustments, per policy.           Post Treatment: Patient tolerated treatment without issue and was discharged in no apparent distress.  RTC on 8/12/2025.      Note Authored / Patient Cared for By: Amy Benavides RN

## 2025-07-21 ENCOUNTER — HOSPITAL ENCOUNTER (OUTPATIENT)
Dept: RADIOLOGY | Facility: HOSPITAL | Age: 65
Discharge: HOME | End: 2025-07-21
Payer: COMMERCIAL

## 2025-07-21 DIAGNOSIS — M96.0 NONUNION OF SUBTALAR ARTHRODESIS: ICD-10-CM

## 2025-07-21 DIAGNOSIS — G89.29 CHRONIC PAIN OF LEFT ANKLE: ICD-10-CM

## 2025-07-21 DIAGNOSIS — M25.572 CHRONIC PAIN OF LEFT ANKLE: ICD-10-CM

## 2025-07-21 PROCEDURE — 73700 CT LOWER EXTREMITY W/O DYE: CPT | Mod: LT

## 2025-07-21 PROCEDURE — 73700 CT LOWER EXTREMITY W/O DYE: CPT | Mod: LEFT SIDE | Performed by: RADIOLOGY

## 2025-07-30 ENCOUNTER — APPOINTMENT (OUTPATIENT)
Dept: ORTHOPEDIC SURGERY | Facility: CLINIC | Age: 65
End: 2025-07-30
Payer: COMMERCIAL

## 2025-07-30 DIAGNOSIS — M25.572 CHRONIC PAIN OF LEFT ANKLE: ICD-10-CM

## 2025-07-30 DIAGNOSIS — G89.29 CHRONIC PAIN OF LEFT ANKLE: ICD-10-CM

## 2025-07-30 DIAGNOSIS — M25.879 IMPINGEMENT OF ANTERIOR PART OF ANKLE: Primary | ICD-10-CM

## 2025-07-30 DIAGNOSIS — G57.82 SURAL NEURITIS, LEFT: ICD-10-CM

## 2025-07-30 DIAGNOSIS — M25.879 IMPINGEMENT OF ANTERIOR PART OF ANKLE: ICD-10-CM

## 2025-07-30 PROCEDURE — 99214 OFFICE O/P EST MOD 30 MIN: CPT | Performed by: STUDENT IN AN ORGANIZED HEALTH CARE EDUCATION/TRAINING PROGRAM

## 2025-07-30 PROCEDURE — 20605 DRAIN/INJ JOINT/BURSA W/O US: CPT | Performed by: STUDENT IN AN ORGANIZED HEALTH CARE EDUCATION/TRAINING PROGRAM

## 2025-07-30 RX ORDER — LIDOCAINE HYDROCHLORIDE 10 MG/ML
3 INJECTION, SOLUTION INFILTRATION; PERINEURAL
Status: COMPLETED | OUTPATIENT
Start: 2025-07-30 | End: 2025-07-30

## 2025-07-30 RX ADMIN — LIDOCAINE HYDROCHLORIDE 3 ML: 10 INJECTION, SOLUTION INFILTRATION; PERINEURAL at 10:41

## 2025-07-30 ASSESSMENT — PAIN SCALES - GENERAL: PAINLEVEL_OUTOF10: 3

## 2025-07-30 ASSESSMENT — PAIN - FUNCTIONAL ASSESSMENT: PAIN_FUNCTIONAL_ASSESSMENT: 0-10

## 2025-07-30 NOTE — PROGRESS NOTES
"ORTHOPAEDIC SURGERY PROGRESS NOTE    Chief Complaint:  Left foot pain    History Of Present Illness  Maureen Vela \"Mendy\" is a 65 y.o. female who presents for complex evaluation of left foot pain, self-referred.  Patient has previously undergone 3 surgeries with an [facility] podiatrist.  The first was a tendon transfer of which the details are unclear at this time, the second was a left subtalar joint arthrodesis from 06/28/2023 followed by a revision subtalar joint arthrodesis from 02/07/2024.  The patient continues with severe pain that is activity dependent.  Typically rated as 5-9 out of 10.  She has been ambulating with use of a boot and crutch.  By report, the patient's previous provider has stopped doing surgery due to a medical condition.  She was quite happy with her previous practice, however sought an orthopedic surgery perspective.  Prior to her index surgery she reported pain on the outside of her foot that radiated to the top.  This is yet unresolved.  Patient works as a  and is on her feet extensively throughout the day.  She denies any fevers, chills or night sweats but does report paresthesias on the outside of her foot.    Patient has a past medical history of rheumatoid arthritis, she has been off of her rheumatologic medications from the winter 2023.  She has been using a bone stimulator.    07/12/2024: Patient returns for follow-up of her left foot pain as above.  She continues with severe pain in her left foot and ankle.  Rated as 8 out of 10.  Pain is improved with boot wear.  She has been ambulating with use of crutches.  She is reporting a burning pain on the lateral side of her foot, this is unchanged from her previous operations.  Patient also notes discomfort when walking on the back of her heel from a prominent screw head.    09/06/2024: Patient returns s/p left foot hardware removal and bone biopsy from 08/26/2024.  Patient's intraoperative cultures were negative " but biopsy of the talus was consistent with osteomyelitis.  She has been compliant with nonweightbearing restrictions and reporting 2 out of 10 pain.  She has been following closely with her rheumatologist.    09/20/2024: Patient returns s/p left foot hardware removal and bone biopsy from 08/26/2024.  She is currently reporting 3 out of 10 pain.  She has been compliant with nonweightbearing restrictions in a walking boot and crutches.  She has follow-up with ID who did not feel that her clinical situation and pathology was consistent with osteomyelitis requiring formal IV antibiotics.  She has been restarted on her RA medications.    10/18/2024: Patient returns s/p left foot hardware removal and bone biopsy from 08/26/2024.  She is reporting 5 out of 10 pain.  She has continued to protect her weightbearing with a walking boot and crutches.  She has had a drug rash/response to doxycycline and underwent a biopsy for this.    12/06/2024: Patient returns s/p left revision bone block ICBG subtalar arthrodesis with BMAC from 11/19/2024.  Patient has been compliant with nonweightbearing restrictions.  She is reporting similar swelling to her preoperative state.  Pain continues as 5 out of 10.  She is present with her  today.    12/26/2024: Patient returns s/p left revision bone block ICBG subtalar arthrodesis with BMAC from 11/19/2024.  Patient has been compliant with nonweightbearing restrictions.  She is reporting 4 out of 10 pain today that is improving overall.  She is present with her  today.  Unfortunately her previous bone stimulator has timed out.  She has not had any fevers, chills or night sweats.    01/23/2025: Patient returns s/p left revision bone block ICBG subtalar arthrodesis with BMAC from 11/19/2024.  Patient continues with 3 out of 10 pain that is relatively unchanged.  She has had more sural neuralgia type pain.  She has continued to be compliant with nonweightbearing restrictions.  She  has put some weight down for transfers and has noticed improved pain with ankle motion.    02/21/2025: Patient returns s/p left revision bone block ICBG subtalar arthrodesis with BMAC from 11/19/2024.  Patient continues to report improved pain as compared to her preoperative state.  She has somewhat plateaued with respect to her stamina and recovery and continues to have difficulty with prolonged pain but no obvious increase in pain or swelling.  She has undergone a recent ablation procedure and has been in physical therapy for her back.    04/04/2025:  Patient returns s/p left revision bone block ICBG subtalar arthrodesis with BMAC from 11/19/2024.  Patient has been reporting worsening pain overall since initiating weightbearing.  Typical pain is now reported to 7 out of 10.  She has been ambulating with use of walking boot and scooter, particularly while at work.  She reports that she does not trust her ankle.    06/06/2025:  Patient returns s/p left revision bone block ICBG subtalar arthrodesis with BMAC from 11/19/2024.  Patient is reporting 4 out of 10 pain.  This is vastly improved as compared to before surgery.  She does notice worsening pain with standing as well as persistent swelling.  She has continued in physical therapy and follows with pain management.  She has been ambulating with use of a Voovio aka 3Ditizeco brace.  This has been helpful.    07/11/2025: Patient returns s/p left revision bone block ICBG subtalar arthrodesis with BMAC from 11/19/2024.  As per MyChart encounter, patient has had persistent pain.  This has been worsening as she has transitioned to more frequent ambulation.  Typical pain is now reported as 7 out of 10.  This is different than her previous surgery pain.  For review, she did have temporary relief from a corticosteroid injection into the ankle, unfortunately this only lasted a day.  She is ambulating with the use of a cane and is concerned that she will not be able to make it through her  school year at this rate.    07/30/2025: Patient returns s/p left revision bone block ICBG subtalar arthrodesis with BMAC from 11/19/2024.  Patient is here for CT follow-up.  She is reporting 3 out of 10 pain.  She recently started Topamax which has been helpful with her previous sural neuralgia.  She is having difficulty standing for longer periods of time and going downstairs as well as getting up from a seated position.  She is concerned about returning to school and spending longer days on her feet.  For review, she has had 5 years of pain and has been through 5 surgeries at this point.  She did have significant prominence of intra-articular talar head screw from previous revision subtalar arthrodesis, likely contributing to anterior ankle impingement.    Past Medical History  Past Medical History:   Diagnosis Date    AB (asthmatic bronchitis) ([facility])     Acquired immunocompromised state     Autoimmune RA, On Orencia every 28 days, followed by rheumatology    Actinic keratoses     Allergic rhinitis     Asthma     only used inhaler 4-5 times this entire year, triggers are seasonal allergies    Bilateral sacroiliitis     Chronic pain     Chronic sinusitis     DDD (degenerative disc disease), cervical     C5,6 BULGE    DDD (degenerative disc disease), lumbar     L4,5 BULGE    DJD (degenerative joint disease)     right ankle and foot    ETD (eustachian tube dysfunction)     GERD (gastroesophageal reflux disease)     H/O arthrodesis     Hiatal hernia     HTN (hypertension)     Hyperlipidemia     Hypothyroidism     Joint pain     Lateral epicondylitis of elbow     Low back pain     hronic, folowed by pain management    Low back pain     Lumbosacral disc disease     Migraine     Neuropathy     left foot N/T and hands bilateral    Osteoarthritis     Osteopenia     PONV (postoperative nausea and vomiting)     PTTD (posterior tibial tendon dysfunction)     left lower extremity    Seronegative rheumatoid arthritis  (Multi)     Skin cancer     Basal nd squamous removed    Spondylosis of 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     Vitamin D deficiency        Surgical History  Recent Surgeries in Orthopaedic Surgery            No cases to display             Social History  Social History     Socioeconomic History    Marital status:    Tobacco Use    Smoking status: Never    Smokeless tobacco: Never   Vaping Use    Vaping status: Never Used   Substance and Sexual Activity    Alcohol use: Not Currently     Comment: RARELY    Drug use: Never    Sexual activity: Defer     Partners: Male     Birth control/protection: Post-menopausal, Female Sterilization       Family History  Family History   Problem Relation Name Age of Onset    Arthritis Mother Mom     Alzheimer's disease Father Dad     Breast cancer Sister Sima 42    Alzheimer's disease Sister Joselyn     Breast cancer Mother's Sister  63    Stroke Maternal Grandfather Grandpa         Allergies  Allergies   Allergen Reactions    Doxycycline Rash     Severe rash or burn.    Oxycodone-Acetaminophen Nausea/vomiting    Sulfa (Sulfonamide Antibiotics) Headache    Sulfamethoxazole-Trimethoprim Rash    Balsam [place] Hives     Pt states in dental adhesives and make up    Clarithromycin Itching and Rash    Hydroxychloroquine Hives and Rash    Methotrexate Hives and Rash       Review of Systems  REVIEW OF SYSTEMS  Constitutional: no unplanned weight loss  Psychiatric: no suicidal ideation  ENT: no vision changes, no sinus problems  Pulmonary: no shortness of breath  Lymphatic: no enlarged lymph nodes  Cardiovascular: no chest pain or shortness of breath  Gastrointestinal: no stomach problems  Genitourinary: no dysuria   Skin: no rashes  Endocrine: no thyroid problems  Neurological: no headache, no numbness  Hematological: no easy bruising  Musculoskeletal: Left foot pain    Physical Exam  PHYSICAL EXAMINATION  Constitutional Exam: well developed and well nourished  Psychiatric  Exam: alert and oriented, appropriate mood and behavior  Eye Exam: EOMI  Pulmonary Exam: breathing non-labored, no apparent distress  Lymphatic exam: no appreciable lymphadenopathy in the lower extremities  Cardiovascular exam: RRR to peripheral palpation, DP pulses 2+, PT 2+, toes are pink with good capillary refill, no pitting edema  Skin exam: no open lesions, rashes, abrasions or ulcerations  Neurological exam: sensation to light touch intact in both lower extremities in peripheral and dermatomal distributions (except for any abnormalities noted in musculoskeletal exam)    Musculoskeletal exam: Left lower extremity examination.  Patient surgical incisions well-healed.  Note made of continued hypersensitivity to light touch at the sural nerve.  She does continue to localize pain to the anterolateral gutter with a positive anterolateral impingement test.  Also localizing pain in a C-type sign of the sinus Tarsi. She has supple ankle range of motion with pain, pain-free but obviously restricted subtalar joint range of motion, no significant pain with attempted inversion or eversion. Patient has sensation intact light touch grossly in a saphenous, sural (reporting neuritic type symptoms with positive tinel's baseline for patient from prior surgery), superficial peroneal, deep peroneal and tibial nerve distribution.  She has intact PF/DF/EHL.  She has 2+ DP/PT pulse palpated.    Last Recorded Vitals  There were no vitals taken for this visit.    Laboratory Results    No results found. However, due to the size of the patient record, not all encounters were searched. Please check Results Review for a complete set of results.    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  There is no intra-articular head screw penetration or obvious lucency.    Patient  "ID: Maureen Vela \"Mendy\" is a 65 y.o. female.    M Inj/Asp on 7/30/2025 10:41 AM  Medications: 3 mL lidocaine 10 mg/mL (1 %)    I reviewed the risks and benefits of left ankle lidocaine injection with the patient.  Risks including pain, bleeding, infection, hypopigmentation of the skin, loss of subcutaneous fat, metabolic complications and possibility that further injections may not be effective.  The patient gave informed consent for the procedure.       A time-out was called and confirmed identifying the left ankle as the site of injection.  Sterile skin preparation was made over the left tibiotalar joint just medial to the palpable TA tendon and the soft spot.  Then, using sterile technique, the left ankle was injected with 3 cc of 1% lidocaine with no complications.   The patient was given post-procedure instructions and precautions.  I personally performed the procedure.            Assessment/Plan:  65-year-old female who presents for complex evaluation of painful and multiply revised left subtalar arthrodesis by [facility] podiatrist with sural nerve neuritis and anterior ankle impingement s/p L HARSHAL and bone biopsy from 08/26/2024 s/p left revision bone block ICBG subtalar arthrodesis with BMAC from 11/19/2024 who presents with persistent pain with serial CT evidence of central bridging of subtalar joint and persistent pain.  I have reviewed the diagnosis and treatment options extensively with the patient.  I would recommend the patient continue weightbearing to her tolerance in her left lower extremity, she has been utilizing a tayco type brace.  We again discussed the diagnostic benefits of a lidocaine ankle injection test.  The patient reported at least 50% pain relief with the injection was able to ambulate more comfortably in the examination room, this is the second positive response to intra-articular ankle injection.  I would recommend proceeding with a left ankle arthroscopy with excision of impinging " osteophytes.  We will continue to follow her both clinically and radiographically from her subtalar distraction arthrodesis.  Certainly if she were to develop implant or fusion failure or have persistent pain then consideration could be given to tibiotalar arthrodesis with TTC IMN.  Patient voiced understanding was in agreement.  I reviewed the risk, benefits and alternatives to left ankle arthroscopy. I reviewed that I would not be able to make them a new or normal extremity. Risks included but are not limited to infection, wound healing complications, need for further surgery, persistent or worsening pain, postoperative stiffness, bleeding, blood loss requiring a blood transfusion, neurovascular injury, failure of the procedure, complications of anesthesia, stroke, DVT/PE, myocardial infarction and death. Benefits included decreased pain and improve function. Alternatives included continued conservative management. The patient voiced understanding of the risks, benefits and alternatives.  I reviewed the typical postoperative recovery course and we will plan to see the patient back 2 to 3 weeks following surgery for wound check out of plaster.  Upon return patient would not require further imaging.    Marlon Stafford MD, GLENN  Department of Orthopaedic Surgery  [facility]    The diagnosis and treatment plan were reviewed with the patient. All questions were answered. The patient verbalized understanding of the treatment plan. There were no barriers to understanding identified.    Note dictated with damntheradio software.  Completed without full type editing and sent to avoid delay.

## 2025-07-31 ENCOUNTER — ANESTHESIA EVENT (OUTPATIENT)
Dept: OPERATING ROOM | Facility: HOSPITAL | Age: 65
End: 2025-07-31
Payer: COMMERCIAL

## 2025-07-31 ENCOUNTER — APPOINTMENT (OUTPATIENT)
Dept: PREADMISSION TESTING | Facility: HOSPITAL | Age: 65
End: 2025-07-31
Payer: COMMERCIAL

## 2025-07-31 ENCOUNTER — APPOINTMENT (OUTPATIENT)
Dept: RADIOLOGY | Facility: HOSPITAL | Age: 65
End: 2025-07-31
Payer: COMMERCIAL

## 2025-08-03 LAB
25(OH)D3+25(OH)D2 SERPL-MCNC: 90 NG/ML (ref 30–100)
APPEARANCE UR: ABNORMAL
BACTERIA #/AREA URNS HPF: ABNORMAL /HPF
BACTERIA UR CULT: ABNORMAL
BILIRUB UR QL STRIP: NEGATIVE
CAOX CRY #/AREA URNS HPF: ABNORMAL /HPF
COLLAGEN CTX SERPL-MCNC: 109 PG/ML
COLOR UR: YELLOW
ERYTHROCYTE [SEDIMENTATION RATE] IN BLOOD BY WESTERGREN METHOD: 6 MM/H
GLUCOSE UR QL STRIP: NEGATIVE
HGB UR QL STRIP: NEGATIVE
HYALINE CASTS #/AREA URNS LPF: ABNORMAL /LPF
KETONES UR QL STRIP: NEGATIVE
LEUKOCYTE ESTERASE UR QL STRIP: NEGATIVE
MAGNESIUM SERPL-MCNC: 2.1 MG/DL (ref 1.5–2.5)
NITRITE UR QL STRIP: NEGATIVE
PH UR STRIP: 6 [PH] (ref 5–8)
PHOSPHATE SERPL-MCNC: 3.3 MG/DL (ref 2.5–4.5)
PROT UR QL STRIP: NEGATIVE
PTH-INTACT SERPL-MCNC: 38 PG/ML (ref 16–77)
RBC #/AREA URNS HPF: ABNORMAL /HPF
SERVICE CMNT-IMP: ABNORMAL
SP GR UR STRIP: 1.02 (ref 1–1.03)
SQUAMOUS #/AREA URNS HPF: ABNORMAL /HPF
WBC #/AREA URNS HPF: ABNORMAL /HPF

## 2025-08-04 ENCOUNTER — APPOINTMENT (OUTPATIENT)
Dept: PREADMISSION TESTING | Facility: HOSPITAL | Age: 65
End: 2025-08-04
Payer: COMMERCIAL

## 2025-08-07 ENCOUNTER — OFFICE VISIT (OUTPATIENT)
Dept: PRIMARY CARE | Facility: CLINIC | Age: 65
End: 2025-08-07
Payer: COMMERCIAL

## 2025-08-07 DIAGNOSIS — E03.9 ACQUIRED HYPOTHYROIDISM: ICD-10-CM

## 2025-08-07 DIAGNOSIS — I10 BENIGN ESSENTIAL HYPERTENSION: Primary | ICD-10-CM

## 2025-08-07 DIAGNOSIS — J30.1 SEASONAL ALLERGIC RHINITIS DUE TO POLLEN: ICD-10-CM

## 2025-08-07 DIAGNOSIS — E55.9 VITAMIN D DEFICIENCY: ICD-10-CM

## 2025-08-07 PROBLEM — D84.9 IMMUNODEFICIENCY (MULTI): Status: RESOLVED | Noted: 2023-10-04 | Resolved: 2025-08-07

## 2025-08-07 PROBLEM — D84.9 ACQUIRED IMMUNOCOMPROMISED STATE: Status: RESOLVED | Noted: 2023-10-04 | Resolved: 2025-08-07

## 2025-08-07 PROCEDURE — 99214 OFFICE O/P EST MOD 30 MIN: CPT | Performed by: FAMILY MEDICINE

## 2025-08-07 PROCEDURE — 3008F BODY MASS INDEX DOCD: CPT | Performed by: FAMILY MEDICINE

## 2025-08-07 PROCEDURE — 3075F SYST BP GE 130 - 139MM HG: CPT | Performed by: FAMILY MEDICINE

## 2025-08-07 PROCEDURE — 1036F TOBACCO NON-USER: CPT | Performed by: FAMILY MEDICINE

## 2025-08-07 PROCEDURE — 3079F DIAST BP 80-89 MM HG: CPT | Performed by: FAMILY MEDICINE

## 2025-08-07 ASSESSMENT — PAIN SCALES - GENERAL: PAINLEVEL_OUTOF10: 3

## 2025-08-07 NOTE — PROGRESS NOTES
Subjective   Patient ID: Mendy Vela is a 65 y.o. female who presents for Annual Exam.      Ankle/Foot:          Foot left foot surgery with Dr markel Treviño 3 weeks ago - at [facility], she is recovering for the surgery, he says it is still healing, she is non weight bearing , using a scooter and a bone stimulator     Anxiety:          Anxiety F/U at patient's baseline.          Medications none- she feels she has anxiety over her physical symptoms- she does not want to take meds for anxiety.          Stressors worsening- starting back at school - concerns with covid.          Supports significant, support from spouse, support from family, support from friends.          Mood anxious, worried about everything.          Energy change decreased energy.          Concentration decreased.          Suicidal ideations none.      Hypertension:          Patient here for F/U. at goal.          Med compliance yes , yes.          Med side effects none , none.          Chest pain none.          Dizziness lightheaded.          Palpitations asymptomatic.          Syncope none.      Hypothyroidism:          Follow Up taking hormone supplement routinely, due for a tsh.          Hypothyroidism tolerating meds with no side effects.      Medications reviewed:          Current medications Use reviewed and recorded.          Vitamin/Mineral supplements Use reviewed and recorded.      Review family history:          Reviewed See family history.      Review surgical history:          Reviewed See surgical history.      Review past history:          Reviewed See past history.      Headaches/Migraines:          Headaches/Migraines very rare migraines - uses maxalt maybe 1-2 a month.   UTD on her pap, mammogram and colonoscopy           Review of Systems   Constitutional:  Positive for fatigue. Negative for chills and fever.   HENT:  Negative for ear pain and postnasal drip.    Respiratory:  Negative for shortness of breath and wheezing.   "  Cardiovascular:  Negative for chest pain, palpitations and leg swelling.   Gastrointestinal:  Negative for constipation, nausea and vomiting.   Genitourinary:  Negative for frequency and hematuria.   Musculoskeletal:  Positive for arthralgias and myalgias.   Skin:  Negative for rash.   Neurological:  Negative for dizziness and weakness.   Psychiatric/Behavioral:  Negative for confusion. The patient is not nervous/anxious.        Objective   /84   Pulse 83   Ht 1.575 m (5' 2\")   Wt 92.1 kg (203 lb)   SpO2 99%   BMI 37.13 kg/m²     Physical Exam  Vitals reviewed.   Constitutional:       General: She is not in acute distress.     Appearance: Normal appearance. She is not ill-appearing.     Cardiovascular:      Rate and Rhythm: Normal rate and regular rhythm.      Heart sounds: Normal heart sounds. No murmur heard.  Pulmonary:      Breath sounds: Normal breath sounds. No wheezing.   Abdominal:      General: Bowel sounds are normal.     Musculoskeletal:      Cervical back: Neck supple.     Skin:     Findings: No rash.     Neurological:      General: No focal deficit present.      Mental Status: She is alert and oriented to person, place, and time.     Psychiatric:         Mood and Affect: Mood normal.         Behavior: Behavior normal.         Assessment/Plan   Problem List Items Addressed This Visit           ICD-10-CM    Benign essential hypertension - Primary I10    Hypothyroid E03.9    Vitamin D deficiency E55.9    Allergic rhinitis due to pollen J30.1     Other Visit Diagnoses         Codes      BMI 37.0-37.9, adult     Z68.37    low fat, low sugar diet and get 30 min of exercise a day                "

## 2025-08-08 ENCOUNTER — APPOINTMENT (OUTPATIENT)
Dept: RADIOLOGY | Facility: HOSPITAL | Age: 65
End: 2025-08-08
Payer: COMMERCIAL

## 2025-08-08 VITALS
DIASTOLIC BLOOD PRESSURE: 84 MMHG | HEART RATE: 83 BPM | SYSTOLIC BLOOD PRESSURE: 138 MMHG | OXYGEN SATURATION: 99 % | BODY MASS INDEX: 37.36 KG/M2 | WEIGHT: 203 LBS | HEIGHT: 62 IN

## 2025-08-08 DIAGNOSIS — Z12.31 SCREENING MAMMOGRAM FOR BREAST CANCER: ICD-10-CM

## 2025-08-08 PROCEDURE — 77063 BREAST TOMOSYNTHESIS BI: CPT

## 2025-08-08 ASSESSMENT — ENCOUNTER SYMPTOMS
NAUSEA: 0
PALPITATIONS: 0
FATIGUE: 1
WHEEZING: 0
CONFUSION: 0
HEMATURIA: 0
ARTHRALGIAS: 1
WEAKNESS: 0
FREQUENCY: 0
MYALGIAS: 1
SHORTNESS OF BREATH: 0
VOMITING: 0
NERVOUS/ANXIOUS: 0
DIZZINESS: 0
FEVER: 0
CONSTIPATION: 0
CHILLS: 0

## 2025-08-11 ENCOUNTER — PRE-ADMISSION TESTING (OUTPATIENT)
Dept: PREADMISSION TESTING | Facility: HOSPITAL | Age: 65
End: 2025-08-11
Payer: COMMERCIAL

## 2025-08-11 ASSESSMENT — ENCOUNTER SYMPTOMS
GASTROINTESTINAL NEGATIVE: 1
NECK NEGATIVE: 1
NUMBNESS: 1
CARDIOVASCULAR NEGATIVE: 1
CONSTITUTIONAL NEGATIVE: 1
RESPIRATORY NEGATIVE: 1

## 2025-08-11 ASSESSMENT — DUKE ACTIVITY SCORE INDEX (DASI)
CAN YOU DO LIGHT WORK AROUND THE HOUSE LIKE DUSTING OR WASHING DISHES: YES
CAN YOU PARTICIPATE IN STRENOUS SPORTS LIKE SWIMMING, SINGLES TENNIS, FOOTBALL, BASKETBALL, OR SKIING: NO
CAN YOU DO HEAVY WORK AROUND THE HOUSE LIKE SCRUBBING FLOORS OR LIFTING AND MOVING HEAVY FURNITURE: NO
CAN YOU RUN A SHORT DISTANCE: NO
CAN YOU WALK INDOORS, SUCH AS AROUND YOUR HOUSE: YES
CAN YOU CLIMB A FLIGHT OF STAIRS OR WALK UP A HILL: YES
CAN YOU PARTICIPATE IN MODERATE RECREATIONAL ACTIVITIES LIKE GOLF, BOWLING, DANCING, DOUBLES TENNIS OR THROWING A BASEBALL OR FOOTBALL: NO
CAN YOU DO MODERATE WORK AROUND THE HOUSE LIKE VACUUMING, SWEEPING FLOORS OR CARRYING GROCERIES: YES
CAN YOU HAVE SEXUAL RELATIONS: YES
CAN YOU DO YARD WORK LIKE RAKING LEAVES, WEEDING OR PUSHING A MOWER: YES
TOTAL_SCORE: 28.7
CAN YOU WALK A BLOCK OR TWO ON LEVEL GROUND: YES
CAN YOU TAKE CARE OF YOURSELF (EAT, DRESS, BATHE, OR USE TOILET): YES
DASI METS SCORE: 6.3

## 2025-08-11 ASSESSMENT — PAIN - FUNCTIONAL ASSESSMENT: PAIN_FUNCTIONAL_ASSESSMENT: 0-10

## 2025-08-11 ASSESSMENT — PAIN SCALES - GENERAL
PAINLEVEL_OUTOF10: 4
PAINLEVEL_OUTOF10: 4

## 2025-08-11 ASSESSMENT — LIFESTYLE VARIABLES: SMOKING_STATUS: NONSMOKER

## 2025-08-12 ENCOUNTER — APPOINTMENT (OUTPATIENT)
Dept: INFUSION THERAPY | Facility: CLINIC | Age: 65
End: 2025-08-12
Payer: COMMERCIAL

## 2025-08-13 ENCOUNTER — APPOINTMENT (OUTPATIENT)
Dept: PAIN MEDICINE | Facility: CLINIC | Age: 65
End: 2025-08-13
Payer: COMMERCIAL

## 2025-08-13 ENCOUNTER — OFFICE VISIT (OUTPATIENT)
Facility: CLINIC | Age: 65
End: 2025-08-13
Payer: COMMERCIAL

## 2025-08-13 ENCOUNTER — RESULTS FOLLOW-UP (OUTPATIENT)
Dept: PRIMARY CARE | Facility: CLINIC | Age: 65
End: 2025-08-13
Payer: COMMERCIAL

## 2025-08-13 VITALS
SYSTOLIC BLOOD PRESSURE: 144 MMHG | HEART RATE: 76 BPM | HEIGHT: 62 IN | DIASTOLIC BLOOD PRESSURE: 92 MMHG | WEIGHT: 203 LBS | BODY MASS INDEX: 37.36 KG/M2

## 2025-08-13 DIAGNOSIS — Z12.31 ENCOUNTER FOR SCREENING MAMMOGRAM FOR BREAST CANCER: Primary | ICD-10-CM

## 2025-08-13 DIAGNOSIS — M85.80 OSTEOPENIA WITH HIGH RISK OF FRACTURE: ICD-10-CM

## 2025-08-13 DIAGNOSIS — M05.79 SEROPOSITIVE RHEUMATOID ARTHRITIS OF MULTIPLE JOINTS (MULTI): Primary | ICD-10-CM

## 2025-08-13 DIAGNOSIS — E55.9 VITAMIN D DEFICIENCY: ICD-10-CM

## 2025-08-13 DIAGNOSIS — L80 VITILIGO: ICD-10-CM

## 2025-08-13 DIAGNOSIS — Z79.899 ENCOUNTER FOR LONG-TERM (CURRENT) USE OF MEDICATIONS: ICD-10-CM

## 2025-08-13 DIAGNOSIS — R76.8 POSITIVE ANA (ANTINUCLEAR ANTIBODY): ICD-10-CM

## 2025-08-13 PROCEDURE — 3080F DIAST BP >= 90 MM HG: CPT | Performed by: INTERNAL MEDICINE

## 2025-08-13 PROCEDURE — 3077F SYST BP >= 140 MM HG: CPT | Performed by: INTERNAL MEDICINE

## 2025-08-13 PROCEDURE — 99215 OFFICE O/P EST HI 40 MIN: CPT | Performed by: INTERNAL MEDICINE

## 2025-08-13 PROCEDURE — 3008F BODY MASS INDEX DOCD: CPT | Performed by: INTERNAL MEDICINE

## 2025-08-13 ASSESSMENT — ROUTINE ASSESSMENT OF PATIENT INDEX DATA (RAPID3)
FEELINGS_ANXIETY_NERVOUS: WITHOUT ANY DIFFICULTY
SEVERITY_SCORE: 0
SUM OF QUESTIONS A TO J: 7
TOTAL RAPID3 SCORE: 12.3
SEVERITY_SCORE: HIGH SEVERITY (HS)
IN_OUT_BED: WITHOUT ANY DIFFICULTY
FEELINGS_DEPRESSION: WITHOUT ANY DIFFICULTY
GOOD_NIGHTS_SLEEP: WITH SOME DIFFICULTY
FN_SCORE: 2.3
ON A SCALE OF ONE TO TEN, CONSIDERING ALL THE WAYS IN WHICH ILLNESS AND HEALTH CONDITIONS MAY AFFECT YOU AT THIS TIME, PLEASE INDICATE BELOW HOW YOU ARE DOING:: 5
DRESS_YOURSELF: WITH SOME DIFFICULTY
WALK_KILOMETERS: WITH MUCH DIFFICULTY
IN_OUT_TRANSPORT: WITH SOME DIFFICULTY
PICK_CLOTHES_OFF_FLOOR: WITHOUT ANY DIFFICULTY
TURN_FAUCETS_OFF: WITHOUT ANY DIFFICULTY
ON A SCALE OF ONE TO TEN, HOW MUCH PAIN HAVE YOU HAD BECAUSE OF YOUR CONDITION OVER THE PAST WEEK?: 5
ON A SCALE OF ONE TO TEN, CONSIDERING ALL THE WAYS IN WHICH ILLNESS AND HEALTH CONDITIONS MAY AFFECT YOU AT THIS TIME, PLEASE INDICATE BELOW HOW YOU ARE DOING:: 5
PARTIPATE_RECREATIONAL_ACTIVITIES: WITH MUCH DIFFICULTY
WALK_FLAT_GROUND: WITH SOME DIFFICULTY
WASH_DRY_BODY: WITHOUT ANY DIFFICULTY
WEIGHTED_TOTAL_SCORE: 4.1
LIFT_CUP_TO_MOUTH: WITHOUT ANY DIFFICULTY
ON A SCALE OF ONE TO TEN, HOW MUCH PAIN HAVE YOU HAD BECAUSE OF YOUR CONDITION OVER THE PAST WEEK?: 5

## 2025-08-13 ASSESSMENT — COLUMBIA-SUICIDE SEVERITY RATING SCALE - C-SSRS
6. HAVE YOU EVER DONE ANYTHING, STARTED TO DO ANYTHING, OR PREPARED TO DO ANYTHING TO END YOUR LIFE?: NO
2. HAVE YOU ACTUALLY HAD ANY THOUGHTS OF KILLING YOURSELF?: NO
1. IN THE PAST MONTH, HAVE YOU WISHED YOU WERE DEAD OR WISHED YOU COULD GO TO SLEEP AND NOT WAKE UP?: NO

## 2025-08-13 ASSESSMENT — PAIN SCALES - GENERAL: PAINLEVEL_OUTOF10: 6

## 2025-08-13 ASSESSMENT — PATIENT HEALTH QUESTIONNAIRE - PHQ9
2. FEELING DOWN, DEPRESSED OR HOPELESS: NOT AT ALL
1. LITTLE INTEREST OR PLEASURE IN DOING THINGS: NOT AT ALL
SUM OF ALL RESPONSES TO PHQ9 QUESTIONS 1 AND 2: 0

## 2025-08-14 ENCOUNTER — PHARMACY VISIT (OUTPATIENT)
Dept: PHARMACY | Facility: CLINIC | Age: 65
End: 2025-08-14
Payer: COMMERCIAL

## 2025-08-14 ENCOUNTER — ANESTHESIA (OUTPATIENT)
Dept: OPERATING ROOM | Facility: HOSPITAL | Age: 65
End: 2025-08-14
Payer: COMMERCIAL

## 2025-08-14 ENCOUNTER — HOSPITAL ENCOUNTER (OUTPATIENT)
Facility: HOSPITAL | Age: 65
Setting detail: OUTPATIENT SURGERY
Discharge: HOME | End: 2025-08-14
Attending: STUDENT IN AN ORGANIZED HEALTH CARE EDUCATION/TRAINING PROGRAM | Admitting: STUDENT IN AN ORGANIZED HEALTH CARE EDUCATION/TRAINING PROGRAM
Payer: COMMERCIAL

## 2025-08-14 VITALS
HEIGHT: 62 IN | RESPIRATION RATE: 12 BRPM | TEMPERATURE: 97 F | HEART RATE: 80 BPM | BODY MASS INDEX: 37.24 KG/M2 | WEIGHT: 202.38 LBS | OXYGEN SATURATION: 100 % | SYSTOLIC BLOOD PRESSURE: 137 MMHG | DIASTOLIC BLOOD PRESSURE: 83 MMHG

## 2025-08-14 DIAGNOSIS — M25.572 CHRONIC PAIN OF LEFT ANKLE: Primary | Chronic | ICD-10-CM

## 2025-08-14 DIAGNOSIS — G89.29 CHRONIC PAIN OF LEFT ANKLE: Primary | Chronic | ICD-10-CM

## 2025-08-14 DIAGNOSIS — M25.879 IMPINGEMENT OF ANTERIOR PART OF ANKLE: ICD-10-CM

## 2025-08-14 PROCEDURE — 2500000004 HC RX 250 GENERAL PHARMACY W/ HCPCS (ALT 636 FOR OP/ED): Performed by: ANESTHESIOLOGY

## 2025-08-14 PROCEDURE — 7100000001 HC RECOVERY ROOM TIME - INITIAL BASE CHARGE: Performed by: STUDENT IN AN ORGANIZED HEALTH CARE EDUCATION/TRAINING PROGRAM

## 2025-08-14 PROCEDURE — 2500000004 HC RX 250 GENERAL PHARMACY W/ HCPCS (ALT 636 FOR OP/ED)

## 2025-08-14 PROCEDURE — RXMED WILLOW AMBULATORY MEDICATION CHARGE

## 2025-08-14 PROCEDURE — 2500000005 HC RX 250 GENERAL PHARMACY W/O HCPCS

## 2025-08-14 PROCEDURE — 7100000010 HC PHASE TWO TIME - EACH INCREMENTAL 1 MINUTE: Performed by: STUDENT IN AN ORGANIZED HEALTH CARE EDUCATION/TRAINING PROGRAM

## 2025-08-14 PROCEDURE — 3600000007 HC OR TIME - EACH INCREMENTAL 1 MINUTE - PROCEDURE LEVEL TWO: Performed by: STUDENT IN AN ORGANIZED HEALTH CARE EDUCATION/TRAINING PROGRAM

## 2025-08-14 PROCEDURE — 3700000002 HC GENERAL ANESTHESIA TIME - EACH INCREMENTAL 1 MINUTE: Performed by: STUDENT IN AN ORGANIZED HEALTH CARE EDUCATION/TRAINING PROGRAM

## 2025-08-14 PROCEDURE — 2500000005 HC RX 250 GENERAL PHARMACY W/O HCPCS: Performed by: STUDENT IN AN ORGANIZED HEALTH CARE EDUCATION/TRAINING PROGRAM

## 2025-08-14 PROCEDURE — 3600000002 HC OR TIME - INITIAL BASE CHARGE - PROCEDURE LEVEL TWO: Performed by: STUDENT IN AN ORGANIZED HEALTH CARE EDUCATION/TRAINING PROGRAM

## 2025-08-14 PROCEDURE — 7100000002 HC RECOVERY ROOM TIME - EACH INCREMENTAL 1 MINUTE: Performed by: STUDENT IN AN ORGANIZED HEALTH CARE EDUCATION/TRAINING PROGRAM

## 2025-08-14 PROCEDURE — 2720000007 HC OR 272 NO HCPCS: Performed by: STUDENT IN AN ORGANIZED HEALTH CARE EDUCATION/TRAINING PROGRAM

## 2025-08-14 PROCEDURE — 3700000001 HC GENERAL ANESTHESIA TIME - INITIAL BASE CHARGE: Performed by: STUDENT IN AN ORGANIZED HEALTH CARE EDUCATION/TRAINING PROGRAM

## 2025-08-14 PROCEDURE — 7100000009 HC PHASE TWO TIME - INITIAL BASE CHARGE: Performed by: STUDENT IN AN ORGANIZED HEALTH CARE EDUCATION/TRAINING PROGRAM

## 2025-08-14 PROCEDURE — 2500000005 HC RX 250 GENERAL PHARMACY W/O HCPCS: Performed by: ANESTHESIOLOGY

## 2025-08-14 PROCEDURE — 29898 ANKLE ARTHROSCOPY/SURGERY: CPT | Performed by: STUDENT IN AN ORGANIZED HEALTH CARE EDUCATION/TRAINING PROGRAM

## 2025-08-14 RX ORDER — ASPIRIN 81 MG/1
81 TABLET ORAL 2 TIMES DAILY
Qty: 84 TABLET | Refills: 0 | Status: SHIPPED | OUTPATIENT
Start: 2025-08-14 | End: 2025-09-25

## 2025-08-14 RX ORDER — LIDOCAINE HYDROCHLORIDE 10 MG/ML
INJECTION, SOLUTION EPIDURAL; INFILTRATION; INTRACAUDAL; PERINEURAL
Status: COMPLETED | OUTPATIENT
Start: 2025-08-14 | End: 2025-08-14

## 2025-08-14 RX ORDER — BUPIVACAINE HCL/EPINEPHRINE 0.5-1:200K
VIAL (ML) INJECTION
Status: COMPLETED | OUTPATIENT
Start: 2025-08-14 | End: 2025-08-14

## 2025-08-14 RX ORDER — ONDANSETRON HYDROCHLORIDE 2 MG/ML
4 INJECTION, SOLUTION INTRAVENOUS ONCE AS NEEDED
Status: DISCONTINUED | OUTPATIENT
Start: 2025-08-14 | End: 2025-08-14 | Stop reason: HOSPADM

## 2025-08-14 RX ORDER — DROPERIDOL 2.5 MG/ML
0.62 INJECTION, SOLUTION INTRAMUSCULAR; INTRAVENOUS ONCE AS NEEDED
Status: DISCONTINUED | OUTPATIENT
Start: 2025-08-14 | End: 2025-08-14 | Stop reason: HOSPADM

## 2025-08-14 RX ORDER — FENTANYL CITRATE 50 UG/ML
INJECTION, SOLUTION INTRAMUSCULAR; INTRAVENOUS AS NEEDED
Status: DISCONTINUED | OUTPATIENT
Start: 2025-08-14 | End: 2025-08-14

## 2025-08-14 RX ORDER — TRANEXAMIC ACID 1 G/10ML
INJECTION, SOLUTION INTRAVENOUS AS NEEDED
Status: DISCONTINUED | OUTPATIENT
Start: 2025-08-14 | End: 2025-08-14

## 2025-08-14 RX ORDER — SODIUM CHLORIDE, SODIUM LACTATE, POTASSIUM CHLORIDE, CALCIUM CHLORIDE 600; 310; 30; 20 MG/100ML; MG/100ML; MG/100ML; MG/100ML
20 INJECTION, SOLUTION INTRAVENOUS CONTINUOUS
Status: DISCONTINUED | OUTPATIENT
Start: 2025-08-14 | End: 2025-08-14 | Stop reason: HOSPADM

## 2025-08-14 RX ORDER — SODIUM CHLORIDE 0.9 G/100ML
INJECTION, SOLUTION IRRIGATION AS NEEDED
Status: DISCONTINUED | OUTPATIENT
Start: 2025-08-14 | End: 2025-08-14 | Stop reason: HOSPADM

## 2025-08-14 RX ORDER — CEFAZOLIN 1 G/1
INJECTION, POWDER, FOR SOLUTION INTRAVENOUS AS NEEDED
Status: DISCONTINUED | OUTPATIENT
Start: 2025-08-14 | End: 2025-08-14

## 2025-08-14 RX ORDER — TRAMADOL HYDROCHLORIDE 50 MG/1
50 TABLET, FILM COATED ORAL EVERY 6 HOURS PRN
Qty: 28 TABLET | Refills: 0 | Status: SHIPPED | OUTPATIENT
Start: 2025-08-14 | End: 2025-08-21

## 2025-08-14 RX ORDER — MEPERIDINE HYDROCHLORIDE 25 MG/ML
12.5 INJECTION INTRAMUSCULAR; INTRAVENOUS; SUBCUTANEOUS EVERY 10 MIN PRN
Status: DISCONTINUED | OUTPATIENT
Start: 2025-08-14 | End: 2025-08-14 | Stop reason: HOSPADM

## 2025-08-14 RX ORDER — SODIUM CHLORIDE, SODIUM LACTATE, POTASSIUM CHLORIDE, CALCIUM CHLORIDE 600; 310; 30; 20 MG/100ML; MG/100ML; MG/100ML; MG/100ML
100 INJECTION, SOLUTION INTRAVENOUS CONTINUOUS
Status: DISCONTINUED | OUTPATIENT
Start: 2025-08-14 | End: 2025-08-14 | Stop reason: HOSPADM

## 2025-08-14 RX ORDER — DIPHENHYDRAMINE HYDROCHLORIDE 50 MG/ML
12.5 INJECTION, SOLUTION INTRAMUSCULAR; INTRAVENOUS ONCE AS NEEDED
Status: DISCONTINUED | OUTPATIENT
Start: 2025-08-14 | End: 2025-08-14 | Stop reason: HOSPADM

## 2025-08-14 RX ORDER — ONDANSETRON HYDROCHLORIDE 2 MG/ML
INJECTION, SOLUTION INTRAVENOUS AS NEEDED
Status: DISCONTINUED | OUTPATIENT
Start: 2025-08-14 | End: 2025-08-14

## 2025-08-14 RX ORDER — LIDOCAINE HYDROCHLORIDE 20 MG/ML
INJECTION, SOLUTION INFILTRATION; PERINEURAL AS NEEDED
Status: DISCONTINUED | OUTPATIENT
Start: 2025-08-14 | End: 2025-08-14

## 2025-08-14 RX ORDER — MIDAZOLAM HYDROCHLORIDE 1 MG/ML
INJECTION, SOLUTION INTRAMUSCULAR; INTRAVENOUS AS NEEDED
Status: DISCONTINUED | OUTPATIENT
Start: 2025-08-14 | End: 2025-08-14

## 2025-08-14 RX ORDER — ALBUTEROL SULFATE 0.83 MG/ML
2.5 SOLUTION RESPIRATORY (INHALATION) ONCE AS NEEDED
Status: DISCONTINUED | OUTPATIENT
Start: 2025-08-14 | End: 2025-08-14 | Stop reason: HOSPADM

## 2025-08-14 RX ORDER — ACETAMINOPHEN 325 MG/1
650 TABLET ORAL EVERY 6 HOURS PRN
Qty: 60 TABLET | Refills: 0 | Status: SHIPPED | OUTPATIENT
Start: 2025-08-14

## 2025-08-14 RX ORDER — ROCURONIUM BROMIDE 10 MG/ML
INJECTION, SOLUTION INTRAVENOUS AS NEEDED
Status: DISCONTINUED | OUTPATIENT
Start: 2025-08-14 | End: 2025-08-14

## 2025-08-14 RX ORDER — DOCUSATE SODIUM 100 MG/1
100 CAPSULE, LIQUID FILLED ORAL 2 TIMES DAILY PRN
Qty: 14 CAPSULE | Refills: 0 | Status: SHIPPED | OUTPATIENT
Start: 2025-08-14 | End: 2025-08-21

## 2025-08-14 RX ORDER — ONDANSETRON 4 MG/1
4 TABLET, FILM COATED ORAL EVERY 8 HOURS PRN
Qty: 21 TABLET | Refills: 0 | Status: SHIPPED | OUTPATIENT
Start: 2025-08-14 | End: 2025-08-21

## 2025-08-14 RX ORDER — PROPOFOL 10 MG/ML
INJECTION, EMULSION INTRAVENOUS AS NEEDED
Status: DISCONTINUED | OUTPATIENT
Start: 2025-08-14 | End: 2025-08-14

## 2025-08-14 RX ADMIN — SODIUM CHLORIDE, POTASSIUM CHLORIDE, SODIUM LACTATE AND CALCIUM CHLORIDE: 600; 310; 30; 20 INJECTION, SOLUTION INTRAVENOUS at 12:29

## 2025-08-14 RX ADMIN — Medication 30 ML: at 12:35

## 2025-08-14 RX ADMIN — FENTANYL CITRATE 50 MCG: 50 INJECTION, SOLUTION INTRAMUSCULAR; INTRAVENOUS at 13:00

## 2025-08-14 RX ADMIN — CEFAZOLIN 2 G: 1 INJECTION, POWDER, FOR SOLUTION INTRAMUSCULAR; INTRAVENOUS at 13:07

## 2025-08-14 RX ADMIN — PROPOFOL 160 MG: 10 INJECTION, EMULSION INTRAVENOUS at 13:00

## 2025-08-14 RX ADMIN — FENTANYL CITRATE 25 MCG: 50 INJECTION, SOLUTION INTRAMUSCULAR; INTRAVENOUS at 13:42

## 2025-08-14 RX ADMIN — LIDOCAINE HYDROCHLORIDE 2 ML: 10 SOLUTION INTRAVENOUS at 12:35

## 2025-08-14 RX ADMIN — LIDOCAINE HYDROCHLORIDE 60 MG: 20 INJECTION, SOLUTION INFILTRATION; PERINEURAL at 13:00

## 2025-08-14 RX ADMIN — HYDROMORPHONE HYDROCHLORIDE 0.6 MG: 2 INJECTION, SOLUTION INTRAMUSCULAR; INTRAVENOUS; SUBCUTANEOUS at 14:57

## 2025-08-14 RX ADMIN — FENTANYL CITRATE 100 MCG: 50 INJECTION, SOLUTION INTRAMUSCULAR; INTRAVENOUS at 12:35

## 2025-08-14 RX ADMIN — ONDANSETRON 4 MG: 2 INJECTION, SOLUTION INTRAMUSCULAR; INTRAVENOUS at 14:21

## 2025-08-14 RX ADMIN — ROCURONIUM BROMIDE 10 MG: 10 INJECTION, SOLUTION INTRAVENOUS at 14:35

## 2025-08-14 RX ADMIN — HYDROMORPHONE HYDROCHLORIDE 0.4 MG: 2 INJECTION, SOLUTION INTRAMUSCULAR; INTRAVENOUS; SUBCUTANEOUS at 14:59

## 2025-08-14 RX ADMIN — SODIUM CHLORIDE, SODIUM LACTATE, POTASSIUM CHLORIDE, AND CALCIUM CHLORIDE 20 ML/HR: .6; .31; .03; .02 INJECTION, SOLUTION INTRAVENOUS at 11:01

## 2025-08-14 RX ADMIN — MIDAZOLAM 2 MG: 1 INJECTION INTRAMUSCULAR; INTRAVENOUS at 12:35

## 2025-08-14 RX ADMIN — FENTANYL CITRATE 25 MCG: 50 INJECTION, SOLUTION INTRAMUSCULAR; INTRAVENOUS at 14:29

## 2025-08-14 RX ADMIN — BUPIVACAINE HYDROCHLORIDE AND EPINEPHRINE BITARTRATE 30 ML: 5; .0091 INJECTION, SOLUTION PERINEURAL at 12:35

## 2025-08-14 RX ADMIN — ROCURONIUM BROMIDE 10 MG: 10 INJECTION, SOLUTION INTRAVENOUS at 14:05

## 2025-08-14 RX ADMIN — LIDOCAINE HYDROCHLORIDE 2 ML: 10 INJECTION, SOLUTION EPIDURAL; INFILTRATION; INTRACAUDAL; PERINEURAL at 12:35

## 2025-08-14 RX ADMIN — TRANEXAMIC ACID 1000 MG: 100 INJECTION, SOLUTION INTRAVENOUS at 13:10

## 2025-08-14 RX ADMIN — ROCURONIUM BROMIDE 20 MG: 10 INJECTION, SOLUTION INTRAVENOUS at 13:34

## 2025-08-14 RX ADMIN — DEXAMETHASONE SODIUM PHOSPHATE 4 MG: 4 INJECTION INTRA-ARTICULAR; INTRALESIONAL; INTRAMUSCULAR; INTRAVENOUS; SOFT TISSUE at 12:35

## 2025-08-14 RX ADMIN — ROCURONIUM BROMIDE 50 MG: 10 INJECTION, SOLUTION INTRAVENOUS at 13:01

## 2025-08-14 RX ADMIN — PROPOFOL 20 MG: 10 INJECTION, EMULSION INTRAVENOUS at 14:15

## 2025-08-14 RX ADMIN — PROPOFOL 20 MG: 10 INJECTION, EMULSION INTRAVENOUS at 13:36

## 2025-08-14 SDOH — HEALTH STABILITY: MENTAL HEALTH: CURRENT SMOKER: 0

## 2025-08-14 ASSESSMENT — PAIN SCALES - GENERAL
PAINLEVEL_OUTOF10: 0 - NO PAIN

## 2025-08-14 ASSESSMENT — PAIN - FUNCTIONAL ASSESSMENT
PAIN_FUNCTIONAL_ASSESSMENT: 0-10

## 2025-08-26 ENCOUNTER — APPOINTMENT (OUTPATIENT)
Dept: INFUSION THERAPY | Facility: CLINIC | Age: 65
End: 2025-08-26
Payer: COMMERCIAL

## 2025-08-26 VITALS
BODY MASS INDEX: 37.44 KG/M2 | RESPIRATION RATE: 18 BRPM | HEART RATE: 73 BPM | OXYGEN SATURATION: 100 % | DIASTOLIC BLOOD PRESSURE: 64 MMHG | SYSTOLIC BLOOD PRESSURE: 122 MMHG | TEMPERATURE: 97.9 F | WEIGHT: 204.7 LBS

## 2025-08-26 DIAGNOSIS — M05.79 SEROPOSITIVE RHEUMATOID ARTHRITIS OF MULTIPLE JOINTS (MULTI): ICD-10-CM

## 2025-08-26 PROCEDURE — 96365 THER/PROPH/DIAG IV INF INIT: CPT | Performed by: NURSE PRACTITIONER

## 2025-08-26 RX ORDER — DIPHENHYDRAMINE HYDROCHLORIDE 50 MG/ML
50 INJECTION, SOLUTION INTRAMUSCULAR; INTRAVENOUS AS NEEDED
OUTPATIENT
Start: 2025-09-09

## 2025-08-26 RX ORDER — ALBUTEROL SULFATE 0.83 MG/ML
3 SOLUTION RESPIRATORY (INHALATION) AS NEEDED
OUTPATIENT
Start: 2025-09-09

## 2025-08-26 RX ORDER — FAMOTIDINE 10 MG/ML
20 INJECTION, SOLUTION INTRAVENOUS ONCE AS NEEDED
OUTPATIENT
Start: 2025-09-09

## 2025-08-26 RX ORDER — EPINEPHRINE 0.3 MG/.3ML
0.3 INJECTION SUBCUTANEOUS EVERY 5 MIN PRN
OUTPATIENT
Start: 2025-09-09

## 2025-08-26 ASSESSMENT — ENCOUNTER SYMPTOMS
HEMATURIA: 0
NAUSEA: 0
ABDOMINAL PAIN: 0
WOUND: 0
APPETITE CHANGE: 0
VOICE CHANGE: 0
EXTREMITY WEAKNESS: 1
CHILLS: 0
FREQUENCY: 0
DYSURIA: 0
WHEEZING: 0
VOMITING: 0
EYE PROBLEMS: 0
ARTHRALGIAS: 1
TROUBLE SWALLOWING: 0
SHORTNESS OF BREATH: 0
DIARRHEA: 0
CONSTIPATION: 0
COUGH: 0
HEADACHES: 0
DIZZINESS: 0
BLOOD IN STOOL: 0
BRUISES/BLEEDS EASILY: 0
NUMBNESS: 1
SORE THROAT: 0
MYALGIAS: 0
LEG SWELLING: 1
LIGHT-HEADEDNESS: 0
FATIGUE: 0
FEVER: 0

## 2025-08-26 ASSESSMENT — PAIN SCALES - GENERAL: PAINLEVEL_OUTOF10: 4

## 2025-08-27 ENCOUNTER — OFFICE VISIT (OUTPATIENT)
Dept: PAIN MEDICINE | Facility: CLINIC | Age: 65
End: 2025-08-27
Payer: COMMERCIAL

## 2025-08-27 ENCOUNTER — PREP FOR PROCEDURE (OUTPATIENT)
Dept: PAIN MEDICINE | Facility: CLINIC | Age: 65
End: 2025-08-27

## 2025-08-27 VITALS
WEIGHT: 204 LBS | RESPIRATION RATE: 24 BRPM | OXYGEN SATURATION: 99 % | HEART RATE: 88 BPM | DIASTOLIC BLOOD PRESSURE: 90 MMHG | SYSTOLIC BLOOD PRESSURE: 130 MMHG | BODY MASS INDEX: 37.54 KG/M2 | HEIGHT: 62 IN

## 2025-08-27 DIAGNOSIS — M46.1 SACROILIITIS: Primary | ICD-10-CM

## 2025-08-27 PROCEDURE — 3008F BODY MASS INDEX DOCD: CPT | Performed by: ANESTHESIOLOGY

## 2025-08-27 PROCEDURE — 1036F TOBACCO NON-USER: CPT | Performed by: ANESTHESIOLOGY

## 2025-08-27 PROCEDURE — 3075F SYST BP GE 130 - 139MM HG: CPT | Performed by: ANESTHESIOLOGY

## 2025-08-27 PROCEDURE — 99214 OFFICE O/P EST MOD 30 MIN: CPT | Performed by: ANESTHESIOLOGY

## 2025-08-27 PROCEDURE — 3080F DIAST BP >= 90 MM HG: CPT | Performed by: ANESTHESIOLOGY

## 2025-08-27 PROCEDURE — 99212 OFFICE O/P EST SF 10 MIN: CPT

## 2025-08-27 ASSESSMENT — PAIN SCALES - GENERAL
PAINLEVEL_OUTOF10: 3
PAINLEVEL_OUTOF10: 3

## 2025-08-27 ASSESSMENT — PAIN DESCRIPTION - DESCRIPTORS: DESCRIPTORS: ACHING

## 2025-08-27 ASSESSMENT — PAIN - FUNCTIONAL ASSESSMENT: PAIN_FUNCTIONAL_ASSESSMENT: 0-10

## 2025-08-29 ENCOUNTER — OFFICE VISIT (OUTPATIENT)
Dept: ORTHOPEDIC SURGERY | Facility: CLINIC | Age: 65
End: 2025-08-29
Payer: COMMERCIAL

## 2025-08-29 DIAGNOSIS — M25.879 IMPINGEMENT OF ANTERIOR PART OF ANKLE: Primary | ICD-10-CM

## 2025-08-29 PROCEDURE — 99024 POSTOP FOLLOW-UP VISIT: CPT | Performed by: STUDENT IN AN ORGANIZED HEALTH CARE EDUCATION/TRAINING PROGRAM

## 2025-08-29 ASSESSMENT — PAIN SCALES - GENERAL: PAINLEVEL_OUTOF10: 3

## 2025-08-29 ASSESSMENT — PAIN DESCRIPTION - DESCRIPTORS: DESCRIPTORS: ACHING;DULL;DISCOMFORT

## 2025-08-29 ASSESSMENT — PAIN - FUNCTIONAL ASSESSMENT: PAIN_FUNCTIONAL_ASSESSMENT: 0-10

## 2025-09-05 ENCOUNTER — APPOINTMENT (OUTPATIENT)
Dept: ORTHOPEDIC SURGERY | Facility: CLINIC | Age: 65
End: 2025-09-05
Payer: COMMERCIAL

## 2025-09-09 ENCOUNTER — APPOINTMENT (OUTPATIENT)
Dept: INFUSION THERAPY | Facility: CLINIC | Age: 65
End: 2025-09-09
Payer: COMMERCIAL

## 2025-09-23 ENCOUNTER — APPOINTMENT (OUTPATIENT)
Dept: INFUSION THERAPY | Facility: CLINIC | Age: 65
End: 2025-09-23
Payer: COMMERCIAL

## 2025-10-03 ENCOUNTER — APPOINTMENT (OUTPATIENT)
Facility: CLINIC | Age: 65
End: 2025-10-03
Payer: COMMERCIAL

## 2025-10-21 ENCOUNTER — APPOINTMENT (OUTPATIENT)
Dept: INFUSION THERAPY | Facility: CLINIC | Age: 65
End: 2025-10-21
Payer: COMMERCIAL

## 2025-11-18 ENCOUNTER — APPOINTMENT (OUTPATIENT)
Dept: INFUSION THERAPY | Facility: CLINIC | Age: 65
End: 2025-11-18
Payer: COMMERCIAL

## 2025-12-16 ENCOUNTER — APPOINTMENT (OUTPATIENT)
Dept: INFUSION THERAPY | Facility: CLINIC | Age: 65
End: 2025-12-16
Payer: COMMERCIAL

## (undated) DEVICE — COVER, EQUIPMENT, C ARM, W/ STRAPS

## (undated) DEVICE — GLOVE, SURGICAL, PROTEXIS PI ORTHO, 7.5, PF, LF

## (undated) DEVICE — APPLICATOR, CHLORAPREP, W/ORANGE TINT, 26ML

## (undated) DEVICE — SUTURE, VICRYL, 2-0, 27 IN, FS-1, UNDYED

## (undated) DEVICE — Device

## (undated) DEVICE — TUBING, SUCTION, CONNECTING, NON-CONDUCTIVE, SURE GRIP CONNECTORS, 3/16 IN X 10 FT

## (undated) DEVICE — CUFF, TOURNIQUET, 34 X 4, DUAL PORT/SNGL BLADDER, DISP, LF

## (undated) DEVICE — TUBING, ARTHROSCOPIC INFLOW, 10K

## (undated) DEVICE — MAT, FLOOR, SUCTION, LOW PROFILE, 50 X 34

## (undated) DEVICE — SUTURE, VICRYL, 3-0, 27 IN, SH

## (undated) DEVICE — DRAPE, SHEET, U, IMPERVIOUS, STERI DRAPE, 47 X 70 IN, DISPOSABLE, PLASTIC, STERILE

## (undated) DEVICE — CUFF, TOURNIQUET, 30 X 4, SNGL PORT/SNGL BLADDER, DISP, LF

## (undated) DEVICE — BLADE, PRECISION THICK, MED, LONG, 9 X 0.51 X 31MM

## (undated) DEVICE — SUTURE, VICRYL, 0, 27 IN, CT-2, UNDYED

## (undated) DEVICE — BANDAGE, COFLEX, 4 X 5 YDS, FOAM TAN, STERILE, LF

## (undated) DEVICE — SUTURE, CTD, VICRYL, 2-0, UND, BR, CT-2

## (undated) DEVICE — SUTURE, VICRYL PLUS, 2-0, SH, 1X27IN, UNDYED

## (undated) DEVICE — GUIDEWIRE, UNTHREADED, 2.0MM X 150MM

## (undated) DEVICE — GLOVE, SURGICAL, PROTEXIS PI ORTHO, 8.0, PF, LF

## (undated) DEVICE — BANDAGE, GAUZE, 6 PLY, KERLIX, 2.25 IN X 3 YD, STERILE

## (undated) DEVICE — DRAPE PACK, ORTHOPEDIC, ARTHROSCOPY, CUSTOM, GEAUGA

## (undated) DEVICE — DRESSING, GAUZE, PETROLATUM, PATCH, XEROFORM, 1 X 8 IN, STERILE

## (undated) DEVICE — BLADE, OSCILLATING/SAGITTAL, 25MM X 9MM

## (undated) DEVICE — COVER, MAYO STAND, W/PAD, 23 IN, DISPOSABLE, PLASTIC, LF, STERILE

## (undated) DEVICE — SOLUTION, IRRIGATION, USP, 0.9% SODIUM CHL, 3000ML, TITAN XL

## (undated) DEVICE — STOCKINETTE, DOUBLE PLY, 6 X 48 IN, STERILE

## (undated) DEVICE — BANDAGE, ESMARK, 6 IN X 9 FT, STERILE

## (undated) DEVICE — SUTURE, VICRYL, 4-0, 18 IN, PS2, UNDYED

## (undated) DEVICE — BANDAGE, COFLEX, 4 X 5 YDS, TAN, STERILE, LF

## (undated) DEVICE — BUR, ROUND, 4.0MM X 54.0 MM

## (undated) DEVICE — BLADE, SURGICAL, POLYMER COATED P15, STERILE, DISP

## (undated) DEVICE — SUTURE, VICRYL PLUS 3-0, SH, 27IN

## (undated) DEVICE — SOLUTION, IRRIGATION, SODIUM CHLORIDE 0.9%, 1000 ML, POUR BOTTLE

## (undated) DEVICE — COVER, EQUIPMENT, C ARM

## (undated) DEVICE — DRESSING, GAUZE, SPONGE, 12 PLY, CURITY, 4 X 4 IN, STERILE

## (undated) DEVICE — COVER, C-ARM W/CLIPS, OEC GE

## (undated) DEVICE — GLOVE, SURGICAL, PROTEXIS PI BLUE W/NEUTHERA, 8.5, PF, LF

## (undated) DEVICE — DRESSING, ABDOMINAL PAD, CURITY, 7.5 X 8 IN

## (undated) DEVICE — SUTURE, VICRYL PLUS, 2-0, 27 IN, CT-2, UNDYED

## (undated) DEVICE — DRAPE, C-ARMOR KIT

## (undated) DEVICE — DRESSING, NON-ADHERENT, OIL EMULSION, CURITY, 3 X 8 IN, STERILE

## (undated) DEVICE — DRESSING, GAUZE, 16 PLY, 4 X 4 IN, STERILE

## (undated) DEVICE — SUTURE, ETHILON, 3/0, FS1, 18 IN, BLK MONO

## (undated) DEVICE — GLOVE, SURGICAL, PROTEXIS PI BLUE W/NEUTHERA, 7.5, PF, LF

## (undated) DEVICE — SUTURE, VICRYL, 4-0, 27 IN, SH-1, UNDYED

## (undated) DEVICE — SUTURE, VICRYL, 3-0, 27 IN, SH, VIOLET

## (undated) DEVICE — BMC KIT, ANGEL, (US TRAY)

## (undated) DEVICE — DRESSING, ADAPTIC, NON-ADHERENT, 3 X 8 IN

## (undated) DEVICE — GUIDEWIRE, ASNIS III, 3.2 X 300MM, THREADED

## (undated) DEVICE — BANDAGE, ELASTIC, PREMIUM, SELF-CLOSE, 4 IN X 5.5 YD, STERILE

## (undated) DEVICE — GLOVE, SURGICAL, PROTEXIS PI , 7.5, PF, LF

## (undated) DEVICE — STRAP, ANKLE, DISTRACTOR, ARTHROSCOPY, STERILE

## (undated) DEVICE — SUTURE, ETHILON, 3-0, 18 IN, FS-1, BLACK

## (undated) DEVICE — NEEDLE, SAFETY, 18 G X 1.5 IN

## (undated) DEVICE — DRESSING, GAUZE, SPONGE, KERLIX, SUPER, 6 X 6.75 IN, STERILE 10PK

## (undated) DEVICE — SUTURE, ETHILON, 3-0, 18 IN, PS2, BLACK

## (undated) DEVICE — TOWEL PACK, STERILE, 4/PACK, BLUE

## (undated) DEVICE — SUTURE, ETHILON, 3-0, 18 IN, PS1, BLACK

## (undated) DEVICE — DRAPE, C-ARM IMAGE

## (undated) DEVICE — DRAPE, SHEET, U, W/ADHESIVE STRIP, IMPERVIOUS, 60 X 70 IN, DISPOSABLE, LF, STERILE

## (undated) DEVICE — TUBING, IRRIGATION, HIGH FLOW, HAND PIECE SET

## (undated) DEVICE — GLOVE, SURGICAL, PROTEXIS PI , 8.0, PF, LF

## (undated) DEVICE — SUTURE, ETHILON, 3-0, 30 IN, FS-1, BLACK